# Patient Record
Sex: FEMALE | Race: WHITE | Employment: FULL TIME | URBAN - METROPOLITAN AREA
[De-identification: names, ages, dates, MRNs, and addresses within clinical notes are randomized per-mention and may not be internally consistent; named-entity substitution may affect disease eponyms.]

---

## 2017-10-13 ENCOUNTER — ANESTHESIA EVENT (OUTPATIENT)
Dept: ENDOSCOPY | Age: 67
End: 2017-10-13
Payer: MEDICARE

## 2017-10-13 RX ORDER — UREA 10 %
1 LOTION (ML) TOPICAL
COMMUNITY
End: 2022-07-07 | Stop reason: CLARIF

## 2017-10-13 NOTE — ANESTHESIA PREPROCEDURE EVALUATION
Anesthetic History   No history of anesthetic complications            Review of Systems / Medical History  Patient summary reviewed, nursing notes reviewed and pertinent labs reviewed    Pulmonary                Comments: Remote smoking history - Quit 1967 - 0.125 pack years   Neuro/Psych   Within defined limits           Cardiovascular  Within defined limits                Exercise tolerance: >4 METS  Comments: 3-2016 EKG: SB, rate 54   GI/Hepatic/Renal     GERD: well controlled      PUD    Comments: Dysphagia  RUQ pain  Colon polyps Endo/Other        Arthritis     Other Findings            Physical Exam    Airway  Mallampati: III  TM Distance: 4 - 6 cm  Neck ROM: normal range of motion   Mouth opening: Normal     Cardiovascular  Regular rate and rhythm,  S1 and S2 normal,  no murmur, click, rub, or gallop             Dental  No notable dental hx       Pulmonary  Breath sounds clear to auscultation               Abdominal  GI exam deferred       Other Findings            Anesthetic Plan    ASA: 2  Anesthesia type: total IV anesthesia and general          Induction: Intravenous  Anesthetic plan and risks discussed with: Patient

## 2017-10-16 ENCOUNTER — ANESTHESIA (OUTPATIENT)
Dept: ENDOSCOPY | Age: 67
End: 2017-10-16
Payer: MEDICARE

## 2017-10-16 ENCOUNTER — HOSPITAL ENCOUNTER (OUTPATIENT)
Age: 67
Setting detail: OUTPATIENT SURGERY
Discharge: HOME | End: 2017-10-16
Attending: INTERNAL MEDICINE | Admitting: INTERNAL MEDICINE
Payer: MEDICARE

## 2017-10-16 VITALS
DIASTOLIC BLOOD PRESSURE: 62 MMHG | WEIGHT: 171.31 LBS | RESPIRATION RATE: 10 BRPM | HEIGHT: 61 IN | HEART RATE: 57 BPM | SYSTOLIC BLOOD PRESSURE: 129 MMHG | TEMPERATURE: 98.3 F | BODY MASS INDEX: 32.34 KG/M2 | OXYGEN SATURATION: 99 %

## 2017-10-16 PROCEDURE — 77030013992 HC SNR POLYP ENDOSC BSC -B: Performed by: INTERNAL MEDICINE

## 2017-10-16 PROCEDURE — 76060000031 HC ANESTHESIA FIRST 0.5 HR: Performed by: INTERNAL MEDICINE

## 2017-10-16 PROCEDURE — 74011000250 HC RX REV CODE- 250

## 2017-10-16 PROCEDURE — 74011250636 HC RX REV CODE- 250/636: Performed by: INTERNAL MEDICINE

## 2017-10-16 PROCEDURE — 76040000019: Performed by: INTERNAL MEDICINE

## 2017-10-16 PROCEDURE — 88305 TISSUE EXAM BY PATHOLOGIST: CPT | Performed by: INTERNAL MEDICINE

## 2017-10-16 RX ORDER — ETOMIDATE 2 MG/ML
INJECTION INTRAVENOUS AS NEEDED
Status: DISCONTINUED | OUTPATIENT
Start: 2017-10-16 | End: 2017-10-16 | Stop reason: HOSPADM

## 2017-10-16 RX ORDER — EPINEPHRINE 0.1 MG/ML
1 INJECTION INTRACARDIAC; INTRAVENOUS
Status: DISCONTINUED | OUTPATIENT
Start: 2017-10-16 | End: 2017-10-16 | Stop reason: HOSPADM

## 2017-10-16 RX ORDER — MIDAZOLAM HYDROCHLORIDE 1 MG/ML
1-2 INJECTION, SOLUTION INTRAMUSCULAR; INTRAVENOUS
Status: DISCONTINUED | OUTPATIENT
Start: 2017-10-16 | End: 2017-10-16 | Stop reason: HOSPADM

## 2017-10-16 RX ORDER — SODIUM CHLORIDE 0.9 % (FLUSH) 0.9 %
5-10 SYRINGE (ML) INJECTION AS NEEDED
Status: DISCONTINUED | OUTPATIENT
Start: 2017-10-16 | End: 2017-10-16 | Stop reason: HOSPADM

## 2017-10-16 RX ORDER — ATROPINE SULFATE 0.1 MG/ML
0.5 INJECTION INTRAVENOUS
Status: DISCONTINUED | OUTPATIENT
Start: 2017-10-16 | End: 2017-10-16 | Stop reason: HOSPADM

## 2017-10-16 RX ORDER — MIDAZOLAM HYDROCHLORIDE 1 MG/ML
.25-5 INJECTION, SOLUTION INTRAMUSCULAR; INTRAVENOUS
Status: DISCONTINUED | OUTPATIENT
Start: 2017-10-16 | End: 2017-10-16 | Stop reason: HOSPADM

## 2017-10-16 RX ORDER — FENTANYL CITRATE 50 UG/ML
25 INJECTION, SOLUTION INTRAMUSCULAR; INTRAVENOUS
Status: DISCONTINUED | OUTPATIENT
Start: 2017-10-16 | End: 2017-10-16 | Stop reason: HOSPADM

## 2017-10-16 RX ORDER — FLUMAZENIL 0.1 MG/ML
0.2 INJECTION INTRAVENOUS
Status: DISCONTINUED | OUTPATIENT
Start: 2017-10-16 | End: 2017-10-16 | Stop reason: HOSPADM

## 2017-10-16 RX ORDER — SODIUM CHLORIDE 0.9 % (FLUSH) 0.9 %
5-10 SYRINGE (ML) INJECTION EVERY 8 HOURS
Status: DISCONTINUED | OUTPATIENT
Start: 2017-10-16 | End: 2017-10-16 | Stop reason: HOSPADM

## 2017-10-16 RX ORDER — DEXTROMETHORPHAN/PSEUDOEPHED 2.5-7.5/.8
1.2 DROPS ORAL
Status: DISCONTINUED | OUTPATIENT
Start: 2017-10-16 | End: 2017-10-16 | Stop reason: HOSPADM

## 2017-10-16 RX ORDER — NALOXONE HYDROCHLORIDE 0.4 MG/ML
0.4 INJECTION, SOLUTION INTRAMUSCULAR; INTRAVENOUS; SUBCUTANEOUS
Status: DISCONTINUED | OUTPATIENT
Start: 2017-10-16 | End: 2017-10-16 | Stop reason: HOSPADM

## 2017-10-16 RX ORDER — SODIUM CHLORIDE 9 MG/ML
75 INJECTION, SOLUTION INTRAVENOUS CONTINUOUS
Status: DISCONTINUED | OUTPATIENT
Start: 2017-10-16 | End: 2017-10-16 | Stop reason: HOSPADM

## 2017-10-16 RX ADMIN — ETOMIDATE 50 MG: 2 INJECTION INTRAVENOUS at 07:47

## 2017-10-16 RX ADMIN — ETOMIDATE 50 MG: 2 INJECTION INTRAVENOUS at 07:52

## 2017-10-16 RX ADMIN — ETOMIDATE 50 MG: 2 INJECTION INTRAVENOUS at 07:57

## 2017-10-16 RX ADMIN — ETOMIDATE 50 MG: 2 INJECTION INTRAVENOUS at 07:50

## 2017-10-16 RX ADMIN — SODIUM CHLORIDE 75 ML/HR: 900 INJECTION, SOLUTION INTRAVENOUS at 07:30

## 2017-10-16 RX ADMIN — ETOMIDATE 50 MG: 2 INJECTION INTRAVENOUS at 07:54

## 2017-10-16 NOTE — PERIOP NOTES
Polyp removed from Ascending Colon:   - Protruding lesions:     -Sessile Polyp(s) size 3 mm removed by polypectomy (cold snare) using cold snare technique.

## 2017-10-16 NOTE — H&P
Gastroenterology Outpatient History and Physical    Patient: Torey Menon    Physician: Verito Prakash MD    Chief Complaint: [facility] Home Hx of colon polyps  History of Present Illness: 70yo F with Pers Hx of colon polyps. Last colonoscopy  without polyps. Denies new GI s/Sx    History:  Past Medical History:   Diagnosis Date    Arthritis     Autoimmune disease ([facility] [address]     Lickens    GERD (gastroesophageal reflux disease)     PUD (peptic ulcer disease)       Past Surgical History:   Procedure Laterality Date    HX  SECTION      X2    HX CHOLECYSTECTOMY      HX ORTHOPAEDIC      right foot, bunionectomy    HX ORTHOPAEDIC      bilateral hip arthroplasty    MA COLONOSCOPY FLX DX W/COLLJ SPEC WHEN PFRMD  1/20/2012     x 2    UPPER GI ENDOSCOPY,BIOPSY  2016         UPPER GI ENDOSCOPY,DILATN W GUIDE  2016           Social History     Social History    Marital status:      Spouse name: N/A    Number of children: N/A    Years of education: N/A     Social History Main Topics    Smoking status: Former Smoker     Packs/day: 0.25     Years: 0.50     Quit date: 1967    Smokeless tobacco: Never Used    Alcohol use Yes      Comment: occasionally    Drug use: No    Sexual activity: Not Asked     Other Topics Concern    None     Social History Narrative      Family History   Problem Relation Age of Onset    Cancer Maternal Grandmother 79     colon cancer    Hypertension Mother     Cancer Mother      pacreatic cancer      Patient Active Problem List   Diagnosis Code    DJD (degenerative joint disease) of hip M16.9    Hip arthritis M16.10       Allergies:    Allergies   Allergen Reactions    Codeine Nausea and Vomiting    Penicillins Rash     Rash & dizzy    Sulfa (Sulfonamide Antibiotics) Other (comments)     General redness all over skin    Ciprofloxacin Itching and Other (comments)     Extreme dizziness and rash    Keflex [Cephalexin] Other (comments)     Denies allergy Medications:   Prior to Admission medications    Medication Sig Start Date End Date Taking? Authorizing Provider   melatonin 1 mg tablet Take 1 mg by mouth nightly. Yes Historical Provider   cyanocobalamin (VITAMIN B-12) 1,000 mcg tablet Take 1,000 mcg by mouth daily. Yes Historical Provider   cholecalciferol, vitamin D3, 2,000 unit tab Take 2,000 Units by mouth daily. Yes Historical Provider   BIOTIN PO Take 5,000 mcg by mouth daily. Yes Historical Provider   pyridoxine (VITAMIN B-6) 100 mg tablet Take 100 mg by mouth daily. Yes Historical Provider     Physical Exam:   Vital Signs: There were no vitals taken for this visit.   General: well developed, well nourished   HEENT: unremarkable   Heart: regular rhythm no mumur    Lungs: clear   Abdominal:  benign   Neurological: unremarkable   Extremities: no edema     Findings/Diagnosis: Pers Hx of colon polyps  Plan of Care/Planned Procedure: Colonoscopy with conscious/deep sedation    Signed:  Amairani Chinchilla MD 10/16/2017

## 2017-10-16 NOTE — DISCHARGE INSTRUCTIONS
Brittany Alert  [id number]  1950    COLON DISCHARGE INSTRUCTIONS  Discomfort:  Redness at IV site- apply warm compress to area; if redness or soreness persist- contact your physician  There may be a slight amount of blood passed from the rectum  Gaseous discomfort- walking, belching will help relieve any discomfort  You may not operate a vehicle for 12 hours  You may not engage in an occupation involving machinery or appliances for rest of today  You may not drink alcoholic beverages for at least 12 hours  Avoid making any critical decisions for at least 24 hour  DIET:   High fiber diet. - however -  remember your colon is empty and a heavy meal will produce gas. Avoid these foods:  vegetables, fried / greasy foods, carbonated drinks for today  MEDICATION:         ACTIVITY:  You may not resume your normal daily activities until tomorrow AM; it is recommended that you spend the remainder of the day resting -  avoid any strenuous activity. CALL M.D. ANY SIGN OF:   Increasing pain, nausea, vomiting  Abdominal distension (swelling)  New increased bleeding (oral or rectal)  Fever (chills)      IMPRESSION:  -Left side colonic diverticulosis  -Single diminutive 3mm sessile, benign appearing polyp in the ascending colon at 80cm, removed with cold snare    Follow-up Instructions:   Call Dr. Fab Blue for the results of procedure / biopsy in 7-10 days  Telephone # [phone]  Repeat colonoscopy in 5 years    Bebeto William MD    [employer] Activation    Thank you for requesting access to [employer]. Please follow the instructions below to securely access and download your online medical record. L[employer] allows you to send messages to your doctor, view your test results, renew your prescriptions, schedule appointments, and more. How Do I Sign Up? 1. In your internet browser, go to [email or web address]  2. Click on the First Time User? Click Here link in the Sign In box.  You will be redirect to the New Member Sign Up page.  3. Enter your [employer] Access Code exactly as it appears below. You will not need to use this code after youve completed the sign-up process. If you do not sign up before the expiration date, you must request a new code. MyChart Access Code: Activation code not generated  Current Pagevamp Status: Active (This is the date your [employer] access code will )    4. Enter the last four digits of your Social Security Number (xxxx) and Date of Birth (mm/dd/yyyy) as indicated and click Submit. You will be taken to the next sign-up page. 5. Create a [employer] ID. This will be your Page[employer]amp login ID and cannot be changed, so think of one that is secure and easy to remember. 6. Create a [employer] password. You can change your password at any time. 7. Enter your Password Reset Question and Answer. This can be used at a later time if you forget your password. 8. Enter your e-mail address. You will receive e-mail notification when new information is available in [address] 9. Click Sign Up. You can now view and download portions of your medical record. 10. Click the Download Summary menu link to download a portable copy of your medical information. Additional Information    If you have questions, please visit the Frequently Asked Questions section of the Page[employer] website at [email or web address] Remember, Page[employer] is NOT to be used for urgent needs. For medical emergencies, dial 911.

## 2017-10-16 NOTE — ANESTHESIA POSTPROCEDURE EVALUATION
Post-Anesthesia Evaluation and Assessment    Patient: Vanessa Kendrick MRN: [id number]  SSN: [id number]    YOB: 1950  Age: 79 y.o. Sex: female       Cardiovascular Function/Vital Signs  Visit Vitals    /78    Pulse (!) 47    Temp 36.8 °C (98.3 °F)    Resp 13    Ht 5' 0.5\" (1.537 m)    Wt 77.7 kg (171 lb 5 oz)    SpO2 100%    BMI 32.91 kg/m2       Patient is status post total IV anesthesia, general anesthesia for Procedure(s):  COLONOSCOPY  ENDOSCOPIC POLYPECTOMY. Nausea/Vomiting: None    Postoperative hydration reviewed and adequate. Pain:  Pain Scale 1: Numeric (0 - 10) (10/16/17 1174)  Pain Intensity 1: 0 (10/16/17 9410)   Managed    Neurological Status: At baseline    Mental Status and Level of Consciousness: Arousable    Pulmonary Status:   O2 Device: Room air (10/16/17 2245)   Adequate oxygenation and airway patent    Complications related to anesthesia: None    Post-anesthesia assessment completed.  No concerns    Signed By: Jonny Mercer MD     October 16, 2017

## 2017-10-16 NOTE — ROUTINE PROCESS
Rocio Saini  1950  [id number]    Situation:  Verbal report received from: Jeffrey Martinez RN  Procedure: Procedure(s):  COLONOSCOPY  ENDOSCOPIC POLYPECTOMY    Background:    Preoperative diagnosis: DYSPHAGIA, RUQ PAIN, HX COLONIC POLYPS  Postoperative diagnosis: diverticulosis; colon polyp    :  Dr. Mary Grossman  Assistant(s): Endoscopy Technician-1: Michaela Cisneros  Endoscopy RN-1: Dennis Orta    Specimens:   ID Type Source Tests Collected by Time Destination   1 : ascending colon polyp for pathology Preservative Colon, Ascending  Carlos Antoine MD 10/16/2017 8799 Pathology     H. Pylori  no    Assessment:  Intra-procedure medications       Anesthesia gave intra-procedure sedation and medications, see anesthesia flow sheet yes    Intravenous fluids: NS@ KVO     Vital signs stable       Abdominal assessment: round and soft       Recommendation:  Discharge patient per MD order  .     Family or Friend   Norman Aguilera  Permission to share finding with family or friend yes

## 2017-10-16 NOTE — PROCEDURES
NAME:  Littie Galeazzi   :   1950   MRN:   [id number]     Date/Time:  10/16/2017 8:05 AM    Colonoscopy Operative Report    Procedure Type:   Colonoscopy with polypectomy (cold snare)     Indications:     Personal history of colon polyps (screening only)  Pre-operative Diagnosis: see indication above  Post-operative Diagnosis:  See findings below  :  Larissa Robert MD  Referring Provider: Destiny Solano MD    Exam:  Airway: clear, no airway problems anticipated  Heart: RRR, without gallops or rubs  Lungs: clear bilaterally without wheezes, crackles, or rhonchi  Abdomen: soft, nontender, nondistended, bowel sounds present  Mental Status: awake, alert and oriented to person, place and time    Sedation:  MAC anesthesia Propofol 250mg IV  Procedure Details:  After informed consent was obtained with all risks and benefits of procedure explained and preoperative exam completed, the patient was taken to the endoscopy suite and placed in the left lateral decubitus position. Upon sequential sedation as per above, a digital rectal exam was performed demonstrating no hemorrhoids. The Olympus videocolonoscope  was inserted in the rectum and carefully advanced to the cecum, which was identified by the ileocecal valve and appendiceal orifice. The quality of preparation was adequate. The colonoscope was slowly withdrawn with careful evaluation between folds. Retroflexion in the rectum was completed demonstrating no hemorrhoids. Findings:     -Left side colonic diverticulosis  -Single diminutive 3mm sessile, benign appearing polyp in the ascending colon at 80cm, removed with cold snare    Specimen Removed:  #1 asc colon  Complications: None. EBL:  None. Impression:    -Left side colonic diverticulosis  -Single diminutive 3mm sessile, benign appearing polyp in the ascending colon at 80cm, removed with cold snare    Recommendations: --Await pathology. , -Repeat colonoscopy in 5 years.  High fiber diet.  Resume normal medication(s). You will receive a letter about the biopsy results in about 10 days. You may be asked to call your doctor's office for the results. Discharge Disposition:  Home in the company of a  when able to ambulate.     Normie Dakins, MD

## 2017-10-16 NOTE — IP AVS SNAPSHOT
[address] [facility] 
[phone] Patient: Liz Disla MRN: [id number] WBT:[birth year] You are allergic to the following Allergen Reactions Codeine Nausea and Vomiting Penicillins Rash Rash & dizzy Sulfa (Sulfonamide Antibiotics) Other (comments) General redness all over skin Ciprofloxacin Itching Other (comments) Extreme dizziness and rash Keflex (Cephalexin) Other (comments) Denies allergy Recent Documentation Height Weight BMI OB Status Smoking Status 1.537 m 77.7 kg 32.91 kg/m2 Postmenopausal Former Smoker Emergency Contacts Name Discharge Info Relation Home Work Mobile Abner Muhammad DISCHARGE CAREGIVER [3] Child [2] [phone] About your hospitalization You were admitted on:  October 16, 2017 You last received care in the:  [facility] ENDOSCOPY You were discharged on:  October 16, 2017 Unit phone number:  [phone] Why you were hospitalized Your primary diagnosis was:  Not on File Providers Seen During Your Hospitalizations Provider Role Specialty Primary office phone Normie Dakins, MD Attending Provider Gastroenterology [phone] Your Primary Care Physician (PCP) Primary Care Physician Office Phone Office Fax [address] [phone] Follow-up Information None Current Discharge Medication List  
  
CONTINUE these medications which have NOT CHANGED Dose & Instructions Dispensing Information Comments Morning Noon Evening Bedtime BIOTIN PO Your last dose was: Your next dose is:    
   
   
 Dose:  5000 mcg Take 5,000 mcg by mouth daily. Refills:  0  
     
   
   
   
  
 cholecalciferol (vitamin D3) 2,000 unit Tab Your last dose was: Your next dose is: Dose:  2000 Units Take 2,000 Units by mouth daily. Refills:  0  
     
   
   
   
  
 melatonin 1 mg tablet Your last dose was: Your next dose is:    
   
   
 Dose:  1 mg Take 1 mg by mouth nightly. Refills:  0  
     
   
   
   
  
 pyridoxine (vitamin B6) 100 mg tablet Commonly known as:  VITAMIN B-6 Your last dose was: Your next dose is:    
   
   
 Dose:  100 mg Take 100 mg by mouth daily. Refills:  0  
     
   
   
   
  
 VITAMIN B-12 1,000 mcg tablet Generic drug:  cyanocobalamin Your last dose was: Your next dose is:    
   
   
 Dose:  1000 mcg Take 1,000 mcg by mouth daily. Refills:  0 Discharge Instructions Caroline Lara [id number] 
1950 COLON DISCHARGE INSTRUCTIONS Discomfort: 
Redness at IV site- apply warm compress to area; if redness or soreness persist- contact your physician There may be a slight amount of blood passed from the rectum Gaseous discomfort- walking, belching will help relieve any discomfort You may not operate a vehicle for 12 hours You may not engage in an occupation involving machinery or appliances for rest of today You may not drink alcoholic beverages for at least 12 hours Avoid making any critical decisions for at least 24 hour DIET: 
 High fiber diet.  however -  remember your colon is empty and a heavy meal will produce gas. Avoid these foods:  vegetables, fried / greasy foods, carbonated drinks for today MEDICATION: 
 
 
  
ACTIVITY: 
You may not resume your normal daily activities until tomorrow AM; it is recommended that you spend the remainder of the day resting -  avoid any strenuous activity. CALL M.D. ANY SIGN OF: Increasing pain, nausea, vomiting Abdominal distension (swelling) New increased bleeding (oral or rectal) Fever (chills) IMPRESSION: 
-Left side colonic diverticulosis -Single diminutive 3mm sessile, benign appearing polyp in the ascending colon at 80cm, removed with cold snare Follow-up Instructions: 
 Call Dr. Susan Turner for the results of procedure / biopsy in 7-10 days Telephone # [phone] Repeat colonoscopy in 5 years Jaqueline Araujo MD 
 
[employer] Activation Thank you for requesting access to [employer]. Please follow the instructions below to securely access and download your online medical record. [employer] allows you to send messages to your doctor, view your test results, renew your prescriptions, schedule appointments, and more. How Do I Sign Up? 1. In your internet browser, go to [email or web address] 
2. Click on the First Time User? Click Here link in the Sign In box. You will be redirect to the New Member Sign Up page. 3. Enter your [employer] Access Code exactly as it appears below. You will not need to use this code after youve completed the sign-up process. If you do not sign up before the expiration date, you must request a new code. [employer] Access Code: Activation code not generated Current [employer] Status: Active (This is the date your [employer] access code will ) 4. Enter the last four digits of your Social Security Number (xxxx) and Date of Birth (mm/dd/yyyy) as indicated and click Submit. You will be taken to the next sign-up page. 5. Create a [employer] ID. This will be your [employer] login ID and cannot be changed, so think of one that is secure and easy to remember. 6. Create a [employer] password. You can change your password at any time. 7. Enter your Password Reset Question and Answer. This can be used at a later time if you forget your password. 8. Enter your e-mail address. You will receive e-mail notification when new information is available in [address] 9. Click Sign Up. You can now view and download portions of your medical record. 10. Click the Download Summary menu link to download a portable copy of your medical information. Additional Information If you have questions, please visit the Frequently Asked Questions section of the [employer] website at [email or web address] Remember, [employer] is NOT to be used for urgent needs. For medical emergencies, dial 911. Discharge Orders None [facility]! Dear Demetrius Black: 
Thank you for requesting a [employer] account. Our records indicate that you already have an active [employer] account. You can access your account anytime at [email or web address] Did you know that you can access your hospital and ER discharge instructions at any time in [employer]? You can also review all of your test results from your hospital stay or ER visit. Additional Information If you have questions, please visit the Frequently Asked Questions section of the [employer] website at [email or web address] Remember, [employer] is NOT to be used for urgent needs. For medical emergencies, dial 911. Now available from your iPhone and Android! General Information Please provide this summary of care documentation to your next provider. Patient Signature:  ____________________________________________________________ Date:  ____________________________________________________________  
  
Lizz Cobb Provider Signature:  ____________________________________________________________ Date:  ____________________________________________________________

## 2019-04-25 ENCOUNTER — HOSPITAL ENCOUNTER (OUTPATIENT)
Dept: MAMMOGRAPHY | Age: 69
Discharge: HOME | End: 2019-04-25
Payer: MEDICARE

## 2019-04-25 DIAGNOSIS — Z78.0 POSTMENOPAUSAL: ICD-10-CM

## 2019-04-25 PROCEDURE — 77080 DXA BONE DENSITY AXIAL: CPT

## 2021-06-25 RX ORDER — MISOPROSTOL 200 UG/1
200 TABLET ORAL ONCE
COMMUNITY
End: 2021-07-07

## 2021-06-25 RX ORDER — METHYLPREDNISOLONE 4 MG/1
4 TABLET ORAL
COMMUNITY
End: 2022-10-20

## 2021-06-25 RX ORDER — IBUPROFEN 200 MG
400 TABLET ORAL
COMMUNITY
End: 2021-07-07

## 2021-06-25 NOTE — PERIOP NOTES
[facility]  Ambulatory Surgery Unit  Pre-operative Instructions    Surgery/Procedure Date  7/7            Tentative Arrival Time tbd      1. On the day of your surgery/procedure, please report to the Ambulatory Surgery Unit Registration Desk and sign in at your designated time. The Ambulatory Surgery Unit is located in [facility] on the [facility] side of the [facility] across from the [facility]. Please have all of your health insurance cards and a photo ID. 2. You must have someone with you to drive you home, as you should not drive a car for 24 hours following anesthesia. Please make arrangements for a responsible adult friend or family member to stay with you for at least the first 24 hours after your surgery. 3. Do not have anything to eat or drink (including water, gum, mints, coffee, juice) after 11:59 PM  7/6. This may not apply to medications prescribed by your physician. (Please note below the special instructions with medications to take the morning of surgery, if applicable.)    4. We recommend you do not drink any alcoholic beverages for 24 hours before and after your surgery. 5. Contact your surgeons office for instructions on the following medications: non-steroidal anti-inflammatory drugs (i.e. Advil, Aleve), vitamins, and supplements. (Some surgeons will want you to stop these medications prior to surgery and others may allow you to take them)   **If you are currently taking Plavix, Coumadin, Aspirin and/or other blood-thinning agents, contact your surgeon for instructions. ** Your surgeon will partner with the physician prescribing these medications to determine if it is safe to stop or if you need to continue taking. Please do not stop taking these medications without instructions from your surgeon.     6. In an effort to help prevent surgical site infection, we ask that you shower with an anti-bacterial soap (i.e. Dial/Safeguard, or the soap provided to you at your preadmission testing appointment) for 3 days prior to and on the morning of surgery, using a fresh towel after each shower. (Please begin this process with fresh bed linens.) Do not apply any lotions, powders, or deodorants after the shower on the day of your procedure. If applicable, please do not shave the operative site for 48 hours prior to surgery. 7. Wear comfortable clothes. Wear glasses instead of contacts. Do not bring any jewelry or money (other than copays or fees as instructed). Do not wear make-up, particularly mascara, the morning of your surgery. Do not wear nail polish, particularly if you are having foot /hand surgery. Wear your hair loose or down, no ponytails, buns, erich pins or clips. All body piercings must be removed. 8. You should understand that if you do not follow these instructions your surgery may be cancelled. If your physical condition changes (i.e. fever, cold or flu) please contact your surgeon as soon as possible. 9. It is important that you be on time. If a situation occurs where you may be late, or if you have any questions or problems, please call [phone].    10. Your surgery time may be subject to change. You will receive a phone call the day prior to surgery to confirm your arrival time. Special Instructions: Take all medications and inhalers, as prescribed, on the morning of surgery with a sip of water EXCEPT: none    I understand a pre-operative phone call will be made to verify my surgery time. In the event that I am not available, I give permission for a message to be left on my answering service and/or with another person? yes    Reviewed by phone with pt, verbalize understanding.  Bring vaccine card dos     ___________________      ___________________      ________________  (Signature of Patient)          (Witness)                   (Date and Time)

## 2021-07-02 RX ORDER — ACETAMINOPHEN 500 MG
1000 TABLET ORAL ONCE
Status: COMPLETED | OUTPATIENT
Start: 2021-07-07 | End: 2021-07-07

## 2021-07-06 ENCOUNTER — ANESTHESIA EVENT (OUTPATIENT)
Dept: SURGERY | Age: 71
End: 2021-07-06
Payer: MEDICARE

## 2021-07-06 NOTE — H&P
Pre-operative Evaluation / History & Physical    Sent From: Sent To: [address] [place], [place]  Phone: [phone] Fax: [phone]      Patient Information  Patient Name Julio César DORMAN    1950 Age 69yo   Address [place] [address] [place] [address], [address] Phone H: [phone]  W: [phone]  M: [phone] [facility] [place] (Medicare)  ID: [id number]  Policy [place]: Shelley MULLIGAN [facility] [place] ([address] [place] [address])  ID: [id number]  Policy [place]: Diego Freeman Visit and Medical History  Chief Complaint postmenopausal bleeding   History of Present Illness Patient presents today for follow up of postmenopausal bleeding. last month she noticed some pink spotting on her liner one day and then a few more times when she wiped after urinating. She also had some pelvic pain. she saw Dr. Raina Stevenson who did an EMB. EMB was benign but no endometrial tissue was identified. she set her up for u/s and follow up. she is here today. she has not had any further bleeding. Her pain has improved. No vaginal discharge, no bladder or bowel complaints. EMS 12.5mm  Uterus 5 x 2.9 x 5cm  Cervical length 2.3cm     Past Medical History Discussed Past Medical History  Colon Polyp: Y  Obesity: Y  Other: (no answer) - Lichen Planus   Surgical History Reviewed Surgical History     Orthopedic - Hip Replacement - Bilateral hip replacement ,    Cholecystectomy (Gallbladder)    delivery - 1986   Tubal Ligation   Colonoscopy   Gynecological / Obstetrical History Reviewed GYN History  Date of LMP: (Notes: ). Date of Last Pap Smear: 2020 (Notes: NIL). Sexually Active?: N.  STIs/STDs: Y (Notes: HSV). Current Birth Control Method: Tubal Ligation. Date of Last Mammogram: 2020 (Notes: BIRADS 1, TC LR 8.4%). Most Recent Bone Density: (Notes: 2019 osteopenia (pcp)).   Date of Last Colonoscopy: (Notes: 2017 polyp removed Q5). Social History Discussed Social History  OB/GYN Social History  Chewing tobacco: none  Tobacco Smoking Status: Never smoker  Non-smoker  Smokeless Tobacco Status: Never used smokeless tobacco  Tobacco-years of use: 0  E-cigarette/Vape Status: Never used electronic cigarettes  Most Recent Tobacco Use Screenin/15/2020  Marital status:  (Notes:   2013)  Number of children: 2  Are you working: Yes  Occupation: dept of  in [employer]  On average, how many days per week do you engage in moderate to strenuous EXERCISE (like walking fast, running, jogging, dancing, swimming, biking, or other activities that cause a light or heavy sweat)?: 0  How often do you have a DRINK containing ALCOHOL?: Monthly or less  Illicit drugs: no   Family History Discussed Family History    Paternal Aunt - Malignant tumor of breast     - Malignant tumor of ovary   Maternal Grandmother - Malignant tumor of colon   Paternal Grandmother - Diabetes mellitus   Mother - Hypertensive disorder     - Malignant tumor of pancreas   Sister - Hypertensive disorder     - History of transient ischemic attack   Paternal Uncle - Heart disease      Allergies List Reviewed Allergies     CIPRO: Dizziness (Moderate)    CODEINE: Vomiting (Moderate)    PENICILLIN G SODIUM: Dizziness (Moderate)    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    Vital Signs Ht: 5 ft 0.75 in (154.31 cm) 2021 10:37 am Wt: 177 lbs (80.29 kg) 2021 10:38 am BMI: 33.7 2021 10:38 am   BP: 150/80 2021 10:40 am          Physical Exam Patient is a 70-year-old female. Constitutional: General Appearance: well developed and nourished and pleasant. Level of Distress: no acute distress. Ambulation: ambulating normally. Head: Head: normocephalic. Eyes: Extraocular Movements extraocular movements intact. Ears, Nose, Mouth, Throat: Ears normal hearing. Nose: no external nose lesions. Neck: Appearance supple, trachea midline, and no neck masses. Thyroid: no enlargement or nodules and non-tender. Abdomen: Inspection and Palpation: no tenderness, guarding, masses, rebound tenderness, or CVA tenderness and soft and non-distended. Liver: non-tender; no masses. Spleen: no masses. Hernia: none palpable. Female : External genitalia: no lesions, rash, or erythema. Vagina: no discharge, mass, or tenderness and atrophic mucosa. Cervix: no discharge or cervical lesion. Uterus: midline, non-tender, no mass, and not enlarged. Adnexae: no adnexal mass or tenderness. Bladder and Urethra: no urethral discharge or mass. Lymphatics: Inguinal no inguinal lymphadenopathy. Mental Status Exam: Orientation oriented to person, place, and time. ultrasound  Normal uterus wit 12.5 mm stripe. Normal right ovary. Non visible left ovary with otherwise normal left adnexa. No free fluid. Lab Results    Assessment and Plan 1. Postmenopausal bleeding -  Exam is normal. bleeding resolved. however, we discussed that stripe is quite a bit more than 4 mm which is the cut of to use u/s alone to rule out serious pathology. EMB was benign but inconclusive. i think the safest thing would be to proceed with hysteroscopy, possible myosure with Dr. Leticia Zayas. We discussed risks of bleeding, infection, injury to surrounding tissues that could require further surgery, and anesthetic complications. The need for blood transfusion is unlikely but possible. All of her questions were answered. She desires to proceed with surgery. she is fine to meet her day of procedure.  she will call back with any questions. N95.0: Postmenopausal bleeding    2.  Endometrium thickened  R93.89: Abnormal findings on diagnostic imaging of other specified body structures      Return to Office  None recorded   Current Problems (Diagnoses) Reviewed Problems     Endometrium thickened - Onset: 04/20/2021   Obesity - Onset: 12/29/2015   Atrophic vaginitis - Onset: 01/27/2009   Postmenopausal bleeding - Onset: 04/20/2021         Electronically Signed by: Stanton Rosas MD    _____________________________________________  Ordered/Documented by:  Visit Date: 04/20/2021

## 2021-07-07 ENCOUNTER — HOSPITAL ENCOUNTER (OUTPATIENT)
Age: 71
Setting detail: OUTPATIENT SURGERY
Discharge: HOME | End: 2021-07-07
Attending: OBSTETRICS & GYNECOLOGY | Admitting: OBSTETRICS & GYNECOLOGY
Payer: MEDICARE

## 2021-07-07 ENCOUNTER — ANESTHESIA (OUTPATIENT)
Dept: SURGERY | Age: 71
End: 2021-07-07
Payer: MEDICARE

## 2021-07-07 VITALS
WEIGHT: 175 LBS | HEART RATE: 64 BPM | BODY MASS INDEX: 33.04 KG/M2 | RESPIRATION RATE: 13 BRPM | DIASTOLIC BLOOD PRESSURE: 75 MMHG | TEMPERATURE: 97.6 F | OXYGEN SATURATION: 99 % | SYSTOLIC BLOOD PRESSURE: 156 MMHG | HEIGHT: 61 IN

## 2021-07-07 PROCEDURE — 76210000034 HC AMBSU PH I REC 0.5 TO 1 HR: Performed by: OBSTETRICS & GYNECOLOGY

## 2021-07-07 PROCEDURE — 2709999900 HC NON-CHARGEABLE SUPPLY: Performed by: OBSTETRICS & GYNECOLOGY

## 2021-07-07 PROCEDURE — 77030021352 HC CBL LD SYS DISP COVD -B: Performed by: OBSTETRICS & GYNECOLOGY

## 2021-07-07 PROCEDURE — 77030033137 HC TBNG OUTFLO AQUILEX ST HOLO -B: Performed by: OBSTETRICS & GYNECOLOGY

## 2021-07-07 PROCEDURE — 74011250636 HC RX REV CODE- 250/636: Performed by: NURSE ANESTHETIST, CERTIFIED REGISTERED

## 2021-07-07 PROCEDURE — 74011250637 HC RX REV CODE- 250/637: Performed by: ANESTHESIOLOGY

## 2021-07-07 PROCEDURE — 77030033136 HC TBNG INFLO AQUILEX ST HOLO -C: Performed by: OBSTETRICS & GYNECOLOGY

## 2021-07-07 PROCEDURE — 88305 TISSUE EXAM BY PATHOLOGIST: CPT

## 2021-07-07 PROCEDURE — 74011250636 HC RX REV CODE- 250/636: Performed by: ANESTHESIOLOGY

## 2021-07-07 PROCEDURE — 76210000046 HC AMBSU PH II REC FIRST 0.5 HR: Performed by: OBSTETRICS & GYNECOLOGY

## 2021-07-07 PROCEDURE — 74011000250 HC RX REV CODE- 250: Performed by: OBSTETRICS & GYNECOLOGY

## 2021-07-07 PROCEDURE — 76030000000 HC AMB SURG OR TIME 0.5 TO 1: Performed by: OBSTETRICS & GYNECOLOGY

## 2021-07-07 PROCEDURE — 74011000250 HC RX REV CODE- 250: Performed by: NURSE ANESTHETIST, CERTIFIED REGISTERED

## 2021-07-07 PROCEDURE — 77030037417 HC DEV TISS RMVL HOLO -H: Performed by: OBSTETRICS & GYNECOLOGY

## 2021-07-07 PROCEDURE — 76060000061 HC AMB SURG ANES 0.5 TO 1 HR: Performed by: OBSTETRICS & GYNECOLOGY

## 2021-07-07 RX ORDER — HYDROMORPHONE HYDROCHLORIDE 1 MG/ML
.2-.5 INJECTION, SOLUTION INTRAMUSCULAR; INTRAVENOUS; SUBCUTANEOUS ONCE
Status: DISCONTINUED | OUTPATIENT
Start: 2021-07-07 | End: 2021-07-07 | Stop reason: HOSPADM

## 2021-07-07 RX ORDER — LIDOCAINE HYDROCHLORIDE 10 MG/ML
10 INJECTION INFILTRATION; PERINEURAL ONCE
Status: DISCONTINUED | OUTPATIENT
Start: 2021-07-07 | End: 2021-07-07

## 2021-07-07 RX ORDER — MORPHINE SULFATE 10 MG/ML
2 INJECTION, SOLUTION INTRAMUSCULAR; INTRAVENOUS
Status: DISCONTINUED | OUTPATIENT
Start: 2021-07-07 | End: 2021-07-07 | Stop reason: HOSPADM

## 2021-07-07 RX ORDER — OXYCODONE AND ACETAMINOPHEN 5; 325 MG/1; MG/1
1 TABLET ORAL
Status: DISCONTINUED | OUTPATIENT
Start: 2021-07-07 | End: 2021-07-07 | Stop reason: HOSPADM

## 2021-07-07 RX ORDER — PROPOFOL 10 MG/ML
INJECTION, EMULSION INTRAVENOUS AS NEEDED
Status: DISCONTINUED | OUTPATIENT
Start: 2021-07-07 | End: 2021-07-07 | Stop reason: HOSPADM

## 2021-07-07 RX ORDER — SODIUM CHLORIDE 0.9 % (FLUSH) 0.9 %
5-40 SYRINGE (ML) INJECTION AS NEEDED
Status: DISCONTINUED | OUTPATIENT
Start: 2021-07-07 | End: 2021-07-07 | Stop reason: HOSPADM

## 2021-07-07 RX ORDER — IBUPROFEN 600 MG/1
600 TABLET ORAL
Qty: 30 TABLET | Refills: 0 | Status: SHIPPED | OUTPATIENT
Start: 2021-07-07 | End: 2022-08-17 | Stop reason: CLARIF

## 2021-07-07 RX ORDER — KETOROLAC TROMETHAMINE 30 MG/ML
30 INJECTION, SOLUTION INTRAMUSCULAR; INTRAVENOUS
Status: COMPLETED | OUTPATIENT
Start: 2021-07-07 | End: 2021-07-07

## 2021-07-07 RX ORDER — DEXAMETHASONE SODIUM PHOSPHATE 4 MG/ML
INJECTION, SOLUTION INTRA-ARTICULAR; INTRALESIONAL; INTRAMUSCULAR; INTRAVENOUS; SOFT TISSUE AS NEEDED
Status: DISCONTINUED | OUTPATIENT
Start: 2021-07-07 | End: 2021-07-07 | Stop reason: HOSPADM

## 2021-07-07 RX ORDER — LIDOCAINE HYDROCHLORIDE 10 MG/ML
0.1 INJECTION, SOLUTION EPIDURAL; INFILTRATION; INTRACAUDAL; PERINEURAL AS NEEDED
Status: DISCONTINUED | OUTPATIENT
Start: 2021-07-07 | End: 2021-07-07 | Stop reason: HOSPADM

## 2021-07-07 RX ORDER — LIDOCAINE HYDROCHLORIDE 10 MG/ML
10 INJECTION, SOLUTION EPIDURAL; INFILTRATION; INTRACAUDAL; PERINEURAL ONCE
Status: COMPLETED | OUTPATIENT
Start: 2021-07-07 | End: 2021-07-07

## 2021-07-07 RX ORDER — FENTANYL CITRATE 50 UG/ML
25 INJECTION, SOLUTION INTRAMUSCULAR; INTRAVENOUS
Status: DISCONTINUED | OUTPATIENT
Start: 2021-07-07 | End: 2021-07-07 | Stop reason: HOSPADM

## 2021-07-07 RX ORDER — SODIUM CHLORIDE 0.9 % (FLUSH) 0.9 %
5-40 SYRINGE (ML) INJECTION EVERY 8 HOURS
Status: DISCONTINUED | OUTPATIENT
Start: 2021-07-07 | End: 2021-07-07 | Stop reason: HOSPADM

## 2021-07-07 RX ORDER — EPHEDRINE SULFATE/0.9% NACL/PF 50 MG/5 ML
SYRINGE (ML) INTRAVENOUS AS NEEDED
Status: DISCONTINUED | OUTPATIENT
Start: 2021-07-07 | End: 2021-07-07 | Stop reason: HOSPADM

## 2021-07-07 RX ORDER — SODIUM CHLORIDE, SODIUM LACTATE, POTASSIUM CHLORIDE, CALCIUM CHLORIDE 600; 310; 30; 20 MG/100ML; MG/100ML; MG/100ML; MG/100ML
25 INJECTION, SOLUTION INTRAVENOUS CONTINUOUS
Status: DISCONTINUED | OUTPATIENT
Start: 2021-07-07 | End: 2021-07-07 | Stop reason: HOSPADM

## 2021-07-07 RX ORDER — KETOROLAC TROMETHAMINE 30 MG/ML
INJECTION, SOLUTION INTRAMUSCULAR; INTRAVENOUS
Status: DISCONTINUED
Start: 2021-07-07 | End: 2021-07-07 | Stop reason: HOSPADM

## 2021-07-07 RX ORDER — PROPOFOL 10 MG/ML
INJECTION, EMULSION INTRAVENOUS
Status: DISCONTINUED | OUTPATIENT
Start: 2021-07-07 | End: 2021-07-07 | Stop reason: HOSPADM

## 2021-07-07 RX ORDER — DIPHENHYDRAMINE HYDROCHLORIDE 50 MG/ML
12.5 INJECTION, SOLUTION INTRAMUSCULAR; INTRAVENOUS AS NEEDED
Status: DISCONTINUED | OUTPATIENT
Start: 2021-07-07 | End: 2021-07-07 | Stop reason: HOSPADM

## 2021-07-07 RX ORDER — FENTANYL CITRATE 50 UG/ML
INJECTION, SOLUTION INTRAMUSCULAR; INTRAVENOUS AS NEEDED
Status: DISCONTINUED | OUTPATIENT
Start: 2021-07-07 | End: 2021-07-07 | Stop reason: HOSPADM

## 2021-07-07 RX ORDER — SILVER NITRATE 38.21; 12.74 MG/1; MG/1
1 STICK TOPICAL ONCE
Status: DISCONTINUED | OUTPATIENT
Start: 2021-07-07 | End: 2021-07-07 | Stop reason: HOSPADM

## 2021-07-07 RX ORDER — ONDANSETRON 2 MG/ML
4 INJECTION INTRAMUSCULAR; INTRAVENOUS AS NEEDED
Status: DISCONTINUED | OUTPATIENT
Start: 2021-07-07 | End: 2021-07-07 | Stop reason: HOSPADM

## 2021-07-07 RX ORDER — ONDANSETRON 2 MG/ML
INJECTION INTRAMUSCULAR; INTRAVENOUS AS NEEDED
Status: DISCONTINUED | OUTPATIENT
Start: 2021-07-07 | End: 2021-07-07 | Stop reason: HOSPADM

## 2021-07-07 RX ADMIN — FENTANYL CITRATE 12.5 MCG: 50 INJECTION, SOLUTION INTRAMUSCULAR; INTRAVENOUS at 08:08

## 2021-07-07 RX ADMIN — KETOROLAC TROMETHAMINE 30 MG: 30 INJECTION, SOLUTION INTRAMUSCULAR; INTRAVENOUS at 08:57

## 2021-07-07 RX ADMIN — PROPOFOL 40 MG: 10 INJECTION, EMULSION INTRAVENOUS at 08:02

## 2021-07-07 RX ADMIN — Medication 1000 MG: at 07:52

## 2021-07-07 RX ADMIN — ONDANSETRON HYDROCHLORIDE 4 MG: 2 INJECTION, SOLUTION INTRAMUSCULAR; INTRAVENOUS at 08:05

## 2021-07-07 RX ADMIN — FENTANYL CITRATE 25 MCG: 50 INJECTION, SOLUTION INTRAMUSCULAR; INTRAVENOUS at 08:04

## 2021-07-07 RX ADMIN — PROPOFOL 30 MG: 10 INJECTION, EMULSION INTRAVENOUS at 08:13

## 2021-07-07 RX ADMIN — FENTANYL CITRATE 12.5 MCG: 50 INJECTION, SOLUTION INTRAMUSCULAR; INTRAVENOUS at 08:10

## 2021-07-07 RX ADMIN — FENTANYL CITRATE 12.5 MCG: 50 INJECTION, SOLUTION INTRAMUSCULAR; INTRAVENOUS at 08:13

## 2021-07-07 RX ADMIN — FENTANYL CITRATE 12.5 MCG: 50 INJECTION, SOLUTION INTRAMUSCULAR; INTRAVENOUS at 08:07

## 2021-07-07 RX ADMIN — SODIUM CHLORIDE, POTASSIUM CHLORIDE, SODIUM LACTATE AND CALCIUM CHLORIDE 25 ML/HR: 600; 310; 30; 20 INJECTION, SOLUTION INTRAVENOUS at 07:51

## 2021-07-07 RX ADMIN — PROPOFOL 70 MCG/KG/MIN: 10 INJECTION, EMULSION INTRAVENOUS at 08:02

## 2021-07-07 RX ADMIN — PROPOFOL 25 MG: 10 INJECTION, EMULSION INTRAVENOUS at 08:05

## 2021-07-07 RX ADMIN — DEXAMETHASONE SODIUM PHOSPHATE 4 MG: 4 INJECTION, SOLUTION INTRAMUSCULAR; INTRAVENOUS at 08:08

## 2021-07-07 RX ADMIN — PROPOFOL 25 MG: 10 INJECTION, EMULSION INTRAVENOUS at 08:07

## 2021-07-07 RX ADMIN — Medication 5 MG: at 08:15

## 2021-07-07 RX ADMIN — Medication 5 MG: at 08:32

## 2021-07-07 NOTE — PERIOP NOTES
Air Warming blanket placed on pt; turned on for comfort    Permission received to review discharge instructions and discuss private health information with son and will be with patient after discharge

## 2021-07-07 NOTE — DISCHARGE INSTRUCTIONS
>>>You received  Tylenol 1000mg before your surgery, you may take tylenol (or pain medication containing Tylenol or Acetaminophen) in 6 hours at 2 pm today. <<<        After Care Instructions For Your Hysteroscopy      1. You may resume your usual diet once the nausea resolves. Initially, try sips of warm fluids and a bland diet. 2. Avoid heavy lifting and straining. Gradually increase your activity. First, try walking and doing light activity around the house. Resume your normal habits if no significant discomfort or bleeding develops. Most women can return to work within one to four days after this procedure. 3. You may take showers. Avoid using a tub bath, swimming pool or hot tub until after your check-up. 4. Do not place anything in your vagina until after your postoperative visit. Do not   douche, use tampons, or have intercourse because this may cause bleeding and   infection. 5. You may initially experience a heavy bloody discharge. This should not be more than your menstrual flow. Over the next several days, the flow should steadily decrease. 6. Typically following the procedure, there is little or no pain. You may feel cramps in your lower abdomen. Tylenol may relieve mild cramping. If pain medication does not improve your symptoms, you should contact your physician. 7. Contact the office if you have excessive bleeding (saturating a pad an hour for two hours or passing large clots). It is also necessary to speak with your physician if you develop chills, a temperature greater than 100.4, difficulty voiding or burning on urination. 8. Your physician may want to see you in the office after your D&C. Please call for an appointment if this has not already been arranged. Our office phone number is [phone]. If appropriate, the microscopic results from your procedure will be discussed at this follow-up visit.                  We suggest taking a stool softener such as Colace or Miralax (follow package instructions). DO NOT DRIVE TODAY. DO NOT TAKE SLEEPING MEDICATIONS OR ANTIANXIETY MEDICATIONS WHILE TAKING NARCOTIC PAIN MEDICATIONS,  ESPECIALLY THE NIGHT OF ANESTHESIA! CPAP PATIENTS BE SURE TO WEAR MACHINE WHENEVER NAPPING OR SLEEPING! DISCHARGE SUMMARY from Nurse    The following personal items collected during your admission are returned to you:   Dental Appliance: Dental Appliances: None  Vision: Visual Aid: Glasses  Hearing Aid:    Jewelry: Jewelry: None  Clothing: Clothing: With patient  Other Valuables: Other Valuables: Eyeglasses (In Pacu)  Valuables sent to safe:        PATIENT INSTRUCTIONS:    After General Anesthesia or Intravenous Sedation, for 24 hours or while taking prescription Narcotics:        Someone should be with you for the next 24 hours. For your own safety, a responsible adult must drive you home. · Limit your activities  · Recommended activity: Rest today, up with assistance today. Do not climb stairs or shower unattended for the next 24 hours. · Please start with a soft bland diet and advance as tolerated (no nausea) to regular diet. · If you have a sore throat you should try the following: fluids, warm salt water gargles, or throat lozenges. If it does not improve after several days please follow up with your primary physician. · Do not drive and operate hazardous machinery  · Do not make important personal or business decisions  · Do  not drink alcoholic beverages  · If you have not urinated within 8 hours after discharge, please contact your surgeon on call.     Report the following to your surgeon:  · Excessive pain, swelling, redness or odor of or around the surgical area  · Temperature over 100.5  · Nausea and vomiting lasting longer than 4 hours or if unable to take medications  · Any signs of decreased circulation or nerve impairment to extremity: change in color, persistent  numbness, tingling, coldness or increase pain      · You will receive a Post Operative Call from one of the Recovery Room Nurses on the day after your surgery to check on you. It is very important for us to know how you are recovering after your surgery. If you have an issue or need to speak with someone, please call your surgeon, do not wait for the post operative call. · You may receive an e-mail or letter in the mail from [facility] regarding your experience with us in the Ambulatory Surgery Unit. Your feedback is valuable to us and we appreciate your participation in the survey. · If the above instructions are not adequate or you are having problems after your surgery, call the physician at their office number. · We wish you a speedy recovery ? What to do at Home:      *  Please give a list of your current medications to your Primary Care Provider. *  Please update this list whenever your medications are discontinued, doses are      changed, or new medications (including over-the-counter products) are added. *  Please carry medication information at all times in case of emergency situations. If you have not received your influenza and/or pneumococcal vaccine, please follow up with your primary care physician. The discharge information has been reviewed with the patient and caregiver. The patient and caregiver verbalized understanding.

## 2021-07-07 NOTE — PERIOP NOTES
Pt tolerating liquids, vss    0928-D/c instructions reviewed, all questions answered. Reviewed when to call the doctor, diet, and activity. Reviewed when to take pain meds. 0954-Transported via w/c to awaiting transportation.

## 2021-07-07 NOTE — PERIOP NOTES
Merle Face  1950  [id number]    Situation:  Verbal report given from: [address] MICHELLE Hughes RN  Procedure: Procedure(s):   HYSTEROSCOPY DIRECTED ENDOMETRIAL SAMPLING WITH MYOSURE,  DILITATION AND CURETTAGE (CHOICE ANESTH)    Background:    Preoperative diagnosis: THICKENED ENDOMETRIUM    Postoperative diagnosis: THICKENED ENDOMETRIUM    :  Dr. Lindajo Lesches    Assistant(s): Circ-1: Christina Reynoso RN  Scrub Tech-1: Vinod COCHRAN    Specimens:   ID Type Source Tests Collected by Time Destination   1 : ENDOMETRIAL CURETTINGS Preservative Endometrial Curetting  Claudia Sparks MD 7/7/2021 2705 Pathology       Assessment:  Intra-procedure medications         Anesthesia gave intra-procedure sedation and medications, see anesthesia flow sheet     Intravenous fluids: LR@ KVO     Vital signs stable       Recommendation:

## 2021-07-07 NOTE — PERIOP NOTES
Patient: Cipriano Estrella MRN: [id number]  SSN: [id number]   YOB: 1950  Age: 79 y.o. Sex: female     Patient is status post Procedure(s): HYSTEROSCOPY DIRECTED ENDOMETRIAL SAMPLING WITH MYOSURE,  DILITATION AND CURETTAGE (CHOICE ANESTH). Surgeon(s) and Role:     * Pricila Sparks MD - Primary    Local/Dose/Irrigation:  SEE MAR                Peripheral IV 07/07/21 Anterior;Distal;Left Antecubital (Active)   Site Assessment Clean, dry, & intact 07/07/21 0750   Phlebitis Assessment 0 07/07/21 0750   Infiltration Assessment 0 07/07/21 0750   Dressing Status Clean, dry, & intact 07/07/21 0750   Dressing Type Tape;Transparent 07/07/21 0750   Hub Color/Line Status Pink; Infusing 07/07/21 0750                           Dressing/Packing:  Incision 07/07/21 Vagina-Dressing/Treatment: Peripad (07/07/21 0700)    Other:  FLUID DEFICIT 70 ML.

## 2021-07-07 NOTE — OP NOTES
HYSTEROSCOPY WITH MYOSURE FULL OP NOTE           NAME: Rebeka Méndez    DO1950    DATE OF PROCEDURE:  2021     PREOPERATIVE DIAGNOSIS:  Postmenopausal bleeding, thickened endometrium     POSTOPERATIVE: Same     PROCEDURE: Hysteroscopy with directed endometrial sampling with myosure     SURGEON:  Abelardo Kidd MD     ASSISTANT: none     ANESTHESIA: MAC and paracervical block     EBL:  minimal     FINDINGS: Markedly thickened endometrium anteriorly with some areas of calcification. Posterior endometrium slightly thickened. Specimen: Endometrial curettings. PROCEDURE: Patient was placed on the operating table in the supine position. Time out was done to confirm the operating procedure, surgeon, patient and site. Once confirmed by the team, procedure was started. Patient was placed under MAC. She was prepped and draped in the usual fashion for vaginal surgery. Cervix was visualized with the aid of a Graves vaginal speculum and grasped with a single-tooth tenaculum. Approximately 10 cc of 1% lidocaine was then injected to create a paracervical block in the usual fashion. The cervix was then dilated to 7mm. The MyoSure hysteroscope was then inserted into the uterus under direct visualization. Survey of the uterus revealed markedly thickened endometrium anteriorly with some areas of calcification. Posterior endometrium slightly thickened All abnormal appearing tissue was resected with the MyoSure device. Global sampling of the endometrium was then done with the MyoSure. There was noted to be good hemostasis and no evidence of uterine perforation. The hysteroscope was then removed from the uterus. All instruments were then removed from the uterus. The tenaculum was removed and tenaculum sites were made hemostatic with pressure. All instruments were removed. She tolerated the procedure well and was transferred to the PACU in stable condition. Instrument counts were correct x 2.

## 2021-07-07 NOTE — ANESTHESIA PREPROCEDURE EVALUATION
Anesthetic History   No history of anesthetic complications            Review of Systems / Medical History  Patient summary reviewed, nursing notes reviewed and pertinent labs reviewed    Pulmonary                Comments: Remote smoking history - Quit 1967 - 0.125 pack years   Neuro/Psych   Within defined limits           Cardiovascular  Within defined limits                Exercise tolerance: >4 METS  Comments: 3-2016 EKG: SB, rate 54   GI/Hepatic/Renal     GERD (occasional only)      PUD (remote hx)     Endo/Other        Arthritis     Other Findings   Comments:  Thickened endometrium    Occasional right foot swelling d/t injury         Physical Exam    Airway  Mallampati: III  TM Distance: 4 - 6 cm  Neck ROM: normal range of motion, short neck   Mouth opening: Normal     Cardiovascular      Rate: normal         Dental    Dentition: Implants     Pulmonary  Breath sounds clear to auscultation               Abdominal  GI exam deferred       Other Findings            Anesthetic Plan    ASA: 2  Anesthesia type: MAC and general - backup          Induction: Intravenous  Anesthetic plan and risks discussed with: Patient

## 2021-07-07 NOTE — INTERVAL H&P NOTE
Update History & Physical    The Patient's History and Physical of July 6, 2021 was reviewed with the patient and I examined the patient. There was no change. The surgical site was confirmed by the patient and me. Plan:  The risk, benefits, expected outcome, and alternative to the recommended procedure have been discussed with the patient. Patient understands and wants to proceed with the procedure.     Electronically signed by Bridger Wallace MD on 7/7/2021 at 7:45 AM

## 2021-07-07 NOTE — ANESTHESIA POSTPROCEDURE EVALUATION
Procedure(s): HYSTEROSCOPY DIRECTED ENDOMETRIAL SAMPLING WITH MYOSURE,  DILITATION AND CURETTAGE (CHOICE ANESTH). MAC, general - backup    Anesthesia Post Evaluation      Multimodal analgesia: multimodal analgesia used between 6 hours prior to anesthesia start to PACU discharge  Patient location during evaluation: PACU  Patient participation: complete - patient participated  Level of consciousness: awake  Pain management: adequate  Airway patency: patent  Anesthetic complications: no  Cardiovascular status: acceptable  Respiratory status: acceptable  Hydration status: acceptable  Post anesthesia nausea and vomiting:  none  Final Post Anesthesia Temperature Assessment:  Normothermia (36.0-37.5 degrees C)      INITIAL Post-op Vital signs:   Vitals Value Taken Time   /77 07/07/21 0915   Temp 36.4 °C (97.6 °F) 07/07/21 0915   Pulse 63 07/07/21 0927   Resp 16 07/07/21 0927   SpO2 100 % 07/07/21 0927   Vitals shown include unvalidated device data.

## 2021-07-12 NOTE — PROGRESS NOTES
New Patient, Referred by Dr. Venkat Chang, D&C on 7/7/21,    High-grade endometrial carcinoma, favor serous carcinoma     Vitals:    07/14/21 0757 07/14/21 0805   BP: (!) 198/93 (!) 175/105   BP 1 Location: Left arm Left arm   BP Patient Position: Sitting Sitting   BP Cuff Size: Adult Adult   Pulse: 64 72   Height: 5' (1.524 m)    Weight: 174 lb 9.6 oz (79.2 kg)          1. Have you been to the ER, urgent care clinic since your last visit? Hospitalized since your last visit?  no    2. Have you seen or consulted any other health care providers outside of the [address] [place] [address] since your last visit? Include any pap smears or colon screening.    Yes, Dr. Venkat Chang

## 2021-07-14 ENCOUNTER — OFFICE VISIT (OUTPATIENT)
Dept: GYNECOLOGY | Age: 71
End: 2021-07-14
Payer: MEDICARE

## 2021-07-14 VITALS
BODY MASS INDEX: 34.28 KG/M2 | HEIGHT: 60 IN | SYSTOLIC BLOOD PRESSURE: 175 MMHG | WEIGHT: 174.6 LBS | HEART RATE: 72 BPM | DIASTOLIC BLOOD PRESSURE: 105 MMHG

## 2021-07-14 DIAGNOSIS — C54.1 ENDOMETRIAL CANCER (HCC): Primary | ICD-10-CM

## 2021-07-14 DIAGNOSIS — C55 SEROUS ADENOCARCINOMA OF UTERUS (HCC): ICD-10-CM

## 2021-07-14 PROCEDURE — G0463 HOSPITAL OUTPT CLINIC VISIT: HCPCS | Performed by: OBSTETRICS & GYNECOLOGY

## 2021-07-14 PROCEDURE — G8417 CALC BMI ABV UP PARAM F/U: HCPCS | Performed by: OBSTETRICS & GYNECOLOGY

## 2021-07-14 PROCEDURE — G8399 PT W/DXA RESULTS DOCUMENT: HCPCS | Performed by: OBSTETRICS & GYNECOLOGY

## 2021-07-14 PROCEDURE — 1101F PT FALLS ASSESS-DOCD LE1/YR: CPT | Performed by: OBSTETRICS & GYNECOLOGY

## 2021-07-14 PROCEDURE — G8432 DEP SCR NOT DOC, RNG: HCPCS | Performed by: OBSTETRICS & GYNECOLOGY

## 2021-07-14 PROCEDURE — 1090F PRES/ABSN URINE INCON ASSESS: CPT | Performed by: OBSTETRICS & GYNECOLOGY

## 2021-07-14 PROCEDURE — G8536 NO DOC ELDER MAL SCRN: HCPCS | Performed by: OBSTETRICS & GYNECOLOGY

## 2021-07-14 PROCEDURE — G8427 DOCREV CUR MEDS BY ELIG CLIN: HCPCS | Performed by: OBSTETRICS & GYNECOLOGY

## 2021-07-14 PROCEDURE — 99205 OFFICE O/P NEW HI 60 MIN: CPT | Performed by: OBSTETRICS & GYNECOLOGY

## 2021-07-14 PROCEDURE — 3017F COLORECTAL CA SCREEN DOC REV: CPT | Performed by: OBSTETRICS & GYNECOLOGY

## 2021-07-14 NOTE — LETTER
7/14/2021    Patient: Darby Mendez   YOB: 1950   Date of Visit: 7/14/2021     Nickolas Stiles, [address]  [address] [address]  Via Fax: [phone]     Jayleen Beyer MD  [address]  [address] [address]  Via Fax: [phone]    Dear MD Jayleen Fulton MD,      Thank you for referring Ms. Darby Mendez to [facility] for evaluation. My notes for this consultation are attached. If you have questions, please do not hesitate to call me. I look forward to following your patient along with you.       Sincerely,    Lion Cordova MD

## 2021-07-14 NOTE — PROGRESS NOTES
[address] [place] [address] Mathias Moritz [address], [address] [facility]  P [phone]  F [phone]    Office Note  Patient ID:  Name:  Garland Kyle  MRN:  [id number]  :  1950/70 y.o. Date:  2021      HISTORY OF PRESENT ILLNESS:  Ms. Garland Kyle is a 79 y.o.  postmenopausal female who presents as a new patient from Dr. Mikhail King for high-grade endometrial cancer. The patient reports vaginal spotting back in April, which resulted in her seeing Dr. Mikhail King. Pelvic ultrasound on 2021 demonstrated 12.5mm stripe. Hysteroscopy/D&C on 2021 demonstrated \"high-grade endometrial carcinoma, favor serous carcinoma\". She was subsequently referred to our office for further discussion and management. Denies pelvic or abdominal pain/bloating, change in appetite or bowel habits, nausea, vomiting, CP, SOB, hematuria, hematochezia, or urinary symptoms. Pertinent PMH/PSH: DJD, h/o  x 2     Active, no restrictions. Imaging Review:   Pelvic ultrasound 2021:  Impression: Normal uterus with 12.5mm stripe. Normal right ovary. Non visible left ovary with otherwise normal left adnexa. No free fluid. Pathology Review:   2021:  FINAL PATHOLOGIC DIAGNOSIS   Endometrium, curettings:   High-grade endometrial carcinoma, favor serous carcinoma   See comment   Comment   The biopsy contains a malignant glandular neoplasm with a high nuclear grade, papillary growth pattern, and areas of necrosis. Morphologic features suggest a high-grade endometrial carcinoma and are most compatible with a diagnosis of serous carcinoma. ROS:  A comprehensive review of systems was negative except for that written in the History of Present Illness.  , 10 point ROS    OB/GYN ROS:  Per HPI    ECOG ndGndrndanddndend:nd nd2nd Problem List:  Patient Active Problem List    Diagnosis Date Noted    PUD (peptic ulcer disease)     GERD (gastroesophageal reflux disease)     Arthritis     Lichen planus     Hip arthritis 2016    DJD (degenerative joint disease) of hip 2013     PMH:  Past Medical History:   Diagnosis Date    Arthritis     GERD (gastroesophageal reflux disease)     Lichen planus     oral    PUD (peptic ulcer disease)       PSH:  Past Surgical History:   Procedure Laterality Date    COLONOSCOPY N/A 10/16/2017    COLONOSCOPY performed by Mell Topete MD at [address] [place] [address]  10/16/2017         HX  SECTION      X2    HX CHOLECYSTECTOMY      HX DILATION AND CURETTAGE  2021    High-grade endometrial carcinoma, favor serous carcinoma    HX ORTHOPAEDIC      right foot, bunionectomy    HX ORTHOPAEDIC      bilateral hip arthroplasty    HX TUBAL LIGATION      NM COLONOSCOPY FLX DX W/COLLJ SPEC WHEN PFRMD  1/20/2012     x 2    UPPER GI ENDOSCOPY,BIOPSY  2016         UPPER GI ENDOSCOPY,DILATN W GUIDE  2016           Social History:  Social History     Tobacco Use    Smoking status: Former Smoker     Packs/day: 0.25     Years: 0.50     Pack years: 0.12     Quit date: 1967     Years since quittin.6    Smokeless tobacco: Never Used    Tobacco comment: brief   Substance Use Topics    Alcohol use: Yes     Comment: not weekly      Family History:  Family History   Problem Relation Age of Onset    Cancer Maternal Grandmother 79        colon cancer    Hypertension Mother     Cancer Mother         pacreatic cancer    Cancer Paternal Aunt         ovarian cancer      Medications: (reviewed)  Current Outpatient Medications   Medication Sig    ibuprofen (MOTRIN) 600 mg tablet Take 1 Tablet by mouth every six (6) hours as needed for Pain.  methylPREDNISolone (MedroL) 4 mg tab Take 4 mg by mouth daily as needed. Prn for lichen planus    melatonin 1 mg tablet Take 1 mg by mouth nightly.  cyanocobalamin (VITAMIN B-12) 1,000 mcg tablet Take 1,000 mcg by mouth daily.     cholecalciferol, vitamin D3, 2,000 unit tab Take 2,000 Units by mouth daily.  BIOTIN PO Take 5,000 mcg by mouth daily.  pyridoxine (VITAMIN B-6) 100 mg tablet Take 100 mg by mouth daily. No current facility-administered medications for this visit. Allergies: (reviewed)  Allergies   Allergen Reactions    Codeine Nausea and Vomiting    Penicillins Rash     Rash & dizzy    Sulfa (Sulfonamide Antibiotics) Other (comments)     General redness all over skin    Ciprofloxacin Itching and Other (comments)     Extreme dizziness and rash        OBJECTIVE:    Physical Exam:  VITAL SIGNS: Vitals:    07/14/21 0757 07/14/21 0805   BP: (!) 198/93 (!) 175/105   Pulse: 64 72   Weight: 174 lb 9.6 oz (79.2 kg)    Height: 5' (1.524 m)      Body mass index is 34.1 kg/m². GENERAL MICHAEL: Conversant, alert, oriented. No acute distress. HEENT: HEENT. No thyroid enlargement. No JVD. Neck: Supple without restrictions. RESPIRATORY: Clear to auscultation and percussion to the bases. No CVAT. CARDIOVASC: RRR without murmur/rub. GASTROINT: soft, non-tender, without masses or organomegaly   MUSCULOSKEL: no joint tenderness, deformity or swelling       EXTREMITIES: extremities normal, atraumatic, no cyanosis or edema   PELVIC: Exam chaperoned by nurse. Normal appearing external genitalia. On speculum exam, normal appearing vagina and cervix. On bimanual exam, the cervix and uterus are normal size and mobile. No evidence of adnexal masses or nodularity. RECTAL: deferred   SENTHIL SURVEY: No suspicious lymphadenopathy or edema noted. NEURO: Grossly intact. No acute deficit.        Lab Date as available:    Lab Results   Component Value Date/Time    WBC 5.8 03/21/2016 01:52 PM    HGB 11.1 (L) 04/05/2016 02:00 AM    HCT 39.2 03/21/2016 01:52 PM    PLATELET 285 27/50/0601 01:52 PM    MCV 87.9 03/21/2016 01:52 PM     Lab Results   Component Value Date/Time    Sodium 141 04/05/2016 02:00 AM    Potassium 3.9 04/05/2016 02:00 AM    Chloride 108 04/05/2016 02:00 AM CO2 24 04/05/2016 02:00 AM    Anion gap 9 04/05/2016 02:00 AM    Glucose 131 (H) 04/05/2016 02:00 AM    BUN 11 04/05/2016 02:00 AM    Creatinine 0.80 04/05/2016 02:00 AM    BUN/Creatinine ratio 14 04/05/2016 02:00 AM    GFR est AA >60 04/05/2016 02:00 AM    GFR est non-AA >60 04/05/2016 02:00 AM    Calcium 8.1 (L) 04/05/2016 02:00 AM         IMPRESSION/PLAN:    Ms. Deanne Hilliard is a 79 y.o. female with a working diagnosis of high-grade endometrial cancer    Problems:     Patient Active Problem List    Diagnosis Date Noted    PUD (peptic ulcer disease)     GERD (gastroesophageal reflux disease)     Arthritis     Lichen planus     Hip arthritis 04/04/2016    DJD (degenerative joint disease) of hip 01/04/2013       I reviewed Ms. David Muhammad's course to date, including her medical records, recent studies, physical exam, and review of symptoms. Counseled patient regarding standard of care recommendations for endometrial cancer including surgical staging. Given the patient has high-grade, possible serous carcinoma, I have recommended a CT C/A/P prior to surgery. Will plan to discuss via virtual visit next week. Regarding surgery, plan for robotic-assisted TLH/BSO/SLND, possible pelvic and/or para-aortic LND, possible exploratory laparotomy. Posted for surgery on 7/22/2021. Plan for CBCD, CMP, Ca-125, Ca 19-9, EKG, and CXR prior to surgery. Counseled patient regarding risks, benefits, indications, and alternatives to surgery. Plan to sign consents day of surgery. All questions and concerns were addressed with the patient and she is comfortable with the plan.       Defined Sensitive Document    >50% of total time allocated to visit dedicated to counseling, 70 minutes total.    Signed By: Kajal Vu MD     7/14/2021/8:04 AM

## 2021-07-16 ENCOUNTER — HOSPITAL ENCOUNTER (OUTPATIENT)
Dept: CT IMAGING | Age: 71
Discharge: HOME | End: 2021-07-16
Attending: OBSTETRICS & GYNECOLOGY
Payer: MEDICARE

## 2021-07-16 ENCOUNTER — HOSPITAL ENCOUNTER (OUTPATIENT)
Dept: PREADMISSION TESTING | Age: 71
Discharge: HOME | End: 2021-07-16
Payer: MEDICARE

## 2021-07-16 VITALS
HEART RATE: 68 BPM | DIASTOLIC BLOOD PRESSURE: 88 MMHG | HEIGHT: 60 IN | SYSTOLIC BLOOD PRESSURE: 142 MMHG | RESPIRATION RATE: 16 BRPM | TEMPERATURE: 97.9 F | OXYGEN SATURATION: 97 % | WEIGHT: 175.93 LBS | BODY MASS INDEX: 34.54 KG/M2

## 2021-07-16 DIAGNOSIS — C55 SEROUS ADENOCARCINOMA OF UTERUS (HCC): ICD-10-CM

## 2021-07-16 DIAGNOSIS — C54.1 ENDOMETRIAL CANCER (HCC): ICD-10-CM

## 2021-07-16 LAB
ALBUMIN SERPL-MCNC: 3.6 G/DL (ref 3.5–5)
ALBUMIN/GLOB SERPL: 1.1 {RATIO} (ref 1.1–2.2)
ALP SERPL-CCNC: 62 U/L (ref 45–117)
ALT SERPL-CCNC: 34 U/L (ref 12–78)
ANION GAP SERPL CALC-SCNC: 7 MMOL/L (ref 5–15)
AST SERPL-CCNC: 17 U/L (ref 15–37)
BASOPHILS # BLD: 0 K/UL (ref 0–0.1)
BASOPHILS NFR BLD: 1 % (ref 0–1)
BILIRUB SERPL-MCNC: 0.4 MG/DL (ref 0.2–1)
BUN SERPL-MCNC: 15 MG/DL (ref 6–20)
BUN/CREAT SERPL: 22 (ref 12–20)
CALCIUM SERPL-MCNC: 9 MG/DL (ref 8.5–10.1)
CANCER AG125 SERPL-ACNC: 15 U/ML (ref 1.5–35)
CHLORIDE SERPL-SCNC: 108 MMOL/L (ref 97–108)
CO2 SERPL-SCNC: 25 MMOL/L (ref 21–32)
CREAT BLD-MCNC: 0.8 MG/DL (ref 0.6–1.3)
CREAT SERPL-MCNC: 0.69 MG/DL (ref 0.55–1.02)
DIFFERENTIAL METHOD BLD: NORMAL
EOSINOPHIL # BLD: 0.2 K/UL (ref 0–0.4)
EOSINOPHIL NFR BLD: 2 % (ref 0–7)
ERYTHROCYTE [DISTWIDTH] IN BLOOD BY AUTOMATED COUNT: 13.2 % (ref 11.5–14.5)
EST. AVERAGE GLUCOSE BLD GHB EST-MCNC: 105 MG/DL
GLOBULIN SER CALC-MCNC: 3.3 G/DL (ref 2–4)
GLUCOSE SERPL-MCNC: 91 MG/DL (ref 65–100)
HBA1C MFR BLD: 5.3 % (ref 4–5.6)
HCT VFR BLD AUTO: 40.1 % (ref 35–47)
HGB BLD-MCNC: 13.6 G/DL (ref 11.5–16)
IMM GRANULOCYTES # BLD AUTO: 0 K/UL (ref 0–0.04)
IMM GRANULOCYTES NFR BLD AUTO: 0 % (ref 0–0.5)
LYMPHOCYTES # BLD: 1.5 K/UL (ref 0.8–3.5)
LYMPHOCYTES NFR BLD: 23 % (ref 12–49)
MCH RBC QN AUTO: 30.6 PG (ref 26–34)
MCHC RBC AUTO-ENTMCNC: 33.9 G/DL (ref 30–36.5)
MCV RBC AUTO: 90.3 FL (ref 80–99)
MONOCYTES # BLD: 0.5 K/UL (ref 0–1)
MONOCYTES NFR BLD: 8 % (ref 5–13)
NEUTS SEG # BLD: 4.3 K/UL (ref 1.8–8)
NEUTS SEG NFR BLD: 66 % (ref 32–75)
NRBC # BLD: 0 K/UL (ref 0–0.01)
NRBC BLD-RTO: 0 PER 100 WBC
PLATELET # BLD AUTO: 243 K/UL (ref 150–400)
PMV BLD AUTO: 9.8 FL (ref 8.9–12.9)
POTASSIUM SERPL-SCNC: 3.8 MMOL/L (ref 3.5–5.1)
PROT SERPL-MCNC: 6.9 G/DL (ref 6.4–8.2)
RBC # BLD AUTO: 4.44 M/UL (ref 3.8–5.2)
SODIUM SERPL-SCNC: 140 MMOL/L (ref 136–145)
WBC # BLD AUTO: 6.6 K/UL (ref 3.6–11)

## 2021-07-16 PROCEDURE — 82565 ASSAY OF CREATININE: CPT

## 2021-07-16 PROCEDURE — 71260 CT THORAX DX C+: CPT

## 2021-07-16 PROCEDURE — 93005 ELECTROCARDIOGRAM TRACING: CPT

## 2021-07-16 PROCEDURE — 85025 COMPLETE CBC W/AUTO DIFF WBC: CPT

## 2021-07-16 PROCEDURE — 86304 IMMUNOASSAY TUMOR CA 125: CPT

## 2021-07-16 PROCEDURE — 83036 HEMOGLOBIN GLYCOSYLATED A1C: CPT

## 2021-07-16 PROCEDURE — 36415 COLL VENOUS BLD VENIPUNCTURE: CPT

## 2021-07-16 PROCEDURE — 74011000636 HC RX REV CODE- 636: Performed by: OBSTETRICS & GYNECOLOGY

## 2021-07-16 PROCEDURE — 74011000250 HC RX REV CODE- 250: Performed by: OBSTETRICS & GYNECOLOGY

## 2021-07-16 PROCEDURE — 80053 COMPREHEN METABOLIC PANEL: CPT

## 2021-07-16 PROCEDURE — 86301 IMMUNOASSAY TUMOR CA 19-9: CPT

## 2021-07-16 PROCEDURE — 74177 CT ABD & PELVIS W/CONTRAST: CPT

## 2021-07-16 RX ORDER — BARIUM SULFATE 20 MG/ML
900 SUSPENSION ORAL ONCE
Status: COMPLETED | OUTPATIENT
Start: 2021-07-16 | End: 2021-07-16

## 2021-07-16 RX ADMIN — IOPAMIDOL 100 ML: 755 INJECTION, SOLUTION INTRAVENOUS at 13:12

## 2021-07-16 RX ADMIN — BARIUM SULFATE 900 ML: 20 SUSPENSION ORAL at 13:12

## 2021-07-17 LAB — CANCER AG19-9 SERPL-ACNC: 12 U/ML (ref 0–35)

## 2021-07-18 LAB
ATRIAL RATE: 63 BPM
CALCULATED P AXIS, ECG09: 20 DEGREES
CALCULATED R AXIS, ECG10: -6 DEGREES
CALCULATED T AXIS, ECG11: 30 DEGREES
DIAGNOSIS, 93000: NORMAL
P-R INTERVAL, ECG05: 136 MS
Q-T INTERVAL, ECG07: 432 MS
QRS DURATION, ECG06: 80 MS
QTC CALCULATION (BEZET), ECG08: 442 MS
VENTRICULAR RATE, ECG03: 63 BPM

## 2021-07-19 ENCOUNTER — TELEPHONE (OUTPATIENT)
Dept: GYNECOLOGY | Age: 71
End: 2021-07-19

## 2021-07-19 DIAGNOSIS — Z01.810 PREOP CARDIOVASCULAR EXAM: ICD-10-CM

## 2021-07-19 DIAGNOSIS — R94.31 ABNORMAL EKG: Primary | ICD-10-CM

## 2021-07-19 NOTE — PROGRESS NOTES
S/w  patient, she does not have cardiologist, we will call and make her an appt, she would like to go to [facility] if possible

## 2021-07-19 NOTE — TELEPHONE ENCOUNTER
I spoke with patient, advised abnormal EKG, needs cardiology clearance, will have page set her up an appt with cardiologist, will get first available appointment to try and keep her surgery  On 7/22/21, advised patient, page will call her when she has her appt date and time

## 2021-07-20 ENCOUNTER — VIRTUAL VISIT (OUTPATIENT)
Dept: GYNECOLOGY | Age: 71
End: 2021-07-20
Payer: MEDICARE

## 2021-07-20 DIAGNOSIS — C54.1 ENDOMETRIAL CANCER (HCC): Primary | ICD-10-CM

## 2021-07-20 PROCEDURE — 1101F PT FALLS ASSESS-DOCD LE1/YR: CPT | Performed by: OBSTETRICS & GYNECOLOGY

## 2021-07-20 PROCEDURE — 3017F COLORECTAL CA SCREEN DOC REV: CPT | Performed by: OBSTETRICS & GYNECOLOGY

## 2021-07-20 PROCEDURE — G8432 DEP SCR NOT DOC, RNG: HCPCS | Performed by: OBSTETRICS & GYNECOLOGY

## 2021-07-20 PROCEDURE — 1090F PRES/ABSN URINE INCON ASSESS: CPT | Performed by: OBSTETRICS & GYNECOLOGY

## 2021-07-20 PROCEDURE — G8427 DOCREV CUR MEDS BY ELIG CLIN: HCPCS | Performed by: OBSTETRICS & GYNECOLOGY

## 2021-07-20 PROCEDURE — G0463 HOSPITAL OUTPT CLINIC VISIT: HCPCS | Performed by: OBSTETRICS & GYNECOLOGY

## 2021-07-20 PROCEDURE — 99213 OFFICE O/P EST LOW 20 MIN: CPT | Performed by: OBSTETRICS & GYNECOLOGY

## 2021-07-20 PROCEDURE — G8399 PT W/DXA RESULTS DOCUMENT: HCPCS | Performed by: OBSTETRICS & GYNECOLOGY

## 2021-07-20 NOTE — PROGRESS NOTES
Virtual Visit to discuss CT scan results from 7/16/2021    1. Have you been to the ER, urgent care clinic since your last visit? Hospitalized since your last visit?  no    2. Have you seen or consulted any other health care providers outside of the [address] [place] [address] since your last visit? Include any pap smears or colon screening.    no

## 2021-07-25 NOTE — H&P (VIEW-ONLY)
[address] [place] [address] Mathias Moritz [address], [address] [place] [address]  P [phone]  F [phone]    Office Note  Patient ID:  Name:  Dasia Mario  MRN:  [id number]  :  1950/70 y.o. Date:  2021      HISTORY OF PRESENT ILLNESS:  Ms. Dasia Mario is a 79 y.o.  postmenopausal female who presents as a new patient from Dr. Ana Rosa Beyer for high-grade endometrial cancer. The patient reports vaginal spotting back in April, which resulted in her seeing Dr. Ana Rosa Beyer. Pelvic ultrasound on 2021 demonstrated 12.5mm stripe. Hysteroscopy/D&C on 2021 demonstrated \"high-grade endometrial carcinoma, favor serous carcinoma\". She was subsequently referred to our office for further discussion and management. Denies pelvic or abdominal pain/bloating, change in appetite or bowel habits, nausea, vomiting, CP, SOB, hematuria, hematochezia, or urinary symptoms. Interval History:  Presents today via virtual visit for CT results and to finalize surgical planning. Pertinent PMH/PSH: DJD, h/o  x 2     Active, no restrictions. Imaging Review:   CT C/A/P 2021:  FINDINGS:   THYROID: No nodule. MEDIASTINUM: No mass or lymphadenopathy. STEPHANIE: No mass or lymphadenopathy. THORACIC AORTA: No dissection or aneurysm. MAIN PULMONARY ARTERY: Normal in caliber. TRACHEA/BRONCHI: Patent. ESOPHAGUS: No wall thickening or dilatation. HEART: Normal in size. PLEURA: No effusion or pneumothorax. LUNGS: No nodule, mass, or airspace disease. There is minor atelectasis/scarring  right upper lobe  LIVER: No mass or biliary dilatation. There is hepatic steatosis. There is a 5  mm low-density in segment 4A and segment 5 to small to fully characterize but  most likely tiny cysts. GALLBLADDER: Patient status post cholecystectomy  SPLEEN: No mass. PANCREAS: No mass or ductal dilatation. ADRENALS: Unremarkable. KIDNEYS: No mass, calculus, or hydronephrosis.  There is a retroaortic left renal  vein  STOMACH: Unremarkable. SMALL BOWEL: No dilatation or wall thickening. COLON: No dilatation or wall thickening. APPENDIX: Unremarkable. PERITONEUM: No ascites or pneumoperitoneum. RETROPERITONEUM: No lymphadenopathy or aortic aneurysm. There is an 8 mm lymph  node at the aortic bifurcation on the left. There is mild stenosis origin of the  SMA  REPRODUCTIVE ORGANS: Uterus and pelvis are obscured by artifact related to  bilateral total hip replacements. URINARY BLADDER: No mass or calculus. BONES: No destructive bone lesion. There is a right shoulder effusion  ADDITIONAL COMMENTS: N/A     IMPRESSION     1. CT of the chest demonstrates no definite evidence of metastatic disease. No  acute abnormality identified. 2. CT of the abdomen and pelvis demonstrates no definite evidence of metastatic  disease. The pelvis is obscured by artifact related to total hip replacements. There is hepatic steatosis. 2 subcentimeter low densities in the liver are too  small to characterize but most likely represent tiny cysts. There is an 8mm lymph node just to the left of the aortic bifurcation which is  within normal limits. Pelvic ultrasound 4/20/2021:  Impression: Normal uterus with 12.5mm stripe. Normal right ovary. Non visible left ovary with otherwise normal left adnexa. No free fluid. Pathology Review:   7/7/2021:  FINAL PATHOLOGIC DIAGNOSIS   Endometrium, curettings:   High-grade endometrial carcinoma, favor serous carcinoma   See comment   Comment   The biopsy contains a malignant glandular neoplasm with a high nuclear grade, papillary growth pattern, and areas of necrosis. Morphologic features suggest a high-grade endometrial carcinoma and are most compatible with a diagnosis of serous carcinoma. ROS:  A comprehensive review of systems was negative except for that written in the History of Present Illness.  , 10 point ROS    OB/GYN ROS:  Per HPI    ECOG ndGndrndanddndend:nd nd2nd Problem List:  Patient Active Problem List    Diagnosis Date Noted    Endometrial cancer ([facility] [address] 2021    PUD (peptic ulcer disease)     GERD (gastroesophageal reflux disease)     Arthritis     Lichen planus     Hip arthritis 2016    DJD (degenerative joint disease) of hip 2013     PMH:  Past Medical History:   Diagnosis Date    Arthritis     OSTEO HIP JULY.     GERD (gastroesophageal reflux disease)     Lichen planus     oral    PUD (peptic ulcer disease)       PSH:  Past Surgical History:   Procedure Laterality Date    COLONOSCOPY N/A 10/16/2017    COLONOSCOPY performed by Tim Fields MD at [address] [place] [address]  10/16/2017         HX  SECTION      X2    HX CHOLECYSTECTOMY      HX DILATION AND CURETTAGE  2021    High-grade endometrial carcinoma, favor serous carcinoma    HX HEENT      EXTRACTION OF WISDOM TEETH X 4    HX ORTHOPAEDIC      right foot, bunionectomy    HX ORTHOPAEDIC      LEFTl hip arthroplasty    HX ORTHOPAEDIC      RIGHT hip arthroplasty    HX TUBAL LIGATION      SC COLONOSCOPY FLX DX W/COLLJ SPEC WHEN PFRMD  1/20/2012     x 2    UPPER GI ENDOSCOPY,BIOPSY  2016         UPPER GI ENDOSCOPY,DILATN W GUIDE  2016           Social History:  Social History     Tobacco Use    Smoking status: Former Smoker     Packs/day: 0.25     Years: 0.50     Pack years: 0.12     Quit date: 1967     Years since quittin.6    Smokeless tobacco: Never Used    Tobacco comment: brief   Substance Use Topics    Alcohol use: Yes     Comment: VERY RARELY       Family History:  Family History   Problem Relation Age of Onset    Cancer Maternal Grandmother 79        colon cancer    Hypertension Mother     Cancer Mother         pacreatic cancer    Cancer Paternal Aunt         ovarian cancer      Medications: (reviewed)  Current Outpatient Medications   Medication Sig    ibuprofen (MOTRIN) 600 mg tablet Take 1 Tablet by mouth every six (6) hours as needed for Pain. (Patient taking differently: Take 600 mg by mouth nightly.)    methylPREDNISolone (MedroL) 4 mg tab Take 4 mg by mouth daily as needed. Prn for lichen planus    melatonin 1 mg tablet Take 1 mg by mouth nightly.  cyanocobalamin (VITAMIN B-12) 1,000 mcg tablet Take 1,000 mcg by mouth daily.  cholecalciferol, vitamin D3, 2,000 unit tab Take 2,000 Units by mouth daily.  BIOTIN PO Take 5,000 mcg by mouth daily.  pyridoxine (VITAMIN B-6) 100 mg tablet Take 100 mg by mouth daily. No current facility-administered medications for this visit. Allergies: (reviewed)  Allergies   Allergen Reactions    Codeine Nausea and Vomiting    Penicillins Rash     Rash & dizzy    Sulfa (Sulfonamide Antibiotics) Other (comments)     General redness all over skin    Ciprofloxacin Itching and Other (comments)     Extreme dizziness and rash    Other Medication Nausea and Vomiting     PRESCRIPTION STRENGTH ANTI INFLAMMATORY MEDS         OBJECTIVE:  *deferred today given video-conference visit for ongoing COVID-19 pandemic*   Physical Exam:  VITAL SIGNS: There were no vitals filed for this visit. There is no height or weight on file to calculate BMI. GENERAL MICHAEL: Conversant, alert, oriented. No acute distress. HEENT: HEENT. No thyroid enlargement. No JVD. Neck: Supple without restrictions. RESPIRATORY: Clear to auscultation and percussion to the bases. No CVAT. CARDIOVASC: RRR without murmur/rub. GASTROINT: soft, non-tender, without masses or organomegaly   MUSCULOSKEL: no joint tenderness, deformity or swelling       EXTREMITIES: extremities normal, atraumatic, no cyanosis or edema   PELVIC: Exam chaperoned by nurse. Normal appearing external genitalia. On speculum exam, normal appearing vagina and cervix. On bimanual exam, the cervix and uterus are normal size and mobile. No evidence of adnexal masses or nodularity.     RECTAL: deferred   SENTHIL SURVEY: No suspicious lymphadenopathy or edema noted. NEURO: Grossly intact. No acute deficit. Lab Date as available:    Lab Results   Component Value Date/Time    WBC 6.6 07/16/2021 03:37 PM    HGB 13.6 07/16/2021 03:37 PM    HCT 40.1 07/16/2021 03:37 PM    PLATELET 321 41/92/8973 03:37 PM    MCV 90.3 07/16/2021 03:37 PM     Lab Results   Component Value Date/Time    Sodium 140 07/16/2021 03:37 PM    Potassium 3.8 07/16/2021 03:37 PM    Chloride 108 07/16/2021 03:37 PM    CO2 25 07/16/2021 03:37 PM    Anion gap 7 07/16/2021 03:37 PM    Glucose 91 07/16/2021 03:37 PM    BUN 15 07/16/2021 03:37 PM    Creatinine 0.69 07/16/2021 03:37 PM    BUN/Creatinine ratio 22 (H) 07/16/2021 03:37 PM    GFR est AA >60 07/16/2021 03:37 PM    GFR est non-AA >60 07/16/2021 03:37 PM    Calcium 9.0 07/16/2021 03:37 PM         IMPRESSION/PLAN:    Ms. Domonique Good is a 79 y.o. female with a working diagnosis of high-grade endometrial cancer    Problems:     Patient Active Problem List    Diagnosis Date Noted    Endometrial cancer ([facility] [address] 07/14/2021    PUD (peptic ulcer disease)     GERD (gastroesophageal reflux disease)     Arthritis     Lichen planus     Hip arthritis 04/04/2016    DJD (degenerative joint disease) of hip 01/04/2013     Reviewed patients course to date. CT C/A/P on 7/16/2021 negative for metastatic disease. Counseled patient that the standard of care is to proceed with surgical staging. She was previously scheduled for 7/22/2021, but based on her EKG she was referred to Cardiology for clearance. She has an appointment this coming Tuesday, and she is now scheduled for surgery on Thursday 7/28/2021. Plan for robotic-assisted TLH/BSO/SLND, possible pelvic and/or para-aortic LND, possible exploratory laparotomy. Counseled patient regarding risks, benefits, indications, and alternatives to surgery. Plan to sign consents day of surgery.  All questions and concerns were addressed with the patient and she is comfortable with the plan.    An electronic signature was used to authenticate this note. Shirlene Jerome MD    Pursuant to the emergency declaration unde the [address] [place] [address] waiver authority and the [employer] Act, this Virtual Visit was conducted, with patient's consent, to reduce the patient's risk of exposure to COVID-19 and provide continuity of care for an established patient. Patient identification was verified at the start of the visit: Yes    Services were provided through a video synchronous discussion virtually to substitute for in-person clinic visit. Patient was at her individual home, while the provider was in his/her respective office.     I spent at least 25 minutes with this established patient, and >50% of the time was spent counseling and/or coordinating care regarding endometrial cancer, imaging results, surgical planning    Shirlene Jerome MD

## 2021-07-25 NOTE — PROGRESS NOTES
[address] [place] [address] Mathias Moritz [address], [address] [facility]  P [phone]  F [phone]    Office Note  Patient ID:  Name:  Eliecer Tillman  MRN:  [id number]  :  1950/70 y.o. Date:  2021      HISTORY OF PRESENT ILLNESS:  Ms. Eliecer Tillman is a 79 y.o.  postmenopausal female who presents as a new patient from Dr. Lajuana Runner for high-grade endometrial cancer. The patient reports vaginal spotting back in April, which resulted in her seeing Dr. Lajuana Runner. Pelvic ultrasound on 2021 demonstrated 12.5mm stripe. Hysteroscopy/D&C on 2021 demonstrated \"high-grade endometrial carcinoma, favor serous carcinoma\". She was subsequently referred to our office for further discussion and management. Denies pelvic or abdominal pain/bloating, change in appetite or bowel habits, nausea, vomiting, CP, SOB, hematuria, hematochezia, or urinary symptoms. Interval History:  Presents today via virtual visit for CT results and to finalize surgical planning. Pertinent PMH/PSH: DJD, h/o  x 2     Active, no restrictions. Imaging Review:   CT C/A/P 2021:  FINDINGS:   THYROID: No nodule. MEDIASTINUM: No mass or lymphadenopathy. STEPHANIE: No mass or lymphadenopathy. THORACIC AORTA: No dissection or aneurysm. MAIN PULMONARY ARTERY: Normal in caliber. TRACHEA/BRONCHI: Patent. ESOPHAGUS: No wall thickening or dilatation. HEART: Normal in size. PLEURA: No effusion or pneumothorax. LUNGS: No nodule, mass, or airspace disease. There is minor atelectasis/scarring  right upper lobe  LIVER: No mass or biliary dilatation. There is hepatic steatosis. There is a 5  mm low-density in segment 4A and segment 5 to small to fully characterize but  most likely tiny cysts. GALLBLADDER: Patient status post cholecystectomy  SPLEEN: No mass. PANCREAS: No mass or ductal dilatation. ADRENALS: Unremarkable. KIDNEYS: No mass, calculus, or hydronephrosis.  There is a retroaortic left renal  vein  STOMACH: Unremarkable. SMALL BOWEL: No dilatation or wall thickening. COLON: No dilatation or wall thickening. APPENDIX: Unremarkable. PERITONEUM: No ascites or pneumoperitoneum. RETROPERITONEUM: No lymphadenopathy or aortic aneurysm. There is an 8 mm lymph  node at the aortic bifurcation on the left. There is mild stenosis origin of the  SMA  REPRODUCTIVE ORGANS: Uterus and pelvis are obscured by artifact related to  bilateral total hip replacements. URINARY BLADDER: No mass or calculus. BONES: No destructive bone lesion. There is a right shoulder effusion  ADDITIONAL COMMENTS: N/A     IMPRESSION     1. CT of the chest demonstrates no definite evidence of metastatic disease. No  acute abnormality identified. 2. CT of the abdomen and pelvis demonstrates no definite evidence of metastatic  disease. The pelvis is obscured by artifact related to total hip replacements. There is hepatic steatosis. 2 subcentimeter low densities in the liver are too  small to characterize but most likely represent tiny cysts. There is an 8mm lymph node just to the left of the aortic bifurcation which is  within normal limits. Pelvic ultrasound 4/20/2021:  Impression: Normal uterus with 12.5mm stripe. Normal right ovary. Non visible left ovary with otherwise normal left adnexa. No free fluid. Pathology Review:   7/7/2021:  FINAL PATHOLOGIC DIAGNOSIS   Endometrium, curettings:   High-grade endometrial carcinoma, favor serous carcinoma   See comment   Comment   The biopsy contains a malignant glandular neoplasm with a high nuclear grade, papillary growth pattern, and areas of necrosis. Morphologic features suggest a high-grade endometrial carcinoma and are most compatible with a diagnosis of serous carcinoma. ROS:  A comprehensive review of systems was negative except for that written in the History of Present Illness.  , 10 point ROS    OB/GYN ROS:  Per HPI    ECOG ndGndrndanddndend:nd nd2nd Problem List:  Patient Active Problem List    Diagnosis Date Noted    Endometrial cancer ([facility] 2021    PUD (peptic ulcer disease)     GERD (gastroesophageal reflux disease)     Arthritis     Lichen planus     Hip arthritis 2016    DJD (degenerative joint disease) of hip 2013     PMH:  Past Medical History:   Diagnosis Date    Arthritis     OSTEO HIP JULY.     GERD (gastroesophageal reflux disease)     Lichen planus     oral    PUD (peptic ulcer disease)       PSH:  Past Surgical History:   Procedure Laterality Date    COLONOSCOPY N/A 10/16/2017    COLONOSCOPY performed by Irma Mcleod MD at [address] [place] [address]  10/16/2017         HX  SECTION      X2    HX CHOLECYSTECTOMY      HX DILATION AND CURETTAGE  2021    High-grade endometrial carcinoma, favor serous carcinoma    HX HEENT      EXTRACTION OF WISDOM TEETH X 4    HX ORTHOPAEDIC      right foot, bunionectomy    HX ORTHOPAEDIC      LEFTl hip arthroplasty    HX ORTHOPAEDIC      RIGHT hip arthroplasty    HX TUBAL LIGATION      MO COLONOSCOPY FLX DX W/COLLJ SPEC WHEN PFRMD  1/20/2012     x 2    UPPER GI ENDOSCOPY,BIOPSY  2016         UPPER GI ENDOSCOPY,DILATN W GUIDE  2016           Social History:  Social History     Tobacco Use    Smoking status: Former Smoker     Packs/day: 0.25     Years: 0.50     Pack years: 0.12     Quit date: 1967     Years since quittin.6    Smokeless tobacco: Never Used    Tobacco comment: brief   Substance Use Topics    Alcohol use: Yes     Comment: VERY RARELY       Family History:  Family History   Problem Relation Age of Onset    Cancer Maternal Grandmother 79        colon cancer    Hypertension Mother     Cancer Mother         pacreatic cancer    Cancer Paternal Aunt         ovarian cancer      Medications: (reviewed)  Current Outpatient Medications   Medication Sig    ibuprofen (MOTRIN) 600 mg tablet Take 1 Tablet by mouth every six (6) hours as needed for Pain. (Patient taking differently: Take 600 mg by mouth nightly.)    methylPREDNISolone (MedroL) 4 mg tab Take 4 mg by mouth daily as needed. Prn for lichen planus    melatonin 1 mg tablet Take 1 mg by mouth nightly.  cyanocobalamin (VITAMIN B-12) 1,000 mcg tablet Take 1,000 mcg by mouth daily.  cholecalciferol, vitamin D3, 2,000 unit tab Take 2,000 Units by mouth daily.  BIOTIN PO Take 5,000 mcg by mouth daily.  pyridoxine (VITAMIN B-6) 100 mg tablet Take 100 mg by mouth daily. No current facility-administered medications for this visit. Allergies: (reviewed)  Allergies   Allergen Reactions    Codeine Nausea and Vomiting    Penicillins Rash     Rash & dizzy    Sulfa (Sulfonamide Antibiotics) Other (comments)     General redness all over skin    Ciprofloxacin Itching and Other (comments)     Extreme dizziness and rash    Other Medication Nausea and Vomiting     PRESCRIPTION STRENGTH ANTI INFLAMMATORY MEDS         OBJECTIVE:  *deferred today given video-conference visit for ongoing COVID-19 pandemic*   Physical Exam:  VITAL SIGNS: There were no vitals filed for this visit. There is no height or weight on file to calculate BMI. GENERAL MICHAEL: Conversant, alert, oriented. No acute distress. HEENT: HEENT. No thyroid enlargement. No JVD. Neck: Supple without restrictions. RESPIRATORY: Clear to auscultation and percussion to the bases. No CVAT. CARDIOVASC: RRR without murmur/rub. GASTROINT: soft, non-tender, without masses or organomegaly   MUSCULOSKEL: no joint tenderness, deformity or swelling       EXTREMITIES: extremities normal, atraumatic, no cyanosis or edema   PELVIC: Exam chaperoned by nurse. Normal appearing external genitalia. On speculum exam, normal appearing vagina and cervix. On bimanual exam, the cervix and uterus are normal size and mobile. No evidence of adnexal masses or nodularity.     RECTAL: deferred   SENTHIL SURVEY: No suspicious lymphadenopathy or edema noted. NEURO: Grossly intact. No acute deficit. Lab Date as available:    Lab Results   Component Value Date/Time    WBC 6.6 07/16/2021 03:37 PM    HGB 13.6 07/16/2021 03:37 PM    HCT 40.1 07/16/2021 03:37 PM    PLATELET 854 00/19/2325 03:37 PM    MCV 90.3 07/16/2021 03:37 PM     Lab Results   Component Value Date/Time    Sodium 140 07/16/2021 03:37 PM    Potassium 3.8 07/16/2021 03:37 PM    Chloride 108 07/16/2021 03:37 PM    CO2 25 07/16/2021 03:37 PM    Anion gap 7 07/16/2021 03:37 PM    Glucose 91 07/16/2021 03:37 PM    BUN 15 07/16/2021 03:37 PM    Creatinine 0.69 07/16/2021 03:37 PM    BUN/Creatinine ratio 22 (H) 07/16/2021 03:37 PM    GFR est AA >60 07/16/2021 03:37 PM    GFR est non-AA >60 07/16/2021 03:37 PM    Calcium 9.0 07/16/2021 03:37 PM         IMPRESSION/PLAN:    Ms. Carmella Burgos is a 79 y.o. female with a working diagnosis of high-grade endometrial cancer    Problems:     Patient Active Problem List    Diagnosis Date Noted    Endometrial cancer ([facility] [address] 07/14/2021    PUD (peptic ulcer disease)     GERD (gastroesophageal reflux disease)     Arthritis     Lichen planus     Hip arthritis 04/04/2016    DJD (degenerative joint disease) of hip 01/04/2013     Reviewed patients course to date. CT C/A/P on 7/16/2021 negative for metastatic disease. Counseled patient that the standard of care is to proceed with surgical staging. She was previously scheduled for 7/22/2021, but based on her EKG she was referred to Cardiology for clearance. She has an appointment this coming Tuesday, and she is now scheduled for surgery on Thursday 7/28/2021. Plan for robotic-assisted TLH/BSO/SLND, possible pelvic and/or para-aortic LND, possible exploratory laparotomy. Counseled patient regarding risks, benefits, indications, and alternatives to surgery. Plan to sign consents day of surgery.  All questions and concerns were addressed with the patient and she is comfortable with the plan.    An electronic signature was used to authenticate this note. Erika Reyna MD    Pursuant to the emergency declaration unde the [facility], [facility] waiver authority and the [employer] Act, this Virtual Visit was conducted, with patient's consent, to reduce the patient's risk of exposure to COVID-19 and provide continuity of care for an established patient. Patient identification was verified at the start of the visit: Yes    Services were provided through a video synchronous discussion virtually to substitute for in-person clinic visit. Patient was at her individual home, while the provider was in his/her respective office.     I spent at least 25 minutes with this established patient, and >50% of the time was spent counseling and/or coordinating care regarding endometrial cancer, imaging results, surgical planning    Erika Reyna MD

## 2021-07-27 ENCOUNTER — OFFICE VISIT (OUTPATIENT)
Dept: CARDIOLOGY CLINIC | Age: 71
End: 2021-07-27
Payer: MEDICARE

## 2021-07-27 VITALS
WEIGHT: 173 LBS | HEART RATE: 53 BPM | SYSTOLIC BLOOD PRESSURE: 186 MMHG | DIASTOLIC BLOOD PRESSURE: 100 MMHG | OXYGEN SATURATION: 94 % | HEIGHT: 60 IN | BODY MASS INDEX: 33.96 KG/M2

## 2021-07-27 DIAGNOSIS — I10 ESSENTIAL HYPERTENSION: ICD-10-CM

## 2021-07-27 DIAGNOSIS — R94.31 ABNORMAL FINDING ON EKG: ICD-10-CM

## 2021-07-27 DIAGNOSIS — C54.1 ENDOMETRIAL CANCER (HCC): ICD-10-CM

## 2021-07-27 DIAGNOSIS — Z01.810 PREOPERATIVE CARDIOVASCULAR EXAMINATION: Primary | ICD-10-CM

## 2021-07-27 PROCEDURE — G8417 CALC BMI ABV UP PARAM F/U: HCPCS | Performed by: SPECIALIST

## 2021-07-27 PROCEDURE — 1101F PT FALLS ASSESS-DOCD LE1/YR: CPT | Performed by: SPECIALIST

## 2021-07-27 PROCEDURE — G8427 DOCREV CUR MEDS BY ELIG CLIN: HCPCS | Performed by: SPECIALIST

## 2021-07-27 PROCEDURE — G8432 DEP SCR NOT DOC, RNG: HCPCS | Performed by: SPECIALIST

## 2021-07-27 PROCEDURE — 3017F COLORECTAL CA SCREEN DOC REV: CPT | Performed by: SPECIALIST

## 2021-07-27 PROCEDURE — G0463 HOSPITAL OUTPT CLINIC VISIT: HCPCS | Performed by: SPECIALIST

## 2021-07-27 PROCEDURE — G8536 NO DOC ELDER MAL SCRN: HCPCS | Performed by: SPECIALIST

## 2021-07-27 PROCEDURE — 1090F PRES/ABSN URINE INCON ASSESS: CPT | Performed by: SPECIALIST

## 2021-07-27 PROCEDURE — 99204 OFFICE O/P NEW MOD 45 MIN: CPT | Performed by: SPECIALIST

## 2021-07-27 PROCEDURE — G8399 PT W/DXA RESULTS DOCUMENT: HCPCS | Performed by: SPECIALIST

## 2021-07-27 RX ORDER — AMLODIPINE BESYLATE 5 MG/1
5 TABLET ORAL DAILY
Qty: 30 TABLET | Refills: 5 | Status: SHIPPED | OUTPATIENT
Start: 2021-07-27 | End: 2021-12-30

## 2021-07-27 NOTE — PROGRESS NOTES
HISTORY OF PRESENT ILLNESS  Lucho Liu is a 79 y.o. female. She is scheduled to have total abdominal hysterectomy in 2 days for endometrial cancer and is referred for preoperative clearance due to abnormal EKG. Her blood pressure has been high for some time but she thinks it goes up higher in doctor's offices. She does not smoke cigarettes or drink alcohol to excess. She has no chest discomfort or shortness of breath with walking. Family history is unremarkable. Her EKG shows left axis deviation and increased voltage consistent with hypertensive heart disease. HPI   Patient Active Problem List   Diagnosis Code    DJD (degenerative joint disease) of hip M16.9    Hip arthritis M16.10    PUD (peptic ulcer disease) K27.9    GERD (gastroesophageal reflux disease) K21.9    Arthritis T68.71    Lichen planus C28.7    [facility] C54.1    Essential hypertension I10    Abnormal finding on EKG R94.31     Current Outpatient Medications   Medication Sig Dispense Refill    amLODIPine (NORVASC) 5 mg tablet Take 1 Tablet by mouth daily. 30 Tablet 5    ibuprofen (MOTRIN) 600 mg tablet Take 1 Tablet by mouth every six (6) hours as needed for Pain. (Patient taking differently: Take 600 mg by mouth nightly.) 30 Tablet 0    methylPREDNISolone (MedroL) 4 mg tab Take 4 mg by mouth daily as needed. Prn for lichen planus      melatonin 1 mg tablet Take 1 mg by mouth nightly.  cyanocobalamin (VITAMIN B-12) 1,000 mcg tablet Take 1,000 mcg by mouth daily.  cholecalciferol, vitamin D3, 2,000 unit tab Take 2,000 Units by mouth daily.  BIOTIN PO Take 5,000 mcg by mouth daily.  pyridoxine (VITAMIN B-6) 100 mg tablet Take 100 mg by mouth daily. Past Medical History:   Diagnosis Date    Arthritis     OSTEO HIP JULY.     GERD (gastroesophageal reflux disease)     Lichen planus     oral    PUD (peptic ulcer disease)      Past Surgical History:   Procedure Laterality Date    COLONOSCOPY N/A 10/16/2017    COLONOSCOPY performed by Umberto Khan MD at [address] [place] [address]  10/16/2017         HX  SECTION      X2    HX CHOLECYSTECTOMY      HX DILATION AND CURETTAGE  2021    High-grade endometrial carcinoma, favor serous carcinoma    HX HEENT      EXTRACTION OF WISDOM TEETH X 4    HX ORTHOPAEDIC      right foot, bunionectomy    HX ORTHOPAEDIC      LEFTl hip arthroplasty    HX ORTHOPAEDIC      RIGHT hip arthroplasty    HX TUBAL LIGATION      OK COLONOSCOPY FLX DX W/COLLJ SPEC WHEN PFRMD  1/20/2012     x 2    UPPER GI ENDOSCOPY,BIOPSY  2016         UPPER GI ENDOSCOPY,DILATN W GUIDE  2016              Review of Systems   HENT: Negative. Respiratory: Negative. Cardiovascular: Negative. Gastrointestinal: Negative. Genitourinary: Negative. Musculoskeletal: Negative. Skin: Negative. Neurological: Negative. Visit Vitals  BP (!) 186/100 (BP 1 Location: Left upper arm, BP Patient Position: Sitting, BP Cuff Size: Adult)   Pulse (!) 53   Ht 5' (1.524 m)   Wt 173 lb (78.5 kg)   SpO2 94%   BMI 33.79 kg/m²       Physical Exam  Vitals and nursing note reviewed. Constitutional:       Appearance: Normal appearance. HENT:      Head: Normocephalic. Right Ear: External ear normal.      Left Ear: External ear normal.      Nose: Nose normal.      Mouth/Throat:      Mouth: Mucous membranes are moist.   Eyes:      Extraocular Movements: Extraocular movements intact. Cardiovascular:      Rate and Rhythm: Normal rate and regular rhythm. Pulses: Normal pulses. Heart sounds: Normal heart sounds. Pulmonary:      Effort: Pulmonary effort is normal.   Abdominal:      Palpations: Abdomen is soft. Musculoskeletal:         General: No swelling. Normal range of motion. Cervical back: Normal range of motion. Skin:     General: Skin is warm. Neurological:      General: No focal deficit present.       Mental Status: She is alert. Psychiatric:         Mood and Affect: Mood normal.         ASSESSMENT and PLAN  She is hypertensive and I suspect this accounts for her EKG abnormalities. She has no signs or symptoms of coronary disease. I believe that she can safely proceed with surgery in 2 days without further testing. I will start her on amlodipine 5 mg daily and have told her to take the first dose today and to take 1 on the morning of surgery. I will plan to see her back in 4 to 6 weeks and follow-up for further adjustment of medication. An echocardiogram may be considered in the future.

## 2021-07-27 NOTE — PROGRESS NOTES
Vanita Resendez is a 79 y.o. female    Visit Vitals  BP (!) 186/100 (BP 1 Location: Left upper arm, BP Patient Position: Sitting, BP Cuff Size: Adult)   Pulse (!) 53   Ht 5' (1.524 m)   Wt 173 lb (78.5 kg)   SpO2 94%   BMI 33.79 kg/m²       Chief Complaint   Patient presents with    Abnormal EKG       Chest pain NO  SOB NO  Dizziness NO  Swelling NO  Recent hospital visit NO  Refills NO

## 2021-07-28 ENCOUNTER — ANESTHESIA EVENT (OUTPATIENT)
Dept: SURGERY | Age: 71
End: 2021-07-28
Payer: MEDICARE

## 2021-07-29 ENCOUNTER — HOSPITAL ENCOUNTER (OUTPATIENT)
Age: 71
Setting detail: OBSERVATION
Discharge: HOME | End: 2021-07-30
Attending: OBSTETRICS & GYNECOLOGY | Admitting: OBSTETRICS & GYNECOLOGY
Payer: MEDICARE

## 2021-07-29 ENCOUNTER — ANESTHESIA (OUTPATIENT)
Dept: SURGERY | Age: 71
End: 2021-07-29
Payer: MEDICARE

## 2021-07-29 DIAGNOSIS — C54.1 ENDOMETRIAL CANCER (HCC): Primary | ICD-10-CM

## 2021-07-29 PROCEDURE — 74011250636 HC RX REV CODE- 250/636: Performed by: OBSTETRICS & GYNECOLOGY

## 2021-07-29 PROCEDURE — 74011250636 HC RX REV CODE- 250/636: Performed by: NURSE ANESTHETIST, CERTIFIED REGISTERED

## 2021-07-29 PROCEDURE — 77030014008 HC SPNG HEMSTAT J&J -C: Performed by: OBSTETRICS & GYNECOLOGY

## 2021-07-29 PROCEDURE — 74011000250 HC RX REV CODE- 250: Performed by: OBSTETRICS & GYNECOLOGY

## 2021-07-29 PROCEDURE — 77030003666 HC NDL SPINAL BD -A: Performed by: OBSTETRICS & GYNECOLOGY

## 2021-07-29 PROCEDURE — 77030013079 HC BLNKT BAIR HGGR 3M -A: Performed by: ANESTHESIOLOGY

## 2021-07-29 PROCEDURE — 88307 TISSUE EXAM BY PATHOLOGIST: CPT

## 2021-07-29 PROCEDURE — 77030002933 HC SUT MCRYL J&J -A: Performed by: OBSTETRICS & GYNECOLOGY

## 2021-07-29 PROCEDURE — 77030020703 HC SEAL CANN DISP INTU -B: Performed by: OBSTETRICS & GYNECOLOGY

## 2021-07-29 PROCEDURE — 77030008684 HC TU ET CUF COVD -B: Performed by: ANESTHESIOLOGY

## 2021-07-29 PROCEDURE — 77030031492 HC PRT ACC BLNT AIRSEAL CNMD -B: Performed by: OBSTETRICS & GYNECOLOGY

## 2021-07-29 PROCEDURE — 74011250636 HC RX REV CODE- 250/636: Performed by: PHYSICIAN ASSISTANT

## 2021-07-29 PROCEDURE — 77030040830 HC CATH URETH FOL MDII -A: Performed by: OBSTETRICS & GYNECOLOGY

## 2021-07-29 PROCEDURE — 99218 HC RM OBSERVATION: CPT

## 2021-07-29 PROCEDURE — 77030035277 HC OBTRTR BLDELSS DISP INTU -B: Performed by: OBSTETRICS & GYNECOLOGY

## 2021-07-29 PROCEDURE — 88112 CYTOPATH CELL ENHANCE TECH: CPT

## 2021-07-29 PROCEDURE — 74011250637 HC RX REV CODE- 250/637: Performed by: PHYSICIAN ASSISTANT

## 2021-07-29 PROCEDURE — 77030020263 HC SOL INJ SOD CL0.9% LFCR 1000ML: Performed by: OBSTETRICS & GYNECOLOGY

## 2021-07-29 PROCEDURE — 74011000250 HC RX REV CODE- 250: Performed by: NURSE ANESTHETIST, CERTIFIED REGISTERED

## 2021-07-29 PROCEDURE — 88360 TUMOR IMMUNOHISTOCHEM/MANUAL: CPT

## 2021-07-29 PROCEDURE — 88341 IMHCHEM/IMCYTCHM EA ADD ANTB: CPT

## 2021-07-29 PROCEDURE — 77030018778 HC MANIP UTER VCAR CNMD -B: Performed by: OBSTETRICS & GYNECOLOGY

## 2021-07-29 PROCEDURE — 74011250636 HC RX REV CODE- 250/636: Performed by: ANESTHESIOLOGY

## 2021-07-29 PROCEDURE — 77030035029 HC NDL INSUF VERES DISP COVD -B: Performed by: OBSTETRICS & GYNECOLOGY

## 2021-07-29 PROCEDURE — 74011000254 HC RX REV CODE- 254: Performed by: OBSTETRICS & GYNECOLOGY

## 2021-07-29 PROCEDURE — 2709999900 HC NON-CHARGEABLE SUPPLY: Performed by: OBSTETRICS & GYNECOLOGY

## 2021-07-29 PROCEDURE — 74011250637 HC RX REV CODE- 250/637: Performed by: ANESTHESIOLOGY

## 2021-07-29 PROCEDURE — 77030026438 HC STYL ET INTUB CARD -A: Performed by: ANESTHESIOLOGY

## 2021-07-29 PROCEDURE — 76060000037 HC ANESTHESIA 3 TO 3.5 HR: Performed by: OBSTETRICS & GYNECOLOGY

## 2021-07-29 PROCEDURE — 88309 TISSUE EXAM BY PATHOLOGIST: CPT

## 2021-07-29 PROCEDURE — 76010000878 HC OR TIME 3 TO 3.5HR INTENSV - TIER 2: Performed by: OBSTETRICS & GYNECOLOGY

## 2021-07-29 PROCEDURE — 74011000250 HC RX REV CODE- 250: Performed by: ANESTHESIOLOGY

## 2021-07-29 PROCEDURE — 77030022704 HC SUT VLOC COVD -B: Performed by: OBSTETRICS & GYNECOLOGY

## 2021-07-29 PROCEDURE — 76210000001 HC OR PH I REC 2.5 TO 3 HR: Performed by: OBSTETRICS & GYNECOLOGY

## 2021-07-29 PROCEDURE — 77030040922 HC BLNKT HYPOTHRM STRY -A

## 2021-07-29 PROCEDURE — 74011000258 HC RX REV CODE- 258: Performed by: OBSTETRICS & GYNECOLOGY

## 2021-07-29 PROCEDURE — 88342 IMHCHEM/IMCYTCHM 1ST ANTB: CPT

## 2021-07-29 RX ORDER — KETOROLAC TROMETHAMINE 30 MG/ML
15 INJECTION, SOLUTION INTRAMUSCULAR; INTRAVENOUS EVERY 6 HOURS
Status: COMPLETED | OUTPATIENT
Start: 2021-07-29 | End: 2021-07-30

## 2021-07-29 RX ORDER — SUCCINYLCHOLINE CHLORIDE 20 MG/ML
INJECTION INTRAMUSCULAR; INTRAVENOUS AS NEEDED
Status: DISCONTINUED | OUTPATIENT
Start: 2021-07-29 | End: 2021-07-29 | Stop reason: HOSPADM

## 2021-07-29 RX ORDER — SODIUM CHLORIDE 0.9 % (FLUSH) 0.9 %
5-40 SYRINGE (ML) INJECTION EVERY 8 HOURS
Status: DISCONTINUED | OUTPATIENT
Start: 2021-07-29 | End: 2021-07-29 | Stop reason: HOSPADM

## 2021-07-29 RX ORDER — FENTANYL CITRATE 50 UG/ML
INJECTION, SOLUTION INTRAMUSCULAR; INTRAVENOUS AS NEEDED
Status: DISCONTINUED | OUTPATIENT
Start: 2021-07-29 | End: 2021-07-29 | Stop reason: HOSPADM

## 2021-07-29 RX ORDER — ACETAMINOPHEN 325 MG/1
650 TABLET ORAL EVERY 6 HOURS
Status: DISCONTINUED | OUTPATIENT
Start: 2021-07-29 | End: 2021-07-30 | Stop reason: HOSPADM

## 2021-07-29 RX ORDER — LORAZEPAM 2 MG/ML
1 INJECTION INTRAMUSCULAR
Status: DISCONTINUED | OUTPATIENT
Start: 2021-07-29 | End: 2021-07-30 | Stop reason: HOSPADM

## 2021-07-29 RX ORDER — AMLODIPINE BESYLATE 5 MG/1
5 TABLET ORAL DAILY
Status: DISCONTINUED | OUTPATIENT
Start: 2021-07-30 | End: 2021-07-30 | Stop reason: HOSPADM

## 2021-07-29 RX ORDER — SODIUM CHLORIDE, SODIUM LACTATE, POTASSIUM CHLORIDE, CALCIUM CHLORIDE 600; 310; 30; 20 MG/100ML; MG/100ML; MG/100ML; MG/100ML
50 INJECTION, SOLUTION INTRAVENOUS CONTINUOUS
Status: DISCONTINUED | OUTPATIENT
Start: 2021-07-29 | End: 2021-07-29 | Stop reason: HOSPADM

## 2021-07-29 RX ORDER — ROCURONIUM BROMIDE 10 MG/ML
INJECTION, SOLUTION INTRAVENOUS AS NEEDED
Status: DISCONTINUED | OUTPATIENT
Start: 2021-07-29 | End: 2021-07-29 | Stop reason: HOSPADM

## 2021-07-29 RX ORDER — HYDROMORPHONE HYDROCHLORIDE 2 MG/ML
INJECTION, SOLUTION INTRAMUSCULAR; INTRAVENOUS; SUBCUTANEOUS AS NEEDED
Status: DISCONTINUED | OUTPATIENT
Start: 2021-07-29 | End: 2021-07-29 | Stop reason: HOSPADM

## 2021-07-29 RX ORDER — PROPOFOL 10 MG/ML
INJECTION, EMULSION INTRAVENOUS AS NEEDED
Status: DISCONTINUED | OUTPATIENT
Start: 2021-07-29 | End: 2021-07-29 | Stop reason: HOSPADM

## 2021-07-29 RX ORDER — FENTANYL CITRATE 50 UG/ML
25 INJECTION, SOLUTION INTRAMUSCULAR; INTRAVENOUS
Status: DISCONTINUED | OUTPATIENT
Start: 2021-07-29 | End: 2021-07-29 | Stop reason: HOSPADM

## 2021-07-29 RX ORDER — SODIUM CHLORIDE 0.9 % (FLUSH) 0.9 %
5-40 SYRINGE (ML) INJECTION AS NEEDED
Status: DISCONTINUED | OUTPATIENT
Start: 2021-07-29 | End: 2021-07-29 | Stop reason: HOSPADM

## 2021-07-29 RX ORDER — ROCURONIUM BROMIDE 10 MG/ML
INJECTION, SOLUTION INTRAVENOUS AS NEEDED
Status: DISCONTINUED | OUTPATIENT
Start: 2021-07-29 | End: 2021-07-29

## 2021-07-29 RX ORDER — BUPIVACAINE HYDROCHLORIDE 5 MG/ML
INJECTION, SOLUTION EPIDURAL; INTRACAUDAL AS NEEDED
Status: DISCONTINUED | OUTPATIENT
Start: 2021-07-29 | End: 2021-07-29 | Stop reason: HOSPADM

## 2021-07-29 RX ORDER — EPHEDRINE SULFATE/0.9% NACL/PF 50 MG/5 ML
SYRINGE (ML) INTRAVENOUS AS NEEDED
Status: DISCONTINUED | OUTPATIENT
Start: 2021-07-29 | End: 2021-07-29

## 2021-07-29 RX ORDER — MIDAZOLAM HYDROCHLORIDE 1 MG/ML
1 INJECTION, SOLUTION INTRAMUSCULAR; INTRAVENOUS AS NEEDED
Status: DISCONTINUED | OUTPATIENT
Start: 2021-07-29 | End: 2021-07-29 | Stop reason: HOSPADM

## 2021-07-29 RX ORDER — LIDOCAINE HYDROCHLORIDE 20 MG/ML
INJECTION, SOLUTION EPIDURAL; INFILTRATION; INTRACAUDAL; PERINEURAL AS NEEDED
Status: DISCONTINUED | OUTPATIENT
Start: 2021-07-29 | End: 2021-07-29 | Stop reason: HOSPADM

## 2021-07-29 RX ORDER — PHENYLEPHRINE HCL IN 0.9% NACL 0.4MG/10ML
SYRINGE (ML) INTRAVENOUS AS NEEDED
Status: DISCONTINUED | OUTPATIENT
Start: 2021-07-29 | End: 2021-07-29 | Stop reason: HOSPADM

## 2021-07-29 RX ORDER — ATROPINE SULFATE 0.4 MG/ML
INJECTION, SOLUTION ENDOTRACHEAL; INTRAMEDULLARY; INTRAMUSCULAR; INTRAVENOUS; SUBCUTANEOUS AS NEEDED
Status: DISCONTINUED | OUTPATIENT
Start: 2021-07-29 | End: 2021-07-29 | Stop reason: HOSPADM

## 2021-07-29 RX ORDER — SODIUM CHLORIDE 0.9 % (FLUSH) 0.9 %
5-40 SYRINGE (ML) INJECTION AS NEEDED
Status: DISCONTINUED | OUTPATIENT
Start: 2021-07-29 | End: 2021-07-30 | Stop reason: HOSPADM

## 2021-07-29 RX ORDER — SODIUM CHLORIDE, SODIUM LACTATE, POTASSIUM CHLORIDE, CALCIUM CHLORIDE 600; 310; 30; 20 MG/100ML; MG/100ML; MG/100ML; MG/100ML
25 INJECTION, SOLUTION INTRAVENOUS CONTINUOUS
Status: DISCONTINUED | OUTPATIENT
Start: 2021-07-29 | End: 2021-07-29 | Stop reason: HOSPADM

## 2021-07-29 RX ORDER — LIDOCAINE HYDROCHLORIDE 10 MG/ML
0.1 INJECTION, SOLUTION EPIDURAL; INFILTRATION; INTRACAUDAL; PERINEURAL AS NEEDED
Status: DISCONTINUED | OUTPATIENT
Start: 2021-07-29 | End: 2021-07-29 | Stop reason: HOSPADM

## 2021-07-29 RX ORDER — GLYCOPYRROLATE 0.2 MG/ML
INJECTION INTRAMUSCULAR; INTRAVENOUS
Status: DISPENSED
Start: 2021-07-29 | End: 2021-07-29

## 2021-07-29 RX ORDER — SODIUM CHLORIDE, SODIUM LACTATE, POTASSIUM CHLORIDE, CALCIUM CHLORIDE 600; 310; 30; 20 MG/100ML; MG/100ML; MG/100ML; MG/100ML
INJECTION, SOLUTION INTRAVENOUS
Status: DISCONTINUED | OUTPATIENT
Start: 2021-07-29 | End: 2021-07-29 | Stop reason: HOSPADM

## 2021-07-29 RX ORDER — EPHEDRINE SULFATE/0.9% NACL/PF 50 MG/5 ML
SYRINGE (ML) INTRAVENOUS AS NEEDED
Status: DISCONTINUED | OUTPATIENT
Start: 2021-07-29 | End: 2021-07-29 | Stop reason: HOSPADM

## 2021-07-29 RX ORDER — TRAMADOL HYDROCHLORIDE 50 MG/1
50 TABLET ORAL
Status: DISCONTINUED | OUTPATIENT
Start: 2021-07-29 | End: 2021-07-30 | Stop reason: HOSPADM

## 2021-07-29 RX ORDER — FENTANYL CITRATE 50 UG/ML
50 INJECTION, SOLUTION INTRAMUSCULAR; INTRAVENOUS AS NEEDED
Status: DISCONTINUED | OUTPATIENT
Start: 2021-07-29 | End: 2021-07-29 | Stop reason: HOSPADM

## 2021-07-29 RX ORDER — INDOCYANINE GREEN AND WATER 25 MG
KIT INJECTION AS NEEDED
Status: DISCONTINUED | OUTPATIENT
Start: 2021-07-29 | End: 2021-07-29 | Stop reason: HOSPADM

## 2021-07-29 RX ORDER — ONDANSETRON 2 MG/ML
4 INJECTION INTRAMUSCULAR; INTRAVENOUS
Status: DISCONTINUED | OUTPATIENT
Start: 2021-07-29 | End: 2021-07-30 | Stop reason: HOSPADM

## 2021-07-29 RX ORDER — ONDANSETRON 2 MG/ML
4 INJECTION INTRAMUSCULAR; INTRAVENOUS AS NEEDED
Status: DISCONTINUED | OUTPATIENT
Start: 2021-07-29 | End: 2021-07-29 | Stop reason: HOSPADM

## 2021-07-29 RX ORDER — GLYCOPYRROLATE 0.2 MG/ML
0.2 INJECTION INTRAMUSCULAR; INTRAVENOUS ONCE
Status: COMPLETED | OUTPATIENT
Start: 2021-07-29 | End: 2021-07-29

## 2021-07-29 RX ORDER — DIPHENHYDRAMINE HYDROCHLORIDE 50 MG/ML
12.5 INJECTION, SOLUTION INTRAMUSCULAR; INTRAVENOUS
Status: DISCONTINUED | OUTPATIENT
Start: 2021-07-29 | End: 2021-07-30 | Stop reason: HOSPADM

## 2021-07-29 RX ORDER — MIDAZOLAM HYDROCHLORIDE 1 MG/ML
INJECTION, SOLUTION INTRAMUSCULAR; INTRAVENOUS AS NEEDED
Status: DISCONTINUED | OUTPATIENT
Start: 2021-07-29 | End: 2021-07-29 | Stop reason: HOSPADM

## 2021-07-29 RX ORDER — SODIUM CHLORIDE 9 MG/ML
75 INJECTION, SOLUTION INTRAVENOUS CONTINUOUS
Status: DISCONTINUED | OUTPATIENT
Start: 2021-07-29 | End: 2021-07-30 | Stop reason: HOSPADM

## 2021-07-29 RX ORDER — ACETAMINOPHEN 325 MG/1
650 TABLET ORAL ONCE
Status: COMPLETED | OUTPATIENT
Start: 2021-07-29 | End: 2021-07-29

## 2021-07-29 RX ORDER — SCOLOPAMINE TRANSDERMAL SYSTEM 1 MG/1
1 PATCH, EXTENDED RELEASE TRANSDERMAL ONCE
Status: DISCONTINUED | OUTPATIENT
Start: 2021-07-29 | End: 2021-07-30 | Stop reason: HOSPADM

## 2021-07-29 RX ORDER — HYDROMORPHONE HYDROCHLORIDE 1 MG/ML
0.5 INJECTION, SOLUTION INTRAMUSCULAR; INTRAVENOUS; SUBCUTANEOUS
Status: DISCONTINUED | OUTPATIENT
Start: 2021-07-29 | End: 2021-07-30 | Stop reason: HOSPADM

## 2021-07-29 RX ORDER — SODIUM CHLORIDE 0.9 % (FLUSH) 0.9 %
5-40 SYRINGE (ML) INJECTION EVERY 8 HOURS
Status: DISCONTINUED | OUTPATIENT
Start: 2021-07-29 | End: 2021-07-30 | Stop reason: HOSPADM

## 2021-07-29 RX ORDER — HEPARIN SODIUM 5000 [USP'U]/ML
5000 INJECTION, SOLUTION INTRAVENOUS; SUBCUTANEOUS ONCE
Status: DISCONTINUED | OUTPATIENT
Start: 2021-07-29 | End: 2021-07-29

## 2021-07-29 RX ORDER — HYDROMORPHONE HYDROCHLORIDE 1 MG/ML
0.2 INJECTION, SOLUTION INTRAMUSCULAR; INTRAVENOUS; SUBCUTANEOUS
Status: DISCONTINUED | OUTPATIENT
Start: 2021-07-29 | End: 2021-07-29 | Stop reason: HOSPADM

## 2021-07-29 RX ADMIN — FENTANYL CITRATE 50 MCG: 50 INJECTION, SOLUTION INTRAMUSCULAR; INTRAVENOUS at 07:44

## 2021-07-29 RX ADMIN — SODIUM CHLORIDE, POTASSIUM CHLORIDE, SODIUM LACTATE AND CALCIUM CHLORIDE: 600; 310; 30; 20 INJECTION, SOLUTION INTRAVENOUS at 08:01

## 2021-07-29 RX ADMIN — MIDAZOLAM 2 MG: 1 INJECTION INTRAMUSCULAR; INTRAVENOUS at 07:24

## 2021-07-29 RX ADMIN — TRAMADOL HYDROCHLORIDE 50 MG: 50 TABLET, COATED ORAL at 15:36

## 2021-07-29 RX ADMIN — PROPOFOL 60 MG: 10 INJECTION, EMULSION INTRAVENOUS at 07:43

## 2021-07-29 RX ADMIN — FENTANYL CITRATE 50 MCG: 50 INJECTION, SOLUTION INTRAMUSCULAR; INTRAVENOUS at 07:35

## 2021-07-29 RX ADMIN — ACETAMINOPHEN 650 MG: 325 TABLET ORAL at 14:40

## 2021-07-29 RX ADMIN — KETOROLAC TROMETHAMINE 15 MG: 30 INJECTION, SOLUTION INTRAMUSCULAR; INTRAVENOUS at 14:41

## 2021-07-29 RX ADMIN — Medication 80 MCG: at 07:53

## 2021-07-29 RX ADMIN — Medication 10 ML: at 22:00

## 2021-07-29 RX ADMIN — FENTANYL CITRATE 25 MCG: 50 INJECTION, SOLUTION INTRAMUSCULAR; INTRAVENOUS at 11:18

## 2021-07-29 RX ADMIN — ROCURONIUM BROMIDE 5 MG: 10 SOLUTION INTRAVENOUS at 07:35

## 2021-07-29 RX ADMIN — SODIUM CHLORIDE, POTASSIUM CHLORIDE, SODIUM LACTATE AND CALCIUM CHLORIDE 50 ML/HR: 600; 310; 30; 20 INJECTION, SOLUTION INTRAVENOUS at 07:00

## 2021-07-29 RX ADMIN — FENTANYL CITRATE 25 MCG: 50 INJECTION, SOLUTION INTRAMUSCULAR; INTRAVENOUS at 12:32

## 2021-07-29 RX ADMIN — ROCURONIUM BROMIDE 45 MG: 10 SOLUTION INTRAVENOUS at 07:41

## 2021-07-29 RX ADMIN — SODIUM CHLORIDE, POTASSIUM CHLORIDE, SODIUM LACTATE AND CALCIUM CHLORIDE: 600; 310; 30; 20 INJECTION, SOLUTION INTRAVENOUS at 10:02

## 2021-07-29 RX ADMIN — SUGAMMADEX 200 MG: 100 INJECTION, SOLUTION INTRAVENOUS at 10:09

## 2021-07-29 RX ADMIN — SUCCINYLCHOLINE CHLORIDE 160 MG: 20 INJECTION, SOLUTION INTRAMUSCULAR; INTRAVENOUS at 07:35

## 2021-07-29 RX ADMIN — Medication 0.4 MG: at 08:40

## 2021-07-29 RX ADMIN — ACETAMINOPHEN 650 MG: 325 TABLET ORAL at 07:13

## 2021-07-29 RX ADMIN — GLYCOPYRROLATE 0.2 MG: 0.2 INJECTION INTRAMUSCULAR; INTRAVENOUS at 11:34

## 2021-07-29 RX ADMIN — HYDROMORPHONE HYDROCHLORIDE 0.5 MG: 2 INJECTION, SOLUTION INTRAMUSCULAR; INTRAVENOUS; SUBCUTANEOUS at 08:18

## 2021-07-29 RX ADMIN — CEFOXITIN SODIUM 2 G: 2 POWDER, FOR SOLUTION INTRAVENOUS at 07:45

## 2021-07-29 RX ADMIN — CEFOXITIN SODIUM 2 G: 2 POWDER, FOR SOLUTION INTRAVENOUS at 09:45

## 2021-07-29 RX ADMIN — SODIUM CHLORIDE 75 ML/HR: 9 INJECTION, SOLUTION INTRAVENOUS at 10:48

## 2021-07-29 RX ADMIN — Medication 80 MCG: at 08:01

## 2021-07-29 RX ADMIN — PROPOFOL 160 MG: 10 INJECTION, EMULSION INTRAVENOUS at 07:35

## 2021-07-29 RX ADMIN — SODIUM CHLORIDE, POTASSIUM CHLORIDE, SODIUM LACTATE AND CALCIUM CHLORIDE 25 ML/HR: 600; 310; 30; 20 INJECTION, SOLUTION INTRAVENOUS at 07:10

## 2021-07-29 RX ADMIN — ACETAMINOPHEN 650 MG: 325 TABLET ORAL at 20:46

## 2021-07-29 RX ADMIN — HYDROMORPHONE HYDROCHLORIDE 0.5 MG: 2 INJECTION, SOLUTION INTRAMUSCULAR; INTRAVENOUS; SUBCUTANEOUS at 09:37

## 2021-07-29 RX ADMIN — ROCURONIUM BROMIDE 20 MG: 10 SOLUTION INTRAVENOUS at 09:34

## 2021-07-29 RX ADMIN — KETOROLAC TROMETHAMINE 15 MG: 30 INJECTION, SOLUTION INTRAMUSCULAR; INTRAVENOUS at 20:46

## 2021-07-29 RX ADMIN — Medication 10 MG: at 08:25

## 2021-07-29 RX ADMIN — LIDOCAINE HYDROCHLORIDE 60 MG: 20 INJECTION, SOLUTION EPIDURAL; INFILTRATION; INTRACAUDAL; PERINEURAL at 07:35

## 2021-07-29 RX ADMIN — SODIUM CHLORIDE, POTASSIUM CHLORIDE, SODIUM LACTATE AND CALCIUM CHLORIDE: 600; 310; 30; 20 INJECTION, SOLUTION INTRAVENOUS at 07:00

## 2021-07-29 NOTE — ROUTINE PROCESS
Patient: Nancy Millan MRN: [id number]  SSN: [id number]   YOB: 1950  Age: 79 y.o. Sex: female     Patient is status post Procedure(s):  ROBOTIC ASSISTED TOTAL LAPAROSCOPIC HYSTERECTOMY, BILATERAL SALPINGO-OOPHERECTOMY, SENTINAL LYMPH NODE MAPPING AND BIOPSY. Surgeon(s) and Role:     * Caro Trevino MD - Primary    Local/Dose/Irrigation:  30ML                  Peripheral IV 07/29/21 Right Wrist (Active)   Site Assessment Clean, dry, & intact 07/29/21 0644   Dressing Status Clean, dry, & intact 07/29/21 0644   Dressing Type Transparent 07/29/21 0644   Hub Color/Line Status Infusing 07/29/21 0644       Peripheral IV 07/29/21 Anterior; Left Wrist (Active)   Site Assessment Clean, dry, & intact 07/29/21 0700   Dressing Status Clean, dry, & intact 07/29/21 0700   Dressing Type Transparent 07/29/21 0700   Hub Color/Line Status Infusing 07/29/21 0700          Orogastric Tube 07/29/21 (Active)      Airway - Endotracheal Tube 07/29/21 Oral (Active)                   Dressing/Packing:  Incision 07/29/21 Abdomen-Dressing/Treatment: Skin glue (07/29/21 1006)  Incision 07/29/21 Vagina-Dressing/Treatment: Ilya Mixer (07/29/21 1006)    Splint/Cast:  ]    Other:

## 2021-07-29 NOTE — BRIEF OP NOTE
Brief Postoperative Note    Patient: Lucho Liu  YOB: 1950  MRN: [id number]    Date of Procedure: 7/29/2021     Pre-Op Diagnosis: ENDOMETRIAL CANCER    Post-Op Diagnosis: Same as preoperative diagnosis. Procedure(s):  ROBOTIC ASSISTED TOTAL LAPAROSCOPIC HYSTERECTOMY, BILATERAL SALPINGO-OOPHERECTOMY, SENTINAL LYMPH NODE MAPPING AND BIOPSY    Surgeon(s): Ho Theodore MD    Surgical Assistant: Physician Assistant: Anna Monreal PA-C    Anesthesia: General     Estimated Blood Loss (mL): Minimal    Complications: None    Specimens:   ID Type Source Tests Collected by Time Destination   1 : RIGHT PELVIC SENTINEL LYMPH NODE Fresh Lymph Node  Ho Theodore MD 7/29/2021 3031 Pathology   2 : LEFT PELVIC SENTINEL LYMPH NODE 1 Fresh Lymph Node  Ho Theodore MD 7/29/2021 6938 Pathology   3 : LEFT PELVIC SENTINEL LYMPH NODE 2 Fresh Lymph Node  Ho Theodore MD 7/29/2021 9553 Pathology   4 : UTERUS, CERVIX, BILATERAL FALLOPIAN TUBES AND OVARIES Fresh Uterus with Bilateral Fallopian tubes and Ovaries  Ho Theodore MD 7/29/2021 8762 Pathology   1 : PELVIC WASHINGS Fresh Pelvis  Ho Theodore MD 7/29/2021 2528 Cytology        Implants: * No implants in log *    Drains:   Orogastric Tube 07/29/21 (Active)       Findings: On laparoscopic survey, the uterus and bilateral tubes/ovaries were normal appearing. She had signficant intra-abdominal adiposity, and her bowel could not be completed displaced superiorly even in steep Trendelenburg. For this reason, a para-aortic timothy dissection was not performed. Negative pre-operative CT C/A/P. Right sentinel lymph node mapped to the right obturator space. Left sentinel lymph node mapped to two lymph nodes along the external iliac vessels.  There was significant fibrosis and adhesions of the lower uterine segment and cervix to the bladder, which added increased difficult to the case and at least 45 minutes of operative time, and for which a 22-modifier will be added. Normal appearing rectosigmoid colon, small bowel, omentum, liver edge, and diaphragm.      Electronically Signed by Malcom Cox MD on 7/29/2021 at 10:12 AM

## 2021-07-29 NOTE — ANESTHESIA POSTPROCEDURE EVALUATION
Post-Anesthesia Evaluation and Assessment    Patient: Deanne Hilliard MRN: [id number]  SSN: [id number]    YOB: 1950  Age: 79 y.o. Sex: female      I have evaluated the patient and they are stable and ready for discharge from the PACU. Cardiovascular Function/Vital Signs  Visit Vitals  /70   Pulse 64   Temp 36.6 °C (97.8 °F)   Resp 14   Ht 5' (1.524 m)   Wt 79.8 kg (175 lb 14.8 oz)   SpO2 100%   BMI 34.36 kg/m²       Patient is status post General anesthesia for Procedure(s):  ROBOTIC ASSISTED TOTAL LAPAROSCOPIC HYSTERECTOMY, BILATERAL SALPINGO-OOPHERECTOMY, SENTINAL LYMPH NODE MAPPING AND BIOPSY. Nausea/Vomiting: None    Postoperative hydration reviewed and adequate. Pain:  Pain Scale 1: Numeric (0 - 10) (07/29/21 1256)  Pain Intensity 1: 5 (07/29/21 1256)   Managed    Neurological Status:   Neuro (WDL): Exceptions to WDL (07/29/21 1200)  Neuro  Neurologic State: Alert;Drowsy (07/29/21 1200)  Orientation Level: Oriented X4 (07/29/21 1200)  Cognition: Follows commands (07/29/21 1200)  Speech: Clear (07/29/21 1200)  LUE Motor Response: Purposeful (07/29/21 1200)  LLE Motor Response: Purposeful (07/29/21 1200)  RUE Motor Response: Purposeful (07/29/21 1200)  RLE Motor Response: Purposeful (07/29/21 1200)   At baseline    Mental Status, Level of Consciousness: Alert and  oriented to person, place, and time    Pulmonary Status:   O2 Device: Nasal cannula (07/29/21 1200)   Adequate oxygenation and airway patent    Complications related to anesthesia: None    Post-anesthesia assessment completed.  No concerns    Signed By: Juan Miguel Meza MD     July 29, 2021

## 2021-07-29 NOTE — ANESTHESIA PREPROCEDURE EVALUATION
Anesthetic History   No history of anesthetic complications            Review of Systems / Medical History  Patient summary reviewed, nursing notes reviewed and pertinent labs reviewed    Pulmonary                Comments: Remote smoking history - Quit 1967 - 0.125 pack years   Neuro/Psych   Within defined limits           Cardiovascular    Hypertension              Exercise tolerance: >4 METS  Comments:  EKG: SB   GI/Hepatic/Renal     GERD (occasional only)      PUD (remote hx)     Endo/Other        Arthritis     Other Findings   Comments:     Occasional right foot swelling d/t injury           Physical Exam    Airway  Mallampati: III  TM Distance: 4 - 6 cm  Neck ROM: normal range of motion, short neck   Mouth opening: Normal     Cardiovascular      Rate: normal         Dental    Dentition: Implants     Pulmonary  Breath sounds clear to auscultation               Abdominal  GI exam deferred       Other Findings            Anesthetic Plan    ASA: 2  Anesthesia type: general          Induction: Intravenous  Anesthetic plan and risks discussed with: Patient

## 2021-07-29 NOTE — PERIOP NOTES
Dr Susan Jose notified of pt's heart rate that is occ dipping as low as 36/min.   Orders rec'd for meds to be given

## 2021-07-29 NOTE — OP NOTES
Gynecologic Oncology Operative Report    Celena Mendosa  7/29/2021    Pre-operative dx:  1)  High-grade endometrial cancer    Post-operative dx:  1) High-grade endometrial cancer     Procedure:    Robotic-assisted total laparoscopic hysterectomy, Bilateral salpingo-oophorectomy, Bilateral pelvic sentinel lymph node mapping and biopsy     Surgeon:  Raina Peoples MD     Assistant:  Bubba Monte surgical assistance was required throughout the case due to the complexity of the procedure. Shee assisted with dissection, development of the retroperitoneal spaces, and identification of pertinent anatomy during the procedure. Anesthesia:  GETA     EBL:  <25cc    Antibiotics: Cefoxitin 2 grams IV    VTE Prophylaxis: SCDs     Complications:  None    Implants: None     Specimens:  uterus, cervix, bilateral fallopian tubes and ovaries, pelvic washings, bilateral sentinel pelvic lymph nodes      Operative indications:  79 y.o. female with high grade endometrial cancer    Operative findings: On laparoscopic survey, the uterus and bilateral tubes/ovaries were normal appearing. She had signficant intra-abdominal adiposity, and her bowel could not be completed displaced superiorly even in steep Trendelenburg. For this reason, a para-aortic timothy dissection was not performed. Negative pre-operative CT C/A/P. Right sentinel lymph node mapped to the right obturator space. Left sentinel lymph node mapped to two lymph nodes along the external iliac vessels. There was significant fibrosis and adhesions of the lower uterine segment and cervix to the bladder, which added increased difficult to the case and at least 45 minutes of operative time, and for which a 22-modifier will be added. Normal appearing rectosigmoid colon, small bowel, omentum, liver edge, and diaphragm. Operative report:  After the risks, benefits, indications, and alternatives of the procedure were discussed with the patient and informed consent was obtained, the patient was taken to the operating room. She was positively identified, administered general anesthesia, and then placed in the dorsal lithotomy position in [address] [place] [address]. An exam under anesthesia was performed. She was then prepped and draped in the usual fashion. A tony catheter was inserted. An open-sided speculum was used to visualize the cervix. The cervix was grasped with a single-tooth tenaculum and then progressively dilated using cervical dilators. Indocyanine green was then injected at 3 and 9 o'clock on each side of the cervix in preparation for sentinel lymph node mapping. I then placed a V-Care uterine manipulator without difficulty. Attention was then turned to laparoscopy. A horizontal 8-mm skin incision was made at Butler's point in the left upper quadrant. Using an Optiview technique, an 8-mm AirSeal port was placed using a 5-mm laparoscopic scope. Insufflation was applied and intraperitoneal placement was confirmed via direct visualization with the laparoscopic. The abdomen was then insufflated to a pressure of 15 mm Hg. Attention was then turned to placement of the robotic trocars. Under direct visualization, 8-mm DaVinci trocars were placed: one above the umbilicus, one each in both the right and left lateral quadrants, and one in the right upper quadrant. These were placed approximately 8 cm apart. Positioning of the trocars in the abdominal wall were then adjusted to locate the point of articulation at the level of the fascia. The patient was then placed in steep Trendelenberg position and the bowel was displaced into the upper abdomen. The camera port was then docked and the camera was inserted into the #2 port and advanced. These trocars were then docked with the [employer]. A fenestrated bipolar grasper was placed in arm #1, monopolar scissors placed in arm #3, and a Prograsp placed in arm #4. Pelvic washings were obtained.     Attention was then turned to the robotic console and proceeding with hysterectomy and pelvic lymph node mapping. Bilateral round ligaments were cauterized and divided with the monopolar scissors and the bilateral pelvic side-wall retroperitoneum entered and developed. Bilateral ureters were identified and preserved. Bilateral iliac vessels, superior vesicle artery, and obturator nerve were identified and preserved. [place] lymph node mapping identified the above sentinel lymph nodes. Bilateral sentinel lymph nodes were skeletonized and removed and sent for permanent pathology. Bilateral infundibulopelvic ligaments were skeletonized, then sealed with bipolar electrocautery and divided with monopolar scissors. Dissection was continued inferiorly along the broad ligament and the bladder dissected off the lower uterine segment and cervix. As noted above, there was dense fibrosis from her prior C-sections, which added time and difficult to the procedure. Bilateral uterine arteries were skeletonized, then cauterized with bipolar electrocautery and divided using monopolar scissors. A circumferential colpotomy was then carried along the V-care. The specimen of uterus, cervix, and bilateral tubes/ovaries was then removed via the vagina and sent for frozen pathology. The vaginal colpotomy was closed with 0-Vicryl V-lock suture using a karen-suture cut in arm #3. As noted above, there was significant intra-abdominal adiposity and the bowel could not be displaced superiorly and the para-aortic region could not be visualized and thus a para-aortic timothy dissection was not performed. The pelvis was irrigated and made hemostatic. The intra-abdominal pressure was then decreased and all planes of dissection were confirmed hemostatic. Fibrillar was placed along all planes of dissection. The insufflation was released prior to removal of all port sites. All skin incisions were closed with 4-0 monocryl subcuticular stitch.  Skin glue was applied to all skin incisions. The vagina was then inspected and the vaginal cuff was intact and there were no lacerations along the vaginal canal. The patient was taken out of stirrups, awakened from anesthesia, and taken to the recovery room in stable condition. The patient tolerated the procedure well. All instrument, sponge, and needle counts were correct.         Colleen Addison MD  7/29/2021  10:14 AM

## 2021-07-29 NOTE — PERIOP NOTES
FIBRILLAR WAS GIVEN TO THE STERILE FIELD TO BE USED BY MD DURING PROCEDURE  REF: [id number]  LOT: [id number]  EXP: 03/31/2023

## 2021-07-29 NOTE — PERIOP NOTES
TRANSFER - OUT REPORT:    Verbal report given to Maren Molina (name) on Griselda Pollack  being transferred to [facility](unit) for routine post - op       Report consisted of patients Situation, Background, Assessment and   Recommendations(SBAR). Time Pre op antibiotic BUTHS:9541 and 0945  Anesthesia Stop time: 3510  Andrea Present on Transfer to floor:y  Order for Andrea on Chart:y  Discharge Prescriptions with Chart:n    Information from the following report(s) SBAR, OR Summary, Intake/Output, MAR and Accordion was reviewed with the receiving nurse. Opportunity for questions and clarification was provided. Is the patient on 02? YES       L/Min 2       Other     Is the patient on a monitor? NO    Is the nurse transporting with the patient? NO    Surgical Waiting Area notified of patient's transfer from PACU? YES      The following personal items collected during your admission accompanied patient upon transfer:   Dental Appliance: Dental Appliances: None  Vision: Visual Aid: Glasses  Hearing Aid:    Jewelry: Jewelry: None  Clothing: Clothing:  (clothing bag, glasses to pacu)  Other Valuables:  Other Valuables: None  Valuables sent to safe:        Clothes and glasses to floor with pt

## 2021-07-29 NOTE — PROGRESS NOTES
TRANSFER - IN REPORT:    Verbal report received from Stephanie Winslow (name) on Katya Stern  being received from PACU (unit) for routine post - op      Report consisted of patients Situation, Background, Assessment and   Recommendations(SBAR). Information from the following report(s) SBAR, Kardex, OR Summary, Intake/Output, MAR and Recent Results was reviewed with the receiving nurse. Opportunity for questions and clarification was provided. Assessment completed upon patients arrival to unit and care assumed.

## 2021-07-29 NOTE — INTERVAL H&P NOTE
Date of Surgery Update:  Rosalba Gowers was seen and examined. History and physical has been reviewed. The patient has been examined. There have been no significant clinical changes since the completion of the originally dated History and Physical. Cleared by Cardiology to proceed with surgery.      Signed By: Juan Luis Interiano MD     July 29, 2021 7:01 AM

## 2021-07-30 VITALS
RESPIRATION RATE: 16 BRPM | SYSTOLIC BLOOD PRESSURE: 111 MMHG | DIASTOLIC BLOOD PRESSURE: 66 MMHG | HEART RATE: 60 BPM | WEIGHT: 175.93 LBS | HEIGHT: 60 IN | BODY MASS INDEX: 34.54 KG/M2 | OXYGEN SATURATION: 94 % | TEMPERATURE: 98.6 F

## 2021-07-30 LAB
ANION GAP SERPL CALC-SCNC: 6 MMOL/L (ref 5–15)
BUN SERPL-MCNC: 15 MG/DL (ref 6–20)
BUN/CREAT SERPL: 16 (ref 12–20)
CALCIUM SERPL-MCNC: 8 MG/DL (ref 8.5–10.1)
CHLORIDE SERPL-SCNC: 108 MMOL/L (ref 97–108)
CO2 SERPL-SCNC: 25 MMOL/L (ref 21–32)
CREAT SERPL-MCNC: 0.95 MG/DL (ref 0.55–1.02)
ERYTHROCYTE [DISTWIDTH] IN BLOOD BY AUTOMATED COUNT: 13.3 % (ref 11.5–14.5)
GLUCOSE SERPL-MCNC: 108 MG/DL (ref 65–100)
HCT VFR BLD AUTO: 34.7 % (ref 35–47)
HGB BLD-MCNC: 11.5 G/DL (ref 11.5–16)
MCH RBC QN AUTO: 29.9 PG (ref 26–34)
MCHC RBC AUTO-ENTMCNC: 33.1 G/DL (ref 30–36.5)
MCV RBC AUTO: 90.1 FL (ref 80–99)
NRBC # BLD: 0 K/UL (ref 0–0.01)
NRBC BLD-RTO: 0 PER 100 WBC
PLATELET # BLD AUTO: 234 K/UL (ref 150–400)
PMV BLD AUTO: 9.5 FL (ref 8.9–12.9)
POTASSIUM SERPL-SCNC: 4.2 MMOL/L (ref 3.5–5.1)
RBC # BLD AUTO: 3.85 M/UL (ref 3.8–5.2)
SODIUM SERPL-SCNC: 139 MMOL/L (ref 136–145)
WBC # BLD AUTO: 11 K/UL (ref 3.6–11)

## 2021-07-30 PROCEDURE — 80048 BASIC METABOLIC PNL TOTAL CA: CPT

## 2021-07-30 PROCEDURE — 74011250636 HC RX REV CODE- 250/636: Performed by: PHYSICIAN ASSISTANT

## 2021-07-30 PROCEDURE — 85027 COMPLETE CBC AUTOMATED: CPT

## 2021-07-30 PROCEDURE — 74011250637 HC RX REV CODE- 250/637: Performed by: PHYSICIAN ASSISTANT

## 2021-07-30 PROCEDURE — 36415 COLL VENOUS BLD VENIPUNCTURE: CPT

## 2021-07-30 PROCEDURE — 99218 HC RM OBSERVATION: CPT

## 2021-07-30 RX ORDER — DOCUSATE SODIUM 100 MG/1
100 CAPSULE, LIQUID FILLED ORAL
Qty: 60 CAPSULE | Refills: 0 | Status: SHIPPED | OUTPATIENT
Start: 2021-07-30 | End: 2021-08-11 | Stop reason: ALTCHOICE

## 2021-07-30 RX ORDER — TRAMADOL HYDROCHLORIDE 50 MG/1
50 TABLET ORAL
Qty: 20 TABLET | Refills: 0 | Status: SHIPPED | OUTPATIENT
Start: 2021-07-30 | End: 2021-08-04

## 2021-07-30 RX ORDER — ACETAMINOPHEN 325 MG/1
650 TABLET ORAL
Qty: 30 TABLET | Refills: 0 | Status: SHIPPED
Start: 2021-07-30

## 2021-07-30 RX ADMIN — ACETAMINOPHEN 650 MG: 325 TABLET ORAL at 09:57

## 2021-07-30 RX ADMIN — KETOROLAC TROMETHAMINE 15 MG: 30 INJECTION, SOLUTION INTRAMUSCULAR; INTRAVENOUS at 03:00

## 2021-07-30 RX ADMIN — ACETAMINOPHEN 650 MG: 325 TABLET ORAL at 03:00

## 2021-07-30 NOTE — PROGRESS NOTES
[facility]YNECOLOGIC [facility]COLOGY  [address] [facility], [address], [address] [phone]  F [phone]       Patient Name: Neha Strange   Admit Date: 7/29/2021   OR Date: 7/29/2021   Visit Date: 7/30/2021        SUBJECTIVE:    Doing well this morning. States that she had a good night. Pain controlled with tylenol and toradol. No nausea. Tolerating regular diet. Ambulating    OBJECTIVE:    Patient Vitals for the past 24 hrs:   Temp Pulse Resp BP SpO2   07/30/21 0802 98.6 °F (37 °C) 60 16 111/66 94 %   07/30/21 0400 98.2 °F (36.8 °C) 61 16 110/66 94 %   07/30/21 0045 98.9 °F (37.2 °C) 60 16 103/66 96 %   07/29/21 1956 97.9 °F (36.6 °C) 60 18 124/64 95 %   07/29/21 1620 97.8 °F (36.6 °C) 63 18 109/66 96 %   07/29/21 1510 97.9 °F (36.6 °C) 68 16 134/66 98 %   07/29/21 1420 97.7 °F (36.5 °C) 60 16 130/68 100 %   07/29/21 1330 97.6 °F (36.4 °C) (!) 57 16 (!) 144/75 100 %   07/29/21 1300 -- 64 14 132/70 100 %   07/29/21 1245 -- 70 19 (!) 131/58 100 %   07/29/21 1230 -- 77 27 128/78 100 %   07/29/21 1215 97.8 °F (36.6 °C) 75 12 138/70 100 %   07/29/21 1200 96.8 °F (36 °C) 77 13 137/71 100 %   07/29/21 1145 -- 75 11 134/65 100 %   07/29/21 1135 -- 80 12 (!) 141/73 100 %   07/29/21 1130 -- 61 14 132/69 100 %   07/29/21 1115 -- 62 12 (!) 154/89 100 %   07/29/21 1100 -- (!) 53 13 (!) 149/73 100 %   07/29/21 1055 -- 64 13 (!) 146/87 100 %   07/29/21 1050 -- 73 16 (!) 141/76 100 %   07/29/21 1045 -- (!) 58 12 (!) 141/69 100 %   07/29/21 1040 (!) 96.1 °F (35.6 °C) 64 (!) 7 139/75 98 %   07/29/21 1037 (!) 96.1 °F (35.6 °C) 74 10 135/61 100 %       Date 07/29/21 0700 - 07/30/21 0659 07/30/21 0700 - 07/31/21 0659   Shift 4675-3118 4530-1388 24 Hour Total 9452-7058 2479-2634 24 Hour Total   INTAKE   P.O. 0  0        P. O. 0  0      I. V.(mL/kg/hr) 0627(8.9)  2250(1.2)        I.V. 800  800        Volume (lactated Ringers infusion) 1400  1400        Volume (cefOXitin (MEFOXIN) 2 g in 0.9% sodium chloride (MBP/ADV) 50 mL MBP) 50  50      Shift Total(mL/kg) 1011(27.5)  9474(24.3)      OUTPUT   Urine(mL/kg/hr) 1000(1) 900(0.9) 1900(1)        Urine Voided  50 50        Urine Output 250  250        Urine Output (mL) ([REMOVED] Urinary Catheter 07/29/21 2- way; Andrea)       Blood 50  50        Estimated Blood Loss 50  50      Shift Total(mL/kg) 1050(13.2) 900(11.3) 1950(24.4)      NET 1200 -900 300      Weight (kg) 79.8 79.8 79.8 79.8 79.8 79.8       Physical Exam     General:  alert, cooperative, no distress     Cardiac:  Regular rate and rhythm        Lungs:  unlabored  Abdomen:  soft, nondistended       Wound:  clean, dry   Extremity: extremities normal, atraumatic, no cyanosis or edema    Data Review  Lab Results   Component Value Date/Time    WBC 11.0 07/30/2021 03:15 AM    ABS. NEUTROPHILS 4.3 07/16/2021 03:37 PM    HGB 11.5 07/30/2021 03:15 AM    HCT 34.7 (L) 07/30/2021 03:15 AM    MCV 90.1 07/30/2021 03:15 AM    MCH 29.9 07/30/2021 03:15 AM    PLATELET 832 39/47/7059 03:15 AM     Lab Results   Component Value Date/Time    Sodium 139 07/30/2021 03:15 AM    Potassium 4.2 07/30/2021 03:15 AM    Chloride 108 07/30/2021 03:15 AM    CO2 25 07/30/2021 03:15 AM    Glucose 108 (H) 07/30/2021 03:15 AM    BUN 15 07/30/2021 03:15 AM    Creatinine 0.95 07/30/2021 03:15 AM    Calcium 8.0 (L) 07/30/2021 03:15 AM    Albumin 3.6 07/16/2021 03:37 PM    Bilirubin, total 0.4 07/16/2021 03:37 PM    ALT (SGPT) 34 07/16/2021 03:37 PM    Alk. phosphatase 62 07/16/2021 03:37 PM     IMPRESSION/PLAN:    1 Day Post-Op Procedure(s):  ROBOTIC ASSISTED TOTAL LAPAROSCOPIC HYSTERECTOMY, BILATERAL SALPINGO-OOPHERECTOMY, SENTINAL LYMPH NODE MAPPING AND BIOPSY for ENDOMETRIAL CANCER    Oncologic:  78 yo. female with high grade endometrial cancer   Heme/CV:  Hgb 11.5 this morning. VSS. Hemodynamically stable. Renal:  creatinine 0.95. Good urine output overnight. Andrea removed this morning. Was able to void about 50cc.   Would like her to void again before discharge. FEN/GI:  no nausea. Tolerating regular diet. ID/Wound:  afebrile. Abdomen and incisions benign. PPX:  SCDs. Incentive spirometer. Continue OOB   Dispostion:  Doing well postoperatively. Continue routine post op care. Waiting to void again. Discharge instructions reviewed. Questions answered. [id number] [address] for discharge.         Amanda Curtis PA-C  7/30/2021  8:20 AM

## 2021-07-30 NOTE — PROGRESS NOTES
Observation notice provided in writing to patient and/or caregiver as well as verbal explanation of the policy. Patients who are in outpatient status also receive the Observation notice. Patient has received notice and or patient representative has received via secure email, fax, or certified mail based on patient representative's preference.      Keri Kelly MS

## 2021-07-30 NOTE — PROGRESS NOTES
Bedside and Verbal shift change report given to Yessi Levine RN  (oncoming nurse) by Laura Garrett RN  (offgoing nurse). Report included the following information SBAR, Kardex, OR Summary, Intake/Output, MAR and Recent Results. I have reviewed discharge instructions with the patient and son. The patient and son verbalized understanding. Pt given copy of discharge instructions. Medication times reviewed. IVs removed. denies any questions at this time.

## 2021-07-30 NOTE — DISCHARGE INSTRUCTIONS
[address] [facility] [address], [address]  P [phone]  F [phone]     [facility]      Dear Ms. Kavya Astudillo,      Please review your instructions with your nurse and ask any questions so you have all the information you need to recover well at home. If you do not feel you have everything you need to succeed and be safe after you leave the hospital, please discuss these concerns with your nurse. As always, call for any questions at home. Your doctor: Dr. Madie Espinoza  Diagnosis: Endometrial cancer [facility]) [C54.1]  Procedure: Procedure(s):  ROBOTIC ASSISTED TOTAL LAPAROSCOPIC HYSTERECTOMY, BILATERAL SALPINGO-OOPHERECTOMY, SENTINAL LYMPH NODE MAPPING AND BIOPSY  Date of Discharge: 7/30/2021      Take Home Medications     See Discharge Medication Review provided to you by your nurse. If you did not receive one, request this prior to your discharge. · It is important that you take your medications as they are prescribed. Your prescriptions are sent to your pharmacy on record. · Keep your medications in the bottles provided by the pharmacist and keep a list of the medication names, dosages, times to be taken and what they are for in your wallet. · Do not take other medications without consulting your doctor. · You may take acetaminophen (Tylenol) and ibuprofen (Advil) for pain. If this is not sufficient for your pain, take tramadol or other pain medication you were provided. · You should take a daily gentle stool softener such as a colace pill or dulcolax suppository for constipation as this is not uncommon after surgery and/or while on pain medication. If not prescribed, this can be found over the counter. If constipation persists for >24 hours you should take a dose of Milk of Magnesia. Call if your constipation continues. Diet    · Stay hydrated and drink fluids such as gatorade and water.  This will also help prevent constipation and dehydration. Limit somewhat any usual caffeine intake of beverages such as soda, tea and coffee as this may serve to dehydrate you. · For the first 2-3 days keep a low fiber diet avoiding raw vegetables or fruits with skin. A diet consisting of soup, cereal, yogurt, eggs, fish, Boost/Ensure. Avoid fatty/greasy foods. · If nauseated, keep your diet limited to liquids and call if this persists. Activity    · If possible, have someone with you at all times until you feel stable. · Gradually increase your activity each day. There are generally no restrictions on walking, climbing stairs or riding in a car. Walk at least 4 times per day. Walking will help reduce the risk of blood clots and constipation. · Showers are okay. If you have an incision, no tub bathing/swimming for two weeks. · No driving for 2 week and/or while on pain medication. · The following restrictions apply:  1. No heavy lifting greater than 15 lbs for 4 weeks, then 25 lbs for the next 4 weeks. 2. If you had any vaginal procedure or a hysterectomy, nothing per vagina for 6-8 weeks. 3. You may experience vaginal spotting. Should the bleeding require more that 4 pads a day please call our office. Incision    · You should expect some discomfort in the area of your incision, particularly as you increase your activity. If you notice an area of increasing redness or new drainage, please call your doctor. · Your incisions will have buried, absorbable sutures, which do not need to be removed and are covered by protective glue. Please allow this to fall off on its own over time and refrain from peeling it off unless it is no longer covering the incision. Causes For Concern    If any of the following occur, please call our office and speak with the Nurse/aid who will help you with your problem or ask the doctor to call you.  Problems with the incision, including increasing pain, swelling, redness or drainage.  Inability to pass urine    Increasing abdominal pain despite medication   Persisting nausea or vomiting.  Fever or chills and a temperature >101F   Constipation (no bowel movement for three days).  Diarrhea (more than three watery stools within 24 hours).  Excessive vaginal or wound bleeding.  If after hours and you cannot reach an on-call physician, call 911. Follow-Up    Call [phone] to schedule the following appointments with Dr. Ezekiel Storm:   Telephone virtual-visit in 10-14 days to discuss your pathology results.  In-office visit for your postoperative exam in 6 weeks from surgery. Information obtained by :  I understand that if any problems occur once I am at home I am to contact my physician. I understand and acknowledge receipt of the instructions indicated above.                                                                                                                                            Physician's or R.N.'s Signature                                                                  Date/Time                                                                                                                                              Patient or Representative Signature                                                          Date/Time

## 2021-07-30 NOTE — PROGRESS NOTES
1930: Bedside shift change report given to Val Meneses RN (oncoming nurse) by Junior Grady RN (offgoing nurse). Report included the following information SBAR, Kardex, OR Summary, Procedure Summary, Intake/Output, MAR and Recent Results.

## 2021-08-11 ENCOUNTER — VIRTUAL VISIT (OUTPATIENT)
Dept: GYNECOLOGY | Age: 71
End: 2021-08-11
Payer: MEDICARE

## 2021-08-11 DIAGNOSIS — C54.1 PAPILLARY SEROUS ENDOMETRIAL ADENOCARCINOMA (HCC): Primary | ICD-10-CM

## 2021-08-11 PROCEDURE — 99024 POSTOP FOLLOW-UP VISIT: CPT | Performed by: OBSTETRICS & GYNECOLOGY

## 2021-08-11 NOTE — PROGRESS NOTES
[address] [facility] [address], [address] [facility]  P [phone]  F [phone]    Office Note  Patient ID:  Name:  Deanne Hilliard  MRN:  [id number]  :  1950/70 y.o. Date:  2021      HISTORY OF PRESENT ILLNESS:  Ms. Deanne Hilliard is a 79 y.o. female who on 2021 underwent Robotic-assisted total laparoscopic hysterectomy, Bilateral salpingo-oophorectomy, Bilateral pelvic sentinel lymph node mapping and biopsy. Final pathology consistent with Stage II, serous endometrial carcinoma. Negative washings. Stromal cervical involvement. Negative SLNs. 2mm out of 10mm myometrial invasion. Parametrial involvement of tumor. Presents today via virtual visit to discuss pathology and management. She is doing well since surgery. Initial History:  Ms. Deanne Hilliard is a 79 y.o.  postmenopausal female who presents as a new patient from Dr. Jc Lawton for high-grade endometrial cancer. The patient reports vaginal spotting back in April, which resulted in her seeing Dr. Jc Lawton. Pelvic ultrasound on 2021 demonstrated 12.5mm stripe. Hysteroscopy/D&C on 2021 demonstrated \"high-grade endometrial carcinoma, favor serous carcinoma\". She was subsequently referred to our office for further discussion and management. Denies pelvic or abdominal pain/bloating, change in appetite or bowel habits, nausea, vomiting, CP, SOB, hematuria, hematochezia, or urinary symptoms. Interval History:  Presents today via virtual visit for CT results and to finalize surgical planning. Pertinent PMH/PSH: DJD, h/o  x 2     Active, no restrictions. Imaging Review:   CT C/A/P 2021:  FINDINGS:   THYROID: No nodule. MEDIASTINUM: No mass or lymphadenopathy. STEPHANIE: No mass or lymphadenopathy. THORACIC AORTA: No dissection or aneurysm. MAIN PULMONARY ARTERY: Normal in caliber. TRACHEA/BRONCHI: Patent.   ESOPHAGUS: No wall thickening or dilatation. HEART: Normal in size. PLEURA: No effusion or pneumothorax. LUNGS: No nodule, mass, or airspace disease. There is minor atelectasis/scarring  right upper lobe  LIVER: No mass or biliary dilatation. There is hepatic steatosis. There is a 5  mm low-density in segment 4A and segment 5 to small to fully characterize but  most likely tiny cysts. GALLBLADDER: Patient status post cholecystectomy  SPLEEN: No mass. PANCREAS: No mass or ductal dilatation. ADRENALS: Unremarkable. KIDNEYS: No mass, calculus, or hydronephrosis. There is a retroaortic left renal  vein  STOMACH: Unremarkable. SMALL BOWEL: No dilatation or wall thickening. COLON: No dilatation or wall thickening. APPENDIX: Unremarkable. PERITONEUM: No ascites or pneumoperitoneum. RETROPERITONEUM: No lymphadenopathy or aortic aneurysm. There is an 8 mm lymph  node at the aortic bifurcation on the left. There is mild stenosis origin of the  SMA  REPRODUCTIVE ORGANS: Uterus and pelvis are obscured by artifact related to  bilateral total hip replacements. URINARY BLADDER: No mass or calculus. BONES: No destructive bone lesion. There is a right shoulder effusion  ADDITIONAL COMMENTS: N/A     IMPRESSION     1. CT of the chest demonstrates no definite evidence of metastatic disease. No  acute abnormality identified. 2. CT of the abdomen and pelvis demonstrates no definite evidence of metastatic  disease. The pelvis is obscured by artifact related to total hip replacements. There is hepatic steatosis. 2 subcentimeter low densities in the liver are too  small to characterize but most likely represent tiny cysts. There is an 8mm lymph node just to the left of the aortic bifurcation which is  within normal limits. Pelvic ultrasound 4/20/2021:  Impression: Normal uterus with 12.5mm stripe. Normal right ovary. Non visible left ovary with otherwise normal left adnexa. No free fluid.      Pathology Review:   7/29/2021:  * * *FINAL PATHOLOGIC DIAGNOSIS* * *   1.  Right pelvic sentinel lymph node, biopsy:        One lymph node, negative for carcinoma, levels and cytokeratin stain   examined (0/1)   2.  Left pelvic sentinel lymph node #1, biopsy:        One lymph node, negative for carcinoma, levels and cytokeratin stain   examined (0/1)   3.  Left pelvic sentinel lymph node, biopsy:        One lymph node, negative for carcinoma, levels and cytokeratin stain   examined (0/1)   4. Uterus, cervix, bilateral fallopian tubes and ovaries; simple   hysterectomy, BSO:        Endometrium: Uterine serous carcinoma, see synoptic report        Cervix: Serous carcinoma focally involves endocervical stroma,   predominantly as lymphovascular                invasion        Bilateral fallopian tubes: No pathologic diagnosis     ENDOMETRIUM    SPECIMEN       Procedure: Total hysterectomy and bilateral salpingo-oophorectomy       Specimen Integrity: Intact    TUMOR       Histologic Type: Serous carcinoma       Myometrial Invasion: Present           Depth of Myometrial Invasion (Millimeters): 2 mm           Myometrial Thickness (Millimeters): 10 mm           Percentage of Myometrial Invasion: 20%       Uterine Serosa Involvement: Not identified       Lower Uterine Segment Involvement: Present, myoinvasive       Cervical Stromal Involvement: Present       Other Tissue / Organ Involvement: Not identified       Peritoneal Ascitic Fluid: Atypical, favor benign reactive process       Lymphovascular Invasion: Present    MARGINS       Margins: Parametrium and focal paracervix involved by tumor           Parametrial and focal Paracervical Margin: Involved by carcinoma    LYMPH NODES       Lymph Node Status:  All lymph nodes negative for tumor cells           Total Number of Pelvic Nodes Examined: 3           Number of Pelvic [place] Nodes Examined: 3           Total Number of Para-aortic Nodes Examined: 0           Number of Para-aortic [place] Nodes Examined: 0    PATHOLOGIC STAGE CLASSIFICATION (pTNM, AJCC 8th Edition)       Primary Tumor (pT): pT2       Regional Lymph Nodes Modifier: (sn)       Regional Lymph Nodes (pN): pN0    FIGO STAGE       FIGO Stage: II   * * *Comment* * *   Immunohistochemical stains with appropriate controls show tumor positive   for P16, P53 with Ki-67 proliferation index of approximately 50%.  These   findings support diagnosis of endometrial serous carcinoma. 2021:  FINAL PATHOLOGIC DIAGNOSIS   Endometrium, curettings:   High-grade endometrial carcinoma, favor serous carcinoma   See comment   Comment   The biopsy contains a malignant glandular neoplasm with a high nuclear grade, papillary growth pattern, and areas of necrosis. Morphologic features suggest a high-grade endometrial carcinoma and are most compatible with a diagnosis of serous carcinoma. ROS:  A comprehensive review of systems was negative except for that written in the History of Present Illness. , 10 point ROS    OB/GYN ROS:  Per HPI    ECOG ndGndrndanddndend:nd nd2nd Problem List:  Patient Active Problem List    Diagnosis Date Noted    Essential hypertension 2021    Abnormal finding on EKG 2021    Endometrial cancer ([address] 2021    PUD (peptic ulcer disease)     GERD (gastroesophageal reflux disease)     Arthritis     Lichen planus     Hip arthritis 2016    DJD (degenerative joint disease) of hip 2013     PMH:  Past Medical History:   Diagnosis Date    Arthritis     OSTEO HIP JULY.     GERD (gastroesophageal reflux disease)     Lichen planus     oral    PUD (peptic ulcer disease)       PSH:  Past Surgical History:   Procedure Laterality Date    COLONOSCOPY N/A 10/16/2017    COLONOSCOPY performed by Kelli George MD at [address] [place] [address]  10/16/2017         HX  SECTION      X2    HX CHOLECYSTECTOMY      HX DILATION AND CURETTAGE  2021    High-grade endometrial carcinoma, favor serous carcinoma    HX HEENT EXTRACTION OF WISDOM TEETH X 4    HX ORTHOPAEDIC      right foot, bunionectomy    HX ORTHOPAEDIC      LEFTl hip arthroplasty    HX ORTHOPAEDIC      RIGHT hip arthroplasty    HX TUBAL LIGATION      [place] COLONOSCOPY FLX DX W/COLLJ SPEC WHEN PFRMD  1/20/2012     x 2    UPPER GI ENDOSCOPY,BIOPSY  2016         UPPER GI ENDOSCOPY,DILATN W GUIDE  2016           Social History:  Social History     Tobacco Use    Smoking status: Former Smoker     Packs/day: 0.25     Years: 0.50     Pack years: 0.12     Quit date: 1967     Years since quittin.6    Smokeless tobacco: Never Used    Tobacco comment: brief   Substance Use Topics    Alcohol use: Yes     Comment: VERY RARELY       Family History:  Family History   Problem Relation Age of Onset    Cancer Maternal Grandmother 79        colon cancer    Hypertension Mother     Cancer Mother         pacreatic cancer    Cancer Paternal Aunt         ovarian cancer      Medications: (reviewed)  Current Outpatient Medications   Medication Sig    acetaminophen (TYLENOL) 325 mg tablet Take 2 Tablets by mouth every six (6) hours as needed for Pain.  docusate sodium (COLACE) 100 mg capsule Take 1 Capsule by mouth two (2) times daily as needed for Constipation for up to 30 days. Stop taking if you develop loose stools    amLODIPine (NORVASC) 5 mg tablet Take 1 Tablet by mouth daily.  ibuprofen (MOTRIN) 600 mg tablet Take 1 Tablet by mouth every six (6) hours as needed for Pain. (Patient taking differently: Take 600 mg by mouth nightly.)    methylPREDNISolone (MedroL) 4 mg tab Take 4 mg by mouth daily as needed. Prn for lichen planus (Patient not taking: Reported on 2021)    melatonin 1 mg tablet Take 1 mg by mouth nightly.  cyanocobalamin (VITAMIN B-12) 1,000 mcg tablet Take 1,000 mcg by mouth daily.  cholecalciferol, vitamin D3, 2,000 unit tab Take 2,000 Units by mouth daily.  BIOTIN PO Take 5,000 mcg by mouth daily.     pyridoxine (VITAMIN B-6) 100 mg tablet Take 100 mg by mouth daily. No current facility-administered medications for this visit. Allergies: (reviewed)  Allergies   Allergen Reactions    Codeine Nausea and Vomiting    Penicillins Rash     Rash & dizzy    Sulfa (Sulfonamide Antibiotics) Other (comments)     General redness all over skin    Ciprofloxacin Itching and Other (comments)     Extreme dizziness and rash    Other Medication Nausea and Vomiting     PRESCRIPTION STRENGTH ANTI INFLAMMATORY MEDS         OBJECTIVE:  *deferred today given video-conference visit for ongoing COVID-19 pandemic*   Physical Exam:  VITAL SIGNS: There were no vitals filed for this visit. There is no height or weight on file to calculate BMI. GENERAL MICHAEL: Conversant, alert, oriented. No acute distress. HEENT: HEENT. No thyroid enlargement. No JVD. Neck: Supple without restrictions. RESPIRATORY: Clear to auscultation and percussion to the bases. No CVAT. CARDIOVASC: RRR without murmur/rub. GASTROINT: soft, non-tender, without masses or organomegaly   MUSCULOSKEL: no joint tenderness, deformity or swelling       EXTREMITIES: extremities normal, atraumatic, no cyanosis or edema   PELVIC: Exam chaperoned by nurse. Normal appearing external genitalia. On speculum exam, normal appearing vagina and cervix. On bimanual exam, the cervix and uterus are normal size and mobile. No evidence of adnexal masses or nodularity. RECTAL: deferred   SENTHIL SURVEY: No suspicious lymphadenopathy or edema noted. NEURO: Grossly intact. No acute deficit.        Lab Date as available:    Lab Results   Component Value Date/Time    WBC 11.0 07/30/2021 03:15 AM    HGB 11.5 07/30/2021 03:15 AM    HCT 34.7 (L) 07/30/2021 03:15 AM    PLATELET 795 98/90/0947 03:15 AM    MCV 90.1 07/30/2021 03:15 AM     Lab Results   Component Value Date/Time    Sodium 139 07/30/2021 03:15 AM    Potassium 4.2 07/30/2021 03:15 AM    Chloride 108 07/30/2021 03:15 AM    CO2 25 07/30/2021 03:15 AM    Anion gap 6 07/30/2021 03:15 AM    Glucose 108 (H) 07/30/2021 03:15 AM    BUN 15 07/30/2021 03:15 AM    Creatinine 0.95 07/30/2021 03:15 AM    BUN/Creatinine ratio 16 07/30/2021 03:15 AM    GFR est AA >60 07/30/2021 03:15 AM    GFR est non-AA 58 (L) 07/30/2021 03:15 AM    Calcium 8.0 (L) 07/30/2021 03:15 AM         IMPRESSION/PLAN:    Ms. Kimberly Jackson is a 79 y.o.  female who on 7/29/2021 underwent Robotic-assisted total laparoscopic hysterectomy, Bilateral salpingo-oophorectomy, Bilateral pelvic sentinel lymph node mapping and biopsy. Final pathology consistent with Stage II, serous endometrial carcinoma. Negative washings. Stromal cervical involvement. Negative SLNs. 2mm out of 10mm myometrial invasion. Parametrial involvement of tumor. Problems:     Patient Active Problem List    Diagnosis Date Noted    Essential hypertension 07/27/2021    Abnormal finding on EKG 07/27/2021    Endometrial cancer ([facility] [address] 07/14/2021    PUD (peptic ulcer disease)     GERD (gastroesophageal reflux disease)     Arthritis     Lichen planus     Hip arthritis 04/04/2016    DJD (degenerative joint disease) of hip 01/04/2013     Reviewed patient's course to date, including her surgical pathology as per above. Counseled patient regarding management options, which are not clear cut. Discuss the results of , , and PORTEC-3, which support the role of adjuvant therapy but differ in what is the optimal adjuvant therapy. I discuss the following adjuvant therapy options: 1) chemotherapy alone (6 cycles of carboplatin + paclitaxel); 2) Chemotherapy followed by VBT; 3) concurrent chemoradiation (EBRT with cisplatin followed by 3-4 cycles of carboplatin/paclitaxel); 4) EBRT alone. The data is clear that some form of adjuvant therapy is indicated in patients with high-grade (high-risk) serous endometrial cancer.  supports the use of radiation alone in stage II disease.  Having said that, the risk of distant metastatic disease in serous endometrial cancer supports the consideration of adjuvant chemotherapy. Examining  and PORTEC-3, adjuvant pelvic radiation reduces the risk of pelvic/loco-regional recurrences, while chemotherapy reduces the risk of distant metastatic disease. Counseled the patient that our best time to beat this cancer is now at the start rather than during a recurrence. I believe the most aggressive form of treatment would be concurrent chemoradiation, and I believe this may be her best option given her cervical and parametrial tumor involvement. The patient agrees, but will discuss these options further with her family. RTC in 2 weeks for further discussion and planning. All questions and concerns were addressed with the patient and she is comfortable with the plan. An electronic signature was used to authenticate this note. Briseyda West MD    Pursuant to the emergency declaration unde the [facility] waiver authority and the [employer] Act, this Virtual Visit was conducted, with patient's consent, to reduce the patient's risk of exposure to COVID-19 and provide continuity of care for an established patient. Patient identification was verified at the start of the visit: Yes    Services were provided through a video synchronous discussion virtually to substitute for in-person clinic visit. Patient was at her individual home, while the provider was in his/her respective office.     I spent at least 40 minutes with this established patient, and >50% of the time was spent counseling and/or coordinating care regarding pathology, treatment options    Briseyda West MD

## 2021-08-11 NOTE — PROGRESS NOTES
Virtual Post op Visit to discuss pathology report, surgery was on 7/29/2021    1. Have you been to the ER, urgent care clinic since your last visit? Hospitalized since your last visit? Yes, surgery with Dr. Vivi Martin on 7/29/2021    2. Have you seen or consulted any other health care providers outside of the [address] [place] [address] since your last visit? Include any pap smears or colon screening.    no

## 2021-08-18 ENCOUNTER — VIRTUAL VISIT (OUTPATIENT)
Dept: GYNECOLOGY | Age: 71
End: 2021-08-18
Payer: MEDICARE

## 2021-08-18 DIAGNOSIS — C54.1 PAPILLARY SEROUS ENDOMETRIAL ADENOCARCINOMA (HCC): ICD-10-CM

## 2021-08-18 DIAGNOSIS — C54.1 ENDOMETRIAL CANCER (HCC): Primary | ICD-10-CM

## 2021-08-18 PROCEDURE — 99024 POSTOP FOLLOW-UP VISIT: CPT | Performed by: OBSTETRICS & GYNECOLOGY

## 2021-08-18 NOTE — PROGRESS NOTES
[address] [place] [address] Mathias Moritz 9, [address] [facility]  P [phone]  F [phone]    Office Note  Patient ID:  Name:  Dasia Mario  MRN:  [id number]  :  1950/70 y.o. Date:  2021      HISTORY OF PRESENT ILLNESS:  Ms. Dasia Mario is a 79 y.o. female who on 2021 underwent Robotic-assisted total laparoscopic hysterectomy, Bilateral salpingo-oophorectomy, Bilateral pelvic sentinel lymph node mapping and biopsy. Final pathology consistent with Stage II, serous endometrial carcinoma. Negative washings. Stromal cervical involvement. Negative SLNs. 2mm out of 10mm myometrial invasion. Parametrial involvement of tumor. Presents today via virtual visit to discuss pathology and further management. She was seen last week via virtual visit, but would like to discuss her options more. Initial History:  Ms. Dasia Mario is a 79 y.o.  postmenopausal female who presents as a new patient from Dr. Ana Rosa Beyer for high-grade endometrial cancer. The patient reports vaginal spotting back in April, which resulted in her seeing Dr. Ana Rosa Beyer. Pelvic ultrasound on 2021 demonstrated 12.5mm stripe. Hysteroscopy/D&C on 2021 demonstrated \"high-grade endometrial carcinoma, favor serous carcinoma\". She was subsequently referred to our office for further discussion and management. Denies pelvic or abdominal pain/bloating, change in appetite or bowel habits, nausea, vomiting, CP, SOB, hematuria, hematochezia, or urinary symptoms. Interval History:  Presents today via virtual visit for CT results and to finalize surgical planning. Pertinent PMH/PSH: DJD, h/o  x 2     Active, no restrictions. Imaging Review:   CT C/A/P 2021:  FINDINGS:   THYROID: No nodule. MEDIASTINUM: No mass or lymphadenopathy. STEPHANIE: No mass or lymphadenopathy. THORACIC AORTA: No dissection or aneurysm.   MAIN PULMONARY ARTERY: Normal in caliber. TRACHEA/BRONCHI: Patent. ESOPHAGUS: No wall thickening or dilatation. HEART: Normal in size. PLEURA: No effusion or pneumothorax. LUNGS: No nodule, mass, or airspace disease. There is minor atelectasis/scarring  right upper lobe  LIVER: No mass or biliary dilatation. There is hepatic steatosis. There is a 5  mm low-density in segment 4A and segment 5 to small to fully characterize but  most likely tiny cysts. GALLBLADDER: Patient status post cholecystectomy  SPLEEN: No mass. PANCREAS: No mass or ductal dilatation. ADRENALS: Unremarkable. KIDNEYS: No mass, calculus, or hydronephrosis. There is a retroaortic left renal  vein  STOMACH: Unremarkable. SMALL BOWEL: No dilatation or wall thickening. COLON: No dilatation or wall thickening. APPENDIX: Unremarkable. PERITONEUM: No ascites or pneumoperitoneum. RETROPERITONEUM: No lymphadenopathy or aortic aneurysm. There is an 8 mm lymph  node at the aortic bifurcation on the left. There is mild stenosis origin of the  SMA  REPRODUCTIVE ORGANS: Uterus and pelvis are obscured by artifact related to  bilateral total hip replacements. URINARY BLADDER: No mass or calculus. BONES: No destructive bone lesion. There is a right shoulder effusion  ADDITIONAL COMMENTS: N/A     IMPRESSION     1. CT of the chest demonstrates no definite evidence of metastatic disease. No  acute abnormality identified. 2. CT of the abdomen and pelvis demonstrates no definite evidence of metastatic  disease. The pelvis is obscured by artifact related to total hip replacements. There is hepatic steatosis. 2 subcentimeter low densities in the liver are too  small to characterize but most likely represent tiny cysts. There is an 8mm lymph node just to the left of the aortic bifurcation which is  within normal limits. Pelvic ultrasound 4/20/2021:  Impression: Normal uterus with 12.5mm stripe. Normal right ovary. Non visible left ovary with otherwise normal left adnexa.  No free fluid. Pathology Review:   7/29/2021:  * * *FINAL PATHOLOGIC DIAGNOSIS* * *   1.  Right pelvic sentinel lymph node, biopsy:        One lymph node, negative for carcinoma, levels and cytokeratin stain   examined (0/1)   2.  Left pelvic sentinel lymph node #1, biopsy:        One lymph node, negative for carcinoma, levels and cytokeratin stain   examined (0/1)   3.  Left pelvic sentinel lymph node, biopsy:        One lymph node, negative for carcinoma, levels and cytokeratin stain   examined (0/1)   4. Uterus, cervix, bilateral fallopian tubes and ovaries; simple   hysterectomy, BSO:        Endometrium: Uterine serous carcinoma, see synoptic report        Cervix: Serous carcinoma focally involves endocervical stroma,   predominantly as lymphovascular                invasion        Bilateral fallopian tubes: No pathologic diagnosis     ENDOMETRIUM    SPECIMEN       Procedure: Total hysterectomy and bilateral salpingo-oophorectomy       Specimen Integrity: Intact    TUMOR       Histologic Type: Serous carcinoma       Myometrial Invasion: Present           Depth of Myometrial Invasion (Millimeters): 2 mm           Myometrial Thickness (Millimeters): 10 mm           Percentage of Myometrial Invasion: 20%       Uterine Serosa Involvement: Not identified       Lower Uterine Segment Involvement: Present, myoinvasive       Cervical Stromal Involvement: Present       Other Tissue / Organ Involvement: Not identified       Peritoneal Ascitic Fluid: Atypical, favor benign reactive process       Lymphovascular Invasion: Present    MARGINS       Margins: Parametrium and focal paracervix involved by tumor           Parametrial and focal Paracervical Margin: Involved by carcinoma    LYMPH NODES       Lymph Node Status:  All lymph nodes negative for tumor cells           Total Number of Pelvic Nodes Examined: 3           Number of Pelvic [place] Nodes Examined: 3           Total Number of Para-aortic Nodes Examined: 0           Number of Para-aortic [place] Nodes Examined: 0    PATHOLOGIC STAGE CLASSIFICATION (pTNM, AJCC 8th Edition)       Primary Tumor (pT): pT2       Regional Lymph Nodes Modifier: (sn)       Regional Lymph Nodes (pN): pN0    FIGO STAGE       FIGO Stage: II   * * *Comment* * *   Immunohistochemical stains with appropriate controls show tumor positive   for P16, P53 with Ki-67 proliferation index of approximately 50%.  These   findings support diagnosis of endometrial serous carcinoma. 2021:  FINAL PATHOLOGIC DIAGNOSIS   Endometrium, curettings:   High-grade endometrial carcinoma, favor serous carcinoma   See comment   Comment   The biopsy contains a malignant glandular neoplasm with a high nuclear grade, papillary growth pattern, and areas of necrosis. Morphologic features suggest a high-grade endometrial carcinoma and are most compatible with a diagnosis of serous carcinoma. ROS:  A comprehensive review of systems was negative except for that written in the History of Present Illness. , 10 point ROS    OB/GYN ROS:  Per HPI    ECOG ndGndrndanddndend:nd nd2nd Problem List:  Patient Active Problem List    Diagnosis Date Noted    Essential hypertension 2021    Abnormal finding on EKG 2021    Papillary serous endometrial adenocarcinoma ([facility] [address] 2021    PUD (peptic ulcer disease)     GERD (gastroesophageal reflux disease)     Arthritis     Lichen planus     Hip arthritis 2016    DJD (degenerative joint disease) of hip 2013     PMH:  Past Medical History:   Diagnosis Date    Arthritis     OSTEO HIP JULY.     GERD (gastroesophageal reflux disease)     Lichen planus     oral    PUD (peptic ulcer disease)       PSH:  Past Surgical History:   Procedure Laterality Date    COLONOSCOPY N/A 10/16/2017    COLONOSCOPY performed by Salma Brown MD at [address] [place] [address]  10/16/2017         HX  SECTION      X2    HX CHOLECYSTECTOMY      HX DILATION AND CURETTAGE 2021    High-grade endometrial carcinoma, favor serous carcinoma    HX HEENT      EXTRACTION OF WISDOM TEETH X 4    HX ORTHOPAEDIC      right foot, bunionectomy    HX ORTHOPAEDIC      LEFTl hip arthroplasty    HX ORTHOPAEDIC      RIGHT hip arthroplasty    HX TUBAL LIGATION      OH COLONOSCOPY FLX DX W/COLLJ SPEC WHEN PFRMD  1/20/2012     x 2    UPPER GI ENDOSCOPY,BIOPSY  2016         UPPER GI ENDOSCOPY,DILATN W GUIDE  2016           Social History:  Social History     Tobacco Use    Smoking status: Former Smoker     Packs/day: 0.25     Years: 0.50     Pack years: 0.12     Quit date: 1967     Years since quittin.7    Smokeless tobacco: Never Used    Tobacco comment: brief   Substance Use Topics    Alcohol use: Yes     Comment: VERY RARELY       Family History:  Family History   Problem Relation Age of Onset    Cancer Maternal Grandmother 79        colon cancer    Hypertension Mother     Cancer Mother         pacreatic cancer    Cancer Paternal Aunt         ovarian cancer      Medications: (reviewed)  Current Outpatient Medications   Medication Sig    acetaminophen (TYLENOL) 325 mg tablet Take 2 Tablets by mouth every six (6) hours as needed for Pain.  amLODIPine (NORVASC) 5 mg tablet Take 1 Tablet by mouth daily.  ibuprofen (MOTRIN) 600 mg tablet Take 1 Tablet by mouth every six (6) hours as needed for Pain. (Patient taking differently: Take 600 mg by mouth nightly.)    methylPREDNISolone (MedroL) 4 mg tab Take 4 mg by mouth daily as needed. Prn for lichen planus (Patient not taking: Reported on 2021)    melatonin 1 mg tablet Take 1 mg by mouth nightly.  cyanocobalamin (VITAMIN B-12) 1,000 mcg tablet Take 1,000 mcg by mouth daily.  cholecalciferol, vitamin D3, 2,000 unit tab Take 2,000 Units by mouth daily.  BIOTIN PO Take 5,000 mcg by mouth daily.  pyridoxine (VITAMIN B-6) 100 mg tablet Take 100 mg by mouth daily.      No current facility-administered medications for this visit. Allergies: (reviewed)  Allergies   Allergen Reactions    Codeine Nausea and Vomiting    Penicillins Rash     Rash & dizzy    Sulfa (Sulfonamide Antibiotics) Other (comments)     General redness all over skin    Ciprofloxacin Itching and Other (comments)     Extreme dizziness and rash    Other Medication Nausea and Vomiting     PRESCRIPTION STRENGTH ANTI INFLAMMATORY MEDS         OBJECTIVE:  *deferred today given video-conference visit for ongoing COVID-19 pandemic*   Physical Exam:  VITAL SIGNS: There were no vitals filed for this visit. There is no height or weight on file to calculate BMI. GENERAL MICHAEL: Conversant, alert, oriented. No acute distress. HEENT: HEENT. No thyroid enlargement. No JVD. Neck: Supple without restrictions. RESPIRATORY: Clear to auscultation and percussion to the bases. No CVAT. CARDIOVASC: RRR without murmur/rub. GASTROINT: soft, non-tender, without masses or organomegaly   MUSCULOSKEL: no joint tenderness, deformity or swelling       EXTREMITIES: extremities normal, atraumatic, no cyanosis or edema   PELVIC: Exam chaperoned by nurse. Normal appearing external genitalia. On speculum exam, normal appearing vagina and cervix. On bimanual exam, the cervix and uterus are normal size and mobile. No evidence of adnexal masses or nodularity. RECTAL: deferred   SENTHIL SURVEY: No suspicious lymphadenopathy or edema noted. NEURO: Grossly intact. No acute deficit.        Lab Date as available:    Lab Results   Component Value Date/Time    WBC 11.0 07/30/2021 03:15 AM    HGB 11.5 07/30/2021 03:15 AM    HCT 34.7 (L) 07/30/2021 03:15 AM    PLATELET 810 07/22/7976 03:15 AM    MCV 90.1 07/30/2021 03:15 AM     Lab Results   Component Value Date/Time    Sodium 139 07/30/2021 03:15 AM    Potassium 4.2 07/30/2021 03:15 AM    Chloride 108 07/30/2021 03:15 AM    CO2 25 07/30/2021 03:15 AM    Anion gap 6 07/30/2021 03:15 AM    Glucose 108 (H) 07/30/2021 03:15 AM    BUN 15 07/30/2021 03:15 AM    Creatinine 0.95 07/30/2021 03:15 AM    BUN/Creatinine ratio 16 07/30/2021 03:15 AM    GFR est AA >60 07/30/2021 03:15 AM    GFR est non-AA 58 (L) 07/30/2021 03:15 AM    Calcium 8.0 (L) 07/30/2021 03:15 AM         IMPRESSION/PLAN:    Ms. Maureen Medina is a 79 y.o.  female who on 7/29/2021 underwent Robotic-assisted total laparoscopic hysterectomy, Bilateral salpingo-oophorectomy, Bilateral pelvic sentinel lymph node mapping and biopsy. Final pathology consistent with Stage II, serous endometrial carcinoma. Negative washings. Stromal cervical involvement. Negative SLNs. 2mm out of 10mm myometrial invasion. Parametrial involvement of tumor. Problems:     Patient Active Problem List    Diagnosis Date Noted    Essential hypertension 07/27/2021    Abnormal finding on EKG 07/27/2021    Papillary serous endometrial adenocarcinoma ([facility] [address] 07/14/2021    PUD (peptic ulcer disease)     GERD (gastroesophageal reflux disease)     Arthritis     Lichen planus     Hip arthritis 04/04/2016    DJD (degenerative joint disease) of hip 01/04/2013     Reviewed patient's course to date, including our discussion from last week. Today's visit was to go over these again and to move forward with further decisions. I again counseled the patient regarding management options, which are not clear cut. Discussed the results of , , and PORTEC-3, which support the role of adjuvant therapy but differ in what is the optimal adjuvant therapy. I discussed the following adjuvant therapy options: 1) chemotherapy alone (6 cycles of carboplatin + paclitaxel); 2) Chemotherapy followed by VBT; 3) concurrent chemoradiation (EBRT with cisplatin followed by 3-4 cycles of carboplatin/paclitaxel); 4) EBRT alone. The data is clear that some form of adjuvant therapy is indicated in patients with high-grade (high-risk) serous endometrial cancer.  However, in patients with stage II disease, the data is somewhat inconclusive.  supports the use of radiation alone in stage II disease. Having said that, the risk of distant metastatic disease in serous endometrial cancer supports the consideration of adjuvant chemotherapy. Examining  and PORTEC-3, adjuvant pelvic radiation reduces the risk of pelvic/loco-regional recurrences, while chemotherapy reduces the risk of distant metastatic disease. Counseled the patient that our best time to beat this cancer is now at the start rather than during a recurrence. I believe the most aggressive form of treatment would be concurrent chemoradiation, and I believe this may be her best option given her cervical and parametrial tumor involvement. The patient is reluctant to consider radiation as she is worried about side-effects. The patient has agreed to meet with Radiation Oncology to discuss radiation further, as I think the patient is somewhat misinformed regarding the effects of radiation in the short and long terms. Referral placed to Radiation Oncology today. Will follow-up after their discussion. RTC in 2 weeks for postoperative visit and further discussion. All questions and concerns were addressed with the patient and she is comfortable with the plan. An electronic signature was used to authenticate this note. Yuriy Martin MD    Pursuant to the emergency declaration unde the [facility] waiver authority and the "[employer] and [employer] Act, this Virtual Visit was conducted, with patient's consent, to reduce the patient's risk of exposure to COVID-19 and provide continuity of care for an established patient. Patient identification was verified at the start of the visit: Yes    Services were provided through a video synchronous discussion virtually to substitute for in-person clinic visit.  Patient was at her individual home, while the provider was in his/her respective office. I spent at least 40 minutes with this established patient, and >50% of the time was spent counseling and/or coordinating care regarding endometrial cancer, adjuvant therapy, chemotherapy, radiation.      Sydney Coley MD

## 2021-08-25 ENCOUNTER — HOSPITAL ENCOUNTER (OUTPATIENT)
Dept: RADIATION THERAPY | Age: 71
Discharge: HOME | End: 2021-08-25

## 2021-08-25 VITALS — HEIGHT: 61 IN | WEIGHT: 170 LBS | BODY MASS INDEX: 32.1 KG/M2

## 2021-08-26 ENCOUNTER — OFFICE VISIT (OUTPATIENT)
Dept: CARDIOLOGY CLINIC | Age: 71
End: 2021-08-26
Payer: MEDICARE

## 2021-08-26 VITALS
BODY MASS INDEX: 33.96 KG/M2 | HEART RATE: 65 BPM | SYSTOLIC BLOOD PRESSURE: 130 MMHG | WEIGHT: 173 LBS | DIASTOLIC BLOOD PRESSURE: 90 MMHG | RESPIRATION RATE: 18 BRPM | OXYGEN SATURATION: 98 % | HEIGHT: 60 IN

## 2021-08-26 DIAGNOSIS — C54.1 ENDOMETRIAL CANCER (HCC): ICD-10-CM

## 2021-08-26 DIAGNOSIS — I10 ESSENTIAL HYPERTENSION: Primary | ICD-10-CM

## 2021-08-26 DIAGNOSIS — R94.31 ABNORMAL FINDING ON EKG: ICD-10-CM

## 2021-08-26 PROCEDURE — 99214 OFFICE O/P EST MOD 30 MIN: CPT | Performed by: SPECIALIST

## 2021-08-26 PROCEDURE — 3017F COLORECTAL CA SCREEN DOC REV: CPT | Performed by: SPECIALIST

## 2021-08-26 PROCEDURE — G8399 PT W/DXA RESULTS DOCUMENT: HCPCS | Performed by: SPECIALIST

## 2021-08-26 PROCEDURE — 1090F PRES/ABSN URINE INCON ASSESS: CPT | Performed by: SPECIALIST

## 2021-08-26 PROCEDURE — G8427 DOCREV CUR MEDS BY ELIG CLIN: HCPCS | Performed by: SPECIALIST

## 2021-08-26 PROCEDURE — 1101F PT FALLS ASSESS-DOCD LE1/YR: CPT | Performed by: SPECIALIST

## 2021-08-26 PROCEDURE — G0463 HOSPITAL OUTPT CLINIC VISIT: HCPCS | Performed by: SPECIALIST

## 2021-08-26 PROCEDURE — G8417 CALC BMI ABV UP PARAM F/U: HCPCS | Performed by: SPECIALIST

## 2021-08-26 PROCEDURE — G8510 SCR DEP NEG, NO PLAN REQD: HCPCS | Performed by: SPECIALIST

## 2021-08-26 PROCEDURE — G8536 NO DOC ELDER MAL SCRN: HCPCS | Performed by: SPECIALIST

## 2021-08-26 RX ORDER — CEPHALEXIN 500 MG/1
CAPSULE ORAL
COMMUNITY
Start: 2021-08-23 | End: 2022-02-08 | Stop reason: ALTCHOICE

## 2021-08-26 RX ORDER — NYSTATIN 100000 [USP'U]/G
POWDER TOPICAL AS NEEDED
COMMUNITY
Start: 2021-08-22

## 2021-08-26 NOTE — PROGRESS NOTES
HISTORY OF PRESENT ILLNESS  Dasia Mario is a 79 y.o. female. I saw her initially prior to planned surgery for endometrial carcinoma. She was very hypertensive and I started her on Norvasc 5 mg a day and her blood pressure has come down. She initially had some side effects from the medication but they seem to be improved. The surgery went well but because it was a high-grade cancer they are considering her for radiation therapy and chemotherapy and she is concerned about her heart in this regard. She does have an abnormal EKG most likely due to hypertensive heart disease. HPI  Patient Active Problem List   Diagnosis Code    DJD (degenerative joint disease) of hip M16.9    Hip arthritis M16.10    PUD (peptic ulcer disease) K27.9    GERD (gastroesophageal reflux disease) K21.9    Arthritis B43.03    Lichen planus C33.2    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    Essential hypertension I10    Abnormal finding on EKG R94.31     Current Outpatient Medications   Medication Sig Dispense Refill    cephALEXin (KEFLEX) 500 mg capsule Dental work      Nystop powder as needed.  amLODIPine (NORVASC) 5 mg tablet Take 1 Tablet by mouth daily. 30 Tablet 5    ibuprofen (MOTRIN) 600 mg tablet Take 1 Tablet by mouth every six (6) hours as needed for Pain. (Patient taking differently: Take 600 mg by mouth nightly.) 30 Tablet 0    melatonin 1 mg tablet Take 1 mg by mouth nightly.  acetaminophen (TYLENOL) 325 mg tablet Take 2 Tablets by mouth every six (6) hours as needed for Pain. (Patient not taking: Reported on 8/26/2021) 30 Tablet 0    methylPREDNISolone (MedroL) 4 mg tab Take 4 mg by mouth daily as needed. Prn for lichen planus  (Patient not taking: Reported on 8/26/2021)      cyanocobalamin (VITAMIN B-12) 1,000 mcg tablet Take 1,000 mcg by mouth daily. (Patient not taking: Reported on 8/26/2021)      cholecalciferol, vitamin D3, 2,000 unit tab Take 2,000 Units by mouth daily. (Patient not taking: Reported on 2021)      BIOTIN PO Take 5,000 mcg by mouth daily. (Patient not taking: Reported on 2021)      pyridoxine (VITAMIN B-6) 100 mg tablet Take 100 mg by mouth daily. (Patient not taking: Reported on 2021)       Past Medical History:   Diagnosis Date    Arthritis     OSTEO HIP JULY.  GERD (gastroesophageal reflux disease)     Lichen planus     oral    PUD (peptic ulcer disease)      Past Surgical History:   Procedure Laterality Date    COLONOSCOPY N/A 10/16/2017    COLONOSCOPY performed by Fran Black MD at [address] [place] [address]  10/16/2017         HX  SECTION      X2    HX CHOLECYSTECTOMY      HX DILATION AND CURETTAGE  2021    High-grade endometrial carcinoma, favor serous carcinoma    HX HEENT      EXTRACTION OF WISDOM TEETH X 4    HX ORTHOPAEDIC      right foot, bunionectomy    HX ORTHOPAEDIC      LEFTl hip arthroplasty    HX ORTHOPAEDIC      RIGHT hip arthroplasty    HX TUBAL LIGATION      VT COLONOSCOPY FLX DX W/COLLJ SPEC WHEN PFRMD  1/20/2012     x 2    UPPER GI ENDOSCOPY,BIOPSY  2016         UPPER GI ENDOSCOPY,DILATN W GUIDE  2016            Review of Systems   Constitutional: Negative. HENT: Negative. Eyes: Negative. Respiratory: Negative. Cardiovascular: Negative. Gastrointestinal: Positive for abdominal pain. Skin: Negative. All other systems reviewed and are negative. Visit Vitals  BP (!) 130/90 (BP 1 Location: Right arm, BP Patient Position: Sitting, BP Cuff Size: Adult)   Pulse 65   Resp 18   Ht 5' (1.524 m)   Wt 173 lb (78.5 kg)   SpO2 98%   BMI 33.79 kg/m²       Physical Exam  Constitutional:       Appearance: Normal appearance. HENT:      Head: Normocephalic.       Right Ear: External ear normal.      Left Ear: External ear normal.      Nose: Nose normal.      Mouth/Throat:      Mouth: Mucous membranes are moist.   Eyes:      Extraocular Movements: Extraocular movements intact. Cardiovascular:      Rate and Rhythm: Normal rate and regular rhythm. Heart sounds: Normal heart sounds. Pulmonary:      Breath sounds: Normal breath sounds. Abdominal:      Palpations: Abdomen is soft. Musculoskeletal:         General: Normal range of motion. Cervical back: Normal range of motion. Skin:     General: Skin is warm. Neurological:      General: No focal deficit present. Mental Status: She is alert. Psychiatric:         Mood and Affect: Mood normal.         ASSESSMENT and PLAN  Blood pressure is acceptable today and I will continue this regimen for now. Because of her need for chemotherapy and her abnormal EKG I will have her return for an echocardiogram and we will call her regarding the results and any potential need for follow-up.

## 2021-09-03 NOTE — PROGRESS NOTES
Post op Visit, surgery was on 7/29/2021    1. Have you been to the ER, urgent care clinic since your last visit? Hospitalized since your last visit? Yes, surgery with Dr. Ruth Rogel on 7/29/2021    2. Have you seen or consulted any other health care providers outside of the [address] [place] [address] since your last visit? Include any pap smears or colon screening.    no

## 2021-09-07 ENCOUNTER — OFFICE VISIT (OUTPATIENT)
Dept: GYNECOLOGY | Age: 71
End: 2021-09-07
Payer: MEDICARE

## 2021-09-07 VITALS
DIASTOLIC BLOOD PRESSURE: 83 MMHG | HEIGHT: 60 IN | WEIGHT: 172.6 LBS | SYSTOLIC BLOOD PRESSURE: 160 MMHG | HEART RATE: 64 BPM | BODY MASS INDEX: 33.89 KG/M2

## 2021-09-07 DIAGNOSIS — C54.1 PAPILLARY SEROUS ENDOMETRIAL ADENOCARCINOMA (HCC): Primary | ICD-10-CM

## 2021-09-07 DIAGNOSIS — C54.1 ENDOMETRIAL CANCER (HCC): Primary | ICD-10-CM

## 2021-09-07 PROCEDURE — 99024 POSTOP FOLLOW-UP VISIT: CPT | Performed by: OBSTETRICS & GYNECOLOGY

## 2021-09-07 RX ORDER — DEXAMETHASONE 4 MG/1
TABLET ORAL
Qty: 36 TABLET | Refills: 2 | Status: SHIPPED | OUTPATIENT
Start: 2021-09-07 | End: 2022-07-07 | Stop reason: CLARIF

## 2021-09-07 RX ORDER — ONDANSETRON 4 MG/1
4 TABLET, ORALLY DISINTEGRATING ORAL
Qty: 30 TABLET | Refills: 2 | Status: SHIPPED | OUTPATIENT
Start: 2021-09-07 | End: 2021-11-15 | Stop reason: SDUPTHER

## 2021-09-07 NOTE — TELEPHONE ENCOUNTER
Requested Prescriptions     Signed Prescriptions Disp Refills    ondansetron (ZOFRAN ODT) 4 mg disintegrating tablet 30 Tablet 2     Sig: Take 1 Tablet by mouth every eight (8) hours as needed for Nausea. Indications: nausea and vomiting caused by cancer drugs     Authorizing Provider: Katja Russo     Ordering User: GELY Peter    dexAMETHasone (DECADRON) 4 mg tablet 36 Tablet 2     Sig: Take 2 tablets with breakfast the day before chemo and for 2 days after chemo     Authorizing Provider: Katja Russo     Ordering User: Keven Meckel     Prescriptions sent to pharmacy per vo Dr. Sher Terry.

## 2021-09-08 ENCOUNTER — TELEPHONE (OUTPATIENT)
Dept: GYNECOLOGY | Age: 71
End: 2021-09-08

## 2021-09-09 RX ORDER — DIPHENHYDRAMINE HYDROCHLORIDE 50 MG/ML
25 INJECTION, SOLUTION INTRAMUSCULAR; INTRAVENOUS AS NEEDED
Status: CANCELLED
Start: 2021-09-20

## 2021-09-09 RX ORDER — HYDROCORTISONE SODIUM SUCCINATE 100 MG/2ML
100 INJECTION, POWDER, FOR SOLUTION INTRAMUSCULAR; INTRAVENOUS AS NEEDED
Status: CANCELLED | OUTPATIENT
Start: 2021-09-20

## 2021-09-09 RX ORDER — SODIUM CHLORIDE 9 MG/ML
10 INJECTION INTRAMUSCULAR; INTRAVENOUS; SUBCUTANEOUS AS NEEDED
Status: CANCELLED | OUTPATIENT
Start: 2021-09-20

## 2021-09-09 RX ORDER — SODIUM CHLORIDE 0.9 % (FLUSH) 0.9 %
10 SYRINGE (ML) INJECTION AS NEEDED
Status: CANCELLED | OUTPATIENT
Start: 2021-09-20

## 2021-09-09 RX ORDER — ONDANSETRON 2 MG/ML
8 INJECTION INTRAMUSCULAR; INTRAVENOUS AS NEEDED
Status: CANCELLED | OUTPATIENT
Start: 2021-09-20

## 2021-09-09 RX ORDER — DIPHENHYDRAMINE HYDROCHLORIDE 50 MG/ML
50 INJECTION, SOLUTION INTRAMUSCULAR; INTRAVENOUS AS NEEDED
Status: CANCELLED
Start: 2021-09-20

## 2021-09-09 RX ORDER — ACETAMINOPHEN 325 MG/1
650 TABLET ORAL AS NEEDED
Status: CANCELLED
Start: 2021-09-20

## 2021-09-09 RX ORDER — PALONOSETRON 0.05 MG/ML
0.25 INJECTION, SOLUTION INTRAVENOUS ONCE
Status: CANCELLED | OUTPATIENT
Start: 2021-09-20 | End: 2021-09-20

## 2021-09-09 RX ORDER — EPINEPHRINE 1 MG/ML
0.3 INJECTION, SOLUTION, CONCENTRATE INTRAVENOUS AS NEEDED
Status: CANCELLED | OUTPATIENT
Start: 2021-09-20

## 2021-09-09 RX ORDER — ALBUTEROL SULFATE 0.83 MG/ML
2.5 SOLUTION RESPIRATORY (INHALATION) AS NEEDED
Status: CANCELLED
Start: 2021-09-20

## 2021-09-09 RX ORDER — HEPARIN 100 UNIT/ML
300-500 SYRINGE INTRAVENOUS AS NEEDED
Status: CANCELLED
Start: 2021-09-20

## 2021-09-09 RX ORDER — SODIUM CHLORIDE 9 MG/ML
25 INJECTION, SOLUTION INTRAVENOUS CONTINUOUS
Status: CANCELLED | OUTPATIENT
Start: 2021-09-20

## 2021-09-10 NOTE — PROGRESS NOTES
[address] [facility] [address]ias [address] [facility]  P [phone]  F [phone]    Office Note  Patient ID:  Name:  Nancy Millan  MRN:  [id number]  :  1950/71 y.o. Date:  9/10/2021      HISTORY OF PRESENT ILLNESS:  Ms. Nancy Millan is a 70 y.o. female who on 2021 underwent Robotic-assisted total laparoscopic hysterectomy, Bilateral salpingo-oophorectomy, Bilateral pelvic sentinel lymph node mapping and biopsy. Final pathology consistent with Stage II, serous endometrial carcinoma. Negative washings. Stromal cervical involvement. Negative SLNs. 2mm out of 10mm myometrial invasion. Parametrial involvement of tumor. Presents today for postoperative visit and to make final decisions regarding adjuvant treatment. Doing well without complaints. Initial History:  Ms. Nancy Millan is a 70 y.o.  postmenopausal female who presents as a new patient from Dr. Annie Mullins for high-grade endometrial cancer. The patient reports vaginal spotting back in April, which resulted in her seeing Dr. Annie Mullins. Pelvic ultrasound on 2021 demonstrated 12.5mm stripe. Hysteroscopy/D&C on 2021 demonstrated \"high-grade endometrial carcinoma, favor serous carcinoma\". She was subsequently referred to our office for further discussion and management. Denies pelvic or abdominal pain/bloating, change in appetite or bowel habits, nausea, vomiting, CP, SOB, hematuria, hematochezia, or urinary symptoms. Interval History:  Presents today via virtual visit for CT results and to finalize surgical planning. Pertinent PMH/PSH: DJD, h/o  x 2     Active, no restrictions. Imaging Review:   CT C/A/P 2021:  FINDINGS:   THYROID: No nodule. MEDIASTINUM: No mass or lymphadenopathy. STEPHANIE: No mass or lymphadenopathy. THORACIC AORTA: No dissection or aneurysm. MAIN PULMONARY ARTERY: Normal in caliber. TRACHEA/BRONCHI: Patent.   ESOPHAGUS: No wall thickening or dilatation. HEART: Normal in size. PLEURA: No effusion or pneumothorax. LUNGS: No nodule, mass, or airspace disease. There is minor atelectasis/scarring  right upper lobe  LIVER: No mass or biliary dilatation. There is hepatic steatosis. There is a 5  mm low-density in segment 4A and segment 5 to small to fully characterize but  most likely tiny cysts. GALLBLADDER: Patient status post cholecystectomy  SPLEEN: No mass. PANCREAS: No mass or ductal dilatation. ADRENALS: Unremarkable. KIDNEYS: No mass, calculus, or hydronephrosis. There is a retroaortic left renal  vein  STOMACH: Unremarkable. SMALL BOWEL: No dilatation or wall thickening. COLON: No dilatation or wall thickening. APPENDIX: Unremarkable. PERITONEUM: No ascites or pneumoperitoneum. RETROPERITONEUM: No lymphadenopathy or aortic aneurysm. There is an 8 mm lymph  node at the aortic bifurcation on the left. There is mild stenosis origin of the  SMA  REPRODUCTIVE ORGANS: Uterus and pelvis are obscured by artifact related to  bilateral total hip replacements. URINARY BLADDER: No mass or calculus. BONES: No destructive bone lesion. There is a right shoulder effusion  ADDITIONAL COMMENTS: N/A     IMPRESSION     1. CT of the chest demonstrates no definite evidence of metastatic disease. No  acute abnormality identified. 2. CT of the abdomen and pelvis demonstrates no definite evidence of metastatic  disease. The pelvis is obscured by artifact related to total hip replacements. There is hepatic steatosis. 2 subcentimeter low densities in the liver are too  small to characterize but most likely represent tiny cysts. There is an 8mm lymph node just to the left of the aortic bifurcation which is  within normal limits. Pelvic ultrasound 4/20/2021:  Impression: Normal uterus with 12.5mm stripe. Normal right ovary. Non visible left ovary with otherwise normal left adnexa. No free fluid.      Pathology Review:   7/29/2021:  * * *FINAL PATHOLOGIC DIAGNOSIS* * *   1.  Right pelvic sentinel lymph node, biopsy:        One lymph node, negative for carcinoma, levels and cytokeratin stain   examined (0/1)   2.  Left pelvic sentinel lymph node #1, biopsy:        One lymph node, negative for carcinoma, levels and cytokeratin stain   examined (0/1)   3.  Left pelvic sentinel lymph node, biopsy:        One lymph node, negative for carcinoma, levels and cytokeratin stain   examined (0/1)   4. Uterus, cervix, bilateral fallopian tubes and ovaries; simple   hysterectomy, BSO:        Endometrium: Uterine serous carcinoma, see synoptic report        Cervix: Serous carcinoma focally involves endocervical stroma,   predominantly as lymphovascular                invasion        Bilateral fallopian tubes: No pathologic diagnosis     ENDOMETRIUM    SPECIMEN       Procedure: Total hysterectomy and bilateral salpingo-oophorectomy       Specimen Integrity: Intact    TUMOR       Histologic Type: Serous carcinoma       Myometrial Invasion: Present           Depth of Myometrial Invasion (Millimeters): 2 mm           Myometrial Thickness (Millimeters): 10 mm           Percentage of Myometrial Invasion: 20%       Uterine Serosa Involvement: Not identified       Lower Uterine Segment Involvement: Present, myoinvasive       Cervical Stromal Involvement: Present       Other Tissue / Organ Involvement: Not identified       Peritoneal Ascitic Fluid: Atypical, favor benign reactive process       Lymphovascular Invasion: Present    MARGINS       Margins: Parametrium and focal paracervix involved by tumor           Parametrial and focal Paracervical Margin: Involved by carcinoma    LYMPH NODES       Lymph Node Status:  All lymph nodes negative for tumor cells           Total Number of Pelvic Nodes Examined: 3           Number of Pelvic [place] Nodes Examined: 3           Total Number of Para-aortic Nodes Examined: 0           Number of Para-aortic [place] Nodes Examined: 0 [address] [facility] [address] STAGE CLASSIFICATION (pTNM, AJCC 8th Edition)       Primary Tumor (pT): pT2       Regional Lymph Nodes Modifier: (sn)       Regional Lymph Nodes (pN): pN0    FIGO STAGE       FIGO Stage: II   * * *Comment* * *   Immunohistochemical stains with appropriate controls show tumor positive   for P16, P53 with Ki-67 proliferation index of approximately 50%.  These   findings support diagnosis of endometrial serous carcinoma. 2021:  FINAL PATHOLOGIC DIAGNOSIS   Endometrium, curettings:   High-grade endometrial carcinoma, favor serous carcinoma   See comment   Comment   The biopsy contains a malignant glandular neoplasm with a high nuclear grade, papillary growth pattern, and areas of necrosis. Morphologic features suggest a high-grade endometrial carcinoma and are most compatible with a diagnosis of serous carcinoma. ROS:  A comprehensive review of systems was negative except for that written in the History of Present Illness. , 10 point ROS    OB/GYN ROS:  Per HPI    ECOG ndGndrndanddndend:nd nd2nd Problem List:  Patient Active Problem List    Diagnosis Date Noted    Essential hypertension 2021    Abnormal finding on EKG 2021    Papillary serous endometrial adenocarcinoma ([facility] [address] 2021    PUD (peptic ulcer disease)     GERD (gastroesophageal reflux disease)     Arthritis     Lichen planus     Hip arthritis 2016    DJD (degenerative joint disease) of hip 2013     PMH:  Past Medical History:   Diagnosis Date    Arthritis     OSTEO HIP JULY.     GERD (gastroesophageal reflux disease)     Lichen planus     oral    PUD (peptic ulcer disease)       PSH:  Past Surgical History:   Procedure Laterality Date    COLONOSCOPY N/A 10/16/2017    COLONOSCOPY performed by Davy Doe MD at [address] [place] [address]  10/16/2017         HX  SECTION      X2    HX CHOLECYSTECTOMY      HX DILATION AND CURETTAGE  2021    High-grade endometrial carcinoma, favor serous carcinoma    HX HEENT      EXTRACTION OF WISDOM TEETH X 4    HX ORTHOPAEDIC      right foot, bunionectomy    HX ORTHOPAEDIC      LEFTl hip arthroplasty    HX ORTHOPAEDIC      RIGHT hip arthroplasty    HX TUBAL LIGATION      AK COLONOSCOPY FLX DX W/COLLJ SPEC WHEN PFRMD  1/20/2012     x 2    UPPER GI ENDOSCOPY,BIOPSY  2016         UPPER GI ENDOSCOPY,DILATN W GUIDE  2016           Social History:  Social History     Tobacco Use    Smoking status: Former Smoker     Packs/day: 0.25     Years: 0.50     Pack years: 0.12     Quit date: 1967     Years since quittin.7    Smokeless tobacco: Never Used    Tobacco comment: brief   Substance Use Topics    Alcohol use: Not Currently     Comment: VERY RARELY       Family History:  Family History   Problem Relation Age of Onset    Cancer Maternal Grandmother 79        colon cancer    Hypertension Mother     Cancer Mother         pacreatic cancer    Cancer Paternal Aunt         ovarian cancer      Medications: (reviewed)  Current Outpatient Medications   Medication Sig    Nystop powder as needed.  acetaminophen (TYLENOL) 325 mg tablet Take 2 Tablets by mouth every six (6) hours as needed for Pain.  amLODIPine (NORVASC) 5 mg tablet Take 1 Tablet by mouth daily.  methylPREDNISolone (MedroL) 4 mg tab Take 4 mg by mouth daily as needed. Prn for lichen planus    melatonin 1 mg tablet Take 1 mg by mouth nightly.  cyanocobalamin (VITAMIN B-12) 1,000 mcg tablet Take 1,000 mcg by mouth daily.  cholecalciferol, vitamin D3, 2,000 unit tab Take 2,000 Units by mouth daily.  BIOTIN PO Take 5,000 mcg by mouth daily.  pyridoxine (VITAMIN B-6) 100 mg tablet Take 100 mg by mouth daily.  ondansetron (ZOFRAN ODT) 4 mg disintegrating tablet Take 1 Tablet by mouth every eight (8) hours as needed for Nausea.  Indications: nausea and vomiting caused by cancer drugs    dexAMETHasone (DECADRON) 4 mg tablet Take 2 tablets with breakfast the day before chemo and for 2 days after chemo    cephALEXin (KEFLEX) 500 mg capsule Dental work (Patient not taking: Reported on 9/7/2021)    ibuprofen (MOTRIN) 600 mg tablet Take 1 Tablet by mouth every six (6) hours as needed for Pain. (Patient not taking: Reported on 9/7/2021)     No current facility-administered medications for this visit. Allergies: (reviewed)  Allergies   Allergen Reactions    Codeine Nausea and Vomiting    Penicillins Rash     Rash & dizzy    Sulfa (Sulfonamide Antibiotics) Other (comments)     General redness all over skin    Ciprofloxacin Itching and Other (comments)     Extreme dizziness and rash    Other Medication Nausea and Vomiting     PRESCRIPTION STRENGTH ANTI INFLAMMATORY MEDS         OBJECTIVE:  Physical Exam:  Visit Vitals  BP (!) 160/83 (BP 1 Location: Left arm, BP Patient Position: Sitting)   Pulse 64   Ht 5' (1.524 m)   Wt 172 lb 9.6 oz (78.3 kg)   BMI 33.71 kg/m²      General: Alert and oriented. No acute distress. Well-nourished  Gastrointestinal: soft, non-tender, non-distended, no masses or organomegaly. Well-healed port-site incisions. Musculoskeletal: normal gait. No joint tenderness, deformity or swelling. No muscular tenderness. Extremities: extremities normal, atraumatic, no cyanosis or edema. Pelvic: exam chaperoned by nurse. Normal appearing external genitalia. On speculum exam, the vaginal cuff is intact and healing well. On bimanual, the uterus and cervix are surgically absent. Vaginal cuff intact without defect. No evidence of masses or nodularity on bimanual exam. Deferred rectovaginal exam.   Neuro: Grossly intact. Normal gait and movement. No acute deficit  Skin: No evidence of rashes or skin changes. IMPRESSION/PLAN:    Ms. Talisha Sanchez is a 70 y.o.  female who on 7/29/2021 underwent Robotic-assisted total laparoscopic hysterectomy, Bilateral salpingo-oophorectomy, Bilateral pelvic sentinel lymph node mapping and biopsy. Final pathology consistent with Stage II, serous endometrial carcinoma. Negative washings. Stromal cervical involvement. Negative SLNs. 2mm out of 10mm myometrial invasion. Parametrial involvement of tumor. Problems:     Patient Active Problem List    Diagnosis Date Noted    Essential hypertension 07/27/2021    Abnormal finding on EKG 07/27/2021    Papillary serous endometrial adenocarcinoma ([facility] [address] 07/14/2021    PUD (peptic ulcer disease)     GERD (gastroesophageal reflux disease)     Arthritis     Lichen planus     Hip arthritis 04/04/2016    DJD (degenerative joint disease) of hip 01/04/2013     Reviewed patient's course to date, including our discussion from last week. She met with Dr. Vannesa Turcios with Radiation Oncology, and the patient believes that proceeding with the combination of radiation and chemotherapy is her best option. Please see my prior notes for our full discussion regarding available treatment options. I again counseled the patient that our best time to beat this cancer is now at the start rather than during a recurrence. I believe the most aggressive form of treatment would be concurrent chemoradiation, and I believe this may be her best option given her cervical and parametrial tumor involvement. As noted above, the patient would like to move forward with this regimen. Will meet with Dr. Vannesa Turcios tomorrow to finalize her radiation schedule. Will plan cisplatin 50mg/m2 during week 1 and week 4 of radiation, followed by carboplatin AUC 5 + paclitaxel 175mg/m2 for 4 cycles. All questions and concerns were addressed with the patient and she is comfortable with the plan. An electronic signature was used to authenticate this note.      Antonieta Pierre MD

## 2021-09-16 ENCOUNTER — TELEPHONE (OUTPATIENT)
Dept: CARDIOLOGY CLINIC | Age: 71
End: 2021-09-16

## 2021-09-16 ENCOUNTER — ANCILLARY PROCEDURE (OUTPATIENT)
Dept: CARDIOLOGY CLINIC | Age: 71
End: 2021-09-16
Payer: MEDICARE

## 2021-09-16 VITALS
WEIGHT: 172 LBS | DIASTOLIC BLOOD PRESSURE: 82 MMHG | SYSTOLIC BLOOD PRESSURE: 160 MMHG | BODY MASS INDEX: 33.77 KG/M2 | HEIGHT: 60 IN

## 2021-09-16 DIAGNOSIS — R94.31 ABNORMAL FINDING ON EKG: ICD-10-CM

## 2021-09-16 LAB
ECHO AO ASC DIAM: 3.24 CM
ECHO AO ROOT DIAM: 3.38 CM
ECHO AV AREA PEAK VELOCITY: 2.47 CM2
ECHO AV AREA VTI: 2.4 CM2
ECHO AV AREA/BSA PEAK VELOCITY: 1.4 CM2/M2
ECHO AV AREA/BSA VTI: 1.4 CM2/M2
ECHO AV MEAN GRADIENT: 5.22 MMHG
ECHO AV PEAK GRADIENT: 11.51 MMHG
ECHO AV PEAK VELOCITY: 169.66 CM/S
ECHO AV VTI: 36.6 CM
ECHO EST RA PRESSURE: 3 MMHG
ECHO IVC PROX: 1.62 CM
ECHO LA AREA 4C: 17.84 CM2
ECHO LA MAJOR AXIS: 3.97 CM
ECHO LA MINOR AXIS: 2.27 CM
ECHO LA VOL 2C: 51.22 ML (ref 22–52)
ECHO LA VOL 4C: 49.2 ML (ref 22–52)
ECHO LA VOL BP: 52.74 ML (ref 22–52)
ECHO LA VOL/BSA BIPLANE: 30.14 ML/M2 (ref 16–28)
ECHO LA VOLUME INDEX A2C: 29.27 ML/M2 (ref 16–28)
ECHO LA VOLUME INDEX A4C: 28.11 ML/M2 (ref 16–28)
ECHO LV E' LATERAL VELOCITY: 8.4 CM/S
ECHO LV E' SEPTAL VELOCITY: 8.47 CM/S
ECHO LV EDV A2C: 92.36 ML
ECHO LV EDV A4C: 88.23 ML
ECHO LV EDV BP: 91.22 ML (ref 56–104)
ECHO LV EDV INDEX A4C: 50.4 ML/M2
ECHO LV EDV INDEX BP: 52.1 ML/M2
ECHO LV EDV NDEX A2C: 52.8 ML/M2
ECHO LV EJECTION FRACTION A2C: 62 PERCENT
ECHO LV EJECTION FRACTION A4C: 63 PERCENT
ECHO LV EJECTION FRACTION BIPLANE: 62.2 PERCENT (ref 55–100)
ECHO LV ESV A2C: 35.38 ML
ECHO LV ESV A4C: 32.93 ML
ECHO LV ESV BP: 34.47 ML (ref 19–49)
ECHO LV ESV INDEX A2C: 20.2 ML/M2
ECHO LV ESV INDEX A4C: 18.8 ML/M2
ECHO LV ESV INDEX BP: 19.7 ML/M2
ECHO LV GLOBAL LONGITUDINAL STRAIN (GLS): -21.6 PERCENT
ECHO LV INTERNAL DIMENSION DIASTOLIC: 4.42 CM (ref 3.9–5.3)
ECHO LV INTERNAL DIMENSION SYSTOLIC: 2.81 CM
ECHO LV IVSD: 0.82 CM (ref 0.6–0.9)
ECHO LV MASS 2D: 129 G (ref 67–162)
ECHO LV MASS INDEX 2D: 73.7 G/M2 (ref 43–95)
ECHO LV POSTERIOR WALL DIASTOLIC: 0.98 CM (ref 0.6–0.9)
ECHO LVOT DIAM: 1.95 CM
ECHO LVOT PEAK GRADIENT: 7.83 MMHG
ECHO LVOT PEAK VELOCITY: 139.91 CM/S
ECHO LVOT SV: 87.9 ML
ECHO LVOT VTI: 29.34 CM
ECHO MV A VELOCITY: 92.56 CM/S
ECHO MV AREA PHT: 3.58 CM2
ECHO MV E DECELERATION TIME (DT): 211.88 MS
ECHO MV E VELOCITY: 75.16 CM/S
ECHO MV E/A RATIO: 0.81
ECHO MV E/E' LATERAL: 8.95
ECHO MV E/E' RATIO (AVERAGED): 8.91
ECHO MV E/E' SEPTAL: 8.87
ECHO MV PRESSURE HALF TIME (PHT): 61.44 MS
ECHO RA MAJOR AXIS: 3.51 CM
ECHO RIGHT VENTRICULAR SYSTOLIC PRESSURE (RVSP): 41 MMHG
ECHO RV INTERNAL DIMENSION: 3.24 CM
ECHO RV TAPSE: 2.11 CM (ref 1.5–2)
ECHO TV REGURGITANT MAX VELOCITY: 306.23 CM/S
ECHO TV REGURGITANT PEAK GRADIENT: 37.53 MMHG
GLOBAL LONGITUDINAL STRAIN 2 CHAMBER: -25.6 PERCENT
GLOBAL LONGITUDINAL STRAIN 4 CHAMBER: -23.9 PERCENT
GLOBAL LONGITUDINAL STRAIN LONG AXIS: -15.1 PERCENT
LA VOL DISK BP: 50.21 ML (ref 22–52)
LVOT MG: 3.31 MMHG

## 2021-09-16 PROCEDURE — 93306 TTE W/DOPPLER COMPLETE: CPT | Performed by: SPECIALIST

## 2021-09-16 NOTE — TELEPHONE ENCOUNTER
----- Message from Jorge L Cervantes MD sent at 9/16/2021 11:00 AM EDT -----  Heart function normal by echo  Would be reasonable to see her back in 6 mos

## 2021-09-16 NOTE — TELEPHONE ENCOUNTER
Called pt. Verified patient's identity with two identifiers. Notified pt of results and Dr. Guanakito Nagy message. Scheduled 6 mo f/u. Patient verbalized understanding and denied further questions or concerns.

## 2021-09-20 ENCOUNTER — HOSPITAL ENCOUNTER (OUTPATIENT)
Dept: INFUSION THERAPY | Age: 71
Discharge: HOME | End: 2021-09-20
Payer: MEDICARE

## 2021-09-20 ENCOUNTER — HOSPITAL ENCOUNTER (OUTPATIENT)
Dept: RADIATION THERAPY | Age: 71
Discharge: HOME | End: 2021-09-20

## 2021-09-20 VITALS
TEMPERATURE: 97.1 F | HEIGHT: 60 IN | RESPIRATION RATE: 18 BRPM | WEIGHT: 175 LBS | HEART RATE: 65 BPM | DIASTOLIC BLOOD PRESSURE: 70 MMHG | SYSTOLIC BLOOD PRESSURE: 150 MMHG | BODY MASS INDEX: 34.36 KG/M2

## 2021-09-20 DIAGNOSIS — C54.1 PAPILLARY SEROUS ENDOMETRIAL ADENOCARCINOMA (HCC): Primary | ICD-10-CM

## 2021-09-20 LAB
ALBUMIN SERPL-MCNC: 3.4 G/DL (ref 3.5–5)
ALBUMIN/GLOB SERPL: 1 {RATIO} (ref 1.1–2.2)
ALP SERPL-CCNC: 72 U/L (ref 45–117)
ALT SERPL-CCNC: 40 U/L (ref 12–78)
ANION GAP SERPL CALC-SCNC: 10 MMOL/L (ref 5–15)
AST SERPL-CCNC: 16 U/L (ref 15–37)
BASOPHILS # BLD: 0 K/UL (ref 0–0.1)
BASOPHILS NFR BLD: 0 % (ref 0–1)
BILIRUB SERPL-MCNC: 0.3 MG/DL (ref 0.2–1)
BUN SERPL-MCNC: 19 MG/DL (ref 6–20)
BUN/CREAT SERPL: 22 (ref 12–20)
CALCIUM SERPL-MCNC: 9.4 MG/DL (ref 8.5–10.1)
CANCER AG125 SERPL-ACNC: 10 U/ML (ref 1.5–35)
CHLORIDE SERPL-SCNC: 110 MMOL/L (ref 97–108)
CO2 SERPL-SCNC: 20 MMOL/L (ref 21–32)
CREAT SERPL-MCNC: 0.87 MG/DL (ref 0.55–1.02)
DIFFERENTIAL METHOD BLD: ABNORMAL
EOSINOPHIL # BLD: 0 K/UL (ref 0–0.4)
EOSINOPHIL NFR BLD: 0 % (ref 0–7)
ERYTHROCYTE [DISTWIDTH] IN BLOOD BY AUTOMATED COUNT: 13.4 % (ref 11.5–14.5)
GLOBULIN SER CALC-MCNC: 3.5 G/DL (ref 2–4)
GLUCOSE SERPL-MCNC: 146 MG/DL (ref 65–100)
HCT VFR BLD AUTO: 39.2 % (ref 35–47)
HGB BLD-MCNC: 13.1 G/DL (ref 11.5–16)
IMM GRANULOCYTES # BLD AUTO: 0.1 K/UL (ref 0–0.04)
IMM GRANULOCYTES NFR BLD AUTO: 0 % (ref 0–0.5)
LYMPHOCYTES # BLD: 0.8 K/UL (ref 0.8–3.5)
LYMPHOCYTES NFR BLD: 6 % (ref 12–49)
MCH RBC QN AUTO: 29.6 PG (ref 26–34)
MCHC RBC AUTO-ENTMCNC: 33.4 G/DL (ref 30–36.5)
MCV RBC AUTO: 88.7 FL (ref 80–99)
MONOCYTES # BLD: 0.6 K/UL (ref 0–1)
MONOCYTES NFR BLD: 4 % (ref 5–13)
NEUTS SEG # BLD: 11.8 K/UL (ref 1.8–8)
NEUTS SEG NFR BLD: 90 % (ref 32–75)
NRBC # BLD: 0 K/UL (ref 0–0.01)
NRBC BLD-RTO: 0 PER 100 WBC
PLATELET # BLD AUTO: 280 K/UL (ref 150–400)
PMV BLD AUTO: 10.2 FL (ref 8.9–12.9)
POTASSIUM SERPL-SCNC: 3.8 MMOL/L (ref 3.5–5.1)
PROT SERPL-MCNC: 6.9 G/DL (ref 6.4–8.2)
RBC # BLD AUTO: 4.42 M/UL (ref 3.8–5.2)
SODIUM SERPL-SCNC: 140 MMOL/L (ref 136–145)
WBC # BLD AUTO: 13.3 K/UL (ref 3.6–11)

## 2021-09-20 PROCEDURE — 74011250636 HC RX REV CODE- 250/636: Performed by: OBSTETRICS & GYNECOLOGY

## 2021-09-20 PROCEDURE — 74011000258 HC RX REV CODE- 258: Performed by: OBSTETRICS & GYNECOLOGY

## 2021-09-20 PROCEDURE — 96413 CHEMO IV INFUSION 1 HR: CPT

## 2021-09-20 PROCEDURE — 36415 COLL VENOUS BLD VENIPUNCTURE: CPT

## 2021-09-20 PROCEDURE — 96375 TX/PRO/DX INJ NEW DRUG ADDON: CPT

## 2021-09-20 PROCEDURE — 96368 THER/DIAG CONCURRENT INF: CPT

## 2021-09-20 PROCEDURE — 96415 CHEMO IV INFUSION ADDL HR: CPT

## 2021-09-20 PROCEDURE — 96367 TX/PROPH/DG ADDL SEQ IV INF: CPT

## 2021-09-20 PROCEDURE — 86304 IMMUNOASSAY TUMOR CA 125: CPT

## 2021-09-20 PROCEDURE — 80053 COMPREHEN METABOLIC PANEL: CPT

## 2021-09-20 PROCEDURE — 85025 COMPLETE CBC W/AUTO DIFF WBC: CPT

## 2021-09-20 PROCEDURE — 99213 OFFICE O/P EST LOW 20 MIN: CPT | Performed by: PHYSICIAN ASSISTANT

## 2021-09-20 RX ORDER — SODIUM CHLORIDE 0.9 % (FLUSH) 0.9 %
10 SYRINGE (ML) INJECTION AS NEEDED
Status: DISPENSED | OUTPATIENT
Start: 2021-09-20 | End: 2021-09-20

## 2021-09-20 RX ORDER — SODIUM CHLORIDE 9 MG/ML
25 INJECTION, SOLUTION INTRAVENOUS CONTINUOUS
Status: DISPENSED | OUTPATIENT
Start: 2021-09-20 | End: 2021-09-20

## 2021-09-20 RX ORDER — PALONOSETRON 0.05 MG/ML
0.25 INJECTION, SOLUTION INTRAVENOUS ONCE
Status: COMPLETED | OUTPATIENT
Start: 2021-09-20 | End: 2021-09-20

## 2021-09-20 RX ADMIN — POTASSIUM CHLORIDE: 2 INJECTION, SOLUTION, CONCENTRATE INTRAVENOUS at 11:41

## 2021-09-20 RX ADMIN — PALONOSETRON 0.25 MG: 0.05 INJECTION, SOLUTION INTRAVENOUS at 13:41

## 2021-09-20 RX ADMIN — POTASSIUM CHLORIDE: 2 INJECTION, SOLUTION, CONCENTRATE INTRAVENOUS at 13:42

## 2021-09-20 RX ADMIN — CISPLATIN 91 MG: 100 INJECTION, SOLUTION INTRAVENOUS at 13:46

## 2021-09-20 RX ADMIN — DEXAMETHASONE SODIUM PHOSPHATE 12 MG: 4 INJECTION, SOLUTION INTRA-ARTICULAR; INTRALESIONAL; INTRAMUSCULAR; INTRAVENOUS; SOFT TISSUE at 13:15

## 2021-09-20 RX ADMIN — SODIUM CHLORIDE 25 ML/HR: 900 INJECTION, SOLUTION INTRAVENOUS at 08:00

## 2021-09-20 RX ADMIN — SODIUM CHLORIDE 150 MG: 900 INJECTION, SOLUTION INTRAVENOUS at 12:51

## 2021-09-20 NOTE — PROGRESS NOTES
[address] [place] [address] Mathias Moritz 721, [address]  P [phone]  F [phone]      Patient ID:  Name:  Neha Strange  MRN:  [id number]  :  1950/71 y.o. Date:  2021      Hemant Zamarripa MD: Dr. Nino Tillman  PCP: Jigar De Luna MD     Primary Diagnosis: Endometrial cancer  Date of Diagnosis: 2021      Current Agent: Cisplatin/ XRT  Cycle: 1      HPI:  70 y.o. 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 She is now RATLHBSO on 21. Pathology confirmed high grade serous histopath w/ parametrial spread/cevical LVI. She is recommended adjuvant CCRT (CDDP/EBRT/brachytherapy) with adjuvant taxol/carboplatin to follow. OncTx History:  21 Hysteroscopy, EMB, myosure ([facility])  High-grade endometrial carcinoma, favor serous carcinoma      21 CT CAP  1. CT of the chest demonstrates no definite evidence of metastatic disease. No acute abnormality identified. 2. CT of the abdomen and pelvis demonstrates no definite evidence of metastatic disease. The pelvis is obscured by artifact related to total hip replacements. There is hepatic steatosis. 2 subcentimeter low densities in the liver are too small to characterize but most likely represent tiny cysts. There is an 8mm lymph node just to the left of the aortic bifurcation which is within normal limits. 21 RA TLHBSO, SLNB       1.  Right pelvic sentinel lymph node, biopsy:      One lymph node, negative for carcinoma, levels and cytokeratin stain   examined (0/1)   2.  Left pelvic sentinel lymph node #1, biopsy:      One lymph node, negative for carcinoma, levels and cytokeratin stain   examined (0/1)   3.  Left pelvic sentinel lymph node, biopsy:      One lymph node, negative for carcinoma, levels and cytokeratin stain   examined (0/1)   4.  Uterus, cervix, bilateral fallopian tubes and ovaries; simple   hysterectomy, BSO:        Endometrium: Uterine serous carcinoma, see synoptic report        Cervix: Serous carcinoma focally involves endocervical stroma, predominantly as lymphovascular invasion        Bilateral fallopian tubes: No pathologic diagnosis     ENDOMETRIUM    SPECIMEN       Procedure: Total hysterectomy and bilateral salpingo-oophorectomy       Specimen Integrity: Intact    TUMOR       Histologic Type: Serous carcinoma       Myometrial Invasion: Present           Depth of Myometrial Invasion (Millimeters): 2 mm           Myometrial Thickness (Millimeters): 10 mm           Percentage of Myometrial Invasion: 20%       Uterine Serosa Involvement: Not identified       Lower Uterine Segment Involvement: Present, myoinvasive       Cervical Stromal Involvement: Present       Other Tissue / Organ Involvement: Not identified       Peritoneal Ascitic Fluid: Atypical, favor benign reactive process       Lymphovascular Invasion: Present    MARGINS       Margins: Parametrium and focal paracervix involved by tumor           Parametrial and focal Paracervical Margin: Involved by carcinoma    LYMPH NODES       Lymph Node Status: All lymph nodes negative for tumor cells           Total Number of Pelvic Nodes Examined: 3   * * *Comment* * *  Immunohistochemical stains with appropriate controls show tumor positive for P16, P53 with Ki-67 proliferation index of approximately 50%.  These findings support diagnosis of endometrial serous carcinoma. 21 Cisplatin/XRT EBRT/cuff brachytherapy               SUBJECTIVE:  Shanita Gilbert presents for chemotherapy. She is doing well, recovered from surgery. She is to start her RT today with Dr. Kelton An. No c/o GI/ difficulty,no pain, no vaginal bleeding. She lives with one adult son. Another son is coming from out of state to stay for a week. Her  is  from cancer. She continues to work in [employer] full time from home.   She remains independent in ADLs and otherwise healthy. ROS  Constitutional: no weight loss, fever, night sweats  Respiratory: no cough, shortness of breath, or wheezing  Cardiovascular: no chest pain or dyspnea on exertion  Heme: No abnormal bleeding  Gastrointestinal: no abdominal pain, change in bowel habits, or black or bloody stools  Genito-Urinary: no dysuria, trouble voiding, or hematuria  Musculoskeletal: negative for - gait disturbance or muscular weakness  Neurological: negative for - confusion, headaches, memory loss or numbness/tingling  Derm: negative  Psych: negative for depression. Admits anxiety over her diagnosis and doing much research. OBJECTIVE:  Physical Exam  Visit Vitals  BP (!) 177/91   Pulse 85   Temp 97.1 °F (36.2 °C)   Resp 18   Ht 5' 0.05\" (1.525 m)   Wt 175 lb (79.4 kg)   Breastfeeding No   BMI 34.12 kg/m²        General:  alert, cooperative, no distress       HEENT: without pallor, sclera without jaundice, oral mucosa without lesions,      Cardiac:  Regular rate and rhythm        Lungs:  nonlabored          Port:  n/a. PIV  Abdomen:  soft, nondistended, nontender, well healed       Lymph:  no lymphadenopathy   Extremity: no edema    Recent Labs     09/20/21  0753   WBC 13.3*   ANEU 11.8*   HGB 13.1   HCT 39.2   MCV 88.7   MCH 29.6        Recent Labs     09/20/21  0847      K 3.8   *   *   BUN 19   CREA 0.87   CA 9.4   ALB 3.4*   TBILI 0.3   ALT 40       Tumor markers  No results found for: MBAA818    Patient Active Problem List   Diagnosis Code    DJD (degenerative joint disease) of hip M16.9    Hip arthritis M16.10    PUD (peptic ulcer disease) K27.9    GERD (gastroesophageal reflux disease) K21.9    Arthritis G57.20    Lichen planus S83.8    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     GERD (gastroesophageal reflux disease)     Lichen planus     oral    PUD (peptic ulcer disease)      Prior to Admission medications    Medication Sig Start Date End Date Taking? Authorizing Provider   ondansetron (ZOFRAN ODT) 4 mg disintegrating tablet Take 1 Tablet by mouth every eight (8) hours as needed for Nausea. Indications: nausea and vomiting caused by cancer drugs 9/7/21   Tyrone Young MD   dexAMETHasone (DECADRON) 4 mg tablet Take 2 tablets with breakfast the day before chemo and for 2 days after chemo 9/7/21   Tyrone Young MD   cephALEXin [facility]) 500 mg capsule Dental work  Patient not taking: Reported on 9/7/2021 8/23/21   Provider, Historical   Nystop powder as needed. 8/22/21   Provider, Historical   acetaminophen (TYLENOL) 325 mg tablet Take 2 Tablets by mouth every six (6) hours as needed for Pain. 7/30/21   Phillip Esposito PA-C   amLODIPine (NORVASC) 5 mg tablet Take 1 Tablet by mouth daily. 7/27/21   Valjean Schwab, MD   ibuprofen (MOTRIN) 600 mg tablet Take 1 Tablet by mouth every six (6) hours as needed for Pain. Patient not taking: Reported on 9/7/2021 7/7/21   Antonio Sparks MD   methylPREDNISolone (MedroL) 4 mg tab Take 4 mg by mouth daily as needed. Prn for lichen planus    Provider, Historical   melatonin 1 mg tablet Take 1 mg by mouth nightly. Provider, Historical   cyanocobalamin (VITAMIN B-12) 1,000 mcg tablet Take 1,000 mcg by mouth daily. Provider, Historical   cholecalciferol, vitamin D3, 2,000 unit tab Take 2,000 Units by mouth daily. Provider, Historical   BIOTIN PO Take 5,000 mcg by mouth daily. Provider, Historical   pyridoxine (VITAMIN B-6) 100 mg tablet Take 100 mg by mouth daily.     Provider, Historical     Allergies   Allergen Reactions    Codeine Nausea and Vomiting    Penicillins Rash     Rash & dizzy    Sulfa (Sulfonamide Antibiotics) Other (comments)     General redness all over skin    Ciprofloxacin Itching and Other (comments)     Extreme dizziness and rash    Other Medication Nausea and Vomiting     PRESCRIPTION STRENGTH ANTI INFLAMMATORY MEDS      Family History   Problem Relation Age of Onset    Cancer Maternal Grandmother 79        colon cancer    Hypertension Mother     Cancer Mother         pacreatic cancer    Cancer Paternal Aunt         ovarian cancer       CT Results (most recent):  Results from Hospital Encounter encounter on 07/16/21    CT CHEST W CONT    Narrative  EXAM:  CT ABD PELV W CONT, CT CHEST W CONT    INDICATION: Endometrial cancer    COMPARISON:    CONTRAST:  100 mL of Isovue-370. TECHNIQUE:  Following the uneventful intravenous administration of contrast, thin axial  images were obtained through the chest, abdomen and pelvis. Coronal and sagittal  reconstructions were generated. Oral contrast was administered. CT dose  reduction was achieved through use of a standardized protocol tailored for this  examination and automatic exposure control for dose modulation. FINDINGS:    THYROID: No nodule. MEDIASTINUM: No mass or lymphadenopathy. STEPHANIE: No mass or lymphadenopathy. THORACIC AORTA: No dissection or aneurysm. MAIN PULMONARY ARTERY: Normal in caliber. TRACHEA/BRONCHI: Patent. ESOPHAGUS: No wall thickening or dilatation. HEART: Normal in size. PLEURA: No effusion or pneumothorax. LUNGS: No nodule, mass, or airspace disease. There is minor atelectasis/scarring  right upper lobe  LIVER: No mass or biliary dilatation. There is hepatic steatosis. There is a 5  mm low-density in segment 4A and segment 5 to small to fully characterize but  most likely tiny cysts. GALLBLADDER: Patient status post cholecystectomy  SPLEEN: No mass. PANCREAS: No mass or ductal dilatation. ADRENALS: Unremarkable. KIDNEYS: No mass, calculus, or hydronephrosis. There is a retroaortic left renal  vein  STOMACH: Unremarkable. SMALL BOWEL: No dilatation or wall thickening. COLON: No dilatation or wall thickening. APPENDIX: Unremarkable.   PERITONEUM: No ascites or pneumoperitoneum. RETROPERITONEUM: No lymphadenopathy or aortic aneurysm. There is an 8 mm lymph  node at the aortic bifurcation on the left. There is mild stenosis origin of the  SMA  REPRODUCTIVE ORGANS: Uterus and pelvis are obscured by artifact related to  bilateral total hip replacements. URINARY BLADDER: No mass or calculus. BONES: No destructive bone lesion. There is a right shoulder effusion  ADDITIONAL COMMENTS: N/A    Impression  1. CT of the chest demonstrates no definite evidence of metastatic disease. No  acute abnormality identified. 2. CT of the abdomen and pelvis demonstrates no definite evidence of metastatic  disease. The pelvis is obscured by artifact related to total hip replacements. There is hepatic steatosis. 2 subcentimeter low densities in the liver are too  small to characterize but most likely represent tiny cysts. There is an 8mm lymph node just to the left of the aortic bifurcation which is  within normal limits. IMPRESSION/PLAN:  70 y.o. with a G3 UPSC s/p RA TLHBSO 21. Rx adjuvant CCRT. ECO      Labs/chart reviewed. Reviewed chemotherapy/XRT and ASE profiles. Proceed with chemotherapy today. Elevated BP - more recently since late August. To d/w PCP w/in the month. Needs genetics. Somatic IHC requested.       Xochitl Ríos PA-C  Gyn Onc

## 2021-09-20 NOTE — PROGRESS NOTES
[facility] Chemo Progress Note    Date: 2021    Name: Lucho Liu    MRN: [id number]         : 1950    [address]45 Ms. Muhammad Arrived to [facility] for C1D1 Cisplatin ambulatory in stable condition. Assessment was completed, no acute issues at this time, no new complaints voiced. Peripheral IV established with positive blood return. Labs drawn and sent for processing. Normal Saline started at [facility]. Chemotherapy Flowsheet 2021   Cycle C1D1   Date 2021   Drug / Regimen Cisplatin   Pre Hydration given   Post Hydration given   Pre Meds given   Notes given         Patient denies SOB, fever, cough, general not feeling well. Patient denies recent exposure to someone who has tested positive for COVID-19. Patient denies having contact with anyone who has a pending COVID test.      Ms. Muhammad's vitals were reviewed. Patient Vitals for the past 12 hrs:   Temp Pulse Resp BP   21 1628  65 18 (!) 150/70   21 0745 97.1 °F (36.2 °C) 85 18 (!) 177/91         Lab results were obtained and reviewed. Recent Results (from the past 12 hour(s))   CBC WITH AUTOMATED DIFF    Collection Time: 21  7:53 AM   Result Value Ref Range    WBC 13.3 (H) 3.6 - 11.0 K/uL    RBC 4.42 3.80 - 5.20 M/uL    HGB 13.1 11.5 - 16.0 g/dL    HCT 39.2 35.0 - 47.0 %    MCV 88.7 80.0 - 99.0 FL    MCH 29.6 26.0 - 34.0 PG    MCHC 33.4 30.0 - 36.5 g/dL    RDW 13.4 11.5 - 14.5 %    PLATELET 504 174 - 415 K/uL    MPV 10.2 8.9 - 12.9 FL    NRBC 0.0 0  WBC    ABSOLUTE NRBC 0.00 0.00 - 0.01 K/uL    NEUTROPHILS 90 (H) 32 - 75 %    LYMPHOCYTES 6 (L) 12 - 49 %    MONOCYTES 4 (L) 5 - 13 %    EOSINOPHILS 0 0 - 7 %    BASOPHILS 0 0 - 1 %    IMMATURE GRANULOCYTES 0 0.0 - 0.5 %    ABS. NEUTROPHILS 11.8 (H) 1.8 - 8.0 K/UL    ABS. LYMPHOCYTES 0.8 0.8 - 3.5 K/UL    ABS. MONOCYTES 0.6 0.0 - 1.0 K/UL    ABS. EOSINOPHILS 0.0 0.0 - 0.4 K/UL    ABS. BASOPHILS 0.0 0.0 - 0.1 K/UL    ABS. IMM.  GRANS. 0.1 (H) 0.00 - 0.04 K/UL    DF AUTOMATED METABOLIC PANEL, COMPREHENSIVE    Collection Time: 09/20/21  8:47 AM   Result Value Ref Range    Sodium 140 136 - 145 mmol/L    Potassium 3.8 3.5 - 5.1 mmol/L    Chloride 110 (H) 97 - 108 mmol/L    CO2 20 (L) 21 - 32 mmol/L    Anion gap 10 5 - 15 mmol/L    Glucose 146 (H) 65 - 100 mg/dL    BUN 19 6 - 20 MG/DL    Creatinine 0.87 0.55 - 1.02 MG/DL    BUN/Creatinine ratio 22 (H) 12 - 20      GFR est AA >60 >60 ml/min/1.73m2    GFR est non-AA >60 >60 ml/min/1.73m2    Calcium 9.4 8.5 - 10.1 MG/DL    Bilirubin, total 0.3 0.2 - 1.0 MG/DL    ALT (SGPT) 40 12 - 78 U/L    AST (SGOT) 16 15 - 37 U/L    Alk. phosphatase 72 45 - 117 U/L    Protein, total 6.9 6.4 - 8.2 g/dL    Albumin 3.4 (L) 3.5 - 5.0 g/dL    Globulin 3.5 2.0 - 4.0 g/dL    A-G Ratio 1.0 (L) 1.1 - 2.2     CANCER ANTIGEN 125    Collection Time: 09/20/21  8:47 AM   Result Value Ref Range    CA-125 10 1.5 - 35.0 U/mL       Pre-medications  were administered as ordered and chemotherapy was initiated.   Medications Administered     0.9% sodium chloride 1,000 mL with potassium chloride 10 mEq, magnesium sulfate 2 g infusion     Admin Date  09/20/2021 Action  New Bag Dose   Rate  1,000 mL/hr Route  IntraVENous Administered By  Mina Llanes RN           Admin Date  09/20/2021 Action  New Bag Dose   Rate  1,000 mL/hr Route  IntraVENous Administered By  Mina Llanes RN          0.9% sodium chloride infusion     Admin Date  09/20/2021 Action  New Bag Dose  25 mL/hr Rate  25 mL/hr Route  IntraVENous Administered By  Mina Llanes RN          CISplatin (PLATINOL) 91 mg in 0.9% sodium chloride 500 mL, overfill volume 50 mL chemo infusion     Admin Date  09/20/2021 Action  New Bag Dose  91 mg Rate  320.5 mL/hr Route  IntraVENous Administered By  Mina Llanes RN          dexamethasone (DECADRON) 12 mg in 0.9% sodium chloride 50 mL, overfill volume 5 mL IVPB     Admin Date  09/20/2021 Action  New Bag Dose  12 mg Rate  232 mL/hr Route  IntraVENous Administered By  Naren Priest RN          fosaprepitant (EMEND) 150 mg in 0.9% sodium chloride 150 mL IVPB     Admin Date  09/20/2021 Action  New Bag Dose  150 mg Rate  450 mL/hr Route  IntraVENous Administered By  Naren Priest RN          palonosetron HCl (ALOXI) injection 0.25 mg     Admin Date  09/20/2021 Action  Given Dose  0.25 mg Route  IntraVENous Administered By  Naren Priest RN                  [id number] Patient tolerated treatment well. Peripheral IV maintained positive blood return throughout treatment. Peripheral IV flushed and removed per protocol. Patient was discharged from [facility] in stable condition. Patient aware of next appointment.      Future Appointments   Date Time Provider Cresencio Mckenzie   9/21/2021  3:00 PM RAD ONC THERAPY [facility] [address] [place] [address] HUI   9/22/2021  3:00 PM RAD ONC THERAPY RCR [address] [place] [address] HUI   9/23/2021  3:00 PM RAD [address] HUI   9/24/2021  3:00 PM RAD [address] HUI   9/27/2021  3:45 PM RAD [address] HUI   9/28/2021  3:45 PM RAD [address] HUI   9/29/2021  3:45 PM RAD [address] HUI   9/30/2021  3:45 PM RAD [address] HUI   10/1/2021  3:45 PM RAD [address] HUI   10/4/2021  3:45 PM RAD [address] HUI   10/5/2021  3:45 PM RAD [address] HUI   10/6/2021  3:45 PM RAD [address] HUI   10/7/2021  3:45 PM RAD [address] HUI   10/8/2021  3:45 PM RAD [address] HUI   10/11/2021  3:45 PM RAD [address] HUI   10/12/2021  3:45 PM RAD [address] HUI   10/13/2021 10:30 AM Scott Nichols MD CGO BS AMB   10/13/2021  3:45 PM RAD [address] HUI   10/14/2021  3:45 PM RAD [address] HUI   10/15/2021  3:45 PM RAD ONC THERAPY RCR [address] HUI   10/18/2021  9:00 AM D3 KENNY LONG [facility] H   10/18/2021  3:45 PM RAD ONC THERAPY RCR [address] HUI   10/19/2021  3:45 PM RAD ONC THERAPY RCR [address] HUI   10/20/2021  3:45 PM RAD [address] HUI   10/21/2021  3:45 PM RAD [address] HUI   10/22/2021  3:45 PM RAD [address] HUI   3/17/2022 10:00 AM MD MARIA LUZ Leung BS [address] [place] [address], RN  September 20, 2021

## 2021-09-21 ENCOUNTER — HOSPITAL ENCOUNTER (OUTPATIENT)
Dept: RADIATION THERAPY | Age: 71
Discharge: HOME | End: 2021-09-21

## 2021-09-22 ENCOUNTER — HOSPITAL ENCOUNTER (OUTPATIENT)
Dept: RADIATION THERAPY | Age: 71
Discharge: HOME | End: 2021-09-22

## 2021-09-23 ENCOUNTER — HOSPITAL ENCOUNTER (OUTPATIENT)
Dept: RADIATION THERAPY | Age: 71
Discharge: HOME | End: 2021-09-23

## 2021-09-24 ENCOUNTER — HOSPITAL ENCOUNTER (OUTPATIENT)
Dept: RADIATION THERAPY | Age: 71
Discharge: HOME | End: 2021-09-24

## 2021-09-24 DIAGNOSIS — C54.1 ENDOMETRIAL CANCER (HCC): Primary | ICD-10-CM

## 2021-09-24 RX ORDER — PROCHLORPERAZINE MALEATE 5 MG
5 TABLET ORAL
Qty: 30 TABLET | Refills: 0 | Status: SHIPPED | OUTPATIENT
Start: 2021-09-24 | End: 2021-10-02

## 2021-09-27 ENCOUNTER — HOSPITAL ENCOUNTER (OUTPATIENT)
Dept: RADIATION THERAPY | Age: 71
Discharge: HOME | End: 2021-09-27

## 2021-09-28 ENCOUNTER — HOSPITAL ENCOUNTER (OUTPATIENT)
Dept: RADIATION THERAPY | Age: 71
Discharge: HOME | End: 2021-09-28

## 2021-09-29 ENCOUNTER — HOSPITAL ENCOUNTER (OUTPATIENT)
Dept: RADIATION THERAPY | Age: 71
Discharge: HOME | End: 2021-09-29

## 2021-09-30 ENCOUNTER — HOSPITAL ENCOUNTER (OUTPATIENT)
Dept: RADIATION THERAPY | Age: 71
Discharge: HOME | End: 2021-09-30

## 2021-10-01 ENCOUNTER — HOSPITAL ENCOUNTER (OUTPATIENT)
Dept: RADIATION THERAPY | Age: 71
Discharge: HOME | End: 2021-10-01

## 2021-10-01 DIAGNOSIS — C54.1 ENDOMETRIAL CANCER (HCC): Primary | ICD-10-CM

## 2021-10-04 ENCOUNTER — HOSPITAL ENCOUNTER (OUTPATIENT)
Dept: RADIATION THERAPY | Age: 71
Discharge: HOME | End: 2021-10-04

## 2021-10-05 ENCOUNTER — HOSPITAL ENCOUNTER (OUTPATIENT)
Dept: RADIATION THERAPY | Age: 71
Discharge: HOME | End: 2021-10-05

## 2021-10-06 ENCOUNTER — HOSPITAL ENCOUNTER (OUTPATIENT)
Dept: RADIATION THERAPY | Age: 71
Discharge: HOME | End: 2021-10-06

## 2021-10-07 ENCOUNTER — HOSPITAL ENCOUNTER (OUTPATIENT)
Dept: RADIATION THERAPY | Age: 71
Discharge: HOME | End: 2021-10-07

## 2021-10-08 ENCOUNTER — HOSPITAL ENCOUNTER (OUTPATIENT)
Dept: RADIATION THERAPY | Age: 71
Discharge: HOME | End: 2021-10-08

## 2021-10-11 ENCOUNTER — HOSPITAL ENCOUNTER (OUTPATIENT)
Dept: RADIATION THERAPY | Age: 71
Discharge: HOME | End: 2021-10-11

## 2021-10-11 RX ORDER — ONDANSETRON 2 MG/ML
8 INJECTION INTRAMUSCULAR; INTRAVENOUS AS NEEDED
Status: CANCELLED | OUTPATIENT
Start: 2021-10-18

## 2021-10-11 RX ORDER — HEPARIN 100 UNIT/ML
300-500 SYRINGE INTRAVENOUS AS NEEDED
Status: CANCELLED
Start: 2021-10-18

## 2021-10-11 RX ORDER — DIPHENHYDRAMINE HYDROCHLORIDE 50 MG/ML
50 INJECTION, SOLUTION INTRAMUSCULAR; INTRAVENOUS AS NEEDED
Status: CANCELLED
Start: 2021-10-18

## 2021-10-11 RX ORDER — SODIUM CHLORIDE 9 MG/ML
25 INJECTION, SOLUTION INTRAVENOUS CONTINUOUS
Status: CANCELLED | OUTPATIENT
Start: 2021-10-18

## 2021-10-11 RX ORDER — HYDROCORTISONE SODIUM SUCCINATE 100 MG/2ML
100 INJECTION, POWDER, FOR SOLUTION INTRAMUSCULAR; INTRAVENOUS AS NEEDED
Status: CANCELLED | OUTPATIENT
Start: 2021-10-18

## 2021-10-11 RX ORDER — SODIUM CHLORIDE 9 MG/ML
10 INJECTION INTRAMUSCULAR; INTRAVENOUS; SUBCUTANEOUS AS NEEDED
Status: CANCELLED | OUTPATIENT
Start: 2021-10-18

## 2021-10-11 RX ORDER — EPINEPHRINE 1 MG/ML
0.3 INJECTION, SOLUTION, CONCENTRATE INTRAVENOUS AS NEEDED
Status: CANCELLED | OUTPATIENT
Start: 2021-10-18

## 2021-10-11 RX ORDER — DIPHENHYDRAMINE HYDROCHLORIDE 50 MG/ML
25 INJECTION, SOLUTION INTRAMUSCULAR; INTRAVENOUS AS NEEDED
Status: CANCELLED
Start: 2021-10-18

## 2021-10-11 RX ORDER — PALONOSETRON 0.05 MG/ML
0.25 INJECTION, SOLUTION INTRAVENOUS ONCE
Status: CANCELLED | OUTPATIENT
Start: 2021-10-18 | End: 2021-10-18

## 2021-10-11 RX ORDER — ACETAMINOPHEN 325 MG/1
650 TABLET ORAL AS NEEDED
Status: CANCELLED
Start: 2021-10-18

## 2021-10-11 RX ORDER — ALBUTEROL SULFATE 0.83 MG/ML
2.5 SOLUTION RESPIRATORY (INHALATION) AS NEEDED
Status: CANCELLED
Start: 2021-10-18

## 2021-10-11 RX ORDER — SODIUM CHLORIDE 0.9 % (FLUSH) 0.9 %
10 SYRINGE (ML) INJECTION AS NEEDED
Status: CANCELLED | OUTPATIENT
Start: 2021-10-18

## 2021-10-12 ENCOUNTER — HOSPITAL ENCOUNTER (OUTPATIENT)
Dept: RADIATION THERAPY | Age: 71
Discharge: HOME | End: 2021-10-12

## 2021-10-13 ENCOUNTER — HOSPITAL ENCOUNTER (OUTPATIENT)
Dept: RADIATION THERAPY | Age: 71
Discharge: HOME | End: 2021-10-13

## 2021-10-13 ENCOUNTER — VIRTUAL VISIT (OUTPATIENT)
Dept: GYNECOLOGY | Age: 71
End: 2021-10-13
Payer: MEDICARE

## 2021-10-13 DIAGNOSIS — Z51.11 ENCOUNTER FOR ANTINEOPLASTIC CHEMOTHERAPY: ICD-10-CM

## 2021-10-13 DIAGNOSIS — C54.1 PAPILLARY SEROUS ENDOMETRIAL ADENOCARCINOMA (HCC): Primary | ICD-10-CM

## 2021-10-13 PROCEDURE — G0463 HOSPITAL OUTPT CLINIC VISIT: HCPCS | Performed by: OBSTETRICS & GYNECOLOGY

## 2021-10-13 PROCEDURE — G8427 DOCREV CUR MEDS BY ELIG CLIN: HCPCS | Performed by: OBSTETRICS & GYNECOLOGY

## 2021-10-13 PROCEDURE — 99215 OFFICE O/P EST HI 40 MIN: CPT | Performed by: OBSTETRICS & GYNECOLOGY

## 2021-10-13 PROCEDURE — 3017F COLORECTAL CA SCREEN DOC REV: CPT | Performed by: OBSTETRICS & GYNECOLOGY

## 2021-10-13 PROCEDURE — G8399 PT W/DXA RESULTS DOCUMENT: HCPCS | Performed by: OBSTETRICS & GYNECOLOGY

## 2021-10-13 PROCEDURE — G8417 CALC BMI ABV UP PARAM F/U: HCPCS | Performed by: OBSTETRICS & GYNECOLOGY

## 2021-10-13 PROCEDURE — 1101F PT FALLS ASSESS-DOCD LE1/YR: CPT | Performed by: OBSTETRICS & GYNECOLOGY

## 2021-10-13 PROCEDURE — G8536 NO DOC ELDER MAL SCRN: HCPCS | Performed by: OBSTETRICS & GYNECOLOGY

## 2021-10-13 PROCEDURE — G8432 DEP SCR NOT DOC, RNG: HCPCS | Performed by: OBSTETRICS & GYNECOLOGY

## 2021-10-13 PROCEDURE — 1090F PRES/ABSN URINE INCON ASSESS: CPT | Performed by: OBSTETRICS & GYNECOLOGY

## 2021-10-13 NOTE — PROGRESS NOTES
Virtual visit, pre chemo appt for cisplatin C2 at [facility] on 10/18/2021, pt reports abdominal pain and cramping, she states she is having some nausea, no vomiting, she states she is not eating well    1. Have you been to the ER, urgent care clinic since your last visit? Hospitalized since your last visit?  no    2. Have you seen or consulted any other health care providers outside of the [address] [place] [address] since your last visit? Include any pap smears or colon screening.    no

## 2021-10-13 NOTE — PROGRESS NOTES
[facility] [address]  P [phone]  F [phone]    Office Note  Patient ID:  Name:  Kitty Nguyễn  MRN:  [id number]  :  1950/71 y.o. Date:  10/13/2021      HISTORY OF PRESENT ILLNESS:  Ms. Kitty Nguyễn is a 70 y.o. female who on 2021 underwent Robotic-assisted total laparoscopic hysterectomy, Bilateral salpingo-oophorectomy, Bilateral pelvic sentinel lymph node mapping and biopsy. 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 Negative washings. Stromal cervical involvement. Negative SLNs. 2mm out of 10mm myometrial invasion. Parametrial involvement of tumor. Undergoing concurrent chemo-radiation. Presents today for consideration of cycle 2 of cisplatin. The patient reports that radiation is rough, but she is doing it. She is unable to work while undergoing treatment had has filed for disability during this time, which is reasonable and appropriate. Reports some diarrhea. Denies fevers or chills. Reports some abdominal cramping with radiation and nausea. Denies emesis. Initial History:  Ms. Kitty Nguyễn is a 70 y.o.  postmenopausal female who presents as a new patient from Dr. Aniket Barone for high-grade endometrial cancer. The patient reports vaginal spotting back in April, which resulted in her seeing Dr. Aniket Barone. Pelvic ultrasound on 2021 demonstrated 12.5mm stripe. Hysteroscopy/D&C on 2021 demonstrated \"high-grade endometrial carcinoma, favor serous carcinoma\". She was subsequently referred to our office for further discussion and management. Denies pelvic or abdominal pain/bloating, change in appetite or bowel habits, nausea, vomiting, CP, SOB, hematuria, hematochezia, or urinary symptoms. Interval History:  Presents today via virtual visit for CT results and to finalize surgical planning.      Pertinent PMH/PSH: YVETTE, h/o  x 2 Active, no restrictions. Imaging Review:   CT C/A/P 7/16/2021:  FINDINGS:   THYROID: No nodule. MEDIASTINUM: No mass or lymphadenopathy. STEPHANIE: No mass or lymphadenopathy. THORACIC AORTA: No dissection or aneurysm. MAIN PULMONARY ARTERY: Normal in caliber. TRACHEA/BRONCHI: Patent. ESOPHAGUS: No wall thickening or dilatation. HEART: Normal in size. PLEURA: No effusion or pneumothorax. LUNGS: No nodule, mass, or airspace disease. There is minor atelectasis/scarring  right upper lobe  LIVER: No mass or biliary dilatation. There is hepatic steatosis. There is a 5  mm low-density in segment 4A and segment 5 to small to fully characterize but  most likely tiny cysts. GALLBLADDER: Patient status post cholecystectomy  SPLEEN: No mass. PANCREAS: No mass or ductal dilatation. ADRENALS: Unremarkable. KIDNEYS: No mass, calculus, or hydronephrosis. There is a retroaortic left renal  vein  STOMACH: Unremarkable. SMALL BOWEL: No dilatation or wall thickening. COLON: No dilatation or wall thickening. APPENDIX: Unremarkable. PERITONEUM: No ascites or pneumoperitoneum. RETROPERITONEUM: No lymphadenopathy or aortic aneurysm. There is an 8 mm lymph  node at the aortic bifurcation on the left. There is mild stenosis origin of the  SMA  REPRODUCTIVE ORGANS: Uterus and pelvis are obscured by artifact related to  bilateral total hip replacements. URINARY BLADDER: No mass or calculus. BONES: No destructive bone lesion. There is a right shoulder effusion  ADDITIONAL COMMENTS: N/A     IMPRESSION     1. CT of the chest demonstrates no definite evidence of metastatic disease. No  acute abnormality identified. 2. CT of the abdomen and pelvis demonstrates no definite evidence of metastatic  disease. The pelvis is obscured by artifact related to total hip replacements. There is hepatic steatosis. 2 subcentimeter low densities in the liver are too  small to characterize but most likely represent tiny cysts.   There is an 8mm lymph node just to the left of the aortic bifurcation which is  within normal limits. Pelvic ultrasound 4/20/2021:  Impression: Normal uterus with 12.5mm stripe. Normal right ovary. Non visible left ovary with otherwise normal left adnexa. No free fluid. Pathology Review:   7/29/2021:  * * *FINAL PATHOLOGIC DIAGNOSIS* * *   1.  Right pelvic sentinel lymph node, biopsy:        One lymph node, negative for carcinoma, levels and cytokeratin stain   examined (0/1)   2.  Left pelvic sentinel lymph node #1, biopsy:        One lymph node, negative for carcinoma, levels and cytokeratin stain   examined (0/1)   3.  Left pelvic sentinel lymph node, biopsy:        One lymph node, negative for carcinoma, levels and cytokeratin stain   examined (0/1)   4. Uterus, cervix, bilateral fallopian tubes and ovaries; simple   hysterectomy, BSO:        Endometrium: Uterine serous carcinoma, see synoptic report        Cervix: Serous carcinoma focally involves endocervical stroma,   predominantly as lymphovascular                invasion        Bilateral fallopian tubes: No pathologic diagnosis     ENDOMETRIUM    SPECIMEN       Procedure:  Total hysterectomy and bilateral salpingo-oophorectomy       Specimen Integrity: Intact    TUMOR       Histologic Type: Serous carcinoma       Myometrial Invasion: Present           Depth of Myometrial Invasion (Millimeters): 2 mm           Myometrial Thickness (Millimeters): 10 mm           Percentage of Myometrial Invasion: 20%       Uterine Serosa Involvement: Not identified       Lower Uterine Segment Involvement: Present, myoinvasive       Cervical Stromal Involvement: Present       Other Tissue / Organ Involvement: Not identified       Peritoneal Ascitic Fluid: Atypical, favor benign reactive process       Lymphovascular Invasion: Present    MARGINS       Margins: Parametrium and focal paracervix involved by tumor           Parametrial and focal Paracervical Margin: Involved by carcinoma [facility] NODES       Lymph Node Status: All lymph nodes negative for tumor cells           Total Number of Pelvic Nodes Examined: 3           Number of Pelvic Vernon Nodes Examined: 3           Total Number of Para-aortic Nodes Examined: 0           Number of Para-aortic Vernon Nodes Examined: 0    PATHOLOGIC STAGE CLASSIFICATION (pTNM, AJCC 8th Edition)       Primary Tumor (pT): pT2       Regional Lymph Nodes Modifier: (sn)       Regional Lymph Nodes (pN): pN0    FIGO STAGE       FIGO Stage: II   * * *Comment* * *   Immunohistochemical stains with appropriate controls show tumor positive   for P16, P53 with Ki-67 proliferation index of approximately 50%.  These   findings support diagnosis of endometrial serous carcinoma. 7/7/2021:  FINAL PATHOLOGIC DIAGNOSIS   Endometrium, curettings:   High-grade endometrial carcinoma, favor serous carcinoma   See comment   Comment   The biopsy contains a malignant glandular neoplasm with a high nuclear grade, papillary growth pattern, and areas of necrosis. Morphologic features suggest a high-grade endometrial carcinoma and are most compatible with a diagnosis of serous carcinoma. ROS:  A comprehensive review of systems was negative except for that written in the History of Present Illness. , 10 point ROS    OB/GYN ROS:  Per HPI    ECOG ndGndrndanddndend:nd nd2nd Problem List:  Patient Active Problem List    Diagnosis Date Noted    Encounter for antineoplastic chemotherapy 10/13/2021    Essential hypertension 07/27/2021    Abnormal finding on EKG 07/27/2021    Papillary serous endometrial adenocarcinoma ([facility] [address] 07/14/2021    PUD (peptic ulcer disease)     GERD (gastroesophageal reflux disease)     Arthritis     Lichen planus     Hip arthritis 04/04/2016    DJD (degenerative joint disease) of hip 01/04/2013     PMH:  Past Medical History:   Diagnosis Date    Arthritis     OSTEO HIP JULY.     GERD (gastroesophageal reflux disease)     Lichen planus     oral    PUD (peptic ulcer disease)       PSH:  Past Surgical History:   Procedure Laterality Date    COLONOSCOPY N/A 10/16/2017    COL[facility]an [facility] ENDOSCOPY    Naun Ledesmaia  10/16/2017         HX  SECTION      X2    HX CHOLECYSTECTOMY      HX DILATION AND CURETTAGE  2021    High-grade endometrial carcinoma, favor serous carcinoma    HX HEENT      EXTRACTION OF WISDOM TEETH X 4    HX ORTHOPAEDIC      right foot, bunionectomy    HX ORTHOPAEDIC      LEFTl hip arthroplasty    HX ORTHOPAEDIC      RIGHT hip arthroplasty    HX TUBAL LIGATION      HI COLONOSCOPY FLX DX W/COLLJ SPEC WHEN PFRMD  1/20/2012     x 2    UPPER GI ENDOSCOPY,BIOPSY  2016         UPPER GI ENDOSCOPY,DILATN W GUIDE  2016           Social History:  Social History     Tobacco Use    Smoking status: Former Smoker     Packs/day: 0.25     Years: 0.50     Pack years: 0.12     Quit date: 1967     Years since quittin.8    Smokeless tobacco: Never Used    Tobacco comment: brief   Substance Use Topics    Alcohol use: Not Currently     Comment: VERY RARELY       Family History:  Family History   Problem Relation Age of Onset    Cancer Maternal Grandmother 79        colon cancer    Hypertension Mother     Cancer Mother         pacreatic cancer    Cancer Paternal Aunt         ovarian cancer      Medications: (reviewed)  Current Outpatient Medications   Medication Sig    ondansetron (ZOFRAN ODT) 4 mg disintegrating tablet Take 1 Tablet by mouth every eight (8) hours as needed for Nausea. Indications: nausea and vomiting caused by cancer drugs    dexAMETHasone (DECADRON) 4 mg tablet Take 2 tablets with breakfast the day before chemo and for 2 days after chemo    cephALEXin (KEFLEX) 500 mg capsule Dental work (Patient not taking: Reported on 2021)    Nystop powder as needed.     acetaminophen (TYLENOL) 325 mg tablet Take 2 Tablets by mouth every six (6) hours as needed for Pain.    amLODIPine (NORVASC) 5 mg tablet Take 1 Tablet by mouth daily.  ibuprofen (MOTRIN) 600 mg tablet Take 1 Tablet by mouth every six (6) hours as needed for Pain. (Patient not taking: Reported on 9/7/2021)    methylPREDNISolone (MedroL) 4 mg tab Take 4 mg by mouth daily as needed. Prn for lichen planus    melatonin 1 mg tablet Take 1 mg by mouth nightly.  cyanocobalamin (VITAMIN B-12) 1,000 mcg tablet Take 1,000 mcg by mouth daily.  cholecalciferol, vitamin D3, 2,000 unit tab Take 2,000 Units by mouth daily.  BIOTIN PO Take 5,000 mcg by mouth daily.  pyridoxine (VITAMIN B-6) 100 mg tablet Take 100 mg by mouth daily. No current facility-administered medications for this visit. Allergies: (reviewed)  Allergies   Allergen Reactions    Codeine Nausea and Vomiting    Penicillins Rash     Rash & dizzy    Sulfa (Sulfonamide Antibiotics) Other (comments)     General redness all over skin    Ciprofloxacin Itching and Other (comments)     Extreme dizziness and rash    Other Medication Nausea and Vomiting     PRESCRIPTION STRENGTH ANTI INFLAMMATORY MEDS         OBJECTIVE:  *deferred today given video-conference visit for ongoing COVID-19 pandemic*   Physical Exam:  There were no vitals taken for this visit. General: Alert and oriented. No acute distress. Well-nourished  Gastrointestinal: soft, non-tender, non-distended, no masses or organomegaly. Well-healed port-site incisions. Musculoskeletal: normal gait. No joint tenderness, deformity or swelling. No muscular tenderness. Extremities: extremities normal, atraumatic, no cyanosis or edema. Pelvic: exam chaperoned by nurse. Normal appearing external genitalia. On speculum exam, the vaginal cuff is intact and healing well. On bimanual, the uterus and cervix are surgically absent. Vaginal cuff intact without defect. No evidence of masses or nodularity on bimanual exam. Deferred rectovaginal exam.   Neuro: Grossly intact.  Normal gait and movement. No acute deficit  Skin: No evidence of rashes or skin changes. IMPRESSION/PLAN:    Ms. Cassius Baker is a 70 y.o.  female who on 7/29/2021 underwent Robotic-assisted total laparoscopic hysterectomy, Bilateral salpingo-oophorectomy, Bilateral pelvic sentinel lymph node mapping and biopsy. 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 Negative washings. Stromal cervical involvement. Negative SLNs. 2mm out of 10mm myometrial invasion. Parametrial involvement of tumor. Problems:     Patient Active Problem List    Diagnosis Date Noted    Encounter for antineoplastic chemotherapy 10/13/2021    Essential hypertension 07/27/2021    Abnormal finding on EKG 07/27/2021    Papillary serous endometrial adenocarcinoma ([facility] [address] 07/14/2021    PUD (peptic ulcer disease)     GERD (gastroesophageal reflux disease)     Arthritis     Lichen planus     Hip arthritis 04/04/2016    DJD (degenerative joint disease) of hip 01/04/2013     Reviewed patient's course to date. Currently undergoing concurrent chemo-radiation with Dr. Tanja Richter. Tolerating cisplatin well. Presenting for consideration of cycle 2 today. Please see my prior notes for our full discussion regarding available treatment options. I again counseled the patient that our best time to beat this cancer is now at the start rather than during a recurrence. I believe the most aggressive form of treatment would be concurrent chemoradiation, and I believe this may be her best option given her cervical and parametrial tumor involvement. Plan to complete chemo-radiation, and then we will proceed with carboplatin AUC 5 + paclitaxel 175mg/m2 for 4 cycles. All questions and concerns were addressed with the patient and she is comfortable with the plan. An electronic signature was used to authenticate this note.      Renetta Acevedo MD    Pursuant to the emergency declaration unde the [address] and the National Emergencies Act, [facility] waiver authority and the [employer] General Act, this Virtual Visit was conducted, with patient's consent, to reduce the patient's risk of exposure to COVID-19 and provide continuity of care for an established patient. Patient identification was verified at the start of the visit: Yes    Services were provided through a video synchronous discussion virtually to substitute for in-person clinic visit. Patient was at her individual home, while the provider was in his/her respective office.     I spent at least 40 minutes with this established patient, and >50% of the time was spent counseling and/or coordinating care regarding chemotherapy, radiation    Agustin Somers MD

## 2021-10-14 ENCOUNTER — HOSPITAL ENCOUNTER (OUTPATIENT)
Dept: RADIATION THERAPY | Age: 71
Discharge: HOME | End: 2021-10-14
Payer: MEDICARE

## 2021-10-14 ENCOUNTER — HOSPITAL ENCOUNTER (OUTPATIENT)
Dept: INTERVENTIONAL RADIOLOGY/VASCULAR | Age: 71
Discharge: HOME | End: 2021-10-14
Attending: RADIOLOGY | Admitting: RADIOLOGY
Payer: MEDICARE

## 2021-10-14 VITALS
RESPIRATION RATE: 15 BRPM | HEART RATE: 52 BPM | BODY MASS INDEX: 32.1 KG/M2 | OXYGEN SATURATION: 94 % | WEIGHT: 170 LBS | HEIGHT: 61 IN | SYSTOLIC BLOOD PRESSURE: 137 MMHG | DIASTOLIC BLOOD PRESSURE: 57 MMHG | TEMPERATURE: 97.8 F

## 2021-10-14 DIAGNOSIS — C54.1 ENDOMETRIAL CANCER (HCC): ICD-10-CM

## 2021-10-14 DIAGNOSIS — C54.1 PAPILLARY SEROUS ENDOMETRIAL ADENOCARCINOMA (HCC): Primary | ICD-10-CM

## 2021-10-14 LAB
ALBUMIN SERPL-MCNC: 3 G/DL (ref 3.5–5)
ALBUMIN/GLOB SERPL: 1 {RATIO} (ref 1.1–2.2)
ALP SERPL-CCNC: 64 U/L (ref 45–117)
ALT SERPL-CCNC: 26 U/L (ref 12–78)
ANION GAP SERPL CALC-SCNC: 4 MMOL/L (ref 5–15)
AST SERPL-CCNC: 15 U/L (ref 15–37)
BASOPHILS # BLD: 0 K/UL (ref 0–0.1)
BASOPHILS NFR BLD: 1 % (ref 0–1)
BILIRUB SERPL-MCNC: 0.4 MG/DL (ref 0.2–1)
BUN SERPL-MCNC: 9 MG/DL (ref 6–20)
BUN/CREAT SERPL: 10 (ref 12–20)
CALCIUM SERPL-MCNC: 8.6 MG/DL (ref 8.5–10.1)
CHLORIDE SERPL-SCNC: 108 MMOL/L (ref 97–108)
CO2 SERPL-SCNC: 28 MMOL/L (ref 21–32)
CREAT SERPL-MCNC: 0.87 MG/DL (ref 0.55–1.02)
DIFFERENTIAL METHOD BLD: ABNORMAL
EOSINOPHIL # BLD: 0.1 K/UL (ref 0–0.4)
EOSINOPHIL NFR BLD: 4 % (ref 0–7)
ERYTHROCYTE [DISTWIDTH] IN BLOOD BY AUTOMATED COUNT: 14.6 % (ref 11.5–14.5)
GLOBULIN SER CALC-MCNC: 3.1 G/DL (ref 2–4)
GLUCOSE SERPL-MCNC: 101 MG/DL (ref 65–100)
HCT VFR BLD AUTO: 34.6 % (ref 35–47)
HGB BLD-MCNC: 11.4 G/DL (ref 11.5–16)
IMM GRANULOCYTES # BLD AUTO: 0 K/UL (ref 0–0.04)
IMM GRANULOCYTES NFR BLD AUTO: 1 % (ref 0–0.5)
LYMPHOCYTES # BLD: 0.4 K/UL (ref 0.8–3.5)
LYMPHOCYTES NFR BLD: 13 % (ref 12–49)
MAGNESIUM SERPL-MCNC: 1.9 MG/DL (ref 1.6–2.4)
MCH RBC QN AUTO: 30.2 PG (ref 26–34)
MCHC RBC AUTO-ENTMCNC: 32.9 G/DL (ref 30–36.5)
MCV RBC AUTO: 91.8 FL (ref 80–99)
MONOCYTES # BLD: 0.4 K/UL (ref 0–1)
MONOCYTES NFR BLD: 13 % (ref 5–13)
NEUTS SEG # BLD: 2.2 K/UL (ref 1.8–8)
NEUTS SEG NFR BLD: 68 % (ref 32–75)
NRBC # BLD: 0 K/UL (ref 0–0.01)
NRBC BLD-RTO: 0 PER 100 WBC
PLATELET # BLD AUTO: 202 K/UL (ref 150–400)
PMV BLD AUTO: 10 FL (ref 8.9–12.9)
POTASSIUM SERPL-SCNC: 4 MMOL/L (ref 3.5–5.1)
PROT SERPL-MCNC: 6.1 G/DL (ref 6.4–8.2)
RBC # BLD AUTO: 3.77 M/UL (ref 3.8–5.2)
RBC MORPH BLD: ABNORMAL
RBC MORPH BLD: ABNORMAL
SODIUM SERPL-SCNC: 140 MMOL/L (ref 136–145)
WBC # BLD AUTO: 3.1 K/UL (ref 3.6–11)

## 2021-10-14 PROCEDURE — 74011000250 HC RX REV CODE- 250: Performed by: RADIOLOGY

## 2021-10-14 PROCEDURE — 2709999900 HC NON-CHARGEABLE SUPPLY

## 2021-10-14 PROCEDURE — C1894 INTRO/SHEATH, NON-LASER: HCPCS

## 2021-10-14 PROCEDURE — 77030010507 HC ADH SKN DERMBND J&J -B

## 2021-10-14 PROCEDURE — 74011250637 HC RX REV CODE- 250/637: Performed by: RADIOLOGY

## 2021-10-14 PROCEDURE — 36561 INSERT TUNNELED CV CATH: CPT

## 2021-10-14 PROCEDURE — C1788 PORT, INDWELLING, IMP: HCPCS

## 2021-10-14 PROCEDURE — 74011250636 HC RX REV CODE- 250/636: Performed by: RADIOLOGY

## 2021-10-14 PROCEDURE — 77030031139 HC SUT VCRL2 J&J -A

## 2021-10-14 RX ORDER — FENTANYL CITRATE 50 UG/ML
200 INJECTION, SOLUTION INTRAMUSCULAR; INTRAVENOUS
Status: DISCONTINUED | OUTPATIENT
Start: 2021-10-14 | End: 2021-10-14 | Stop reason: ALTCHOICE

## 2021-10-14 RX ORDER — FLUMAZENIL 0.1 MG/ML
0.5 INJECTION INTRAVENOUS
Status: DISCONTINUED | OUTPATIENT
Start: 2021-10-14 | End: 2021-10-14 | Stop reason: ALTCHOICE

## 2021-10-14 RX ORDER — HEPARIN SODIUM (PORCINE) LOCK FLUSH IV SOLN 100 UNIT/ML 100 UNIT/ML
300 SOLUTION INTRAVENOUS
Status: DISCONTINUED | OUTPATIENT
Start: 2021-10-14 | End: 2021-10-14

## 2021-10-14 RX ORDER — MIDAZOLAM HYDROCHLORIDE 1 MG/ML
5 INJECTION, SOLUTION INTRAMUSCULAR; INTRAVENOUS
Status: DISCONTINUED | OUTPATIENT
Start: 2021-10-14 | End: 2021-10-14 | Stop reason: ALTCHOICE

## 2021-10-14 RX ORDER — DIPHENOXYLATE HYDROCHLORIDE AND ATROPINE SULFATE 2.5; .025 MG/1; MG/1
1 TABLET ORAL
Qty: 20 TABLET | Refills: 2 | Status: SHIPPED | OUTPATIENT
Start: 2021-10-14 | End: 2022-07-07 | Stop reason: CLARIF

## 2021-10-14 RX ORDER — LIDOCAINE HYDROCHLORIDE AND EPINEPHRINE 10; 10 MG/ML; UG/ML
10 INJECTION, SOLUTION INFILTRATION; PERINEURAL ONCE
Status: COMPLETED | OUTPATIENT
Start: 2021-10-14 | End: 2021-10-14

## 2021-10-14 RX ORDER — NALOXONE HYDROCHLORIDE 0.4 MG/ML
0.4 INJECTION, SOLUTION INTRAMUSCULAR; INTRAVENOUS; SUBCUTANEOUS
Status: DISCONTINUED | OUTPATIENT
Start: 2021-10-14 | End: 2021-10-14 | Stop reason: ALTCHOICE

## 2021-10-14 RX ORDER — HEPARIN 100 UNIT/ML
300 SYRINGE INTRAVENOUS
Status: COMPLETED | OUTPATIENT
Start: 2021-10-14 | End: 2021-10-14

## 2021-10-14 RX ORDER — LIDOCAINE HYDROCHLORIDE 20 MG/ML
20 INJECTION, SOLUTION INFILTRATION; PERINEURAL ONCE
Status: COMPLETED | OUTPATIENT
Start: 2021-10-14 | End: 2021-10-14

## 2021-10-14 RX ORDER — SODIUM CHLORIDE 9 MG/ML
25 INJECTION, SOLUTION INTRAVENOUS
Status: DISCONTINUED | OUTPATIENT
Start: 2021-10-14 | End: 2021-10-14 | Stop reason: ALTCHOICE

## 2021-10-14 RX ORDER — LOPERAMIDE HYDROCHLORIDE 2 MG/1
4 CAPSULE ORAL
Status: COMPLETED | OUTPATIENT
Start: 2021-10-14 | End: 2021-10-14

## 2021-10-14 RX ADMIN — LIDOCAINE HYDROCHLORIDE,EPINEPHRINE BITARTRATE 3 ML: 10; .01 INJECTION, SOLUTION INFILTRATION; PERINEURAL at 09:20

## 2021-10-14 RX ADMIN — LIDOCAINE HYDROCHLORIDE 10 ML: 20 INJECTION, SOLUTION INFILTRATION; PERINEURAL at 09:20

## 2021-10-14 RX ADMIN — CEFAZOLIN SODIUM 2 G: 1 INJECTION, POWDER, FOR SOLUTION INTRAMUSCULAR; INTRAVENOUS at 08:35

## 2021-10-14 RX ADMIN — MIDAZOLAM 1 MG: 1 INJECTION INTRAMUSCULAR; INTRAVENOUS at 09:06

## 2021-10-14 RX ADMIN — HEPARIN 500 UNITS: 100 SYRINGE at 09:02

## 2021-10-14 RX ADMIN — FENTANYL CITRATE 50 MCG: 50 INJECTION INTRAMUSCULAR; INTRAVENOUS at 09:14

## 2021-10-14 RX ADMIN — LOPERAMIDE HYDROCHLORIDE 4 MG: 2 CAPSULE ORAL at 08:22

## 2021-10-14 RX ADMIN — FENTANYL CITRATE 50 MCG: 50 INJECTION INTRAMUSCULAR; INTRAVENOUS at 09:00

## 2021-10-14 RX ADMIN — MIDAZOLAM 1 MG: 1 INJECTION INTRAMUSCULAR; INTRAVENOUS at 09:00

## 2021-10-14 RX ADMIN — MIDAZOLAM 1 MG: 1 INJECTION INTRAMUSCULAR; INTRAVENOUS at 09:16

## 2021-10-14 RX ADMIN — SODIUM CHLORIDE 25 ML/HR: 9 INJECTION, SOLUTION INTRAVENOUS at 08:34

## 2021-10-14 NOTE — PROGRESS NOTES
Discharge instructions explained to Sonali Santamaria, all questions answered, and patient verbalized understanding. Copy of discharge instructions given to patient. Patient taken out via wheelchair Patient discharged to the care of son, Jose Santana. At time of discharge pt in no acute distress, A/O x 4, denies any pain, and vital signs stable. PIV removed without incident, cath tip intact. Post proc site clean and dry, no evidence of bleeding or hematoma noted by this RN.

## 2021-10-14 NOTE — ROUTINE PROCESS
Pt arrives ambulatory to angio department accompanied by son for port placement procedure. All assessments completed and consent was reviewed. Education given was regarding procedure, conscious sedation, post-procedure care and  management/follow-up. Opportunity for questions was provided and all questions and concerns were addressed.

## 2021-10-14 NOTE — DISCHARGE INSTRUCTIONS
Implanted Port Discharge Instructions      General Instructions:   A port is like an implanted IV. They are usually ordered for patients who will be getting chemotherapy, but can also be used as an IV for long term antibiotics, large amounts of fluids, and/or blood products. Your blood can be drawn from your port for labs also. Those patients who do not have good veins find the ports convenient as they can get the IV they need with one stick. The port can be used long term, and the care is easy. The device is under the skin, and once the skin heals, care is minimal. All that is required is the nurse who accesses the port will need to flush it with heparinized saline after each use. Ports are usually placed in the chest wall, usually on the right side. But they can be place in the arms and in the abdomen. Home Care Instructions: If your port is in your arm, do not allow blood pressure or other IVs to be place in that arm. Do not allow bra straps or any clothing to rub the skin over the port. Do not bathe or swim until the skin has healed and if the port is accessed. Once it is healed, and when the port is not accessed, it is okay to bathe and swim. Restrict yourself to light activity for the first 5 days after getting the port put in, after that, resume normal activity slowly. You may resume your normal diet and medications. Surgical glue is placed over the site and this will fall off naturally as the skin underneath heals, do not pick at it. The port can be used immediately. Follow-Up Instructions: Please see your oncologist, or whatever physician ordered the port as he/she has requested of you. Call If: You should call your Physician and/or the Radiology Nurse if you notice redness, pus, swelling, or pain from the area of your incision. Call if you should develop a fever. The nurses who access your port will know to call your doctor if the port does not seem to be working properly.  You need to tell the nurses who use the port if you should have any pain or swelling at the site during an infusion. To Reach Us: Side effects of sedation medications and other medications used today have been reviewed. Notify us of nausea, itching, hives, dizziness, or anything else out of the ordinary. Should you experience any of these significant changes, please call [phone] between the hours of 7:30 am and 10 pm or [phone] after hours.  After hours, ask the  to page the [address] Technologist, and describe the problem to the technologist.

## 2021-10-14 NOTE — H&P
Interventional and Vascular Radiology History and Physical    Patient: Richard Allred 70 y.o. female       Chief Complaint: No chief complaint on file. History of Present Illness: chemotherapy     History:    Past Medical History:   Diagnosis Date    Arthritis     OSTEO HIP JULY.  GERD (gastroesophageal reflux disease)     Lichen planus     oral    PUD (peptic ulcer disease)      Family History   Problem Relation Age of Onset    Cancer Maternal Grandmother 79        colon cancer    Hypertension Mother     Cancer Mother         pacreatic cancer    Cancer Paternal Aunt         ovarian cancer     Social History     Socioeconomic History    Marital status:      Spouse name: Not on file    Number of children: Not on file    Years of education: Not on file    Highest education level: Not on file   Occupational History    Not on file   Tobacco Use    Smoking status: Former Smoker     Packs/day: 0.25     Years: 0.50     Pack years: 0.12     Quit date: 1967     Years since quittin.8    Smokeless tobacco: Never Used    Tobacco comment: brief   Substance and Sexual Activity    Alcohol use: Not Currently     Comment: VERY RARELY     Drug use: No    Sexual activity: Not on file   Other Topics Concern    Not on file   Social History Narrative    Not on file     Social Determinants of Health     Financial Resource Strain:     Difficulty of Paying Living Expenses:    Food Insecurity:     Worried About Running Out of Food in the Last Year:     Ran Out of Food in the Last Year:    Transportation Needs:     Lack of Transportation (Medical):      Lack of Transportation (Non-Medical):    Physical Activity:     Days of Exercise per Week:     Minutes of Exercise per Session:    Stress:     Feeling of Stress :    Social Connections:     Frequency of Communication with Friends and Family:     Frequency of Social Gatherings with Friends and Family:     Attends Buddhism Services:  Active Member of Clubs or Organizations:     Attends Club or Organization Meetings:     Marital Status:    Intimate Partner Violence:     Fear of Current or Ex-Partner:     Emotionally Abused:     Physically Abused:     Sexually Abused: Allergies: Allergies   Allergen Reactions    Codeine Nausea and Vomiting    Penicillins Rash     Rash & dizzy    Sulfa (Sulfonamide Antibiotics) Other (comments)     General redness all over skin    Ciprofloxacin Itching and Other (comments)     Extreme dizziness and rash    Other Medication Nausea and Vomiting     PRESCRIPTION STRENGTH ANTI INFLAMMATORY MEDS        Current Medications:  No current facility-administered medications for this encounter. Physical Exam:  Blood pressure (!) 137/57, pulse (!) 52, temperature 97.8 °F (36.6 °C), resp. rate 15, height 5' 1\" (1.549 m), weight 77.1 kg (170 lb), SpO2 94 %. LUNG: clear to auscultation bilaterally, HEART: regular rate and rhythm, S1, S2 normal, no murmur, click, rub or gallop      Alerts:    Hospital Problems  Date Reviewed: 9/10/2021    None          Laboratory:    No results for input(s): HGB, HCT, WBC, PLT, INR, BUN, CREA, K, CRCLT, HGBEXT, HCTEXT, PLTEXT, INREXT in the last 72 hours. No lab exists for component: PTT, PT      Plan of Care/Planned Procedure:  Risks, benefits, and alternatives reviewed with patient and she agrees to proceed with the procedure. Conscious sedation will be performed with IV fentanyl and versed.  Plan is for chest port placement       Soo Telles MD

## 2021-10-15 ENCOUNTER — HOSPITAL ENCOUNTER (OUTPATIENT)
Dept: RADIATION THERAPY | Age: 71
Discharge: HOME | End: 2021-10-15

## 2021-10-15 ENCOUNTER — TELEPHONE (OUTPATIENT)
Dept: GYNECOLOGY | Age: 71
End: 2021-10-15

## 2021-10-15 DIAGNOSIS — C54.1 PAPILLARY SEROUS ENDOMETRIAL ADENOCARCINOMA (HCC): Primary | ICD-10-CM

## 2021-10-15 RX ORDER — LIDOCAINE AND PRILOCAINE 25; 25 MG/G; MG/G
CREAM TOPICAL
Qty: 30 G | Refills: 0 | Status: SHIPPED | OUTPATIENT
Start: 2021-10-15

## 2021-10-18 ENCOUNTER — HOSPITAL ENCOUNTER (OUTPATIENT)
Dept: RADIATION THERAPY | Age: 71
Discharge: HOME | End: 2021-10-18

## 2021-10-18 ENCOUNTER — HOSPITAL ENCOUNTER (OUTPATIENT)
Dept: INFUSION THERAPY | Age: 71
Discharge: HOME | End: 2021-10-18
Payer: MEDICARE

## 2021-10-18 VITALS
HEART RATE: 53 BPM | TEMPERATURE: 96.9 F | HEIGHT: 60 IN | RESPIRATION RATE: 18 BRPM | DIASTOLIC BLOOD PRESSURE: 64 MMHG | SYSTOLIC BLOOD PRESSURE: 134 MMHG | WEIGHT: 170.4 LBS | BODY MASS INDEX: 33.45 KG/M2

## 2021-10-18 DIAGNOSIS — C54.1 PAPILLARY SEROUS ENDOMETRIAL ADENOCARCINOMA (HCC): Primary | ICD-10-CM

## 2021-10-18 LAB
CANCER AG125 SERPL-ACNC: 11 U/ML (ref 1.5–35)
PHOSPHATE SERPL-MCNC: 3.1 MG/DL (ref 2.6–4.7)

## 2021-10-18 PROCEDURE — 96415 CHEMO IV INFUSION ADDL HR: CPT

## 2021-10-18 PROCEDURE — 74011250636 HC RX REV CODE- 250/636: Performed by: PHYSICIAN ASSISTANT

## 2021-10-18 PROCEDURE — 96375 TX/PRO/DX INJ NEW DRUG ADDON: CPT

## 2021-10-18 PROCEDURE — 36415 COLL VENOUS BLD VENIPUNCTURE: CPT

## 2021-10-18 PROCEDURE — 84100 ASSAY OF PHOSPHORUS: CPT

## 2021-10-18 PROCEDURE — 99213 OFFICE O/P EST LOW 20 MIN: CPT | Performed by: PHYSICIAN ASSISTANT

## 2021-10-18 PROCEDURE — 96413 CHEMO IV INFUSION 1 HR: CPT

## 2021-10-18 PROCEDURE — 96367 TX/PROPH/DG ADDL SEQ IV INF: CPT

## 2021-10-18 PROCEDURE — 86304 IMMUNOASSAY TUMOR CA 125: CPT

## 2021-10-18 PROCEDURE — 96361 HYDRATE IV INFUSION ADD-ON: CPT

## 2021-10-18 PROCEDURE — 77030012965 HC NDL HUBR BBMI -A

## 2021-10-18 PROCEDURE — 74011000258 HC RX REV CODE- 258: Performed by: PHYSICIAN ASSISTANT

## 2021-10-18 RX ORDER — SODIUM CHLORIDE 9 MG/ML
10 INJECTION INTRAMUSCULAR; INTRAVENOUS; SUBCUTANEOUS AS NEEDED
Status: ACTIVE | OUTPATIENT
Start: 2021-10-18 | End: 2021-10-18

## 2021-10-18 RX ORDER — HEPARIN 100 UNIT/ML
300-500 SYRINGE INTRAVENOUS AS NEEDED
Status: ACTIVE | OUTPATIENT
Start: 2021-10-18 | End: 2021-10-18

## 2021-10-18 RX ORDER — SODIUM CHLORIDE 9 MG/ML
25 INJECTION, SOLUTION INTRAVENOUS CONTINUOUS
Status: DISPENSED | OUTPATIENT
Start: 2021-10-18 | End: 2021-10-18

## 2021-10-18 RX ORDER — PALONOSETRON 0.05 MG/ML
0.25 INJECTION, SOLUTION INTRAVENOUS ONCE
Status: COMPLETED | OUTPATIENT
Start: 2021-10-18 | End: 2021-10-18

## 2021-10-18 RX ORDER — SODIUM CHLORIDE 0.9 % (FLUSH) 0.9 %
10 SYRINGE (ML) INJECTION AS NEEDED
Status: DISPENSED | OUTPATIENT
Start: 2021-10-18 | End: 2021-10-18

## 2021-10-18 RX ADMIN — HEPARIN SODIUM (PORCINE) LOCK FLUSH IV SOLN 100 UNIT/ML 500 UNITS: 100 SOLUTION at 14:12

## 2021-10-18 RX ADMIN — SODIUM CHLORIDE 25 ML/HR: 900 INJECTION, SOLUTION INTRAVENOUS at 09:57

## 2021-10-18 RX ADMIN — SODIUM CHLORIDE 10 ML: 9 INJECTION INTRAMUSCULAR; INTRAVENOUS; SUBCUTANEOUS at 14:12

## 2021-10-18 RX ADMIN — POTASSIUM CHLORIDE: 2 INJECTION, SOLUTION, CONCENTRATE INTRAVENOUS at 10:30

## 2021-10-18 RX ADMIN — PALONOSETRON 0.25 MG: 0.05 INJECTION, SOLUTION INTRAVENOUS at 10:25

## 2021-10-18 RX ADMIN — CISPLATIN 91 MG: 1 INJECTION INTRAVENOUS at 11:46

## 2021-10-18 RX ADMIN — FOSAPREPITANT 150 MG: 150 INJECTION, POWDER, LYOPHILIZED, FOR SOLUTION INTRAVENOUS at 09:59

## 2021-10-18 RX ADMIN — DEXAMETHASONE SODIUM PHOSPHATE 12 MG: 4 INJECTION, SOLUTION INTRA-ARTICULAR; INTRALESIONAL; INTRAMUSCULAR; INTRAVENOUS; SOFT TISSUE at 10:27

## 2021-10-18 RX ADMIN — POTASSIUM CHLORIDE: 2 INJECTION, SOLUTION, CONCENTRATE INTRAVENOUS at 11:48

## 2021-10-18 NOTE — PROGRESS NOTES
[facility] Chemo Progress Note    Date: 2021    Name: Nancy Gtz    MRN: [id number]         : 1950    0849 Ms. Muhammad Arrived to [facility] [place] for C2 d1 Cisplatin ambulatory in stable condition. Assessment was completed, no acute issues at this time, no new complaints voiced. Port accessed with positive blood return. Labs drawn on Friday were reviewed    Chemotherapy Flowsheet 10/18/2021   Cycle C2 D1   Date 10/18/2021   Drug / Regimen Cisplatin   Pre Hydration given   Post Hydration given   Pre Meds given   Notes given         Patient denies SOB, fever, cough, general not feeling well. Patient denies recent exposure to someone who has tested positive for COVID-19. Patient denies having contact with anyone who has a pending COVID test.      Ms. Muhammad's vitals were reviewed. Patient Vitals for the past 12 hrs:   Temp Pulse Resp BP   10/18/21 1412 -- (!) 53 -- 134/64   10/18/21 0850 96.9 °F (36.1 °C) 60 18 (!) 169/74         Lab results were obtained and reviewed. Recent Results (from the past 12 hour(s))   PHOSPHORUS    Collection Time: 10/18/21  9:04 AM   Result Value Ref Range    Phosphorus 3.1 2.6 - 4.7 MG/DL   CANCER ANTIGEN 125    Collection Time: 10/18/21  9:04 AM   Result Value Ref Range    CA-125 11 1.5 - 35.0 U/mL       Pre-medications  were administered as ordered and chemotherapy was initiated.   Medications Administered     0.9% sodium chloride 1,000 mL with potassium chloride 10 mEq, magnesium sulfate 2 g infusion     Admin Date  10/18/2021 Action  New Bag Dose   Rate  1,000 mL/hr Route  IntraVENous Administered By  Lavell Perea RN           Admin Date  10/18/2021 Action  New Bag Dose   Rate  1,000 mL/hr Route  IntraVENous Administered By  Lavell Perea RN          0.9% sodium chloride infusion     Admin Date  10/18/2021 Action  New Bag Dose  25 mL/hr Rate  25 mL/hr Route  IntraVENous Administered By  Lavell Perea RN          0.9% sodium chloride injection 10 mL     Admin Date  10/18/2021 Action  Given Dose  10 mL Route  IntraVENous Administered By  Ladan Ballesteros RN          CISplatin (PLATINOL) 91 mg in 0.9% sodium chloride 500 mL, overfill volume 50 mL chemo infusion     Admin Date  10/18/2021 Action  New Bag Dose  91 mg Rate  320.5 mL/hr Route  IntraVENous Administered By  Ladan Ballesteros RN          dexamethasone (DECADRON) 12 mg in 0.9% sodium chloride 50 mL, overfill volume 5 mL IVPB     Admin Date  10/18/2021 Action  New Bag Dose  12 mg Rate  232 mL/hr Route  IntraVENous Administered By  Ladan Ballesteros RN          fosaprepitant (EMEND) 150 mg in 0.9% sodium chloride 150 mL IVPB     Admin Date  10/18/2021 Action  New Bag Dose  150 mg Rate  450 mL/hr Route  IntraVENous Administered By  Ladna Ballesteros RN          heparin (porcine) pf 300-500 Units     Admin Date  10/18/2021 Action  Given Dose  500 Units Route  InterCATHeter Administered By  Ladan Ballesteros RN          palonosetron HCl (ALOXI) injection 0.25 mg     Admin Date  10/18/2021 Action  Given Dose  0.25 mg Route  IntraVENous Administered By  Ladan Ballesteros RN                  2130 Patient tolerated treatment well. Port maintained positive blood return throughout treatment. Port flushed, heparinized and de accessed per protocol. Patient was discharged from [facility] in stable condition. Patient aware of next appointment.      Future Appointments   Date Time Provider Cresencio Mckenzie   10/18/2021  3:45 PM RAD [address] [facility]   10/19/2021  3:45 PM RAD [address] [facility]   10/20/2021  3:45 PM RAD [address] [facility]   10/21/2021  3:45 PM RAD [address] [facility]   10/22/2021  3:45 PM RAD [address] [facility]   3/17/2022 10:00 AM MD MARIA LUZ Montes [facility]         Jonna Gordon RN  October 18, 2021

## 2021-10-18 NOTE — PROGRESS NOTES
[address] [place] [address] Mathias Moritz [address], [address] [facility]  P [phone]  F [phone]      Patient ID:  Name:  Rex Langley  MRN:  [id number]  :  1950/71 y.o. Date:  10/18/2021      Coby Caballero MD: Dr. Didi Cintron  PCP: Sujit Luna MD     Primary Diagnosis: Endometrial cancer  Date of Diagnosis: 2021      Current Agent: Cisplatin/ XRT  Cycle: 4      HPI:  70 y.o. 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 She is s/p RATLHBSO on 21. Pathology confirmed high grade serous carcinoma, histopath w/ parametrial spread/cevical LVI. She is recommended adjuvant CCRT (CDDP/EBRT/brachytherapy) with adjuvant taxol/carboplatin to follow. OncTx History:  21 Hysteroscopy, EMB, myosure ([facility])  High-grade endometrial carcinoma, favor serous carcinoma      21 CT CAP  1. CT of the chest demonstrates no definite evidence of metastatic disease. No acute abnormality identified. 2. CT of the abdomen and pelvis demonstrates no definite evidence of metastatic disease. The pelvis is obscured by artifact related to total hip replacements. There is hepatic steatosis. 2 subcentimeter low densities in the liver are too small to characterize but most likely represent tiny cysts. There is an 8mm lymph node just to the left of the aortic bifurcation which is within normal limits. 21 RA TLHBSO, SLNB       1.  Right pelvic sentinel lymph node, biopsy:      One lymph node, negative for carcinoma, levels and cytokeratin stain   examined (0/1)   2.  Left pelvic sentinel lymph node #1, biopsy:      One lymph node, negative for carcinoma, levels and cytokeratin stain   examined (0/1)   3.  Left pelvic sentinel lymph node, biopsy:      One lymph node, negative for carcinoma, levels and cytokeratin stain   examined (0/1)   4.  Uterus, cervix, bilateral fallopian tubes and ovaries; simple   hysterectomy, BSO:        Endometrium: Uterine serous carcinoma, see synoptic report        Cervix: Serous carcinoma focally involves endocervical stroma, predominantly as lymphovascular invasion        Bilateral fallopian tubes: No pathologic diagnosis     ENDOMETRIUM    SPECIMEN       Procedure: Total hysterectomy and bilateral salpingo-oophorectomy       Specimen Integrity: Intact    TUMOR       Histologic Type: Serous carcinoma       Myometrial Invasion: Present           Depth of Myometrial Invasion (Millimeters): 2 mm           Myometrial Thickness (Millimeters): 10 mm           Percentage of Myometrial Invasion: 20%       Uterine Serosa Involvement: Not identified       Lower Uterine Segment Involvement: Present, myoinvasive       Cervical Stromal Involvement: Present       Other Tissue / Organ Involvement: Not identified       Peritoneal Ascitic Fluid: Atypical, favor benign reactive process       Lymphovascular Invasion: Present    MARGINS       Margins: Parametrium and focal paracervix involved by tumor           Parametrial and focal Paracervical Margin: Involved by carcinoma    LYMPH NODES       Lymph Node Status: All lymph nodes negative for tumor cells           Total Number of Pelvic Nodes Examined: 3   Comment  Immunohistochemical stains with appropriate controls show tumor positive for P16, P53 with Ki-67 proliferation index of approximately 50%.  These findings support diagnosis of endometrial serous carcinoma. 21 Cisplatin/XRT EBRT/cuff brachytherapy               SUBJECTIVE:  Don Villagomez presents for chemotherapy. Continues with pelvic RT. Noting loose stools/GI cramping, dysgeusia, mild urge incontinence. No pain, no vaginal bleeding. She lives with one adult son. Another son in [place] who is very close to her. Her  is  from cancer. She is on temporary disability, previously working in [employer] full time from home. She remains independent in ADLs and otherwise healthy.       ROS  Constitutional: no weight loss, fever, night sweats  Respiratory: no cough, shortness of breath, or wheezing  Cardiovascular: no chest pain or dyspnea on exertion  Heme: No abnormal bleeding  Gastrointestinal: no abdominal pain, change in bowel habits, or black or bloody stools  Genito-Urinary: no dysuria, trouble voiding, or hematuria  Musculoskeletal: negative for - gait disturbance or muscular weakness  Neurological: negative for - confusion, headaches, memory loss or numbness/tingling  Derm: negative  Psych: negative for depression. Admits anxiety over her diagnosis and doing much research. OBJECTIVE:  Physical Exam  Visit Vitals  BP (!) 169/74   Pulse 60   Temp 96.9 °F (36.1 °C)   Resp 18   Ht 5' 0.05\" (1.525 m)   Wt 170 lb 6.4 oz (77.3 kg)   BMI 33.22 kg/m²        General:  alert, cooperative, no distress       HEENT: without pallor, sclera without jaundice, oral mucosa without lesions,      Cardiac:  Regular rate and rhythm        Lungs:  nonlabored          Port:  n/a. PIV  Abdomen:  soft, nondistended, nontender, well healed       Lymph:  no lymphadenopathy   Extremity: no edema    Lab Results   Component Value Date/Time    WBC 3.1 (L) 10/14/2021 12:06 PM    HGB 11.4 (L) 10/14/2021 12:06 PM    HCT 34.6 (L) 10/14/2021 12:06 PM    PLATELET 759 67/46/8792 12:06 PM    MCV 91.8 10/14/2021 12:06 PM     Lab Results   Component Value Date/Time    ABS. NEUTROPHILS 2.2 10/14/2021 12:06 PM     Lab Results   Component Value Date/Time    Sodium 140 10/14/2021 12:06 PM    Potassium 4.0 10/14/2021 12:06 PM    Chloride 108 10/14/2021 12:06 PM    CO2 28 10/14/2021 12:06 PM    Anion gap 4 (L) 10/14/2021 12:06 PM    Glucose 101 (H) 10/14/2021 12:06 PM    BUN 9 10/14/2021 12:06 PM    Creatinine 0.87 10/14/2021 12:06 PM    BUN/Creatinine ratio 10 (L) 10/14/2021 12:06 PM    GFR est AA >60 10/14/2021 12:06 PM    GFR est non-AA >60 10/14/2021 12:06 PM    Calcium 8.6 10/14/2021 12:06 PM    Bilirubin, total 0.4 10/14/2021 12:06 PM    Alk. 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  GERD (gastroesophageal reflux disease)     Lichen planus     oral    PUD (peptic ulcer disease)      Prior to Admission medications    Medication Sig Start Date End Date Taking? Authorizing Provider   lidocaine-prilocaine (EMLA) topical cream APPLY SMALL AMOUNT OVER PORT AREA AND COVER WITH BAND AID ONE HOUR BEFORE CHEMO 10/15/21   Cyndy Plunkett PA-C   diphenoxylate-atropine (LomotiL) 2.5-0.025 mg per tablet Take 1 Tablet by mouth four (4) times daily as needed for Diarrhea. Max Daily Amount: 4 Tablets. 10/14/21   Deanna Reeder MD   ondansetron (ZOFRAN ODT) 4 mg disintegrating tablet Take 1 Tablet by mouth every eight (8) hours as needed for Nausea. Indications: nausea and vomiting caused by cancer drugs 9/7/21   Mary Bey MD   dexAMETHasone (DECADRON) 4 mg tablet Take 2 tablets with breakfast the day before chemo and for 2 days after chemo 9/7/21   Mary Bey MD   cephALEXin [facility]) 500 mg capsule Dental work  Patient not taking: Reported on 9/7/2021 8/23/21   Provider, Historical   Nystop powder as needed.  8/22/21   Provider, Historical   acetaminophen (TYLENOL) 325 mg tablet Take 2 Tablets by mouth every six (6) hours as needed for Pain. 7/30/21   Salvador Lozano PA-C   amLODIPine (NORVASC) 5 mg tablet Take 1 Tablet by mouth daily. 7/27/21   Yohana Keene MD   ibuprofen (MOTRIN) 600 mg tablet Take 1 Tablet by mouth every six (6) hours as needed for Pain. Patient not taking: Reported on 9/7/2021 7/7/21   Beatriz Sparks MD   methylPREDNISolone (MedroL) 4 mg tab Take 4 mg by mouth daily as needed. Prn for lichen planus  Patient not taking: Reported on 10/14/2021    Provider, Historical   melatonin 1 mg tablet Take 1 mg by mouth nightly. Provider, Historical   cyanocobalamin (VITAMIN B-12) 1,000 mcg tablet Take 1,000 mcg by mouth daily. Provider, Historical   cholecalciferol, vitamin D3, 2,000 unit tab Take 2,000 Units by mouth daily. Provider, Historical   BIOTIN PO Take 5,000 mcg by mouth daily. Provider, Historical   pyridoxine (VITAMIN B-6) 100 mg tablet Take 100 mg by mouth daily. Provider, Historical     Allergies   Allergen Reactions    Codeine Nausea and Vomiting    Penicillins Rash     Rash & dizzy    Sulfa (Sulfonamide Antibiotics) Other (comments)     General redness all over skin    Ciprofloxacin Itching and Other (comments)     Extreme dizziness and rash    Other Medication Nausea and Vomiting     PRESCRIPTION STRENGTH ANTI INFLAMMATORY MEDS      Family History   Problem Relation Age of Onset    Cancer Maternal Grandmother 79        colon cancer    Hypertension Mother     Cancer Mother         pacreatic cancer    Cancer Paternal Aunt         ovarian cancer       CT Results (most recent):  Results from Hospital Encounter encounter on 07/16/21    CT CHEST W CONT    Narrative  EXAM:  CT ABD PELV W CONT, CT CHEST W CONT    INDICATION: Endometrial cancer    COMPARISON:    CONTRAST:  100 mL of Isovue-370.     TECHNIQUE:  Following the uneventful intravenous administration of contrast, thin axial  images were obtained through the chest, abdomen and pelvis. Coronal and sagittal  reconstructions were generated. Oral contrast was administered. CT dose  reduction was achieved through use of a standardized protocol tailored for this  examination and automatic exposure control for dose modulation. FINDINGS:    THYROID: No nodule. MEDIASTINUM: No mass or lymphadenopathy. STEPHANIE: No mass or lymphadenopathy. THORACIC AORTA: No dissection or aneurysm. MAIN PULMONARY ARTERY: Normal in caliber. TRACHEA/BRONCHI: Patent. ESOPHAGUS: No wall thickening or dilatation. HEART: Normal in size. PLEURA: No effusion or pneumothorax. LUNGS: No nodule, mass, or airspace disease. There is minor atelectasis/scarring  right upper lobe  LIVER: No mass or biliary dilatation. There is hepatic steatosis. There is a 5  mm low-density in segment 4A and segment 5 to small to fully characterize but  most likely tiny cysts. GALLBLADDER: Patient status post cholecystectomy  SPLEEN: No mass. PANCREAS: No mass or ductal dilatation. ADRENALS: Unremarkable. KIDNEYS: No mass, calculus, or hydronephrosis. There is a retroaortic left renal  vein  STOMACH: Unremarkable. SMALL BOWEL: No dilatation or wall thickening. COLON: No dilatation or wall thickening. APPENDIX: Unremarkable. PERITONEUM: No ascites or pneumoperitoneum. RETROPERITONEUM: No lymphadenopathy or aortic aneurysm. There is an 8 mm lymph  node at the aortic bifurcation on the left. There is mild stenosis origin of the  SMA  REPRODUCTIVE ORGANS: Uterus and pelvis are obscured by artifact related to  bilateral total hip replacements. URINARY BLADDER: No mass or calculus. BONES: No destructive bone lesion. There is a right shoulder effusion  ADDITIONAL COMMENTS: N/A    Impression  1. CT of the chest demonstrates no definite evidence of metastatic disease. No  acute abnormality identified. 2. CT of the abdomen and pelvis demonstrates no definite evidence of metastatic  disease.   The pelvis is obscured by artifact related to total hip replacements. There is hepatic steatosis. 2 subcentimeter low densities in the liver are too  small to characterize but most likely represent tiny cysts. There is an 8mm lymph node just to the left of the aortic bifurcation which is  within normal limits. IMPRESSION/PLAN:  70 y.o. with a G3 UPSC s/p RA TLHBSO 21. Rx adjuvant CCRT. ECO      Labs/chart reviewed. Proceed with chemotherapy today. Taxol/carboplatin next month. Radiation side effects managed by rad onc  Elevated BP - Stopped her Rx'd amlodipine. To d/w PCP. Needs genetics. Somatic requested  Very attentive sons, friends who check in. No financial toxicity. She has experience with hospice/pall care with her .   Survivorship    Julieta Griffin PA-C  Gyn Onc

## 2021-10-19 ENCOUNTER — TELEPHONE (OUTPATIENT)
Dept: GYNECOLOGY | Age: 71
End: 2021-10-19

## 2021-10-19 NOTE — TELEPHONE ENCOUNTER
Message left on am to call me back to give her radiation treatment schedule, so we can proceed to schedule Taxol/Carboplatin.

## 2021-10-20 ENCOUNTER — TELEPHONE (OUTPATIENT)
Dept: GYNECOLOGY | Age: 71
End: 2021-10-20

## 2021-10-20 ENCOUNTER — HOSPITAL ENCOUNTER (OUTPATIENT)
Dept: RADIATION THERAPY | Age: 71
Discharge: HOME | End: 2021-10-20

## 2021-10-20 DIAGNOSIS — C54.1 PAPILLARY SEROUS ENDOMETRIAL ADENOCARCINOMA (HCC): Primary | ICD-10-CM

## 2021-10-21 ENCOUNTER — HOSPITAL ENCOUNTER (OUTPATIENT)
Dept: RADIATION THERAPY | Age: 71
Discharge: HOME | End: 2021-10-21

## 2021-10-21 RX ORDER — ACETAMINOPHEN 325 MG/1
650 TABLET ORAL AS NEEDED
Status: CANCELLED
Start: 2021-11-18

## 2021-10-21 RX ORDER — DIPHENHYDRAMINE HYDROCHLORIDE 50 MG/ML
25 INJECTION, SOLUTION INTRAMUSCULAR; INTRAVENOUS AS NEEDED
Status: CANCELLED
Start: 2021-11-18

## 2021-10-21 RX ORDER — LORAZEPAM 2 MG/ML
0.5 INJECTION INTRAMUSCULAR
Status: CANCELLED
Start: 2021-11-18

## 2021-10-21 RX ORDER — ALBUTEROL SULFATE 0.83 MG/ML
2.5 SOLUTION RESPIRATORY (INHALATION) AS NEEDED
Status: CANCELLED
Start: 2021-11-18

## 2021-10-21 RX ORDER — HYDROCORTISONE SODIUM SUCCINATE 100 MG/2ML
100 INJECTION, POWDER, FOR SOLUTION INTRAMUSCULAR; INTRAVENOUS AS NEEDED
Status: CANCELLED | OUTPATIENT
Start: 2021-11-18

## 2021-10-21 RX ORDER — DIPHENHYDRAMINE HYDROCHLORIDE 50 MG/ML
50 INJECTION, SOLUTION INTRAMUSCULAR; INTRAVENOUS AS NEEDED
Status: CANCELLED
Start: 2021-11-18

## 2021-10-21 RX ORDER — SODIUM CHLORIDE 9 MG/ML
25 INJECTION, SOLUTION INTRAVENOUS CONTINUOUS
Status: CANCELLED | OUTPATIENT
Start: 2021-11-18 | End: 2021-11-18

## 2021-10-21 RX ORDER — SODIUM CHLORIDE 0.9 % (FLUSH) 0.9 %
10 SYRINGE (ML) INJECTION AS NEEDED
Status: CANCELLED | OUTPATIENT
Start: 2021-11-18 | End: 2021-11-18

## 2021-10-21 RX ORDER — ONDANSETRON 2 MG/ML
8 INJECTION INTRAMUSCULAR; INTRAVENOUS AS NEEDED
Status: CANCELLED | OUTPATIENT
Start: 2021-11-18

## 2021-10-21 RX ORDER — HEPARIN 100 UNIT/ML
300-500 SYRINGE INTRAVENOUS AS NEEDED
Status: CANCELLED
Start: 2021-11-18

## 2021-10-21 RX ORDER — ONDANSETRON 2 MG/ML
8 INJECTION INTRAMUSCULAR; INTRAVENOUS ONCE
Status: CANCELLED | OUTPATIENT
Start: 2021-11-18 | End: 2021-11-18

## 2021-10-21 RX ORDER — DIPHENHYDRAMINE HYDROCHLORIDE 50 MG/ML
50 INJECTION, SOLUTION INTRAMUSCULAR; INTRAVENOUS ONCE
Status: CANCELLED
Start: 2021-11-18 | End: 2021-11-18

## 2021-10-21 RX ORDER — EPINEPHRINE 1 MG/ML
0.3 INJECTION, SOLUTION, CONCENTRATE INTRAVENOUS AS NEEDED
Status: CANCELLED | OUTPATIENT
Start: 2021-11-18

## 2021-10-21 RX ORDER — SODIUM CHLORIDE 9 MG/ML
10 INJECTION INTRAMUSCULAR; INTRAVENOUS; SUBCUTANEOUS AS NEEDED
Status: CANCELLED | OUTPATIENT
Start: 2021-11-18

## 2021-10-22 ENCOUNTER — HOSPITAL ENCOUNTER (OUTPATIENT)
Dept: RADIATION THERAPY | Age: 71
Discharge: HOME | End: 2021-10-22

## 2021-10-25 ENCOUNTER — HOSPITAL ENCOUNTER (OUTPATIENT)
Dept: RADIATION THERAPY | Age: 71
Discharge: HOME | End: 2021-10-25

## 2021-10-26 ENCOUNTER — TELEPHONE (OUTPATIENT)
Dept: GYNECOLOGY | Age: 71
End: 2021-10-26

## 2021-10-27 NOTE — TELEPHONE ENCOUNTER
Patient wants to extend her leave through chemo, not working at all , Dr. Niki Barros has cleared her through 11/25 but she needs a note for janet to states she is not working during her chemo cycles, claim # [id number] and note to be faxed to [phone]

## 2021-11-11 DIAGNOSIS — C54.1 PAPILLARY SEROUS ENDOMETRIAL ADENOCARCINOMA (HCC): Primary | ICD-10-CM

## 2021-11-11 NOTE — TELEPHONE ENCOUNTER
Requested Prescriptions     Signed Prescriptions Disp Refills    CRANIAL PROSTHESIS misc 1 Each 1     Sig: Cranial Prosthesis  Diagnosis code:   C54.1  Cpt code:        Authorizing Provider: Noemy Newton     Ordering User: Phoenix Walton     Rx mailed to pt per her request for a wig, and vo Dr. Geraldine Liz.

## 2021-11-15 ENCOUNTER — CLINICAL SUPPORT (OUTPATIENT)
Dept: GYNECOLOGY | Age: 71
End: 2021-11-15
Payer: MEDICARE

## 2021-11-15 DIAGNOSIS — C54.1 ENDOMETRIAL CANCER (HCC): Primary | ICD-10-CM

## 2021-11-15 DIAGNOSIS — C54.1 ENDOMETRIAL CANCER (HCC): ICD-10-CM

## 2021-11-15 PROCEDURE — 99211 OFF/OP EST MAY X REQ PHY/QHP: CPT | Performed by: OBSTETRICS & GYNECOLOGY

## 2021-11-15 RX ORDER — ONDANSETRON 4 MG/1
4 TABLET, ORALLY DISINTEGRATING ORAL
Qty: 30 TABLET | Refills: 2 | Status: SHIPPED | OUTPATIENT
Start: 2021-11-15 | End: 2022-01-18 | Stop reason: SDUPTHER

## 2021-11-15 NOTE — PROGRESS NOTES
Per Dr. Pricila Holder patient was given written materials with review for Taxol and Carboplatin. Side effects with management recomendations:     1. Nausea and vomiting ( Zofran ODT, eating small frequent meals, hydration, and to call office if unable to eat or drink with vomiting X4 over 24 hours), Eating Hints before during and after Chemotherapy given,     2. Hair loss ( ACS book with wigs/hair accessories and a 2 page list of names with address and phone numbers of where she may purchase wigs given )    3. Lab abnormalities: Neutropenia (to call office with chills and fever of 100.4), use good handwashing with soap and water frequently, stay away from anyone sick, and stay out of crowds,   4. Diarrhea/Constipation:  Advise she may use Imodium as directed, but if continues with 4 watery stools to call the office, for constipation she can use Miralax  Prescription for Zofran ODT 4 mg,escribed to pts pharmacy. All questions answered. Office contact phone number given. Patient given schedule for MD/4 cycles chemotherapy. Consent signed and scanned into CC. Chemotherapy orders set up and sent to Dr. Pricila Holder for signature.

## 2021-11-15 NOTE — TELEPHONE ENCOUNTER
Requested Prescriptions     Signed Prescriptions Disp Refills    ondansetron (ZOFRAN ODT) 4 mg disintegrating tablet 30 Tablet 2     Sig: Take 1 Tablet by mouth every eight (8) hours as needed for Nausea. Indications: nausea and vomiting caused by cancer drugs     Authorizing Provider: Werner Em     Ordering User: Ryanne Daniel     Rx escribed to pharmacy per isabel Magdaleno to be used during chemotherapy.

## 2021-11-18 ENCOUNTER — HOSPITAL ENCOUNTER (OUTPATIENT)
Dept: INFUSION THERAPY | Age: 71
Discharge: HOME | End: 2021-11-18
Payer: MEDICARE

## 2021-11-18 VITALS
HEIGHT: 61 IN | OXYGEN SATURATION: 97 % | BODY MASS INDEX: 31 KG/M2 | WEIGHT: 164.2 LBS | HEART RATE: 62 BPM | TEMPERATURE: 98.3 F | DIASTOLIC BLOOD PRESSURE: 78 MMHG | RESPIRATION RATE: 17 BRPM | SYSTOLIC BLOOD PRESSURE: 164 MMHG

## 2021-11-18 DIAGNOSIS — C54.1 PAPILLARY SEROUS ENDOMETRIAL ADENOCARCINOMA (HCC): Primary | ICD-10-CM

## 2021-11-18 PROCEDURE — 96375 TX/PRO/DX INJ NEW DRUG ADDON: CPT

## 2021-11-18 PROCEDURE — 96417 CHEMO IV INFUS EACH ADDL SEQ: CPT

## 2021-11-18 PROCEDURE — 74011250636 HC RX REV CODE- 250/636: Performed by: OBSTETRICS & GYNECOLOGY

## 2021-11-18 PROCEDURE — 96367 TX/PROPH/DG ADDL SEQ IV INF: CPT

## 2021-11-18 PROCEDURE — 96413 CHEMO IV INFUSION 1 HR: CPT

## 2021-11-18 PROCEDURE — 74011000258 HC RX REV CODE- 258: Performed by: OBSTETRICS & GYNECOLOGY

## 2021-11-18 PROCEDURE — 77030012965 HC NDL HUBR BBMI -A

## 2021-11-18 PROCEDURE — 96415 CHEMO IV INFUSION ADDL HR: CPT

## 2021-11-18 PROCEDURE — 74011000250 HC RX REV CODE- 250: Performed by: OBSTETRICS & GYNECOLOGY

## 2021-11-18 RX ORDER — SODIUM CHLORIDE 0.9 % (FLUSH) 0.9 %
10 SYRINGE (ML) INJECTION AS NEEDED
Status: DISPENSED | OUTPATIENT
Start: 2021-11-18 | End: 2021-11-18

## 2021-11-18 RX ORDER — SODIUM CHLORIDE 9 MG/ML
25 INJECTION, SOLUTION INTRAVENOUS CONTINUOUS
Status: DISPENSED | OUTPATIENT
Start: 2021-11-18 | End: 2021-11-18

## 2021-11-18 RX ORDER — ONDANSETRON 2 MG/ML
8 INJECTION INTRAMUSCULAR; INTRAVENOUS ONCE
Status: COMPLETED | OUTPATIENT
Start: 2021-11-18 | End: 2021-11-18

## 2021-11-18 RX ORDER — HEPARIN 100 UNIT/ML
300-500 SYRINGE INTRAVENOUS AS NEEDED
Status: ACTIVE | OUTPATIENT
Start: 2021-11-18 | End: 2021-11-18

## 2021-11-18 RX ORDER — DIPHENHYDRAMINE HYDROCHLORIDE 50 MG/ML
50 INJECTION, SOLUTION INTRAMUSCULAR; INTRAVENOUS ONCE
Status: COMPLETED | OUTPATIENT
Start: 2021-11-18 | End: 2021-11-18

## 2021-11-18 RX ADMIN — Medication 10 ML: at 14:22

## 2021-11-18 RX ADMIN — CARBOPLATIN 412 MG: 10 INJECTION INTRAVENOUS at 13:33

## 2021-11-18 RX ADMIN — FAMOTIDINE 20 MG: 10 INJECTION INTRAVENOUS at 09:26

## 2021-11-18 RX ADMIN — SODIUM CHLORIDE 25 ML/HR: 900 INJECTION, SOLUTION INTRAVENOUS at 09:18

## 2021-11-18 RX ADMIN — SODIUM CHLORIDE 150 MG: 9 INJECTION, SOLUTION INTRAVENOUS at 09:31

## 2021-11-18 RX ADMIN — PACLITAXEL 317 MG: 6 INJECTION, SOLUTION INTRAVENOUS at 10:16

## 2021-11-18 RX ADMIN — DEXAMETHASONE SODIUM PHOSPHATE 12 MG: 4 INJECTION, SOLUTION INTRA-ARTICULAR; INTRALESIONAL; INTRAMUSCULAR; INTRAVENOUS; SOFT TISSUE at 09:29

## 2021-11-18 RX ADMIN — HEPARIN 500 UNITS: 100 SYRINGE at 14:23

## 2021-11-18 RX ADMIN — DIPHENHYDRAMINE HYDROCHLORIDE 50 MG: 50 INJECTION, SOLUTION INTRAMUSCULAR; INTRAVENOUS at 09:23

## 2021-11-18 RX ADMIN — ONDANSETRON 8 MG: 2 INJECTION INTRAMUSCULAR; INTRAVENOUS at 09:20

## 2021-11-18 NOTE — DISCHARGE INSTRUCTIONS
Patient Education   OUTPATIENT INFUSION CENTER    DISCHARGE INSTRUCTIONS FOR:  CHEMOTHERAPY / BIOTHERAPY    Chemotherapy has the potential to cause many side effects. The following are general precautions that chemo patients should take:    1. Practice good hand washing:   * Use soap and water for at least 15 seconds, covering all areas of hands. * Always wash hands before eating. * Wash hands after contact with public surfaces such as door knobs and         handles, shopping carts, telephones and elevator buttons. 2. Get plenty of rest:    * You will likely experience fatigue three to five days following your treatment. It may last as long as seven days. 3. Drink plenty of fluids. Water is best.    4. Eat a well balanced diet:  * Small frequent meals may help if you are having trouble with nausea or your  appetite. Some people also do well with nutritional supplements. 5. Pace yourself with daily activities:   * Take frequent breaks and ask for help if you need it. 6. Exercise is very important:  * It will increase circulation and will help the fatigue. Do what you can each day. 7. If your regimen results in hair loss:  *  You will likely notice effects between two and three weeks following your first treatment. Some lose all hair while others only experience thinning. 8. Practice good oral hygiene:   *  Notify your M.D. immediately if any mouth sores or discomfort develop. 9. Protect yourself from the sun. Signs/Symptoms of an allergic reaction and/or some side effects may require immediate medical attention. Notify your physician if you develop one or more of the following:     Temperature of 100.5 degrees or greater;   Skin redness, itching, swelling, blistering, weeping, crusting, rash, or hives;    Wheezing, chest tightness, cough, or shortness of breath;   Swelling of the face, eyelids, lips, tongue, or throat;  Severe, persistent headache;  Stuffy nose, runny nose, sneezing;   Red (bloodshot), itchy, swollen, or watery eyes;   Stomach  pain, nausea, vomiting, diarrhea, or bloody stools;  Mouth sores        Your physician should also be aware of the following symptoms:    Persistent and unresolved nausea and/or vomiting;   Persistent and unresolved diarrhea or constipation;   Numbness/tingling/burning of the extremities, including the fingers and toes; Bleeding or unexplained bruising; Unexplained redness/swelling/pain in the arms or legs; Shortness of breath or fatigue that worsens;   Pain with urination or blood in the urine; Chills;  Cough, especially a productive cough;  Mouth sores or a white coating of the tongue; Redness, swelling, pain or drainage at the port-a-cath or IV site; Increased feeling of bloating or water retention; Excessive weight loss or gain;  Ringing in the ears; Difficulty swallowing;  Dizziness, vertigo, lightheadedness or fainting. Terrea Flicker Signature: ____________________________ 11/18/2021  Rachel Mensah RN     Patient Education   Paclitaxel (By injection)   Paclitaxel (uxc-ne-YAY-el)  Treats lung, ovary, and breast cancer and Kaposi sarcoma. Brand Name(s): PremierPro Rx PACLitaxel   There may be other brand names for this medicine. When This Medicine Should Not Be Used: This medicine is not right for everyone. You should not receive it if you had an allergic reaction to paclitaxel or polyoxyl 35 castor oil or if you are pregnant. How to Use This Medicine:   Injectable  Medicines used to treat cancer are very strong and can have many side effects. Before receiving this medicine, make sure you understand all the risks and benefits. It is important for you to work closely with your doctor during your treatment. You will receive this medicine while you are in a hospital or cancer treatment center. A nurse or other trained health professional will give you this medicine.   Your doctor will prescribe your dose and schedule. This medicine is given through a needle placed in a vein. You may also receive other medicines to help prevent allergic reactions and nausea or vomiting from paclitaxel. Read and follow the patient instructions that come with this medicine. Talk to your doctor or pharmacist if you have any questions. Missed dose: This medicine needs to be given on a fixed schedule. If you miss a dose, call your doctor, home health caregiver, or treatment clinic for instructions. Drugs and Foods to Avoid:   Ask your doctor or pharmacist before using any other medicine, including over-the-counter medicines, vitamins, and herbal products. Some medicines and foods can affect the way this drug works. Tell your doctor if you are also using atazanavir, buspirone, carbamazepine, clarithromycin, eletriptan, felodipine, gemfibrozil, indinavir, itraconazole, ketoconazole, lovastatin, midazolam, nefazodone, nelfinavir, repaglinide, rifampin, ritonavir, rosiglitazone, saquinavir, sildenafil, simvastatin, telithromycin, or triazolam.  Warnings While Using This Medicine: It is not safe to take this medicine during pregnancy. It could harm an unborn baby. Tell your doctor right away if you become pregnant. Tell your doctor if you are breastfeeding or if you have liver disease, heart disease, or heart rhythm problems. Also tell your doctor if you had an allergic reaction to cyclosporin or teniposide. This medicine may cause the following problems:  Severe allergic reaction  Heart rhythm problems  High blood pressure  Severe reaction where the needle is placed (may happen up to 10 days later)  This medicine may make you bleed, bruise, or get infections more easily. Take precautions to prevent illness and injury. Wash your hands often. Cancer medicine can cause nausea or vomiting, sometimes even after you receive medicine to prevent these effects.  Ask your doctor or nurse about other ways to control any nausea or vomiting that might happen. Your doctor will do lab tests at regular visits to check on the effects of this medicine. Keep all appointments. Possible Side Effects While Using This Medicine:   Call your doctor right away if you notice any of these side effects: Allergic reaction: Itching or hives, swelling in your face or hands, swelling or tingling in your mouth or throat, chest tightness, trouble breathing  Fast, slow, or uneven heartbeat  Fever, chills, cough, sore throat, and body aches  Lightheadedness, dizziness, or fainting  Numbness, tingling, or burning pain in your hands, arms, legs, or feet  Severe redness, pain, swelling, or peeling where the needle is placed  Unusual bleeding, bruising, or weakness  If you notice these less serious side effects, talk with your doctor:   Hair loss  Mild nausea, vomiting, diarrhea  Mild redness, tenderness, swelling, or color changes where the needle is placed  Muscle, bone, or joint pain  Sores or white patches on your lips, mouth, or throat  If you notice other side effects that you think are caused by this medicine, tell your doctor. Call your doctor for medical advice about side effects. You may report side effects to FDA at 0-655-FDA-1823  © 2017 [facility] Information is for End User's use only and may not be sold, redistributed or otherwise used for commercial purposes. The above information is an  only. It is not intended as medical advice for individual conditions or treatments. Talk to your doctor, nurse or pharmacist before following any medical regimen to see if it is safe and effective for you. Patient Education   Carboplatin (By injection)   Carboplatin (rek-lpe-VEO-tin)  Treats cancer, including cancer of the ovaries, head, and neck. Sometimes used in combination with other medicines.    Brand Name(s): Amerinet Choice CARBOplatin, CARBOplatin N+ Novaplus, CARBOplatin Novaplus, PremierPro RX CARBOplatin   There may be other brand names for this medicine. When This Medicine Should Not Be Used: This medicine is not right for everyone. Do not receive it if you had an allergic reaction to carboplatin, mannitol, or platinum, or if you are pregnant or have bone marrow or bleeding problems. How to Use This Medicine:   Injectable  · Medicines used to treat cancer are very strong and can have many side effects. Before receiving this medicine, make sure you understand all the risks and benefits. It is important for you to work closely with your doctor during your treatment. · You will receive this medicine while you are in a hospital or cancer treatment center. A nurse or other trained health professional will give you this medicine. · Your doctor will prescribe your dose and schedule. This medicine is given through a needle placed in a vein. · You may also receive medicines to help prevent nausea and vomiting. · Missed dose: This medicine needs to be given on a fixed schedule. If you miss a dose, call your doctor, home health caregiver, or treatment clinic for instructions. Drugs and Foods to Avoid:   Ask your doctor or pharmacist before using any other medicine, including over-the-counter medicines, vitamins, and herbal products. · Some medicines can affect how carboplatin works. Tell your doctor if you are taking any other cancer medicines, or if you took cancer medicines in the past (especially cisplatin or other platinum medicines). · Talk to your doctor before you get a flu shot or other vaccine while you are receiving carboplatin. Warnings While Using This Medicine:   · It is not safe to take this medicine during pregnancy. It could harm an unborn baby. Tell your doctor right away if you become pregnant. · Tell your doctor if you are breastfeeding, or if you have kidney disease, hearing problems, or an infection of any kind. Tell your doctor if you have been around anyone who has chickenpox or any other infections.  Stay away from anyone who has recently received an oral polio vaccine. · This medicine may make you bleed, bruise, or get infections more easily. Take precautions to prevent illness and injury. Wash your hands often. · Cancer medicine can cause nausea or vomiting, sometimes even after you receive medicine to prevent these effects. Ask your doctor or nurse about other ways to control any nausea or vomiting that might happen. Possible Side Effects While Using This Medicine:   Call your doctor right away if you notice any of these side effects:  · Allergic reaction: Itching or hives, swelling in your face or hands, swelling or tingling in your mouth or throat, chest tightness, trouble breathing  · Bloody or black, tarry stools, or blood in your urine  · Changes in vision  · Fever, chills, or cough  · Numbness, tingling, or burning pain in your hands, arms, legs, or feet  · Redness, pain, or swelling where the IV needle is placed  · Ringing in your ears or trouble hearing  · Unusual bleeding, bruising, or weakness  If you notice these less serious side effects, talk with your doctor:   · Hair loss  · Nausea, vomiting, loss of appetite  If you notice other side effects that you think are caused by this medicine, tell your doctor. Call your doctor for medical advice about side effects. You may report side effects to FDA at 2-583-YLZ-8474  © 2017 [facility] Information is for End User's use only and may not be sold, redistributed or otherwise used for commercial purposes. The above information is an  only. It is not intended as medical advice for individual conditions or treatments. Talk to your doctor, nurse or pharmacist before following any medical regimen to see if it is safe and effective for you.

## 2021-11-18 NOTE — PROGRESS NOTES
[facility] Outpatient Infusion Progress Note  Name:Gaby Pyle  : 1950  MRN: [id number]    Pt arrived to [facility] ambulatory in no acute distress at 0815 for Taxol/Carboplatin C1D1. Assessment unremarkable. Port accessed without issue and positive blood return noted. Labs obtained on 11/15/21 and reviewed with patient. Patient given education on Carboplatin/Taxol treatment regimen. Patient arrived with frozen mittens and socks . Patient requested that  Mittens and socks be placed on 20 minutes prior to treatment and taken off 20 minutes after treatment. Patient used mittens and gloves intermittently and ambulated to bathroom a few times. Vital Signs:  Patient Vitals for the past 12 hrs:   Temp Pulse Resp BP SpO2   21 1425 -- 62 -- (!) 164/78 --   21 1244 -- (!) 57 -- 132/75 --   21 1135 -- (!) 56 -- (!) 169/74 --   21 0907 -- 63 -- (!) 158/87 --   21 0832 98.3 °F (36.8 °C) 79 17 (!) 187/97 97 %     Patient presented as anxious,  states that her amlodipine was stopped a while back and she has not been getting any good sleep in the past few days due to worrying about treatment. Patient denied having any symptoms of COVID-19, i.e. SOB, coughing, fever, or generally not feeling well.   Also denies having been exposed to COVID-19 recently or having had any recent contact with family/friend that has a pending COVID test.     The following medications administered:  Medications Administered     0.9% sodium chloride infusion     Admin Date  2021 Action  New Bag Dose  25 mL/hr Rate  25 mL/hr Route  IntraVENous Administered By  Ino House RN          CARBOplatin (PARAPLATIN) 412 mg in 0.9% sodium chloride 250 mL, overfill volume 25 mL chemo infusion (GOG)     Admin Date  2021 Action  New Bag Dose  412 mg Rate  632.4 mL/hr Route  IntraVENous Administered By  Ino House, RN          dexamethasone (DECADRON) 12 mg in 0.9% sodium chloride 50 mL IVPB Admin Date  11/18/2021 Action  New Bag Dose  12 mg Route  IntraVENous Administered By  Jonelle Villanueva RN          diphenhydrAMINE (BENADRYL) injection 50 mg     Admin Date  11/18/2021 Action  Given Dose  50 mg Route  IntraVENous Administered By  Jonelle Villanueva RN          famotidine (PF) (PEPCID) 20 mg in 0.9% sodium chloride 10 mL injection     Admin Date  11/18/2021 Action  Given Dose  20 mg Route  IntraVENous Administered By  Jonelle Villanueva RN          fosaprepitant (EMEND) 150 mg in 0.9% sodium chloride 150 mL IVPB     Admin Date  11/18/2021 Action  New Bag Dose  150 mg Rate  450 mL/hr Route  IntraVENous Administered By  Jonelle Villanueva RN          heparin (porcine) pf 300-500 Units     Admin Date  11/18/2021 Action  Given Dose  500 Units Route  InterCATHeter Administered By  Jonelle Villanueva RN          [facility] injection 8 mg     Admin Date  11/18/2021 Action  Given Dose  8 mg Route  IntraVENous Administered By  Jonelle Villanueva RN          PACLitaxeL (TAXOL) 317 mg in 0.9% sodium chloride 500 mL, overfill volume 50 mL chemo infusion     Admin Date  11/18/2021 Action  New Bag Dose  317 mg Rate  200.9 mL/hr Route  IntraVENous Administered By  Jonelle Villanueva RN          sodium chloride (NS) flush 10 mL     Admin Date  11/18/2021 Action  Given Dose  10 mL Route  IntraVENous Administered By  Jonelle Villanueva RN                Pt tolerated treatment well. IV flushed per policy and removed, 2x2 and bandaid placed. Pt discharged ambulatory in no acute distress at 1425 , accompanied by son. Next appointment 12/9/2021 @ 0800.     Future Appointments   Date Time Provider Cresencio Magaly   12/7/2021  8:45 AM MD GRISELDA Samuels BS AMB   12/9/2021  8:00 AM JOSE SANDY [place] [address] REG   12/22/2021  8:45 AM MD GRISELDA Samuels BS AMB   12/30/2021  8:00 AM [address] [place] [address] Floor REG   1/18/2022  8:45 AM MD GRISELDA Samuels BS AMB   1/20/2022  8:00 AM [address] [place] [address] Floor REG   3/17/2022 10:00 AM MD MARIA LUZ Lopez AMB

## 2021-11-22 DIAGNOSIS — C54.1 PAPILLARY SEROUS ENDOMETRIAL ADENOCARCINOMA (HCC): ICD-10-CM

## 2021-12-03 RX ORDER — DIPHENHYDRAMINE HYDROCHLORIDE 50 MG/ML
50 INJECTION, SOLUTION INTRAMUSCULAR; INTRAVENOUS AS NEEDED
Status: CANCELLED
Start: 2021-12-09

## 2021-12-03 RX ORDER — SODIUM CHLORIDE 9 MG/ML
25 INJECTION, SOLUTION INTRAVENOUS CONTINUOUS
Status: CANCELLED | OUTPATIENT
Start: 2021-12-09 | End: 2021-12-09

## 2021-12-03 RX ORDER — EPINEPHRINE 1 MG/ML
0.3 INJECTION, SOLUTION, CONCENTRATE INTRAVENOUS AS NEEDED
Status: CANCELLED | OUTPATIENT
Start: 2021-12-09

## 2021-12-03 RX ORDER — ONDANSETRON 2 MG/ML
8 INJECTION INTRAMUSCULAR; INTRAVENOUS AS NEEDED
Status: CANCELLED | OUTPATIENT
Start: 2021-12-09

## 2021-12-03 RX ORDER — LORAZEPAM 2 MG/ML
0.5 INJECTION INTRAMUSCULAR
Status: CANCELLED
Start: 2021-12-09

## 2021-12-03 RX ORDER — SODIUM CHLORIDE 0.9 % (FLUSH) 0.9 %
10 SYRINGE (ML) INJECTION AS NEEDED
Status: CANCELLED | OUTPATIENT
Start: 2021-12-09 | End: 2021-12-09

## 2021-12-03 RX ORDER — DIPHENHYDRAMINE HYDROCHLORIDE 50 MG/ML
50 INJECTION, SOLUTION INTRAMUSCULAR; INTRAVENOUS ONCE
Status: CANCELLED
Start: 2021-12-09 | End: 2021-12-09

## 2021-12-03 RX ORDER — ACETAMINOPHEN 325 MG/1
650 TABLET ORAL AS NEEDED
Status: CANCELLED
Start: 2021-12-09

## 2021-12-03 RX ORDER — HYDROCORTISONE SODIUM SUCCINATE 100 MG/2ML
100 INJECTION, POWDER, FOR SOLUTION INTRAMUSCULAR; INTRAVENOUS AS NEEDED
Status: CANCELLED | OUTPATIENT
Start: 2021-12-09

## 2021-12-03 RX ORDER — ONDANSETRON 2 MG/ML
8 INJECTION INTRAMUSCULAR; INTRAVENOUS ONCE
Status: CANCELLED | OUTPATIENT
Start: 2021-12-09 | End: 2021-12-09

## 2021-12-03 RX ORDER — HEPARIN 100 UNIT/ML
300-500 SYRINGE INTRAVENOUS AS NEEDED
Status: CANCELLED
Start: 2021-12-09

## 2021-12-03 RX ORDER — DIPHENHYDRAMINE HYDROCHLORIDE 50 MG/ML
25 INJECTION, SOLUTION INTRAMUSCULAR; INTRAVENOUS AS NEEDED
Status: CANCELLED
Start: 2021-12-09

## 2021-12-03 RX ORDER — ALBUTEROL SULFATE 0.83 MG/ML
2.5 SOLUTION RESPIRATORY (INHALATION) AS NEEDED
Status: CANCELLED
Start: 2021-12-09

## 2021-12-03 RX ORDER — SODIUM CHLORIDE 9 MG/ML
10 INJECTION INTRAMUSCULAR; INTRAVENOUS; SUBCUTANEOUS AS NEEDED
Status: CANCELLED | OUTPATIENT
Start: 2021-12-09

## 2021-12-06 NOTE — PROGRESS NOTES
Pre chemo appt for C2 Taxol/Carbo at [facility] on 12/9/2021    Vitals:    12/07/21 0859 12/07/21 0903   BP: (!) 199/94 (!) 182/85   BP 1 Location: Left arm Left arm   BP Patient Position: Sitting Sitting   Pulse: 74 67   Height: 5' (1.524 m)    Weight: 165 lb (74.8 kg)          1. Have you been to the ER, urgent care clinic since your last visit? Hospitalized since your last visit?  no    2. Have you seen or consulted any other health care providers outside of the [address] [place] [address] since your last visit? Include any pap smears or colon screening.    no

## 2021-12-07 ENCOUNTER — OFFICE VISIT (OUTPATIENT)
Dept: GYNECOLOGY | Age: 71
End: 2021-12-07
Payer: MEDICARE

## 2021-12-07 VITALS
BODY MASS INDEX: 32.39 KG/M2 | DIASTOLIC BLOOD PRESSURE: 85 MMHG | WEIGHT: 165 LBS | HEART RATE: 67 BPM | SYSTOLIC BLOOD PRESSURE: 182 MMHG | HEIGHT: 60 IN

## 2021-12-07 DIAGNOSIS — C54.1 PAPILLARY SEROUS ENDOMETRIAL ADENOCARCINOMA (HCC): ICD-10-CM

## 2021-12-07 DIAGNOSIS — M25.50 JOINT PAIN FOLLOWING CHEMOTHERAPY: ICD-10-CM

## 2021-12-07 DIAGNOSIS — C54.1 PAPILLARY SEROUS ENDOMETRIAL ADENOCARCINOMA (HCC): Primary | ICD-10-CM

## 2021-12-07 DIAGNOSIS — Z51.11 ENCOUNTER FOR ANTINEOPLASTIC CHEMOTHERAPY: ICD-10-CM

## 2021-12-07 DIAGNOSIS — F11.99 OPIOID USE, UNSPECIFIED WITH UNSPECIFIED OPIOID-INDUCED DISORDER (HCC): ICD-10-CM

## 2021-12-07 DIAGNOSIS — G89.18 JOINT PAIN FOLLOWING CHEMOTHERAPY: ICD-10-CM

## 2021-12-07 LAB
ALBUMIN SERPL-MCNC: 3.4 G/DL (ref 3.5–5)
ALBUMIN/GLOB SERPL: 1.1 {RATIO} (ref 1.1–2.2)
ALP SERPL-CCNC: 77 U/L (ref 45–117)
ALT SERPL-CCNC: 33 U/L (ref 12–78)
ANION GAP SERPL CALC-SCNC: 7 MMOL/L (ref 5–15)
AST SERPL-CCNC: 17 U/L (ref 15–37)
BASOPHILS # BLD: 0 K/UL (ref 0–0.1)
BASOPHILS NFR BLD: 0 % (ref 0–1)
BILIRUB SERPL-MCNC: 0.4 MG/DL (ref 0.2–1)
BUN SERPL-MCNC: 18 MG/DL (ref 6–20)
BUN/CREAT SERPL: 23 (ref 12–20)
CALCIUM SERPL-MCNC: 9.1 MG/DL (ref 8.5–10.1)
CANCER AG125 SERPL-ACNC: 10 U/ML (ref 1.5–35)
CHLORIDE SERPL-SCNC: 110 MMOL/L (ref 97–108)
CO2 SERPL-SCNC: 26 MMOL/L (ref 21–32)
CREAT SERPL-MCNC: 0.8 MG/DL (ref 0.55–1.02)
DIFFERENTIAL METHOD BLD: ABNORMAL
EOSINOPHIL # BLD: 0.1 K/UL (ref 0–0.4)
EOSINOPHIL NFR BLD: 2 % (ref 0–7)
ERYTHROCYTE [DISTWIDTH] IN BLOOD BY AUTOMATED COUNT: 15.8 % (ref 11.5–14.5)
GLOBULIN SER CALC-MCNC: 3.2 G/DL (ref 2–4)
GLUCOSE SERPL-MCNC: 102 MG/DL (ref 65–100)
HCT VFR BLD AUTO: 33.4 % (ref 35–47)
HGB BLD-MCNC: 10.7 G/DL (ref 11.5–16)
IMM GRANULOCYTES # BLD AUTO: 0.1 K/UL (ref 0–0.04)
IMM GRANULOCYTES NFR BLD AUTO: 2 % (ref 0–0.5)
LYMPHOCYTES # BLD: 0.3 K/UL (ref 0.8–3.5)
LYMPHOCYTES NFR BLD: 6 % (ref 12–49)
MAGNESIUM SERPL-MCNC: 2.2 MG/DL (ref 1.6–2.4)
MCH RBC QN AUTO: 30.5 PG (ref 26–34)
MCHC RBC AUTO-ENTMCNC: 32 G/DL (ref 30–36.5)
MCV RBC AUTO: 95.2 FL (ref 80–99)
MONOCYTES # BLD: 0.3 K/UL (ref 0–1)
MONOCYTES NFR BLD: 6 % (ref 5–13)
NEUTS SEG # BLD: 5 K/UL (ref 1.8–8)
NEUTS SEG NFR BLD: 84 % (ref 32–75)
NRBC # BLD: 0 K/UL (ref 0–0.01)
NRBC BLD-RTO: 0 PER 100 WBC
PLATELET # BLD AUTO: 212 K/UL (ref 150–400)
PMV BLD AUTO: 10.1 FL (ref 8.9–12.9)
POTASSIUM SERPL-SCNC: 4.9 MMOL/L (ref 3.5–5.1)
PROT SERPL-MCNC: 6.6 G/DL (ref 6.4–8.2)
RBC # BLD AUTO: 3.51 M/UL (ref 3.8–5.2)
RBC MORPH BLD: ABNORMAL
RBC MORPH BLD: ABNORMAL
SODIUM SERPL-SCNC: 143 MMOL/L (ref 136–145)
WBC # BLD AUTO: 5.8 K/UL (ref 3.6–11)

## 2021-12-07 PROCEDURE — G0463 HOSPITAL OUTPT CLINIC VISIT: HCPCS | Performed by: OBSTETRICS & GYNECOLOGY

## 2021-12-07 PROCEDURE — G8417 CALC BMI ABV UP PARAM F/U: HCPCS | Performed by: OBSTETRICS & GYNECOLOGY

## 2021-12-07 PROCEDURE — 3017F COLORECTAL CA SCREEN DOC REV: CPT | Performed by: OBSTETRICS & GYNECOLOGY

## 2021-12-07 PROCEDURE — G8399 PT W/DXA RESULTS DOCUMENT: HCPCS | Performed by: OBSTETRICS & GYNECOLOGY

## 2021-12-07 PROCEDURE — G8427 DOCREV CUR MEDS BY ELIG CLIN: HCPCS | Performed by: OBSTETRICS & GYNECOLOGY

## 2021-12-07 PROCEDURE — 1101F PT FALLS ASSESS-DOCD LE1/YR: CPT | Performed by: OBSTETRICS & GYNECOLOGY

## 2021-12-07 PROCEDURE — 99215 OFFICE O/P EST HI 40 MIN: CPT | Performed by: OBSTETRICS & GYNECOLOGY

## 2021-12-07 PROCEDURE — G8432 DEP SCR NOT DOC, RNG: HCPCS | Performed by: OBSTETRICS & GYNECOLOGY

## 2021-12-07 PROCEDURE — 1090F PRES/ABSN URINE INCON ASSESS: CPT | Performed by: OBSTETRICS & GYNECOLOGY

## 2021-12-07 PROCEDURE — G8536 NO DOC ELDER MAL SCRN: HCPCS | Performed by: OBSTETRICS & GYNECOLOGY

## 2021-12-07 RX ORDER — TRAMADOL HYDROCHLORIDE 50 MG/1
50 TABLET ORAL
Qty: 25 TABLET | Refills: 0 | Status: SHIPPED | OUTPATIENT
Start: 2021-12-07 | End: 2021-12-21

## 2021-12-07 NOTE — PROGRESS NOTES
[address] [place] [address] Mathias Moritz [facility], [address] [place] [address] [phone]  F [phone]    Office Note  Patient ID:  Name:  Carine Sy  MRN:  [id number]  :  1950/71 y.o. Date:  2021      HISTORY OF PRESENT ILLNESS:  Ms. Carine Sy is a 70 y.o. female who on 2021 underwent Robotic-assisted total laparoscopic hysterectomy, Bilateral salpingo-oophorectomy, Bilateral pelvic sentinel lymph node mapping and biopsy. 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 Negative washings. Stromal cervical involvement. Negative SLNs. 2mm out of 10mm myometrial invasion. Parametrial involvement of tumor. Completed concurrent chemo-EBRT + VBT on 2021. Initiated on adjuvant carboplatin + paclitaxel on 2021. Presents today for consideration of cycle 2. Tolerated cycle 1 well overall. Reports some bone pain controlled with tylenol and tramadol. Tylenol did not work alone. Reports some nausea, but denies emesis with her anti-emetic medications. Denies fevers or chills. Initial History:  Ms. Carine Sy is a 70 y.o.  postmenopausal female who presents as a new patient from Dr. Tyra Boles for high-grade endometrial cancer. The patient reports vaginal spotting back in April, which resulted in her seeing Dr. Tyra Boles. Pelvic ultrasound on 2021 demonstrated 12.5mm stripe. Hysteroscopy/D&C on 2021 demonstrated \"high-grade endometrial carcinoma, favor serous carcinoma\". She was subsequently referred to our office for further discussion and management. Denies pelvic or abdominal pain/bloating, change in appetite or bowel habits, nausea, vomiting, CP, SOB, hematuria, hematochezia, or urinary symptoms. Interval History:  Presents today via virtual visit for CT results and to finalize surgical planning. Pertinent PMH/PSH: DJD, h/o  x 2     Active, no restrictions.      Imaging Review:   CT C/A/P 7/16/2021:  FINDINGS:   THYROID: No nodule. MEDIASTINUM: No mass or lymphadenopathy. STEPHANIE: No mass or lymphadenopathy. THORACIC AORTA: No dissection or aneurysm. MAIN PULMONARY ARTERY: Normal in caliber. TRACHEA/BRONCHI: Patent. ESOPHAGUS: No wall thickening or dilatation. HEART: Normal in size. PLEURA: No effusion or pneumothorax. LUNGS: No nodule, mass, or airspace disease. There is minor atelectasis/scarring  right upper lobe  LIVER: No mass or biliary dilatation. There is hepatic steatosis. There is a 5  mm low-density in segment 4A and segment 5 to small to fully characterize but  most likely tiny cysts. GALLBLADDER: Patient status post cholecystectomy  SPLEEN: No mass. PANCREAS: No mass or ductal dilatation. ADRENALS: Unremarkable. KIDNEYS: No mass, calculus, or hydronephrosis. There is a retroaortic left renal  vein  STOMACH: Unremarkable. SMALL BOWEL: No dilatation or wall thickening. COLON: No dilatation or wall thickening. APPENDIX: Unremarkable. PERITONEUM: No ascites or pneumoperitoneum. RETROPERITONEUM: No lymphadenopathy or aortic aneurysm. There is an 8 mm lymph  node at the aortic bifurcation on the left. There is mild stenosis origin of the  SMA  REPRODUCTIVE ORGANS: Uterus and pelvis are obscured by artifact related to  bilateral total hip replacements. URINARY BLADDER: No mass or calculus. BONES: No destructive bone lesion. There is a right shoulder effusion  ADDITIONAL COMMENTS: N/A     IMPRESSION     1. CT of the chest demonstrates no definite evidence of metastatic disease. No  acute abnormality identified. 2. CT of the abdomen and pelvis demonstrates no definite evidence of metastatic  disease. The pelvis is obscured by artifact related to total hip replacements. There is hepatic steatosis. 2 subcentimeter low densities in the liver are too  small to characterize but most likely represent tiny cysts.   There is an 8mm lymph node just to the left of the aortic bifurcation which is  within normal limits. Pelvic ultrasound 4/20/2021:  Impression: Normal uterus with 12.5mm stripe. Normal right ovary. Non visible left ovary with otherwise normal left adnexa. No free fluid. Pathology Review:   7/29/2021:  * * *FINAL PATHOLOGIC DIAGNOSIS* * *   1.  Right pelvic sentinel lymph node, biopsy:        One lymph node, negative for carcinoma, levels and cytokeratin stain   examined (0/1)   2.  Left pelvic sentinel lymph node #1, biopsy:        One lymph node, negative for carcinoma, levels and cytokeratin stain   examined (0/1)   3.  Left pelvic sentinel lymph node, biopsy:        One lymph node, negative for carcinoma, levels and cytokeratin stain   examined (0/1)   4. Uterus, cervix, bilateral fallopian tubes and ovaries; simple   hysterectomy, BSO:        Endometrium: Uterine serous carcinoma, see synoptic report        Cervix: Serous carcinoma focally involves endocervical stroma,   predominantly as lymphovascular                invasion        Bilateral fallopian tubes: No pathologic diagnosis     ENDOMETRIUM    SPECIMEN       Procedure:  Total hysterectomy and bilateral salpingo-oophorectomy       Specimen Integrity: Intact    TUMOR       Histologic Type: Serous carcinoma       Myometrial Invasion: Present           Depth of Myometrial Invasion (Millimeters): 2 mm           Myometrial Thickness (Millimeters): 10 mm           Percentage of Myometrial Invasion: 20%       Uterine Serosa Involvement: Not identified       Lower Uterine Segment Involvement: Present, myoinvasive       Cervical Stromal Involvement: Present       Other Tissue / Organ Involvement: Not identified       Peritoneal Ascitic Fluid: Atypical, favor benign reactive process       Lymphovascular Invasion: Present    MARGINS       Margins: Parametrium and focal paracervix involved by tumor           Parametrial and focal Paracervical Margin: Involved by carcinoma    LYMPH NODES       Lymph Node Status: All lymph nodes negative for tumor cells           Total Number of Pelvic Nodes Examined: 3           Number of Pelvic Lincoln Nodes Examined: 3           Total Number of Para-aortic Nodes Examined: 0           Number of Para-aortic Lincoln Nodes Examined: 0    PATHOLOGIC STAGE CLASSIFICATION (pTNM, AJCC 8th Edition)       Primary Tumor (pT): pT2       Regional Lymph Nodes Modifier: (sn)       Regional Lymph Nodes (pN): pN0    FIGO STAGE       FIGO Stage: II   * * *Comment* * *   Immunohistochemical stains with appropriate controls show tumor positive   for P16, P53 with Ki-67 proliferation index of approximately 50%.  These   findings support diagnosis of endometrial serous carcinoma. 7/7/2021:  FINAL PATHOLOGIC DIAGNOSIS   Endometrium, curettings:   High-grade endometrial carcinoma, favor serous carcinoma   See comment   Comment   The biopsy contains a malignant glandular neoplasm with a high nuclear grade, papillary growth pattern, and areas of necrosis. Morphologic features suggest a high-grade endometrial carcinoma and are most compatible with a diagnosis of serous carcinoma. ROS:  A comprehensive review of systems was negative except for that written in the History of Present Illness. , 10 point ROS    OB/GYN ROS:  Per HPI    ECOG ndGndrndanddndend:nd nd2nd Problem List:  Patient Active Problem List    Diagnosis Date Noted    Encounter for antineoplastic chemotherapy 10/13/2021    Essential hypertension 07/27/2021    Abnormal finding on EKG 07/27/2021    Papillary serous endometrial adenocarcinoma ([facility] [address] 07/14/2021    PUD (peptic ulcer disease)     GERD (gastroesophageal reflux disease)     Arthritis     Lichen planus     Hip arthritis 04/04/2016    DJD (degenerative joint disease) of hip 01/04/2013     PMH:  Past Medical History:   Diagnosis Date    Arthritis     OSTEO HIP JULY.     GERD (gastroesophageal reflux disease)     Lichen planus     oral    PUD (peptic ulcer disease)       PSH:  Past Surgical History:   Procedure Laterality Date    COLONOSCOPY N/A 10/16/2017    COLONOSCOPY performed by Luis F Ragland [facility] at [facility] ENDOSCOPY    Karen Desir  10/16/2017         HX  SECTION      X2    HX CHOLECYSTECTOMY      HX DILATION AND CURETTAGE  2021    High-grade endometrial carcinoma, favor serous carcinoma    HX HEENT      EXTRACTION OF WISDOM TEETH X 4    HX ORTHOPAEDIC      right foot, bunionectomy    HX ORTHOPAEDIC      LEFTl hip arthroplasty    HX ORTHOPAEDIC      RIGHT hip arthroplasty    HX TUBAL LIGATION      IR INSERT TUNL CVC W PORT OVER 5 YEARS  10/14/2021    LA COLONOSCOPY FLX DX W/COLLJ SPEC WHEN PFRMD  1/20/2012     x 2    UPPER GI ENDOSCOPY,BIOPSY  2016         UPPER GI ENDOSCOPY,DILATN W GUIDE  2016           Social History:  Social History     Tobacco Use    Smoking status: Former Smoker     Packs/day: 0.25     Years: 0.50     Pack years: 0.12     Quit date: 1967     Years since quittin.0    Smokeless tobacco: Never Used    Tobacco comment: brief   Substance Use Topics    Alcohol use: Not Currently     Comment: VERY RARELY       Family History:  Family History   Problem Relation Age of Onset    Cancer Maternal Grandmother 79        colon cancer    Hypertension Mother     Cancer Mother         pacreatic cancer    Cancer Paternal Aunt         ovarian cancer      Medications: (reviewed)  Current Outpatient Medications   Medication Sig    ondansetron (ZOFRAN ODT) 4 mg disintegrating tablet Take 1 Tablet by mouth every eight (8) hours as needed for Nausea. Indications: nausea and vomiting caused by cancer drugs    lidocaine-prilocaine (EMLA) topical cream APPLY SMALL AMOUNT OVER PORT AREA AND COVER WITH BAND AID ONE HOUR BEFORE CHEMO    dexAMETHasone (DECADRON) 4 mg tablet Take 2 tablets with breakfast the day before chemo and for 2 days after chemo    Nystop powder as needed.     acetaminophen (TYLENOL) 325 mg tablet Take 2 Tablets by mouth every six (6) hours as needed for Pain.  melatonin 1 mg tablet Take 1 mg by mouth nightly.  cholecalciferol, vitamin D3, 2,000 unit tab Take 2,000 Units by mouth daily.  BIOTIN PO Take 5,000 mcg by mouth daily.  CRANIAL PROSTHESIS misc Cranial Prosthesis  Diagnosis code:   C54.1  Cpt code:    (Patient not taking: Reported on 12/7/2021)    diphenoxylate-atropine (LomotiL) 2.5-0.025 mg per tablet Take 1 Tablet by mouth four (4) times daily as needed for Diarrhea. Max Daily Amount: 4 Tablets.  cephALEXin (KEFLEX) 500 mg capsule Dental work (Patient not taking: Reported on 9/7/2021)    amLODIPine (NORVASC) 5 mg tablet Take 1 Tablet by mouth daily. (Patient not taking: Reported on 11/18/2021)    ibuprofen (MOTRIN) 600 mg tablet Take 1 Tablet by mouth every six (6) hours as needed for Pain. (Patient not taking: Reported on 9/7/2021)    methylPREDNISolone (MedroL) 4 mg tab Take 4 mg by mouth daily as needed. Prn for lichen planus (Patient not taking: Reported on 10/14/2021)    cyanocobalamin (VITAMIN B-12) 1,000 mcg tablet Take 1,000 mcg by mouth daily.  pyridoxine (VITAMIN B-6) 100 mg tablet Take 100 mg by mouth daily. No current facility-administered medications for this visit. Allergies: (reviewed)  Allergies   Allergen Reactions    Codeine Nausea and Vomiting    Penicillins Rash     Rash & dizzy    Sulfa (Sulfonamide Antibiotics) Other (comments)     General redness all over skin    Ciprofloxacin Itching and Other (comments)     Extreme dizziness and rash    Other Medication Nausea and Vomiting     PRESCRIPTION STRENGTH ANTI INFLAMMATORY MEDS         OBJECTIVE:  *deferred today given video-conference visit for ongoing COVID-19 pandemic*   Physical Exam:  Visit Vitals  BP (!) 182/85 (BP 1 Location: Left arm, BP Patient Position: Sitting)   Pulse 67   Ht 5' (1.524 m)   Wt 165 lb (74.8 kg)   BMI 32.22 kg/m²      General: Alert and oriented. No acute distress. Well-nourished  Gastrointestinal: soft, non-tender, non-distended, no masses or organomegaly. Well-healed port-site incisions. Musculoskeletal: normal gait. No joint tenderness, deformity or swelling. No muscular tenderness. Extremities: extremities normal, atraumatic, no cyanosis or edema. Pelvic: exam chaperoned by nurse. Normal appearing external genitalia. On speculum exam, the vaginal cuff is intact and healing well. On bimanual, the uterus and cervix are surgically absent. Vaginal cuff intact without defect. No evidence of masses or nodularity on bimanual exam. Deferred rectovaginal exam.   Neuro: Grossly intact. Normal gait and movement. No acute deficit  Skin: No evidence of rashes or skin changes. IMPRESSION/PLAN:    Ms. Edison Wei is a 70 y.o.  female who on 7/29/2021 underwent Robotic-assisted total laparoscopic hysterectomy, Bilateral salpingo-oophorectomy, Bilateral pelvic sentinel lymph node mapping and biopsy. 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 Negative washings. Stromal cervical involvement. Negative SLNs. 2mm out of 10mm myometrial invasion. Parametrial involvement of tumor. Problems:     Patient Active Problem List    Diagnosis Date Noted    Encounter for antineoplastic chemotherapy 10/13/2021    Essential hypertension 07/27/2021    Abnormal finding on EKG 07/27/2021    Papillary serous endometrial adenocarcinoma ([facility] [address] 07/14/2021    PUD (peptic ulcer disease)     GERD (gastroesophageal reflux disease)     Arthritis     Lichen planus     Hip arthritis 04/04/2016    DJD (degenerative joint disease) of hip 01/04/2013     Reviewed patient's course to date. Completed concurrent chemo-EBRT + VBT on 11/4/2021. Initiated on adjuvant carboplatin + paclitaxel on 11/18/2021. Presents today for consideration of cycle 2. Tolerated cycle 1 well overall. Tramadol prescription sent to pharmacy today for bone pain.      Please see my prior notes for our full discussion regarding available treatment options. I again counseled the patient that our best time to beat this cancer is now at the start rather than during a recurrence. I believe the most aggressive form of treatment would be concurrent chemoradiation, and I believe this may be her best option given her cervical and parametrial tumor involvement. She has completed chemoradiation. Plan proceed with carboplatin AUC 5 + paclitaxel 175mg/m2 for 4 cycles. All questions and concerns were addressed with the patient and she is comfortable with the plan. An electronic signature was used to authenticate this note.      Boaz Tirado MD

## 2021-12-09 ENCOUNTER — HOSPITAL ENCOUNTER (OUTPATIENT)
Dept: INFUSION THERAPY | Age: 71
Discharge: HOME | End: 2021-12-09
Payer: MEDICARE

## 2021-12-09 VITALS
BODY MASS INDEX: 30.98 KG/M2 | HEART RATE: 69 BPM | HEIGHT: 61 IN | TEMPERATURE: 98.1 F | OXYGEN SATURATION: 97 % | WEIGHT: 164.1 LBS | SYSTOLIC BLOOD PRESSURE: 155 MMHG | RESPIRATION RATE: 20 BRPM | DIASTOLIC BLOOD PRESSURE: 83 MMHG

## 2021-12-09 DIAGNOSIS — C54.1 PAPILLARY SEROUS ENDOMETRIAL ADENOCARCINOMA (HCC): Primary | ICD-10-CM

## 2021-12-09 PROCEDURE — 96367 TX/PROPH/DG ADDL SEQ IV INF: CPT

## 2021-12-09 PROCEDURE — 74011000250 HC RX REV CODE- 250: Performed by: OBSTETRICS & GYNECOLOGY

## 2021-12-09 PROCEDURE — 96375 TX/PRO/DX INJ NEW DRUG ADDON: CPT

## 2021-12-09 PROCEDURE — 74011000258 HC RX REV CODE- 258: Performed by: OBSTETRICS & GYNECOLOGY

## 2021-12-09 PROCEDURE — 74011250636 HC RX REV CODE- 250/636: Performed by: OBSTETRICS & GYNECOLOGY

## 2021-12-09 PROCEDURE — 96415 CHEMO IV INFUSION ADDL HR: CPT

## 2021-12-09 PROCEDURE — 96417 CHEMO IV INFUS EACH ADDL SEQ: CPT

## 2021-12-09 PROCEDURE — 96413 CHEMO IV INFUSION 1 HR: CPT

## 2021-12-09 PROCEDURE — 77030012965 HC NDL HUBR BBMI -A

## 2021-12-09 RX ORDER — SODIUM CHLORIDE 0.9 % (FLUSH) 0.9 %
10 SYRINGE (ML) INJECTION AS NEEDED
Status: DISPENSED | OUTPATIENT
Start: 2021-12-09 | End: 2021-12-09

## 2021-12-09 RX ORDER — SODIUM CHLORIDE 9 MG/ML
25 INJECTION, SOLUTION INTRAVENOUS CONTINUOUS
Status: DISPENSED | OUTPATIENT
Start: 2021-12-09 | End: 2021-12-09

## 2021-12-09 RX ORDER — HEPARIN 100 UNIT/ML
300-500 SYRINGE INTRAVENOUS AS NEEDED
Status: ACTIVE | OUTPATIENT
Start: 2021-12-09 | End: 2021-12-09

## 2021-12-09 RX ORDER — SODIUM CHLORIDE 9 MG/ML
10 INJECTION INTRAMUSCULAR; INTRAVENOUS; SUBCUTANEOUS AS NEEDED
Status: ACTIVE | OUTPATIENT
Start: 2021-12-09 | End: 2021-12-09

## 2021-12-09 RX ORDER — ONDANSETRON 2 MG/ML
8 INJECTION INTRAMUSCULAR; INTRAVENOUS ONCE
Status: COMPLETED | OUTPATIENT
Start: 2021-12-09 | End: 2021-12-09

## 2021-12-09 RX ORDER — DIPHENHYDRAMINE HYDROCHLORIDE 50 MG/ML
50 INJECTION, SOLUTION INTRAMUSCULAR; INTRAVENOUS ONCE
Status: COMPLETED | OUTPATIENT
Start: 2021-12-09 | End: 2021-12-09

## 2021-12-09 RX ADMIN — FAMOTIDINE 20 MG: 10 INJECTION INTRAVENOUS at 09:13

## 2021-12-09 RX ADMIN — DIPHENHYDRAMINE HYDROCHLORIDE 50 MG: 50 INJECTION, SOLUTION INTRAMUSCULAR; INTRAVENOUS at 09:17

## 2021-12-09 RX ADMIN — SODIUM CHLORIDE 25 ML/HR: 9 INJECTION, SOLUTION INTRAVENOUS at 09:09

## 2021-12-09 RX ADMIN — ONDANSETRON 8 MG: 2 INJECTION INTRAMUSCULAR; INTRAVENOUS at 09:10

## 2021-12-09 RX ADMIN — HEPARIN 500 UNITS: 100 SYRINGE at 13:37

## 2021-12-09 RX ADMIN — CARBOPLATIN 420 MG: 10 INJECTION, SOLUTION INTRAVENOUS at 13:00

## 2021-12-09 RX ADMIN — SODIUM CHLORIDE 10 ML: 9 INJECTION INTRAMUSCULAR; INTRAVENOUS; SUBCUTANEOUS at 08:32

## 2021-12-09 RX ADMIN — DEXAMETHASONE SODIUM PHOSPHATE 12 MG: 4 INJECTION, SOLUTION INTRA-ARTICULAR; INTRALESIONAL; INTRAMUSCULAR; INTRAVENOUS; SOFT TISSUE at 09:20

## 2021-12-09 RX ADMIN — SODIUM CHLORIDE 150 MG: 9 INJECTION, SOLUTION INTRAVENOUS at 09:20

## 2021-12-09 RX ADMIN — PACLITAXEL 317 MG: 6 INJECTION, SOLUTION INTRAVENOUS at 09:55

## 2021-12-09 RX ADMIN — Medication 10 ML: at 13:36

## 2021-12-09 NOTE — PROGRESS NOTES
[facility] Progress Note    Date: 2021    Name: Jaylan Hernandez    MRN: [id number]         : 1950    Ms. Muhammad arrived (ambulation) at 0815 and in no distress for cycle 2 day 1 of Taxol/Carboplatin regimen. Assessment was completed, no acute issues at this time, no new complaints voiced. Pt continues having a couple episodes of constipation weekly and generalized fatigue. Covid Screening    Patient denied having any symptoms of COVID-19, i.e. SOB, coughing, fever, or generally not feeling well. Also denies having been exposed to COVID-19 recently or having had any recent contact with family/friend that has a pending COVID test.    Left chest port accessed without difficulty with 1 inch grey needle; + blood return noted, labs drawn and sent. Ms. Rosina Dawson vitals were reviewed. Patient Vitals for the past 12 hrs:   Temp Pulse Resp BP SpO2   21 1331 -- 69 20 (!) 155/83 97 %   21 0822 98.1 °F (36.7 °C) 78 18 (!) 188/89 98 %       Lab were obtained on 21    Pre-medications  were administered as ordered and chemotherapy was initiated.      Medication:  Medications Administered     0.9% sodium chloride infusion     Admin Date  2021 Action  New Bag Dose  25 mL/hr Rate  25 mL/hr Route  IntraVENous Administered By  Phill COCHRAN          0.9% sodium chloride injection 10 mL     Admin Date  2021 Action  Given Dose  10 mL Route  IntraVENous Administered By  Phill COCHRAN          CARBOplatin (PARAPLATIN) 420 mg in 0.9% sodium chloride 250 mL, overfill volume 25 mL chemo infusion (GOG)     Admin Date  2021 Action  New Bag Dose  420 mg Rate  634 mL/hr Route  IntraVENous Administered By  Sukh Steven          dexamethasone (DECADRON) 12 mg in 0.9% sodium chloride 50 mL IVPB     Admin Date  2021 Action  New Bag Dose  12 mg Route  IntraVENous Administered By  Phill COCHRAN          diphenhydrAMINE (BENADRYL) injection 50 mg     Admin Date  12/09/2021 Action  Given Dose  50 mg Route  IntraVENous Administered By  Radha COCHRAN          famotidine (PF) (PEPCID) 20 mg in 0.9% sodium chloride 10 mL injection     Admin Date  12/09/2021 Action  Given Dose  20 mg Route  IntraVENous Administered By  Lindsay Ayers          fosaprepitant (EMEND) 150 mg in 0.9% sodium chloride 150 mL IVPB     Admin Date  12/09/2021 Action  New Bag Dose  150 mg Rate  450 mL/hr Route  IntraVENous Administered By  Radha COCHRAN          heparin (porcine) pf 300-500 Units     Admin Date  12/09/2021 Action  Given Dose  500 Units Route  InterCATHeter Administered By  Lindsay Ayers          ondansetron [facility]) injection 8 mg     Admin Date  12/09/2021 Action  Given Dose  8 mg Route  IntraVENous Administered By  Radha COCHRAN          PACLitaxeL (TAXOL) 317 mg in 0.9% sodium chloride 500 mL, overfill volume 50 mL chemo infusion     Admin Date  12/09/2021 Action  New Bag Dose  317 mg Rate  200.9 mL/hr Route  IntraVENous Administered By  Radha COCHRAN          sodium chloride (NS) flush 10 mL     Admin Date  12/09/2021 Action  Given Dose  10 mL Route  IntraVENous Administered By  RadhaTicketForEventdyan R              Patient port flushed; heparinized; and de-accessed per protocol. Ms. Baljit Vick tolerated treatment well and was discharged from [place] [id number] in stable condition at 1340. She is aware of when to return for her next appointment.     Future Appointments   Date Time Provider Cresencio Mckenzie   12/22/2021  8:45 AM MD GRISELDA Panchal BS AMB   12/30/2021  8:00 AM GARCIA LONG2 [id number] [facility]   1/18/2022  8:45 AM MD GRISELDA Panchal BS AMB   1/20/2022  8:00 AM GARCIA LONG2 [address] [facility]   3/17/2022 10:00 AM MD MARIA LUZ Lopez BS AMB       Brandie Woodall [place]  December 9, 2021

## 2021-12-20 NOTE — PROGRESS NOTES
[facility] [address]  P [phone]  F [phone]    Office Note  Patient ID:  Name:  Luis Vega  MRN:  [id number]  :  1950/71 y.o. Date:  2021      HISTORY OF PRESENT ILLNESS:  Ms. Luis Vega is a 70 y.o. female who on 2021 underwent Robotic-assisted total laparoscopic hysterectomy, Bilateral salpingo-oophorectomy, Bilateral pelvic sentinel lymph node mapping and biopsy. 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 Negative washings. Stromal cervical involvement. Negative SLNs. 2mm out of 10mm myometrial invasion. Parametrial involvement of tumor. Completed concurrent chemo-EBRT + VBT on 2021. Initiated on adjuvant carboplatin + paclitaxel on 2021. Presents today for consideration of cycle 3. Tolerated cycle 1-2 well overall. Reports some bone pain controlled with tylenol and tramadol. Tylenol did not work alone. Reports some nausea, but denies emesis with her anti-emetic medications. Denies fevers or chills. Initial History:  Ms. Luis Vega is a 70 y.o.  postmenopausal female who presents as a new patient from Dr. Debby Zamudio for high-grade endometrial cancer. The patient reports vaginal spotting back in April, which resulted in her seeing Dr. Debby Zamudio. Pelvic ultrasound on 2021 demonstrated 12.5mm stripe. Hysteroscopy/D&C on 2021 demonstrated \"high-grade endometrial carcinoma, favor serous carcinoma\". She was subsequently referred to our office for further discussion and management. Denies pelvic or abdominal pain/bloating, change in appetite or bowel habits, nausea, vomiting, CP, SOB, hematuria, hematochezia, or urinary symptoms. Interval History:  Presents today via virtual visit for CT results and to finalize surgical planning. Pertinent PMH/PSH: DJD, h/o  x 2     Active, no restrictions.      Imaging Review:   CT C/A/P 7/16/2021:  FINDINGS:   THYROID: No nodule. MEDIASTINUM: No mass or lymphadenopathy. STEPHANIE: No mass or lymphadenopathy. THORACIC AORTA: No dissection or aneurysm. MAIN PULMONARY ARTERY: Normal in caliber. TRACHEA/BRONCHI: Patent. ESOPHAGUS: No wall thickening or dilatation. HEART: Normal in size. PLEURA: No effusion or pneumothorax. LUNGS: No nodule, mass, or airspace disease. There is minor atelectasis/scarring  right upper lobe  LIVER: No mass or biliary dilatation. There is hepatic steatosis. There is a 5  mm low-density in segment 4A and segment 5 to small to fully characterize but  most likely tiny cysts. GALLBLADDER: Patient status post cholecystectomy  SPLEEN: No mass. PANCREAS: No mass or ductal dilatation. ADRENALS: Unremarkable. KIDNEYS: No mass, calculus, or hydronephrosis. There is a retroaortic left renal  vein  STOMACH: Unremarkable. SMALL BOWEL: No dilatation or wall thickening. COLON: No dilatation or wall thickening. APPENDIX: Unremarkable. PERITONEUM: No ascites or pneumoperitoneum. RETROPERITONEUM: No lymphadenopathy or aortic aneurysm. There is an 8 mm lymph  node at the aortic bifurcation on the left. There is mild stenosis origin of the  SMA  REPRODUCTIVE ORGANS: Uterus and pelvis are obscured by artifact related to  bilateral total hip replacements. URINARY BLADDER: No mass or calculus. BONES: No destructive bone lesion. There is a right shoulder effusion  ADDITIONAL COMMENTS: N/A     IMPRESSION     1. CT of the chest demonstrates no definite evidence of metastatic disease. No  acute abnormality identified. 2. CT of the abdomen and pelvis demonstrates no definite evidence of metastatic  disease. The pelvis is obscured by artifact related to total hip replacements. There is hepatic steatosis. 2 subcentimeter low densities in the liver are too  small to characterize but most likely represent tiny cysts.   There is an 8mm lymph node just to the left of the aortic bifurcation which is  within normal limits. Pelvic ultrasound 4/20/2021:  Impression: Normal uterus with 12.5mm stripe. Normal right ovary. Non visible left ovary with otherwise normal left adnexa. No free fluid. Pathology Review:   7/29/2021:  * * *FINAL PATHOLOGIC DIAGNOSIS* * *   1.  Right pelvic sentinel lymph node, biopsy:        One lymph node, negative for carcinoma, levels and cytokeratin stain   examined (0/1)   2.  Left pelvic sentinel lymph node #1, biopsy:        One lymph node, negative for carcinoma, levels and cytokeratin stain   examined (0/1)   3.  Left pelvic sentinel lymph node, biopsy:        One lymph node, negative for carcinoma, levels and cytokeratin stain   examined (0/1)   4. Uterus, cervix, bilateral fallopian tubes and ovaries; simple   hysterectomy, BSO:        Endometrium: Uterine serous carcinoma, see synoptic report        Cervix: Serous carcinoma focally involves endocervical stroma,   predominantly as lymphovascular                invasion        Bilateral fallopian tubes: No pathologic diagnosis     ENDOMETRIUM    SPECIMEN       Procedure:  Total hysterectomy and bilateral salpingo-oophorectomy       Specimen Integrity: Intact    TUMOR       Histologic Type: Serous carcinoma       Myometrial Invasion: Present           Depth of Myometrial Invasion (Millimeters): 2 mm           Myometrial Thickness (Millimeters): 10 mm           Percentage of Myometrial Invasion: 20%       Uterine Serosa Involvement: Not identified       Lower Uterine Segment Involvement: Present, myoinvasive       Cervical Stromal Involvement: Present       Other Tissue / Organ Involvement: Not identified       Peritoneal Ascitic Fluid: Atypical, favor benign reactive process       Lymphovascular Invasion: Present    MARGINS       Margins: Parametrium and focal paracervix involved by tumor           Parametrial and focal Paracervical Margin: Involved by carcinoma    LYMPH NODES       Lymph Node Status: All lymph nodes negative for tumor cells           Total Number of Pelvic Nodes Examined: 3           Number of Pelvic Butler Nodes Examined: 3           Total Number of Para-aortic Nodes Examined: 0           Number of Para-aortic Butler Nodes Examined: 0    PATHOLOGIC STAGE CLASSIFICATION (pTNM, AJCC 8th Edition)       Primary Tumor (pT): pT2       Regional Lymph Nodes Modifier: (sn)       Regional Lymph Nodes (pN): pN0    FIGO STAGE       FIGO Stage: II   * * *Comment* * *   Immunohistochemical stains with appropriate controls show tumor positive   for P16, P53 with Ki-67 proliferation index of approximately 50%.  These   findings support diagnosis of endometrial serous carcinoma. 7/7/2021:  FINAL PATHOLOGIC DIAGNOSIS   Endometrium, curettings:   High-grade endometrial carcinoma, favor serous carcinoma   See comment   Comment   The biopsy contains a malignant glandular neoplasm with a high nuclear grade, papillary growth pattern, and areas of necrosis. Morphologic features suggest a high-grade endometrial carcinoma and are most compatible with a diagnosis of serous carcinoma. ROS:  A comprehensive review of systems was negative except for that written in the History of Present Illness. , 10 point ROS    OB/GYN ROS:  Per HPI    ECOG ndGndrndanddndend:nd nd2nd Problem List:  Patient Active Problem List    Diagnosis Date Noted    Opioid use, unspecified with unspecified opioid-induced disorder 12/07/2021    Encounter for antineoplastic chemotherapy 10/13/2021    Essential hypertension 07/27/2021    Abnormal finding on EKG 07/27/2021    Papillary serous endometrial adenocarcinoma ([facility] [address] 07/14/2021    PUD (peptic ulcer disease)     GERD (gastroesophageal reflux disease)     Arthritis     Lichen planus     Hip arthritis 04/04/2016    DJD (degenerative joint disease) of hip 01/04/2013     PMH:  Past Medical History:   Diagnosis Date    Arthritis     OSTEO HIP JULY.     GERD (gastroesophageal reflux disease)  Lichen planus     oral    PUD (peptic ulcer disease)       PSH:  Past Surgical History:   Procedure Laterality Date    COLONOSCOPY N/A 10/16/2017    COLONOSCOPY performed by Merlin Coop, MD at [facility]  10/16/2017         HX  SECTION      X2    HX CHOLECYSTECTOMY      HX DILATION AND CURETTAGE  2021    High-grade endometrial carcinoma, favor serous carcinoma    HX HEENT      EXTRACTION OF WISDOM TEETH X 4    HX ORTHOPAEDIC      right foot, bunionectomy    HX ORTHOPAEDIC      LEFTl hip arthroplasty    HX ORTHOPAEDIC      RIGHT hip arthroplasty    HX TUBAL LIGATION      IR INSERT TUNL CVC W PORT OVER 5 YEARS  10/14/2021    VT COLONOSCOPY FLX DX W/COLLJ SPEC WHEN PFRMD  1/20/2012     x 2    UPPER GI ENDOSCOPY,BIOPSY  2016         UPPER GI ENDOSCOPY,DILATN W GUIDE  2016           Social History:  Social History     Tobacco Use    Smoking status: Former Smoker     Packs/day: 0.25     Years: 0.50     Pack years: 0.12     Quit date: 1967     Years since quittin.0    Smokeless tobacco: Never Used    Tobacco comment: brief   Substance Use Topics    Alcohol use: Not Currently     Comment: VERY RARELY       Family History:  Family History   Problem Relation Age of Onset    Cancer Maternal Grandmother 79        colon cancer    Hypertension Mother     Cancer Mother         pacreatic cancer    Cancer Paternal Aunt         ovarian cancer      Medications: (reviewed)  Current Outpatient Medications   Medication Sig    ondansetron (ZOFRAN ODT) 4 mg disintegrating tablet Take 1 Tablet by mouth every eight (8) hours as needed for Nausea.  Indications: nausea and vomiting caused by cancer drugs    lidocaine-prilocaine (EMLA) topical cream APPLY SMALL AMOUNT OVER PORT AREA AND COVER WITH BAND AID ONE HOUR BEFORE CHEMO    dexAMETHasone (DECADRON) 4 mg tablet Take 2 tablets with breakfast the day before chemo and for 2 days after chemo    Nystop powder as needed.  acetaminophen (TYLENOL) 325 mg tablet Take 2 Tablets by mouth every six (6) hours as needed for Pain.  melatonin 1 mg tablet Take 1 mg by mouth nightly.  cyanocobalamin (VITAMIN B-12) 1,000 mcg tablet Take 1,000 mcg by mouth daily.  cholecalciferol, vitamin D3, 2,000 unit tab Take 2,000 Units by mouth daily.  BIOTIN PO Take 5,000 mcg by mouth daily.  pyridoxine (VITAMIN B-6) 100 mg tablet Take 100 mg by mouth daily.  CRANIAL PROSTHESIS misc Cranial Prosthesis  Diagnosis code:   C54.1  Cpt code:    (Patient not taking: Reported on 12/7/2021)    diphenoxylate-atropine (LomotiL) 2.5-0.025 mg per tablet Take 1 Tablet by mouth four (4) times daily as needed for Diarrhea. Max Daily Amount: 4 Tablets.  cephALEXin (KEFLEX) 500 mg capsule Dental work (Patient not taking: Reported on 9/7/2021)    amLODIPine (NORVASC) 5 mg tablet Take 1 Tablet by mouth daily. (Patient not taking: Reported on 11/18/2021)    ibuprofen (MOTRIN) 600 mg tablet Take 1 Tablet by mouth every six (6) hours as needed for Pain. (Patient not taking: Reported on 9/7/2021)    methylPREDNISolone (MedroL) 4 mg tab Take 4 mg by mouth daily as needed. Prn for lichen planus (Patient not taking: Reported on 10/14/2021)     No current facility-administered medications for this visit. Allergies: (reviewed)  Allergies   Allergen Reactions    Codeine Nausea and Vomiting    Penicillins Rash     Rash & dizzy    Sulfa (Sulfonamide Antibiotics) Other (comments)     General redness all over skin    Ciprofloxacin Itching and Other (comments)     Extreme dizziness and rash    Other Medication Nausea and Vomiting     PRESCRIPTION STRENGTH ANTI INFLAMMATORY MEDS         OBJECTIVE:  *deferred today given video-conference visit for ongoing COVID-19 pandemic*   Physical Exam:  There were no vitals taken for this visit. General: Alert and oriented. No acute distress.  Well-nourished  Gastrointestinal: soft, non-tender, non-distended, no masses or organomegaly. Well-healed port-site incisions. Musculoskeletal: normal gait. No joint tenderness, deformity or swelling. No muscular tenderness. Extremities: extremities normal, atraumatic, no cyanosis or edema. Pelvic: exam chaperoned by nurse. Normal appearing external genitalia. On speculum exam, the vaginal cuff is intact and healing well. On bimanual, the uterus and cervix are surgically absent. Vaginal cuff intact without defect. No evidence of masses or nodularity on bimanual exam. Deferred rectovaginal exam.   Neuro: Grossly intact. Normal gait and movement. No acute deficit  Skin: No evidence of rashes or skin changes. IMPRESSION/PLAN:    Ms. Ryanne Morillo is a 70 y.o.  female who on 7/29/2021 underwent Robotic-assisted total laparoscopic hysterectomy, Bilateral salpingo-oophorectomy, Bilateral pelvic sentinel lymph node mapping and biopsy. 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 Negative washings. Stromal cervical involvement. Negative SLNs. 2mm out of 10mm myometrial invasion. Parametrial involvement of tumor. Problems:     Patient Active Problem List    Diagnosis Date Noted    Opioid use, unspecified with unspecified opioid-induced disorder 12/07/2021    Encounter for antineoplastic chemotherapy 10/13/2021    Essential hypertension 07/27/2021    Abnormal finding on EKG 07/27/2021    Papillary serous endometrial adenocarcinoma ([facility] [address] 07/14/2021    PUD (peptic ulcer disease)     GERD (gastroesophageal reflux disease)     Arthritis     Lichen planus     Hip arthritis 04/04/2016    DJD (degenerative joint disease) of hip 01/04/2013     Reviewed patient's course to date. Completed concurrent chemo-EBRT + VBT on 11/4/2021. Initiated on adjuvant carboplatin + paclitaxel on 11/18/2021. Presents today for consideration of cycle 3. Tolerated cycle 1-2 well overall.      Please see my prior notes for our full discussion regarding available treatment options. I again counseled the patient that our best time to beat this cancer is now at the start rather than during a recurrence. I believe the most aggressive form of treatment would be concurrent chemoradiation, and I believe this may be her best option given her cervical and parametrial tumor involvement. She has completed chemoradiation. Plan proceed with carboplatin AUC 5 + paclitaxel 175mg/m2 for 4 cycles. Plan to repeat CT C/A/P at the end of treatment. All questions and concerns were addressed with the patient and she is comfortable with the plan. An electronic signature was used to authenticate this note.      Jevon Antunez MD

## 2021-12-21 DIAGNOSIS — C54.1 ENDOMETRIAL CANCER (HCC): Primary | ICD-10-CM

## 2021-12-22 ENCOUNTER — HOSPITAL ENCOUNTER (OUTPATIENT)
Dept: RADIATION THERAPY | Age: 71
Discharge: HOME | End: 2021-12-22

## 2021-12-22 ENCOUNTER — VIRTUAL VISIT (OUTPATIENT)
Dept: GYNECOLOGY | Age: 71
End: 2021-12-22
Payer: MEDICARE

## 2021-12-22 DIAGNOSIS — C54.1 PAPILLARY SEROUS ENDOMETRIAL ADENOCARCINOMA (HCC): Primary | ICD-10-CM

## 2021-12-22 DIAGNOSIS — Z51.11 ENCOUNTER FOR ANTINEOPLASTIC CHEMOTHERAPY: ICD-10-CM

## 2021-12-22 PROCEDURE — G0463 HOSPITAL OUTPT CLINIC VISIT: HCPCS | Performed by: OBSTETRICS & GYNECOLOGY

## 2021-12-22 PROCEDURE — G8536 NO DOC ELDER MAL SCRN: HCPCS | Performed by: OBSTETRICS & GYNECOLOGY

## 2021-12-22 PROCEDURE — 1090F PRES/ABSN URINE INCON ASSESS: CPT | Performed by: OBSTETRICS & GYNECOLOGY

## 2021-12-22 PROCEDURE — 3017F COLORECTAL CA SCREEN DOC REV: CPT | Performed by: OBSTETRICS & GYNECOLOGY

## 2021-12-22 PROCEDURE — G8432 DEP SCR NOT DOC, RNG: HCPCS | Performed by: OBSTETRICS & GYNECOLOGY

## 2021-12-22 PROCEDURE — G8399 PT W/DXA RESULTS DOCUMENT: HCPCS | Performed by: OBSTETRICS & GYNECOLOGY

## 2021-12-22 PROCEDURE — 1101F PT FALLS ASSESS-DOCD LE1/YR: CPT | Performed by: OBSTETRICS & GYNECOLOGY

## 2021-12-22 PROCEDURE — 99214 OFFICE O/P EST MOD 30 MIN: CPT | Performed by: OBSTETRICS & GYNECOLOGY

## 2021-12-22 PROCEDURE — G8417 CALC BMI ABV UP PARAM F/U: HCPCS | Performed by: OBSTETRICS & GYNECOLOGY

## 2021-12-22 PROCEDURE — G8427 DOCREV CUR MEDS BY ELIG CLIN: HCPCS | Performed by: OBSTETRICS & GYNECOLOGY

## 2021-12-23 RX ORDER — EPINEPHRINE 1 MG/ML
0.3 INJECTION, SOLUTION, CONCENTRATE INTRAVENOUS AS NEEDED
Status: CANCELLED | OUTPATIENT
Start: 2021-12-30

## 2021-12-23 RX ORDER — DIPHENHYDRAMINE HYDROCHLORIDE 50 MG/ML
25 INJECTION, SOLUTION INTRAMUSCULAR; INTRAVENOUS AS NEEDED
Status: CANCELLED
Start: 2021-12-30

## 2021-12-23 RX ORDER — HYDROCORTISONE SODIUM SUCCINATE 100 MG/2ML
100 INJECTION, POWDER, FOR SOLUTION INTRAMUSCULAR; INTRAVENOUS AS NEEDED
Status: CANCELLED | OUTPATIENT
Start: 2021-12-30

## 2021-12-23 RX ORDER — SODIUM CHLORIDE 0.9 % (FLUSH) 0.9 %
10 SYRINGE (ML) INJECTION AS NEEDED
Status: CANCELLED | OUTPATIENT
Start: 2021-12-30 | End: 2021-12-30

## 2021-12-23 RX ORDER — ALBUTEROL SULFATE 0.83 MG/ML
2.5 SOLUTION RESPIRATORY (INHALATION) AS NEEDED
Status: CANCELLED
Start: 2021-12-30

## 2021-12-23 RX ORDER — DIPHENHYDRAMINE HYDROCHLORIDE 50 MG/ML
50 INJECTION, SOLUTION INTRAMUSCULAR; INTRAVENOUS AS NEEDED
Status: CANCELLED
Start: 2021-12-30

## 2021-12-23 RX ORDER — LORAZEPAM 2 MG/ML
0.5 INJECTION INTRAMUSCULAR
Status: CANCELLED
Start: 2021-12-30

## 2021-12-23 RX ORDER — DIPHENHYDRAMINE HYDROCHLORIDE 50 MG/ML
50 INJECTION, SOLUTION INTRAMUSCULAR; INTRAVENOUS ONCE
Status: CANCELLED | OUTPATIENT
Start: 2021-12-30 | End: 2021-12-30

## 2021-12-23 RX ORDER — SODIUM CHLORIDE 9 MG/ML
10 INJECTION INTRAMUSCULAR; INTRAVENOUS; SUBCUTANEOUS AS NEEDED
Status: CANCELLED | OUTPATIENT
Start: 2021-12-30

## 2021-12-23 RX ORDER — HEPARIN 100 UNIT/ML
300-500 SYRINGE INTRAVENOUS AS NEEDED
Status: CANCELLED | OUTPATIENT
Start: 2021-12-30

## 2021-12-23 RX ORDER — ACETAMINOPHEN 325 MG/1
650 TABLET ORAL AS NEEDED
Status: CANCELLED
Start: 2021-12-30

## 2021-12-23 RX ORDER — ONDANSETRON 2 MG/ML
8 INJECTION INTRAMUSCULAR; INTRAVENOUS AS NEEDED
Status: CANCELLED | OUTPATIENT
Start: 2021-12-30

## 2021-12-23 RX ORDER — SODIUM CHLORIDE 9 MG/ML
25 INJECTION, SOLUTION INTRAVENOUS CONTINUOUS
Status: CANCELLED | OUTPATIENT
Start: 2021-12-30 | End: 2021-12-30

## 2021-12-23 RX ORDER — ONDANSETRON 2 MG/ML
8 INJECTION INTRAMUSCULAR; INTRAVENOUS ONCE
Status: CANCELLED | OUTPATIENT
Start: 2021-12-30 | End: 2021-12-30

## 2021-12-27 ENCOUNTER — TELEPHONE (OUTPATIENT)
Dept: GYNECOLOGY | Age: 71
End: 2021-12-27

## 2021-12-27 DIAGNOSIS — C54.1 PAPILLARY SEROUS ENDOMETRIAL ADENOCARCINOMA (HCC): Primary | ICD-10-CM

## 2021-12-27 RX ORDER — TRAMADOL HYDROCHLORIDE 50 MG/1
50 TABLET ORAL
Qty: 30 TABLET | Refills: 0 | Status: SHIPPED | OUTPATIENT
Start: 2021-12-27 | End: 2022-01-18 | Stop reason: SDUPTHER

## 2021-12-30 ENCOUNTER — HOSPITAL ENCOUNTER (OUTPATIENT)
Dept: INFUSION THERAPY | Age: 71
Discharge: HOME | End: 2021-12-30
Payer: MEDICARE

## 2021-12-30 VITALS
HEIGHT: 61 IN | DIASTOLIC BLOOD PRESSURE: 79 MMHG | OXYGEN SATURATION: 97 % | HEART RATE: 66 BPM | TEMPERATURE: 97.7 F | SYSTOLIC BLOOD PRESSURE: 148 MMHG | WEIGHT: 167.7 LBS | RESPIRATION RATE: 18 BRPM | BODY MASS INDEX: 31.66 KG/M2

## 2021-12-30 DIAGNOSIS — C54.1 PAPILLARY SEROUS ENDOMETRIAL ADENOCARCINOMA (HCC): Primary | ICD-10-CM

## 2021-12-30 PROCEDURE — 96415 CHEMO IV INFUSION ADDL HR: CPT

## 2021-12-30 PROCEDURE — 77030012965 HC NDL HUBR BBMI -A

## 2021-12-30 PROCEDURE — 74011000258 HC RX REV CODE- 258: Performed by: OBSTETRICS & GYNECOLOGY

## 2021-12-30 PROCEDURE — 74011000250 HC RX REV CODE- 250: Performed by: OBSTETRICS & GYNECOLOGY

## 2021-12-30 PROCEDURE — 74011250636 HC RX REV CODE- 250/636: Performed by: OBSTETRICS & GYNECOLOGY

## 2021-12-30 PROCEDURE — 96367 TX/PROPH/DG ADDL SEQ IV INF: CPT

## 2021-12-30 PROCEDURE — 96375 TX/PRO/DX INJ NEW DRUG ADDON: CPT

## 2021-12-30 PROCEDURE — 96417 CHEMO IV INFUS EACH ADDL SEQ: CPT

## 2021-12-30 PROCEDURE — 96413 CHEMO IV INFUSION 1 HR: CPT

## 2021-12-30 RX ORDER — HEPARIN 100 UNIT/ML
300-500 SYRINGE INTRAVENOUS AS NEEDED
Status: ACTIVE | OUTPATIENT
Start: 2021-12-30 | End: 2021-12-30

## 2021-12-30 RX ORDER — DIPHENHYDRAMINE HYDROCHLORIDE 50 MG/ML
50 INJECTION, SOLUTION INTRAMUSCULAR; INTRAVENOUS ONCE
Status: COMPLETED | OUTPATIENT
Start: 2021-12-30 | End: 2021-12-30

## 2021-12-30 RX ORDER — SODIUM CHLORIDE 9 MG/ML
25 INJECTION, SOLUTION INTRAVENOUS CONTINUOUS
Status: DISPENSED | OUTPATIENT
Start: 2021-12-30 | End: 2021-12-30

## 2021-12-30 RX ORDER — ONDANSETRON 2 MG/ML
8 INJECTION INTRAMUSCULAR; INTRAVENOUS ONCE
Status: COMPLETED | OUTPATIENT
Start: 2021-12-30 | End: 2021-12-30

## 2021-12-30 RX ORDER — SODIUM CHLORIDE 9 MG/ML
10 INJECTION INTRAMUSCULAR; INTRAVENOUS; SUBCUTANEOUS AS NEEDED
Status: ACTIVE | OUTPATIENT
Start: 2021-12-30 | End: 2021-12-30

## 2021-12-30 RX ORDER — SODIUM CHLORIDE 0.9 % (FLUSH) 0.9 %
10 SYRINGE (ML) INJECTION AS NEEDED
Status: DISPENSED | OUTPATIENT
Start: 2021-12-30 | End: 2021-12-30

## 2021-12-30 RX ADMIN — Medication 10 ML: at 13:35

## 2021-12-30 RX ADMIN — CARBOPLATIN 390 MG: 10 INJECTION INTRAVENOUS at 13:00

## 2021-12-30 RX ADMIN — HEPARIN 500 UNITS: 100 SYRINGE at 13:35

## 2021-12-30 RX ADMIN — Medication 10 ML: at 08:15

## 2021-12-30 RX ADMIN — DEXAMETHASONE SODIUM PHOSPHATE 12 MG: 4 INJECTION, SOLUTION INTRAMUSCULAR; INTRAVENOUS at 09:15

## 2021-12-30 RX ADMIN — FAMOTIDINE 20 MG: 10 INJECTION INTRAVENOUS at 08:33

## 2021-12-30 RX ADMIN — DIPHENHYDRAMINE HYDROCHLORIDE 50 MG: 50 INJECTION, SOLUTION INTRAMUSCULAR; INTRAVENOUS at 08:36

## 2021-12-30 RX ADMIN — ONDANSETRON 8 MG: 2 INJECTION INTRAMUSCULAR; INTRAVENOUS at 08:29

## 2021-12-30 RX ADMIN — FOSAPREPITANT DIMEGLUMINE 150 MG: 150 INJECTION, POWDER, LYOPHILIZED, FOR SOLUTION INTRAVENOUS at 09:15

## 2021-12-30 RX ADMIN — PACLITAXEL 317 MG: 6 INJECTION, SOLUTION INTRAVENOUS at 10:00

## 2021-12-30 RX ADMIN — SODIUM CHLORIDE 25 ML/HR: 9 INJECTION, SOLUTION INTRAVENOUS at 08:29

## 2021-12-30 NOTE — PROGRESS NOTES
800- Pt arrived to [facility] ambulatory in no acute distress for C3 Taxol/Carbo. Assessment unremarkable except mild constipation and fatigue. R chest port accessed without issue and positive blood return noted. Labs reviewed from 12/28/21 and within treatment parameters. Patient denied having any symptoms of COVID-19, i.e. SOB, coughing, fever, or generally not feeling well.   Also denies having been exposed to COVID-19 recently or having had any recent contact with family/friend that has a pending COVID test.     The following medications administered:  Medications Administered     0.9% sodium chloride infusion     Admin Date  12/30/2021 Action  New Bag Dose  25 mL/hr Rate  25 mL/hr Route  IntraVENous Administered By  Dick Alexander RN          CARBOplatin (PARAPLATIN) 390 mg in 0.9% sodium chloride 250 mL, overfill volume 25 mL chemo infusion (GOG)     Admin Date  12/30/2021 Action  New Bag Dose  390 mg Rate  628 mL/hr Route  IntraVENous Administered By  Dick Alexander RN          dexamethasone (DECADRON) 12 mg in 0.9% sodium chloride 50 mL IVPB     Admin Date  12/30/2021 Action  New Bag Dose  12 mg Route  IntraVENous Administered By  Dick Alexander RN          diphenhydrAMINE (BENADRYL) injection 50 mg     Admin Date  12/30/2021 Action  Given Dose  50 mg Route  IntraVENous Administered By  Dick Alexander RN          famotidine (PF) (PEPCID) 20 mg in 0.9% sodium chloride 10 mL injection     Admin Date  12/30/2021 Action  Given Dose  20 mg Route  IntraVENous Administered By  Dick Alexander RN          fosaprepitant (EMEND) 150 mg in 0.9% sodium chloride 150 mL IVPB     Admin Date  12/30/2021 Action  New Bag Dose  150 mg Rate  450 mL/hr Route  IntraVENous Administered By  Dick Alexander RN          heparin (porcine) pf 300-500 Units     Admin Date  12/30/2021 Action  Given Dose  500 Units Route  InterCATHeter Administered By  Dick Alexander RN          ondansetron [facility] injection 8 mg     Admin Date  12/30/2021 Action  Given Dose  8 mg Route  IntraVENous Administered By  Roslynn Severance, TRISTAN          PACLitaxeL (TAXOL) 317 mg in 0.9% sodium chloride 500 mL, overfill volume 50 mL chemo infusion     Admin Date  12/30/2021 Action  New Bag Dose  317 mg Rate  200.9 mL/hr Route  IntraVENous Administered By  Roslynn Severance, TRISTAN          sodium chloride (NS) flush 10 mL     Admin Date  12/30/2021 Action  Given Dose  10 mL Route  IntraVENous Administered By  Roslynn Severance, RN           Admin Date  12/30/2021 Action  Given Dose  10 mL Route  IntraVENous Administered By  Roslynn Severance, TRISTAN              Patient Vitals for the past 12 hrs:   Temp Pulse Resp BP SpO2   12/30/21 1329 -- 66 18 (!) 148/79 --   12/30/21 0803 97.7 °F (36.5 °C) 84 18 (!) 154/78 97 %     1340- Pt tolerated treatment well, no adverse reactions noted. Port flushed per policy and de-accessed, 2x2 and tape placed. Pt discharged ambulatory in no acute distress, accompanied by son. Next appointment 1/20/22.

## 2022-01-17 NOTE — PROGRESS NOTES
Pre chemo for C4 Taxol/Carbo at [facility] on 1/20/2021, pt states her blood pressure has been running high, taking advil and allergy medication, pt report back pain that is worse in the morning, 8/10 on pain scale    1. Have you been to the ER, urgent care clinic since your last visit? Hospitalized since your last visit?  no    2. Have you seen or consulted any other health care providers outside of the [address] [place] [address] since your last visit? Include any pap smears or colon screening.    no

## 2022-01-18 ENCOUNTER — OFFICE VISIT (OUTPATIENT)
Dept: GYNECOLOGY | Age: 72
End: 2022-01-18
Payer: MEDICARE

## 2022-01-18 VITALS
DIASTOLIC BLOOD PRESSURE: 76 MMHG | BODY MASS INDEX: 32 KG/M2 | SYSTOLIC BLOOD PRESSURE: 182 MMHG | WEIGHT: 163 LBS | HEIGHT: 60 IN | HEART RATE: 70 BPM

## 2022-01-18 DIAGNOSIS — C54.1 PAPILLARY SEROUS ENDOMETRIAL ADENOCARCINOMA (HCC): ICD-10-CM

## 2022-01-18 DIAGNOSIS — C54.1 PAPILLARY SEROUS ENDOMETRIAL ADENOCARCINOMA (HCC): Primary | ICD-10-CM

## 2022-01-18 DIAGNOSIS — C54.1 ENDOMETRIAL CANCER (HCC): ICD-10-CM

## 2022-01-18 DIAGNOSIS — Z51.11 ENCOUNTER FOR ANTINEOPLASTIC CHEMOTHERAPY: ICD-10-CM

## 2022-01-18 PROCEDURE — G8427 DOCREV CUR MEDS BY ELIG CLIN: HCPCS | Performed by: OBSTETRICS & GYNECOLOGY

## 2022-01-18 PROCEDURE — G8754 DIAS BP LESS 90: HCPCS | Performed by: OBSTETRICS & GYNECOLOGY

## 2022-01-18 PROCEDURE — G8417 CALC BMI ABV UP PARAM F/U: HCPCS | Performed by: OBSTETRICS & GYNECOLOGY

## 2022-01-18 PROCEDURE — G8432 DEP SCR NOT DOC, RNG: HCPCS | Performed by: OBSTETRICS & GYNECOLOGY

## 2022-01-18 PROCEDURE — G8399 PT W/DXA RESULTS DOCUMENT: HCPCS | Performed by: OBSTETRICS & GYNECOLOGY

## 2022-01-18 PROCEDURE — G0463 HOSPITAL OUTPT CLINIC VISIT: HCPCS | Performed by: OBSTETRICS & GYNECOLOGY

## 2022-01-18 PROCEDURE — 1101F PT FALLS ASSESS-DOCD LE1/YR: CPT | Performed by: OBSTETRICS & GYNECOLOGY

## 2022-01-18 PROCEDURE — 1090F PRES/ABSN URINE INCON ASSESS: CPT | Performed by: OBSTETRICS & GYNECOLOGY

## 2022-01-18 PROCEDURE — G8536 NO DOC ELDER MAL SCRN: HCPCS | Performed by: OBSTETRICS & GYNECOLOGY

## 2022-01-18 PROCEDURE — G8753 SYS BP > OR = 140: HCPCS | Performed by: OBSTETRICS & GYNECOLOGY

## 2022-01-18 PROCEDURE — 3017F COLORECTAL CA SCREEN DOC REV: CPT | Performed by: OBSTETRICS & GYNECOLOGY

## 2022-01-18 PROCEDURE — 99214 OFFICE O/P EST MOD 30 MIN: CPT | Performed by: OBSTETRICS & GYNECOLOGY

## 2022-01-18 RX ORDER — IBUPROFEN 200 MG
400 TABLET ORAL AS NEEDED
COMMUNITY

## 2022-01-18 RX ORDER — SODIUM CHLORIDE 9 MG/ML
25 INJECTION, SOLUTION INTRAVENOUS CONTINUOUS
Status: CANCELLED | OUTPATIENT
Start: 2022-01-20 | End: 2022-01-20

## 2022-01-18 RX ORDER — DIPHENHYDRAMINE HYDROCHLORIDE 50 MG/ML
25 INJECTION, SOLUTION INTRAMUSCULAR; INTRAVENOUS AS NEEDED
Status: CANCELLED
Start: 2022-01-20

## 2022-01-18 RX ORDER — LORAZEPAM 2 MG/ML
0.5 INJECTION INTRAMUSCULAR
Status: CANCELLED
Start: 2022-01-20

## 2022-01-18 RX ORDER — HEPARIN 100 UNIT/ML
300-500 SYRINGE INTRAVENOUS AS NEEDED
Status: CANCELLED | OUTPATIENT
Start: 2022-01-20

## 2022-01-18 RX ORDER — EPINEPHRINE 1 MG/ML
0.3 INJECTION, SOLUTION, CONCENTRATE INTRAVENOUS AS NEEDED
Status: CANCELLED | OUTPATIENT
Start: 2022-01-20

## 2022-01-18 RX ORDER — DIPHENHYDRAMINE HYDROCHLORIDE 50 MG/ML
50 INJECTION, SOLUTION INTRAMUSCULAR; INTRAVENOUS AS NEEDED
Status: CANCELLED
Start: 2022-01-20

## 2022-01-18 RX ORDER — ALBUTEROL SULFATE 0.83 MG/ML
2.5 SOLUTION RESPIRATORY (INHALATION) AS NEEDED
Status: CANCELLED
Start: 2022-01-20

## 2022-01-18 RX ORDER — HYDROCORTISONE SODIUM SUCCINATE 100 MG/2ML
100 INJECTION, POWDER, FOR SOLUTION INTRAMUSCULAR; INTRAVENOUS AS NEEDED
Status: CANCELLED | OUTPATIENT
Start: 2022-01-20

## 2022-01-18 RX ORDER — ONDANSETRON 2 MG/ML
8 INJECTION INTRAMUSCULAR; INTRAVENOUS AS NEEDED
Status: CANCELLED | OUTPATIENT
Start: 2022-01-20

## 2022-01-18 RX ORDER — DIPHENHYDRAMINE HYDROCHLORIDE 50 MG/ML
50 INJECTION, SOLUTION INTRAMUSCULAR; INTRAVENOUS ONCE
Status: CANCELLED | OUTPATIENT
Start: 2022-01-20 | End: 2022-01-20

## 2022-01-18 RX ORDER — ONDANSETRON 2 MG/ML
8 INJECTION INTRAMUSCULAR; INTRAVENOUS ONCE
Status: CANCELLED | OUTPATIENT
Start: 2022-01-20 | End: 2022-01-20

## 2022-01-18 RX ORDER — SODIUM CHLORIDE 0.9 % (FLUSH) 0.9 %
10 SYRINGE (ML) INJECTION AS NEEDED
Status: CANCELLED | OUTPATIENT
Start: 2022-01-20 | End: 2022-01-20

## 2022-01-18 RX ORDER — SODIUM CHLORIDE 9 MG/ML
10 INJECTION INTRAMUSCULAR; INTRAVENOUS; SUBCUTANEOUS AS NEEDED
Status: CANCELLED | OUTPATIENT
Start: 2022-01-20

## 2022-01-18 RX ORDER — ONDANSETRON 4 MG/1
4 TABLET, ORALLY DISINTEGRATING ORAL
Qty: 30 TABLET | Refills: 2 | Status: SHIPPED | OUTPATIENT
Start: 2022-01-18 | End: 2022-08-17 | Stop reason: CLARIF

## 2022-01-18 RX ORDER — ACETAMINOPHEN 325 MG/1
650 TABLET ORAL AS NEEDED
Status: CANCELLED
Start: 2022-01-20

## 2022-01-18 NOTE — TELEPHONE ENCOUNTER
Pt requesting refill on zofran and tramadol   zofran rx sent to pharmacy, tramadol rx sent to dr. Jorge Herzog to sign

## 2022-01-18 NOTE — PROGRESS NOTES
[address] [facility] [address], [address] [facility] [phone]  F [phone]    Office Note  Patient ID:  Name:  Deena Ragland  MRN:  [id number]  :  1950/71 y.o. Date:  2022      HISTORY OF PRESENT ILLNESS:  Ms. Deena Ragland is a 70 y.o. female who on 2021 underwent Robotic-assisted total laparoscopic hysterectomy, Bilateral salpingo-oophorectomy, Bilateral pelvic sentinel lymph node mapping and biopsy. 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 Negative washings. Stromal cervical involvement. Negative SLNs. 2mm out of 10mm myometrial invasion. Parametrial involvement of tumor. Completed concurrent chemo-EBRT + VBT on 2021. Initiated on adjuvant carboplatin + paclitaxel on 2021. Presents today for consideration of cycle 4. Tolerated cycle 1-3 well overall. Reports some bone pain controlled with tylenol and tramadol. Tylenol did not work alone. Reports some nausea, but denies emesis with her anti-emetic medications. Denies fevers or chills. Mild neuropathy. Reports some lower back pain that is worse in the mornings. Initial History:  Ms. Deena Ragland is a 70 y.o.  postmenopausal female who presents as a new patient from Dr. Jeny Jain for high-grade endometrial cancer. The patient reports vaginal spotting back in April, which resulted in her seeing Dr. Jeny Jain. Pelvic ultrasound on 2021 demonstrated 12.5mm stripe. Hysteroscopy/D&C on 2021 demonstrated \"high-grade endometrial carcinoma, favor serous carcinoma\". She was subsequently referred to our office for further discussion and management. Denies pelvic or abdominal pain/bloating, change in appetite or bowel habits, nausea, vomiting, CP, SOB, hematuria, hematochezia, or urinary symptoms. Pertinent PMH/PSH: DJD, h/o  x 2     Active, no restrictions.      Imaging Review:   CT C/A/P 7/16/2021:  FINDINGS:   THYROID: No nodule. MEDIASTINUM: No mass or lymphadenopathy. STEPHANIE: No mass or lymphadenopathy. THORACIC AORTA: No dissection or aneurysm. MAIN PULMONARY ARTERY: Normal in caliber. TRACHEA/BRONCHI: Patent. ESOPHAGUS: No wall thickening or dilatation. HEART: Normal in size. PLEURA: No effusion or pneumothorax. LUNGS: No nodule, mass, or airspace disease. There is minor atelectasis/scarring  right upper lobe  LIVER: No mass or biliary dilatation. There is hepatic steatosis. There is a 5  mm low-density in segment 4A and segment 5 to small to fully characterize but  most likely tiny cysts. GALLBLADDER: Patient status post cholecystectomy  SPLEEN: No mass. PANCREAS: No mass or ductal dilatation. ADRENALS: Unremarkable. KIDNEYS: No mass, calculus, or hydronephrosis. There is a retroaortic left renal  vein  STOMACH: Unremarkable. SMALL BOWEL: No dilatation or wall thickening. COLON: No dilatation or wall thickening. APPENDIX: Unremarkable. PERITONEUM: No ascites or pneumoperitoneum. RETROPERITONEUM: No lymphadenopathy or aortic aneurysm. There is an 8 mm lymph  node at the aortic bifurcation on the left. There is mild stenosis origin of the  SMA  REPRODUCTIVE ORGANS: Uterus and pelvis are obscured by artifact related to  bilateral total hip replacements. URINARY BLADDER: No mass or calculus. BONES: No destructive bone lesion. There is a right shoulder effusion  ADDITIONAL COMMENTS: N/A     IMPRESSION     1. CT of the chest demonstrates no definite evidence of metastatic disease. No  acute abnormality identified. 2. CT of the abdomen and pelvis demonstrates no definite evidence of metastatic  disease. The pelvis is obscured by artifact related to total hip replacements. There is hepatic steatosis. 2 subcentimeter low densities in the liver are too  small to characterize but most likely represent tiny cysts.   There is an 8mm lymph node just to the left of the aortic bifurcation which is  within normal limits. Pelvic ultrasound 4/20/2021:  Impression: Normal uterus with 12.5mm stripe. Normal right ovary. Non visible left ovary with otherwise normal left adnexa. No free fluid. Pathology Review:   7/29/2021:  * * *FINAL PATHOLOGIC DIAGNOSIS* * *   1.  Right pelvic sentinel lymph node, biopsy:        One lymph node, negative for carcinoma, levels and cytokeratin stain   examined (0/1)   2.  Left pelvic sentinel lymph node #1, biopsy:        One lymph node, negative for carcinoma, levels and cytokeratin stain   examined (0/1)   3.  Left pelvic sentinel lymph node, biopsy:        One lymph node, negative for carcinoma, levels and cytokeratin stain   examined (0/1)   4. Uterus, cervix, bilateral fallopian tubes and ovaries; simple   hysterectomy, BSO:        Endometrium: Uterine serous carcinoma, see synoptic report        Cervix: Serous carcinoma focally involves endocervical stroma,   predominantly as lymphovascular                invasion        Bilateral fallopian tubes: No pathologic diagnosis     ENDOMETRIUM    SPECIMEN       Procedure: Total hysterectomy and bilateral salpingo-oophorectomy       Specimen Integrity: Intact    TUMOR       Histologic Type: Serous carcinoma       Myometrial Invasion: Present           Depth of Myometrial Invasion (Millimeters): 2 mm           Myometrial Thickness (Millimeters): 10 mm           Percentage of Myometrial Invasion: 20%       Uterine Serosa Involvement: Not identified       Lower Uterine Segment Involvement: Present, myoinvasive       Cervical Stromal Involvement: Present       Other Tissue / Organ Involvement: Not identified       Peritoneal Ascitic Fluid: Atypical, favor benign reactive process       Lymphovascular Invasion: Present    MARGINS       Margins: Parametrium and focal paracervix involved by tumor           Parametrial and focal Paracervical Margin: Involved by carcinoma    LYMPH NODES       Lymph Node Status:  All lymph nodes negative for tumor cells           Total Number of Pelvic Nodes Examined: 3           Number of Pelvic [place] Nodes Examined: 3           Total Number of Para-aortic Nodes Examined: 0           Number of Para-aortic [place] Nodes Examined: 0    PATHOLOGIC STAGE CLASSIFICATION (pTNM, AJCC 8th Edition)       Primary Tumor (pT): pT2       Regional Lymph Nodes Modifier: (sn)       Regional Lymph Nodes (pN): pN0    FIGO STAGE       FIGO Stage: II   * * *Comment* * *   Immunohistochemical stains with appropriate controls show tumor positive   for P16, P53 with Ki-67 proliferation index of approximately 50%.  These   findings support diagnosis of endometrial serous carcinoma. 7/7/2021:  FINAL PATHOLOGIC DIAGNOSIS   Endometrium, curettings:   High-grade endometrial carcinoma, favor serous carcinoma   See comment   Comment   The biopsy contains a malignant glandular neoplasm with a high nuclear grade, papillary growth pattern, and areas of necrosis. Morphologic features suggest a high-grade endometrial carcinoma and are most compatible with a diagnosis of serous carcinoma. ROS:  A comprehensive review of systems was negative except for that written in the History of Present Illness. , 10 point ROS    OB/GYN ROS:  Per HPI    ECOG ndGndrndanddndend:nd nd2nd Problem List:  Patient Active Problem List    Diagnosis Date Noted    Opioid use, unspecified with unspecified opioid-induced disorder 12/07/2021    Encounter for antineoplastic chemotherapy 10/13/2021    Essential hypertension 07/27/2021    Abnormal finding on EKG 07/27/2021    Papillary serous endometrial adenocarcinoma ([facility] [address] 07/14/2021    PUD (peptic ulcer disease)     GERD (gastroesophageal reflux disease)     Arthritis     Lichen planus     Hip arthritis 04/04/2016    DJD (degenerative joint disease) of hip 01/04/2013     PMH:  Past Medical History:   Diagnosis Date    Arthritis     OSTEO HIP JULY.     GERD (gastroesophageal reflux disease)     Lichen planus oral    PUD (peptic ulcer disease)       PSH:  Past Surgical History:   Procedure Laterality Date    COLONOSCOPY N/A 10/16/2017    COLONOSCOPY performed by Madan Perez [facility] at [facility] [place]  10/16/2017         HX  SECTION      X2    HX CHOLECYSTECTOMY      HX DILATION AND CURETTAGE  2021    High-grade endometrial carcinoma, favor serous carcinoma    HX HEENT      EXTRACTION OF WISDOM TEETH X 4    HX ORTHOPAEDIC      right foot, bunionectomy    HX ORTHOPAEDIC      LEFTl hip arthroplasty    HX ORTHOPAEDIC      RIGHT hip arthroplasty    HX TUBAL LIGATION      IR INSERT TUNL CVC W PORT OVER 5 YEARS  10/14/2021    ID COLONOSCOPY FLX DX W/COLLJ SPEC WHEN PFRMD  1/20/2012     x 2    UPPER GI ENDOSCOPY,BIOPSY  2016         UPPER GI ENDOSCOPY,DILATN W GUIDE  2016           Social History:  Social History     Tobacco Use    Smoking status: Former Smoker     Packs/day: 0.25     Years: 0.50     Pack years: 0.12     Quit date: 1967     Years since quittin.1    Smokeless tobacco: Never Used    Tobacco comment: brief   Substance Use Topics    Alcohol use: Not Currently     Comment: VERY RARELY       Family History:  Family History   Problem Relation Age of Onset    Cancer Maternal Grandmother 79        colon cancer    Hypertension Mother     Cancer Mother         pacreatic cancer    Cancer Paternal Aunt         ovarian cancer      Medications: (reviewed)  Current Outpatient Medications   Medication Sig    diphenhydramine HCl (ALLERGY MEDICATION PO) Take  by mouth.  ibuprofen (AdviL) 200 mg tablet Take  by mouth.  ondansetron (ZOFRAN ODT) 4 mg disintegrating tablet Take 1 Tablet by mouth every eight (8) hours as needed for Nausea.  Indications: nausea and vomiting caused by cancer drugs    lidocaine-prilocaine (EMLA) topical cream APPLY SMALL AMOUNT OVER PORT AREA AND COVER WITH BAND AID ONE HOUR BEFORE CHEMO    dexAMETHasone (DECADRON) 4 mg tablet Take 2 tablets with breakfast the day before chemo and for 2 days after chemo    Nystop powder as needed.  acetaminophen (TYLENOL) 325 mg tablet Take 2 Tablets by mouth every six (6) hours as needed for Pain.  melatonin 1 mg tablet Take 1 mg by mouth nightly.  cyanocobalamin (VITAMIN B-12) 1,000 mcg tablet Take 1,000 mcg by mouth daily.  cholecalciferol, vitamin D3, 2,000 unit tab Take 2,000 Units by mouth daily.  BIOTIN PO Take 5,000 mcg by mouth daily.  pyridoxine (VITAMIN B-6) 100 mg tablet Take 100 mg by mouth daily.  CRANIAL PROSTHESIS misc Cranial Prosthesis  Diagnosis code:   C54.1  Cpt code:    (Patient not taking: Reported on 12/7/2021)    diphenoxylate-atropine (LomotiL) 2.5-0.025 mg per tablet Take 1 Tablet by mouth four (4) times daily as needed for Diarrhea. Max Daily Amount: 4 Tablets.  cephALEXin (KEFLEX) 500 mg capsule Dental work (Patient not taking: Reported on 9/7/2021)    ibuprofen (MOTRIN) 600 mg tablet Take 1 Tablet by mouth every six (6) hours as needed for Pain. (Patient not taking: Reported on 9/7/2021)    methylPREDNISolone (MedroL) 4 mg tab Take 4 mg by mouth daily as needed. Prn for lichen planus (Patient not taking: Reported on 10/14/2021)     No current facility-administered medications for this visit. Allergies: (reviewed)  Allergies   Allergen Reactions    Codeine Nausea and Vomiting    Penicillins Rash     Rash & dizzy    Sulfa (Sulfonamide Antibiotics) Other (comments)     General redness all over skin    Ciprofloxacin Itching and Other (comments)     Extreme dizziness and rash    Other Medication Nausea and Vomiting     PRESCRIPTION STRENGTH ANTI INFLAMMATORY MEDS         OBJECTIVE:    Physical Exam:  Visit Vitals  BP (!) 182/76 (BP 1 Location: Left arm, BP Patient Position: Sitting)   Pulse 70   Ht 5' (1.524 m)   Wt 163 lb (73.9 kg)   BMI 31.83 kg/m²      General: Alert and oriented. No acute distress. Well-nourished  Gastrointestinal: soft, non-tender, non-distended, no masses or organomegaly. Well-healed port-site incisions. Musculoskeletal: normal gait. No joint tenderness, deformity or swelling. No muscular tenderness. Extremities: extremities normal, atraumatic, no cyanosis or edema. Pelvic: exam chaperoned by nurse. Normal appearing external genitalia. On speculum exam, the vaginal cuff is intact and healing well. On bimanual, the uterus and cervix are surgically absent. Vaginal cuff intact without defect. No evidence of masses or nodularity on bimanual exam. Deferred rectovaginal exam.   Neuro: Grossly intact. Normal gait and movement. No acute deficit  Skin: No evidence of rashes or skin changes. IMPRESSION/PLAN:    Ms. Kourtney Owen is a 70 y.o.  female who on 7/29/2021 underwent Robotic-assisted total laparoscopic hysterectomy, Bilateral salpingo-oophorectomy, Bilateral pelvic sentinel lymph node mapping and biopsy. 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 Negative washings. Stromal cervical involvement. Negative SLNs. 2mm out of 10mm myometrial invasion. Parametrial involvement of tumor. Problems:     Patient Active Problem List    Diagnosis Date Noted    Opioid use, unspecified with unspecified opioid-induced disorder 12/07/2021    Encounter for antineoplastic chemotherapy 10/13/2021    Essential hypertension 07/27/2021    Abnormal finding on EKG 07/27/2021    Papillary serous endometrial adenocarcinoma ([facility] [address] 07/14/2021    PUD (peptic ulcer disease)     GERD (gastroesophageal reflux disease)     Arthritis     Lichen planus     Hip arthritis 04/04/2016    DJD (degenerative joint disease) of hip 01/04/2013     Reviewed patient's course to date. Completed concurrent chemo-EBRT + VBT on 11/4/2021. Initiated on adjuvant carboplatin + paclitaxel on 11/18/2021. Presents today for consideration of cycle 4. Tolerated cycle 1-3 well overall. Please see my prior notes for our full discussion regarding available treatment options. I again counseled the patient that our best time to beat this cancer is now at the start rather than during a recurrence. I believe the most aggressive form of treatment would be concurrent chemoradiation, and I believe this may be her best option given her cervical and parametrial tumor involvement. She has completed chemoradiation. Plan proceed with carboplatin AUC 5 + paclitaxel 175mg/m2 for 4 cycles. Plan to repeat CT C/A/P at the end of treatment. All questions and concerns were addressed with the patient and she is comfortable with the plan. An electronic signature was used to authenticate this note.      Alva Wyatt MD

## 2022-01-18 NOTE — LETTER
NOTIFICATION RETURN TO WORK / SCHOOL    1/18/2022 8:57 AM    Ms. Yamil Paez  [address] [place] [id number]      To Whom It May Concern:    Yamil Paez is currently under the care of Basilio Fernández. Due to her treatment schedule and chemotherapy related side effects, Ms. Faviola Downing is unable to work. Her estimated return to work date is 2/22/2022. If there are questions or concerns please have the patient contact our office.         Sincerely,      Shannon Payan MD

## 2022-01-20 ENCOUNTER — HOSPITAL ENCOUNTER (OUTPATIENT)
Dept: INFUSION THERAPY | Age: 72
Discharge: HOME | End: 2022-01-20
Payer: MEDICARE

## 2022-01-20 VITALS
HEART RATE: 64 BPM | SYSTOLIC BLOOD PRESSURE: 152 MMHG | HEIGHT: 61 IN | BODY MASS INDEX: 31 KG/M2 | TEMPERATURE: 97.7 F | DIASTOLIC BLOOD PRESSURE: 86 MMHG | RESPIRATION RATE: 16 BRPM | OXYGEN SATURATION: 100 % | WEIGHT: 164.2 LBS

## 2022-01-20 DIAGNOSIS — C54.1 PAPILLARY SEROUS ENDOMETRIAL ADENOCARCINOMA (HCC): Primary | ICD-10-CM

## 2022-01-20 PROCEDURE — 74011000258 HC RX REV CODE- 258: Performed by: OBSTETRICS & GYNECOLOGY

## 2022-01-20 PROCEDURE — 96367 TX/PROPH/DG ADDL SEQ IV INF: CPT

## 2022-01-20 PROCEDURE — 96375 TX/PRO/DX INJ NEW DRUG ADDON: CPT

## 2022-01-20 PROCEDURE — 77030012965 HC NDL HUBR BBMI -A

## 2022-01-20 PROCEDURE — 96413 CHEMO IV INFUSION 1 HR: CPT

## 2022-01-20 PROCEDURE — 74011250636 HC RX REV CODE- 250/636: Performed by: OBSTETRICS & GYNECOLOGY

## 2022-01-20 PROCEDURE — 74011000250 HC RX REV CODE- 250: Performed by: OBSTETRICS & GYNECOLOGY

## 2022-01-20 PROCEDURE — 96415 CHEMO IV INFUSION ADDL HR: CPT

## 2022-01-20 PROCEDURE — 96417 CHEMO IV INFUS EACH ADDL SEQ: CPT

## 2022-01-20 RX ORDER — HEPARIN 100 UNIT/ML
300-500 SYRINGE INTRAVENOUS AS NEEDED
Status: ACTIVE | OUTPATIENT
Start: 2022-01-20 | End: 2022-01-20

## 2022-01-20 RX ORDER — SODIUM CHLORIDE 9 MG/ML
10 INJECTION INTRAMUSCULAR; INTRAVENOUS; SUBCUTANEOUS AS NEEDED
Status: ACTIVE | OUTPATIENT
Start: 2022-01-20 | End: 2022-01-20

## 2022-01-20 RX ORDER — DIPHENHYDRAMINE HYDROCHLORIDE 50 MG/ML
50 INJECTION, SOLUTION INTRAMUSCULAR; INTRAVENOUS ONCE
Status: COMPLETED | OUTPATIENT
Start: 2022-01-20 | End: 2022-01-20

## 2022-01-20 RX ORDER — TRAMADOL HYDROCHLORIDE 50 MG/1
50 TABLET ORAL
Qty: 30 TABLET | Refills: 0 | Status: SHIPPED | OUTPATIENT
Start: 2022-01-20 | End: 2022-01-30

## 2022-01-20 RX ORDER — SODIUM CHLORIDE 0.9 % (FLUSH) 0.9 %
10 SYRINGE (ML) INJECTION AS NEEDED
Status: DISPENSED | OUTPATIENT
Start: 2022-01-20 | End: 2022-01-20

## 2022-01-20 RX ORDER — ONDANSETRON 2 MG/ML
8 INJECTION INTRAMUSCULAR; INTRAVENOUS ONCE
Status: COMPLETED | OUTPATIENT
Start: 2022-01-20 | End: 2022-01-20

## 2022-01-20 RX ORDER — SODIUM CHLORIDE 9 MG/ML
25 INJECTION, SOLUTION INTRAVENOUS CONTINUOUS
Status: DISPENSED | OUTPATIENT
Start: 2022-01-20 | End: 2022-01-20

## 2022-01-20 RX ADMIN — SODIUM CHLORIDE, PRESERVATIVE FREE 10 ML: 5 INJECTION INTRAVENOUS at 13:35

## 2022-01-20 RX ADMIN — DIPHENHYDRAMINE HYDROCHLORIDE 50 MG: 50 INJECTION, SOLUTION INTRAMUSCULAR; INTRAVENOUS at 09:00

## 2022-01-20 RX ADMIN — FAMOTIDINE 20 MG: 10 INJECTION INTRAVENOUS at 08:56

## 2022-01-20 RX ADMIN — SODIUM CHLORIDE, PRESERVATIVE FREE 10 ML: 5 INJECTION INTRAVENOUS at 08:15

## 2022-01-20 RX ADMIN — CARBOPLATIN 420 MG: 10 INJECTION INTRAVENOUS at 13:00

## 2022-01-20 RX ADMIN — ONDANSETRON 8 MG: 2 INJECTION INTRAMUSCULAR; INTRAVENOUS at 08:51

## 2022-01-20 RX ADMIN — HEPARIN 500 UNITS: 100 SYRINGE at 13:35

## 2022-01-20 RX ADMIN — SODIUM CHLORIDE 10 ML: 9 INJECTION, SOLUTION INTRAMUSCULAR; INTRAVENOUS; SUBCUTANEOUS at 08:15

## 2022-01-20 RX ADMIN — DEXAMETHASONE SODIUM PHOSPHATE 12 MG: 4 INJECTION, SOLUTION INTRAMUSCULAR; INTRAVENOUS at 09:15

## 2022-01-20 RX ADMIN — PACLITAXEL 317 MG: 6 INJECTION, SOLUTION INTRAVENOUS at 10:00

## 2022-01-20 RX ADMIN — SODIUM CHLORIDE 25 ML/HR: 9 INJECTION, SOLUTION INTRAVENOUS at 08:49

## 2022-01-20 RX ADMIN — FOSAPREPITANT 150 MG: 150 INJECTION, POWDER, LYOPHILIZED, FOR SOLUTION INTRAVENOUS at 09:33

## 2022-01-20 NOTE — PROGRESS NOTES
800- Pt arrived to [facility] ambulatory in no acute distress for C4 Taxol/Carbo. Assessment unremarkable except constipation and fatigue. R chest port accessed without issue and positive blood return noted. Labs reviewed from 1/18/22 and within treatment parameters. Patient denied having any symptoms of COVID-19, i.e. SOB, coughing, fever, or generally not feeling well.   Also denies having been exposed to COVID-19 recently or having had any recent contact with family/friend that has a pending COVID test.     The following medications administered:  Medications Administered     0.9% sodium chloride infusion     Admin Date  01/20/2022 Action  New Bag Dose  25 mL/hr Rate  25 mL/hr Route  IntraVENous Administered By  Dick Alexander RN          0.9% sodium chloride injection 10 mL     Admin Date  01/20/2022 Action  Given Dose  10 mL Route  IntraVENous Administered By  Dick Alexander RN          CARBOplatin (PARAPLATIN) 420 mg in 0.9% sodium chloride 250 mL, overfill volume 25 mL chemo infusion (GOG)     Admin Date  01/20/2022 Action  New Bag Dose  420 mg Rate  634 mL/hr Route  IntraVENous Administered By  Dick Alexander RN          dexamethasone (DECADRON) 12 mg in 0.9% sodium chloride 50 mL IVPB     Admin Date  01/20/2022 Action  New Bag Dose  12 mg Route  IntraVENous Administered By  Dick Alexander RN          diphenhydrAMINE (BENADRYL) injection 50 mg     Admin Date  01/20/2022 Action  Given Dose  50 mg Route  IntraVENous Administered By  Dick Alexander RN          famotidine (PF) (PEPCID) 20 mg in 0.9% sodium chloride 10 mL injection     Admin Date  01/20/2022 Action  Given Dose  20 mg Route  IntraVENous Administered By  Dick Alexander RN          fosaprepitant (EMEND) 150 mg in 0.9% sodium chloride 150 mL IVPB     Admin Date  01/20/2022 Action  New Bag Dose  150 mg Rate  450 mL/hr Route  IntraVENous Administered By  Dick Alexander RN          heparin (porcine) pf 300-500 Units Admin Date  01/20/2022 Action  Given Dose  500 Units Route  InterCATHeter Administered By  Gwenda Cockayne, RN          ondansetron [facility]) injection 8 mg     Admin Date  01/20/2022 Action  Given Dose  8 mg Route  IntraVENous Administered By  Gwenda Cockayne, RN          PACLitaxeL (TAXOL) 317 mg in 0.9% sodium chloride 500 mL, overfill volume 50 mL chemo infusion     Admin Date  01/20/2022 Action  New Bag Dose  317 mg Rate  200.9 mL/hr Route  IntraVENous Administered By  Gwenda Cockayne, RN          sodium chloride (NS) flush 10 mL     Admin Date  01/20/2022 Action  Given Dose  10 mL Route  IntraVENous Administered By  Gwenda Cockayne, RN           Admin Date  01/20/2022 Action  Given Dose  10 mL Route  IntraVENous Administered By  Gwenda Cockayne, RN              Patient Vitals for the past 12 hrs:   Temp Pulse Resp BP SpO2   01/20/22 1328 -- 64 16 (!) 152/86 --   01/20/22 0903 -- 61 -- (!) 150/80 --   01/20/22 0804 97.7 °F (36.5 °C) 72 18 (!) 196/87 100 %     1335- Pt tolerated treatment well, no adverse reactions noted. Port flushed per policy and de-accessed, 2x2 and tape placed. Pt discharged ambulatory in no acute distress, accompanied by son. No further appts scheduled at this time.

## 2022-02-03 ENCOUNTER — HOSPITAL ENCOUNTER (OUTPATIENT)
Dept: CT IMAGING | Age: 72
Discharge: HOME | End: 2022-02-03
Attending: OBSTETRICS & GYNECOLOGY
Payer: MEDICARE

## 2022-02-03 DIAGNOSIS — C54.1 PAPILLARY SEROUS ENDOMETRIAL ADENOCARCINOMA (HCC): ICD-10-CM

## 2022-02-03 PROCEDURE — 74011000636 HC RX REV CODE- 636: Performed by: OBSTETRICS & GYNECOLOGY

## 2022-02-03 PROCEDURE — 74177 CT ABD & PELVIS W/CONTRAST: CPT

## 2022-02-03 PROCEDURE — 74011000250 HC RX REV CODE- 250: Performed by: OBSTETRICS & GYNECOLOGY

## 2022-02-03 PROCEDURE — 71260 CT THORAX DX C+: CPT

## 2022-02-03 RX ORDER — BARIUM SULFATE 20 MG/ML
900 SUSPENSION ORAL
Status: COMPLETED | OUTPATIENT
Start: 2022-02-03 | End: 2022-02-03

## 2022-02-03 RX ADMIN — BARIUM SULFATE 900 ML: 21 SUSPENSION ORAL at 13:16

## 2022-02-03 RX ADMIN — IOPAMIDOL 100 ML: 755 INJECTION, SOLUTION INTRAVENOUS at 13:16

## 2022-02-07 NOTE — PROGRESS NOTES
Virtual visit to discuss CT scan results from 2/3/2022, pt reports back pain that is a 10/10 on pain scale    1. Have you been to the ER, urgent care clinic since your last visit? Hospitalized since your last visit?  no    2. Have you seen or consulted any other health care providers outside of the [address] [place] [address] since your last visit? Include any pap smears or colon screening.    no

## 2022-02-08 ENCOUNTER — VIRTUAL VISIT (OUTPATIENT)
Dept: GYNECOLOGY | Age: 72
End: 2022-02-08
Payer: MEDICARE

## 2022-02-08 ENCOUNTER — TELEPHONE (OUTPATIENT)
Dept: GYNECOLOGY | Age: 72
End: 2022-02-08

## 2022-02-08 DIAGNOSIS — Z51.11 ENCOUNTER FOR ANTINEOPLASTIC CHEMOTHERAPY: ICD-10-CM

## 2022-02-08 DIAGNOSIS — C54.1 PAPILLARY SEROUS ENDOMETRIAL ADENOCARCINOMA (HCC): Primary | ICD-10-CM

## 2022-02-08 PROCEDURE — G8399 PT W/DXA RESULTS DOCUMENT: HCPCS | Performed by: OBSTETRICS & GYNECOLOGY

## 2022-02-08 PROCEDURE — G0463 HOSPITAL OUTPT CLINIC VISIT: HCPCS | Performed by: OBSTETRICS & GYNECOLOGY

## 2022-02-08 PROCEDURE — 1101F PT FALLS ASSESS-DOCD LE1/YR: CPT | Performed by: OBSTETRICS & GYNECOLOGY

## 2022-02-08 PROCEDURE — 3017F COLORECTAL CA SCREEN DOC REV: CPT | Performed by: OBSTETRICS & GYNECOLOGY

## 2022-02-08 PROCEDURE — G8427 DOCREV CUR MEDS BY ELIG CLIN: HCPCS | Performed by: OBSTETRICS & GYNECOLOGY

## 2022-02-08 PROCEDURE — G8756 NO BP MEASURE DOC: HCPCS | Performed by: OBSTETRICS & GYNECOLOGY

## 2022-02-08 PROCEDURE — G8432 DEP SCR NOT DOC, RNG: HCPCS | Performed by: OBSTETRICS & GYNECOLOGY

## 2022-02-08 PROCEDURE — 99214 OFFICE O/P EST MOD 30 MIN: CPT | Performed by: OBSTETRICS & GYNECOLOGY

## 2022-02-08 PROCEDURE — 1090F PRES/ABSN URINE INCON ASSESS: CPT | Performed by: OBSTETRICS & GYNECOLOGY

## 2022-02-08 NOTE — PROGRESS NOTES
[address] [facility] [address], [address] [facility]  P [phone]  F [phone]    Office Note  Patient ID:  Name:  Fidelia Romero  MRN:  [id number]  :  1950/71 y.o. Date:  2022      HISTORY OF PRESENT ILLNESS:  Ms. Fidelia Romero is a 70 y.o. female who on 2021 underwent Robotic-assisted total laparoscopic hysterectomy, Bilateral salpingo-oophorectomy, Bilateral pelvic sentinel lymph node mapping and biopsy. 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 Negative washings. Stromal cervical involvement. Negative SLNs. 2mm out of 10mm myometrial invasion. Parametrial involvement of tumor. Completed concurrent chemo-EBRT + VBT on 2021. Initiated on adjuvant carboplatin + paclitaxel on 2021. Completed cycle 4 on 2022. CT C/A/P 2022 without evidence of disease. Presents today for end of treatment imaging results. Initial History:  Ms. Fidelia Romero is a 70 y.o.  postmenopausal female who presents as a new patient from Dr. Nadya Escalera for high-grade endometrial cancer. The patient reports vaginal spotting back in April, which resulted in her seeing Dr. Nadya Escalera. Pelvic ultrasound on 2021 demonstrated 12.5mm stripe. Hysteroscopy/D&C on 2021 demonstrated \"high-grade endometrial carcinoma, favor serous carcinoma\". She was subsequently referred to our office for further discussion and management. Denies pelvic or abdominal pain/bloating, change in appetite or bowel habits, nausea, vomiting, CP, SOB, hematuria, hematochezia, or urinary symptoms. Pertinent PMH/PSH: DJD, h/o  x 2     Active, no restrictions. Imaging Review:   CT C/A/P 2/3/2022:  FINDINGS:  Port-A-Cath terminates at the cavoatrial junction. CHEST WALL: No mass or axillary lymphadenopathy. THYROID: No nodule. MEDIASTINUM: No mass or lymphadenopathy.   STEPHANIE: No mass or lymphadenopathy. THORACIC AORTA: No dissection or aneurysm. MAIN PULMONARY ARTERY: Normal in caliber. TRACHEA/BRONCHI: Patent. ESOPHAGUS: No wall thickening or dilatation. HEART: Normal in size. PLEURA: No effusion or pneumothorax. LUNGS: No nodule, mass, or airspace disease. LIVER: No mass or biliary dilatation. Stable 2 mm hypodensities in hepatic  segment 6 and 8, too small to further characterize. No new lesions. GALLBLADDER: Prior cholecystectomy. SPLEEN: No mass. PANCREAS: No mass or ductal dilatation. ADRENALS: Unremarkable. KIDNEYS: No mass, calculus, or hydronephrosis. STOMACH: Diffuse thickening of the antrum  SMALL BOWEL: No dilatation or wall thickening. COLON: No dilatation or wall thickening. APPENDIX: Normal axial image 70  PERITONEUM: No ascites or pneumoperitoneum. RETROPERITONEUM: No lymphadenopathy or aortic aneurysm. Slight decrease in left  para-aortic lymph node, now measuring 3 x 5 mm (series 2, image 74). REPRODUCTIVE ORGANS: Prior hysterectomy and bilateral nephrectomy. URINARY BLADDER: No mass or calculus. BONES: No destructive bone lesion. Bilateral hip replacements of results in a  moderate amount of pelvic streak artifact. Discogenic sclerosis at L2-3 and  T8-9. ADDITIONAL COMMENTS: N/A  IMPRESSION  No evidence of metastatic disease to the chest, abdomen, or pelvis  Incidental gastritis. CT C/A/P 7/16/2021:  FINDINGS:   THYROID: No nodule. MEDIASTINUM: No mass or lymphadenopathy. STEPHANIE: No mass or lymphadenopathy. THORACIC AORTA: No dissection or aneurysm. MAIN PULMONARY ARTERY: Normal in caliber. TRACHEA/BRONCHI: Patent. ESOPHAGUS: No wall thickening or dilatation. HEART: Normal in size. PLEURA: No effusion or pneumothorax. LUNGS: No nodule, mass, or airspace disease. There is minor atelectasis/scarring  right upper lobe  LIVER: No mass or biliary dilatation. There is hepatic steatosis.  There is a 5  mm low-density in segment 4A and segment 5 to small to fully characterize but  most likely tiny cysts. GALLBLADDER: Patient status post cholecystectomy  SPLEEN: No mass. PANCREAS: No mass or ductal dilatation. ADRENALS: Unremarkable. KIDNEYS: No mass, calculus, or hydronephrosis. There is a retroaortic left renal  vein  STOMACH: Unremarkable. SMALL BOWEL: No dilatation or wall thickening. COLON: No dilatation or wall thickening. APPENDIX: Unremarkable. PERITONEUM: No ascites or pneumoperitoneum. RETROPERITONEUM: No lymphadenopathy or aortic aneurysm. There is an 8 mm lymph  node at the aortic bifurcation on the left. There is mild stenosis origin of the  SMA  REPRODUCTIVE ORGANS: Uterus and pelvis are obscured by artifact related to  bilateral total hip replacements. URINARY BLADDER: No mass or calculus. BONES: No destructive bone lesion. There is a right shoulder effusion  ADDITIONAL COMMENTS: N/A     IMPRESSION     1. CT of the chest demonstrates no definite evidence of metastatic disease. No  acute abnormality identified. 2. CT of the abdomen and pelvis demonstrates no definite evidence of metastatic  disease. The pelvis is obscured by artifact related to total hip replacements. There is hepatic steatosis. 2 subcentimeter low densities in the liver are too  small to characterize but most likely represent tiny cysts. There is an 8mm lymph node just to the left of the aortic bifurcation which is  within normal limits. Pelvic ultrasound 4/20/2021:  Impression: Normal uterus with 12.5mm stripe. Normal right ovary. Non visible left ovary with otherwise normal left adnexa. No free fluid.      Pathology Review:   7/29/2021:  * * *FINAL PATHOLOGIC DIAGNOSIS* * *   1.  Right pelvic sentinel lymph node, biopsy:        One lymph node, negative for carcinoma, levels and cytokeratin stain   examined (0/1)   2.  Left pelvic sentinel lymph node #1, biopsy:        One lymph node, negative for carcinoma, levels and cytokeratin stain   examined (0/1)   3.  Left pelvic sentinel lymph node, biopsy:        One lymph node, negative for carcinoma, levels and cytokeratin stain   examined (0/1)   4. Uterus, cervix, bilateral fallopian tubes and ovaries; simple   hysterectomy, BSO:        Endometrium: Uterine serous carcinoma, see synoptic report        Cervix: Serous carcinoma focally involves endocervical stroma,   predominantly as lymphovascular                invasion        Bilateral fallopian tubes: No pathologic diagnosis     ENDOMETRIUM    SPECIMEN       Procedure: Total hysterectomy and bilateral salpingo-oophorectomy       Specimen Integrity: Intact    TUMOR       Histologic Type: Serous carcinoma       Myometrial Invasion: Present           Depth of Myometrial Invasion (Millimeters): 2 mm           Myometrial Thickness (Millimeters): 10 mm           Percentage of Myometrial Invasion: 20%       Uterine Serosa Involvement: Not identified       Lower Uterine Segment Involvement: Present, myoinvasive       Cervical Stromal Involvement: Present       Other Tissue / Organ Involvement: Not identified       Peritoneal Ascitic Fluid: Atypical, favor benign reactive process       Lymphovascular Invasion: Present    MARGINS       Margins: Parametrium and focal paracervix involved by tumor           Parametrial and focal Paracervical Margin: Involved by carcinoma    LYMPH NODES       Lymph Node Status:  All lymph nodes negative for tumor cells           Total Number of Pelvic Nodes Examined: 3           Number of Pelvic [place] Nodes Examined: 3           Total Number of Para-aortic Nodes Examined: 0           Number of Para-aortic [place] Nodes Examined: 0    PATHOLOGIC STAGE CLASSIFICATION (pTNM, AJCC 8th Edition)       Primary Tumor (pT): pT2       Regional Lymph Nodes Modifier: (sn)       Regional Lymph Nodes (pN): pN0    FIGO STAGE       FIGO Stage: II   * * *Comment* * *   Immunohistochemical stains with appropriate controls show tumor positive   for P16, P53 with Ki-67 proliferation index of approximately 50%.  These   findings support diagnosis of endometrial serous carcinoma. 2021:  FINAL PATHOLOGIC DIAGNOSIS   Endometrium, curettings:   High-grade endometrial carcinoma, favor serous carcinoma   See comment   Comment   The biopsy contains a malignant glandular neoplasm with a high nuclear grade, papillary growth pattern, and areas of necrosis. Morphologic features suggest a high-grade endometrial carcinoma and are most compatible with a diagnosis of serous carcinoma. ROS:  A comprehensive review of systems was negative except for that written in the History of Present Illness. , 10 point ROS    OB/GYN ROS:  Per HPI    ECOG ndGndrndanddndend:nd nd2nd Problem List:  Patient Active Problem List    Diagnosis Date Noted    Opioid use, unspecified with unspecified opioid-induced disorder 2021    Encounter for antineoplastic chemotherapy 10/13/2021    Essential hypertension 2021    Abnormal finding on EKG 2021    Papillary serous endometrial adenocarcinoma ([facility] [address] 2021    PUD (peptic ulcer disease)     GERD (gastroesophageal reflux disease)     Arthritis     Lichen planus     Hip arthritis 2016    DJD (degenerative joint disease) of hip 2013     PMH:  Past Medical History:   Diagnosis Date    Arthritis     OSTEO HIP JULY.     GERD (gastroesophageal reflux disease)     Lichen planus     oral    PUD (peptic ulcer disease)       PSH:  Past Surgical History:   Procedure Laterality Date    COLONOSCOPY N/A 10/16/2017    COLONOSCOPY performed by Chelsea Zambrano MD at [address] [place] [address]  10/16/2017         HX  SECTION      X2    HX CHOLECYSTECTOMY      HX DILATION AND CURETTAGE  2021    High-grade endometrial carcinoma, favor serous carcinoma    HX HEENT      EXTRACTION OF WISDOM TEETH X 4    HX ORTHOPAEDIC      right foot, bunionectomy    HX ORTHOPAEDIC      LEFTl hip arthroplasty    HX ORTHOPAEDIC RIGHT hip arthroplasty    HX TUBAL LIGATION      IR INSERT TUNL CVC W PORT OVER 5 YEARS  10/14/2021    MA COLONOSCOPY FLX DX W/COLLJ SPEC WHEN PFRMD  1/20/2012     x 2    UPPER GI ENDOSCOPY,BIOPSY  2016         UPPER GI ENDOSCOPY,DILATN W GUIDE  2016           Social History:  Social History     Tobacco Use    Smoking status: Former Smoker     Packs/day: 0.25     Years: 0.50     Pack years: 0.12     Quit date: 1967     Years since quittin.1    Smokeless tobacco: Never Used    Tobacco comment: brief   Substance Use Topics    Alcohol use: Not Currently     Comment: VERY RARELY       Family History:  Family History   Problem Relation Age of Onset    Cancer Maternal Grandmother 79        colon cancer    Hypertension Mother     Cancer Mother         pacreatic cancer    Cancer Paternal Aunt         ovarian cancer      Medications: (reviewed)  Current Outpatient Medications   Medication Sig    diphenhydramine HCl (ALLERGY MEDICATION PO) Take  by mouth.  ibuprofen (AdviL) 200 mg tablet Take  by mouth.  ondansetron (ZOFRAN ODT) 4 mg disintegrating tablet Take 1 Tablet by mouth every eight (8) hours as needed for Nausea. Indications: nausea and vomiting caused by cancer drugs    CRANIAL PROSTHESIS misc Cranial Prosthesis  Diagnosis code:   C54.1  Cpt code:    (Patient not taking: Reported on 2021)    lidocaine-prilocaine (EMLA) topical cream APPLY SMALL AMOUNT OVER PORT AREA AND COVER WITH BAND AID ONE HOUR BEFORE CHEMO    diphenoxylate-atropine (LomotiL) 2.5-0.025 mg per tablet Take 1 Tablet by mouth four (4) times daily as needed for Diarrhea. Max Daily Amount: 4 Tablets.  dexAMETHasone (DECADRON) 4 mg tablet Take 2 tablets with breakfast the day before chemo and for 2 days after chemo    cephALEXin (KEFLEX) 500 mg capsule Dental work (Patient not taking: Reported on 2021)    Nystop powder as needed.     acetaminophen (TYLENOL) 325 mg tablet Take 2 Tablets by mouth every six (6) hours as needed for Pain.  ibuprofen (MOTRIN) 600 mg tablet Take 1 Tablet by mouth every six (6) hours as needed for Pain. (Patient not taking: Reported on 9/7/2021)    methylPREDNISolone (MedroL) 4 mg tab Take 4 mg by mouth daily as needed. Prn for lichen planus (Patient not taking: Reported on 10/14/2021)    melatonin 1 mg tablet Take 1 mg by mouth nightly.  cyanocobalamin (VITAMIN B-12) 1,000 mcg tablet Take 1,000 mcg by mouth daily.  cholecalciferol, vitamin D3, 2,000 unit tab Take 2,000 Units by mouth daily.  BIOTIN PO Take 5,000 mcg by mouth daily.  pyridoxine (VITAMIN B-6) 100 mg tablet Take 100 mg by mouth daily. No current facility-administered medications for this visit. Allergies: (reviewed)  Allergies   Allergen Reactions    Codeine Nausea and Vomiting    Penicillins Rash     Rash & dizzy    Sulfa (Sulfonamide Antibiotics) Other (comments)     General redness all over skin    Ciprofloxacin Itching and Other (comments)     Extreme dizziness and rash    Other Medication Nausea and Vomiting     PRESCRIPTION STRENGTH ANTI INFLAMMATORY MEDS         OBJECTIVE:  *deferred today given video-conference visit for ongoing COVID-19 pandemic*   Physical Exam:  There were no vitals taken for this visit. General: Alert and oriented. No acute distress. Well-nourished  Gastrointestinal: soft, non-tender, non-distended, no masses or organomegaly. Well-healed port-site incisions. Musculoskeletal: normal gait. No joint tenderness, deformity or swelling. No muscular tenderness. Extremities: extremities normal, atraumatic, no cyanosis or edema. Pelvic: exam chaperoned by nurse. Normal appearing external genitalia. On speculum exam, the vaginal cuff is intact and healing well. On bimanual, the uterus and cervix are surgically absent. Vaginal cuff intact without defect.  No evidence of masses or nodularity on bimanual exam. Deferred rectovaginal exam.   Neuro: Grossly intact. Normal gait and movement. No acute deficit  Skin: No evidence of rashes or skin changes. IMPRESSION/PLAN:    Ms. Kwaku Mead is a 70 y.o.  female who on 7/29/2021 underwent Robotic-assisted total laparoscopic hysterectomy, Bilateral salpingo-oophorectomy, Bilateral pelvic sentinel lymph node mapping and biopsy. 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 Negative washings. Stromal cervical involvement. Negative SLNs. 2mm out of 10mm myometrial invasion. Parametrial involvement of tumor. Completed concurrent chemo-EBRT + VBT on 11/4/2021. Initiated on adjuvant carboplatin + paclitaxel on 11/18/2021. Completed cycle 4 on 1/20/2022. CT C/A/P 2/4/2022 without evidence of disease. Problems:     Patient Active Problem List    Diagnosis Date Noted    Opioid use, unspecified with unspecified opioid-induced disorder 12/07/2021    Encounter for antineoplastic chemotherapy 10/13/2021    Essential hypertension 07/27/2021    Abnormal finding on EKG 07/27/2021    Papillary serous endometrial adenocarcinoma ([facility] [address] 07/14/2021    PUD (peptic ulcer disease)     GERD (gastroesophageal reflux disease)     Arthritis     Lichen planus     Hip arthritis 04/04/2016    DJD (degenerative joint disease) of hip 01/04/2013     Reviewed patient's course to date. Completed concurrent chemo-EBRT + VBT on 11/4/2021. Initiated on adjuvant carboplatin + paclitaxel on 11/18/2021. Completed cycle 4 on 1/20/2022. CT C/A/P 2/4/2022 without evidence of disease. Reassured patient that she is now in remission without evidence of disease. Plan to RTC in 3 months for continued surveillance. Reviewed precautionary symptoms to return sooner. All questions and concerns were addressed with the patient and she is comfortable with the plan. An electronic signature was used to authenticate this note.      Janita Schaumann, MD    Pursuant to the emergency declaration unde the [address] and the [employer] Act, [facility] authority and the Coronavirus Preparedness and [employer] Act, this Virtual Visit was conducted, with patient's consent, to reduce the patient's risk of exposure to COVID-19 and provide continuity of care for an established patient. Patient identification was verified at the start of the visit: Yes    Services were provided through a video synchronous discussion virtually to substitute for in-person clinic visit. Patient was at her individual home, while the provider was in his/her respective office.     I spent at least 40 minutes with this established patient, and >50% of the time was spent counseling and/or coordinating care regarding end of treatment discussion    Felipe León MD

## 2022-02-09 RX ORDER — SODIUM CHLORIDE 9 MG/ML
10 INJECTION INTRAMUSCULAR; INTRAVENOUS; SUBCUTANEOUS AS NEEDED
Status: CANCELLED | OUTPATIENT
Start: 2022-02-24

## 2022-02-09 RX ORDER — SODIUM CHLORIDE 0.9 % (FLUSH) 0.9 %
5-10 SYRINGE (ML) INJECTION AS NEEDED
Status: CANCELLED | OUTPATIENT
Start: 2022-02-24

## 2022-02-09 RX ORDER — HEPARIN 100 UNIT/ML
500 SYRINGE INTRAVENOUS AS NEEDED
Status: CANCELLED | OUTPATIENT
Start: 2022-02-24

## 2022-02-24 ENCOUNTER — HOSPITAL ENCOUNTER (OUTPATIENT)
Dept: INFUSION THERAPY | Age: 72
Discharge: HOME | End: 2022-02-24
Payer: MEDICARE

## 2022-02-24 VITALS
TEMPERATURE: 97.2 F | RESPIRATION RATE: 18 BRPM | HEART RATE: 78 BPM | DIASTOLIC BLOOD PRESSURE: 81 MMHG | SYSTOLIC BLOOD PRESSURE: 160 MMHG

## 2022-02-24 DIAGNOSIS — C54.1 PAPILLARY SEROUS ENDOMETRIAL ADENOCARCINOMA (HCC): Primary | ICD-10-CM

## 2022-02-24 PROCEDURE — 96523 IRRIG DRUG DELIVERY DEVICE: CPT

## 2022-02-24 PROCEDURE — 74011000250 HC RX REV CODE- 250: Performed by: OBSTETRICS & GYNECOLOGY

## 2022-02-24 PROCEDURE — 77030012965 HC NDL HUBR BBMI -A

## 2022-02-24 PROCEDURE — 74011250636 HC RX REV CODE- 250/636: Performed by: OBSTETRICS & GYNECOLOGY

## 2022-02-24 RX ORDER — HEPARIN 100 UNIT/ML
500 SYRINGE INTRAVENOUS AS NEEDED
Status: ACTIVE | OUTPATIENT
Start: 2022-02-24 | End: 2022-02-24

## 2022-02-24 RX ORDER — SODIUM CHLORIDE 0.9 % (FLUSH) 0.9 %
5-10 SYRINGE (ML) INJECTION AS NEEDED
Status: DISPENSED | OUTPATIENT
Start: 2022-02-24 | End: 2022-02-24

## 2022-02-24 RX ORDER — SODIUM CHLORIDE 9 MG/ML
10 INJECTION INTRAMUSCULAR; INTRAVENOUS; SUBCUTANEOUS AS NEEDED
Status: ACTIVE | OUTPATIENT
Start: 2022-02-24 | End: 2022-02-24

## 2022-02-24 RX ADMIN — HEPARIN 500 UNITS: 100 SYRINGE at 10:37

## 2022-02-24 RX ADMIN — SODIUM CHLORIDE, PRESERVATIVE FREE 10 ML: 5 INJECTION INTRAVENOUS at 10:37

## 2022-02-24 NOTE — PROGRESS NOTES
[facility] Visit Note    7997 Pt arrived at [facility] ambulatory and in no distress for port flush. No new complaints voiced. Port accessed per protocol with positive blood return, flushed and de-accessed. Patient denied having any symptoms of COVID-19, i.e. SOB, coughing, fever, or generally not feeling well. Also denies having been exposed to COVID-19 recently or having had any recent contact with family/friend that has a pending COVID test.     Visit Vitals  BP (!) 160/81   Pulse 78   Temp 97.2 °F (36.2 °C)   Resp 18       Medications received:  Medications Administered     0.9% sodium chloride injection 10 mL     Admin Date  02/24/2022 Action  Given Dose  10 mL Route  IntraVENous Administered By  Luis Daniel Sparks RN          heparin (porcine) pf 500 Units     Admin Date  02/24/2022 Action  Given Dose  500 Units Route  IntraVENous Administered By  Luis Daniel Sparks RN                0602 Tolerated treatment well, no adverse reaction noted. D/Cd from [facility] ambulatory and in no distress accompanied by self.   Next appt 3/24

## 2022-03-19 PROBLEM — Z51.11 ENCOUNTER FOR ANTINEOPLASTIC CHEMOTHERAPY: Status: ACTIVE | Noted: 2021-10-13

## 2022-03-19 PROBLEM — R94.31 ABNORMAL FINDING ON EKG: Status: ACTIVE | Noted: 2021-07-27

## 2022-03-19 PROBLEM — F11.99 OPIOID USE, UNSPECIFIED WITH UNSPECIFIED OPIOID-INDUCED DISORDER (HCC): Status: ACTIVE | Noted: 2021-12-07

## 2022-03-19 PROBLEM — C54.1 PAPILLARY SEROUS ENDOMETRIAL ADENOCARCINOMA (HCC): Status: ACTIVE | Noted: 2021-07-14

## 2022-03-19 PROBLEM — I10 ESSENTIAL HYPERTENSION: Status: ACTIVE | Noted: 2021-07-27

## 2022-03-22 RX ORDER — SODIUM CHLORIDE 9 MG/ML
10 INJECTION INTRAMUSCULAR; INTRAVENOUS; SUBCUTANEOUS AS NEEDED
Status: CANCELLED | OUTPATIENT
Start: 2022-03-24

## 2022-03-22 RX ORDER — HEPARIN 100 UNIT/ML
500 SYRINGE INTRAVENOUS AS NEEDED
Status: CANCELLED | OUTPATIENT
Start: 2022-03-24

## 2022-03-22 RX ORDER — SODIUM CHLORIDE 0.9 % (FLUSH) 0.9 %
5-10 SYRINGE (ML) INJECTION AS NEEDED
Status: CANCELLED | OUTPATIENT
Start: 2022-03-24

## 2022-03-24 ENCOUNTER — HOSPITAL ENCOUNTER (OUTPATIENT)
Dept: INFUSION THERAPY | Age: 72
Discharge: HOME | End: 2022-03-24
Payer: MEDICARE

## 2022-03-24 VITALS
DIASTOLIC BLOOD PRESSURE: 74 MMHG | WEIGHT: 164 LBS | HEART RATE: 65 BPM | BODY MASS INDEX: 31.5 KG/M2 | RESPIRATION RATE: 18 BRPM | TEMPERATURE: 97.7 F | SYSTOLIC BLOOD PRESSURE: 171 MMHG | OXYGEN SATURATION: 98 %

## 2022-03-24 DIAGNOSIS — C54.1 PAPILLARY SEROUS ENDOMETRIAL ADENOCARCINOMA (HCC): Primary | ICD-10-CM

## 2022-03-24 PROCEDURE — 74011000250 HC RX REV CODE- 250: Performed by: OBSTETRICS & GYNECOLOGY

## 2022-03-24 PROCEDURE — 96523 IRRIG DRUG DELIVERY DEVICE: CPT

## 2022-03-24 PROCEDURE — 74011250636 HC RX REV CODE- 250/636: Performed by: OBSTETRICS & GYNECOLOGY

## 2022-03-24 RX ORDER — SODIUM CHLORIDE 0.9 % (FLUSH) 0.9 %
5-10 SYRINGE (ML) INJECTION AS NEEDED
Status: DISPENSED | OUTPATIENT
Start: 2022-03-24 | End: 2022-03-24

## 2022-03-24 RX ORDER — HEPARIN 100 UNIT/ML
500 SYRINGE INTRAVENOUS AS NEEDED
Status: ACTIVE | OUTPATIENT
Start: 2022-03-24 | End: 2022-03-24

## 2022-03-24 RX ORDER — SODIUM CHLORIDE 9 MG/ML
10 INJECTION INTRAMUSCULAR; INTRAVENOUS; SUBCUTANEOUS AS NEEDED
Status: ACTIVE | OUTPATIENT
Start: 2022-03-24 | End: 2022-03-24

## 2022-03-24 RX ADMIN — Medication 500 UNITS: at 10:12

## 2022-03-24 RX ADMIN — SODIUM CHLORIDE, PRESERVATIVE FREE 10 ML: 5 INJECTION INTRAVENOUS at 10:10

## 2022-03-24 RX ADMIN — SODIUM CHLORIDE, PRESERVATIVE FREE 10 ML: 5 INJECTION INTRAVENOUS at 10:11

## 2022-03-24 NOTE — PROGRESS NOTES
[facility] Note:  Arrived - 1000    Patient denied having any symptoms of COVID-19, i.e. SOB, coughing, fever, or generally not feeling well. Also denies having been exposed to COVID-19 recently or having had any recent contact with family/friend that has a pending COVID test.     Visit Vitals  BP (!) 171/74 (BP 1 Location: Left upper arm, BP Patient Position: Sitting)   Pulse 65   Temp 97.7 °F (36.5 °C)   Resp 18   Wt 74.4 kg (164 lb)   SpO2 98%   BMI 31.50 kg/m²       Assessment - unchanged. Port accessed & flushed per protocol w/o difficulty. Zazueta needle removed. 1010 - Tolerated well. Pt denies any acute problems/changes. Discharged from [facility] ambulatory. No distress. Next appt: 4/21/22 @ 1000.   Future Appointments   Date Time Provider Cresencio Mckenzie   4/4/2022 10:00 AM RAD [address] HUI   4/7/2022 11:00 AM MD MARIA LUZ Murphy BS AMB   4/21/2022 10:00 AM GARCIA LONG2 [place] [address] REG   5/10/2022  8:45 AM MD GRISELDA Gamez BS AMB   5/19/2022 10:00 AM GARCIA MED7 [address]    6/16/2022 10:00 AM GARCIA MED7 [address] [facility] REG   7/14/2022 10:00 AM GARCIA MED7 [address] [facility] REG   8/11/2022 10:00 AM GARCIA MED7 [place] [address] REG

## 2022-04-04 ENCOUNTER — HOSPITAL ENCOUNTER (OUTPATIENT)
Dept: RADIATION THERAPY | Age: 72
Discharge: HOME | End: 2022-04-04

## 2022-04-07 ENCOUNTER — OFFICE VISIT (OUTPATIENT)
Dept: CARDIOLOGY CLINIC | Age: 72
End: 2022-04-07
Payer: MEDICARE

## 2022-04-07 VITALS
WEIGHT: 165 LBS | DIASTOLIC BLOOD PRESSURE: 100 MMHG | HEIGHT: 60 IN | HEART RATE: 70 BPM | BODY MASS INDEX: 32.39 KG/M2 | RESPIRATION RATE: 16 BRPM | OXYGEN SATURATION: 98 % | SYSTOLIC BLOOD PRESSURE: 160 MMHG

## 2022-04-07 DIAGNOSIS — R94.31 ABNORMAL FINDING ON EKG: ICD-10-CM

## 2022-04-07 DIAGNOSIS — I10 ESSENTIAL HYPERTENSION: Primary | ICD-10-CM

## 2022-04-07 DIAGNOSIS — C54.1 ENDOMETRIAL CANCER (HCC): ICD-10-CM

## 2022-04-07 PROCEDURE — 1101F PT FALLS ASSESS-DOCD LE1/YR: CPT | Performed by: SPECIALIST

## 2022-04-07 PROCEDURE — G8432 DEP SCR NOT DOC, RNG: HCPCS | Performed by: SPECIALIST

## 2022-04-07 PROCEDURE — 99214 OFFICE O/P EST MOD 30 MIN: CPT | Performed by: SPECIALIST

## 2022-04-07 PROCEDURE — 1090F PRES/ABSN URINE INCON ASSESS: CPT | Performed by: SPECIALIST

## 2022-04-07 PROCEDURE — G8417 CALC BMI ABV UP PARAM F/U: HCPCS | Performed by: SPECIALIST

## 2022-04-07 PROCEDURE — G8427 DOCREV CUR MEDS BY ELIG CLIN: HCPCS | Performed by: SPECIALIST

## 2022-04-07 PROCEDURE — G8399 PT W/DXA RESULTS DOCUMENT: HCPCS | Performed by: SPECIALIST

## 2022-04-07 PROCEDURE — G8753 SYS BP > OR = 140: HCPCS | Performed by: SPECIALIST

## 2022-04-07 PROCEDURE — G8536 NO DOC ELDER MAL SCRN: HCPCS | Performed by: SPECIALIST

## 2022-04-07 PROCEDURE — G0463 HOSPITAL OUTPT CLINIC VISIT: HCPCS | Performed by: SPECIALIST

## 2022-04-07 PROCEDURE — G8755 DIAS BP > OR = 90: HCPCS | Performed by: SPECIALIST

## 2022-04-07 PROCEDURE — 3017F COLORECTAL CA SCREEN DOC REV: CPT | Performed by: SPECIALIST

## 2022-04-07 RX ORDER — LOSARTAN POTASSIUM AND HYDROCHLOROTHIAZIDE 12.5; 5 MG/1; MG/1
1 TABLET ORAL DAILY
Qty: 30 TABLET | Refills: 5 | Status: SHIPPED | OUTPATIENT
Start: 2022-04-07 | End: 2022-04-28 | Stop reason: ALTCHOICE

## 2022-04-07 NOTE — PROGRESS NOTES
HISTORY OF PRESENT ILLNESS  José Miguel Ureña is a 70 y.o. female. She has hypertension and endometrial cancer treated with surgery radiation and chemotherapy. When I originally saw her prior to her surgery for an abnormal EKG her blood pressure was elevated and I started her on therapy with amlodipine 5 mg. Since I last saw her her weight is down 8 pounds. She stopped the amlodipine because of headache nausea and swelling. She was given a prescription for lisinopril by her primary care physician but has not started it. She had no evidence for metastatic disease by CT scan done 2 months ago. Her blood pressure taken by myself today is 200/100. HPI  Patient Active Problem List   Diagnosis Code    DJD (degenerative joint disease) of hip M16.9    Hip arthritis M16.10    PUD (peptic ulcer disease) K27.9    GERD (gastroesophageal reflux disease) K21.9    Arthritis A97.76    Lichen planus W91.3    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    Essential hypertension I10    Abnormal finding on EKG R94.31    Encounter for antineoplastic chemotherapy Z51.11    Opioid use, unspecified with unspecified opioid-induced disorder F11.99     Current Outpatient Medications   Medication Sig Dispense Refill    losartan-hydroCHLOROthiazide (HYZAAR) 50-12.5 mg per tablet Take 1 Tablet by mouth daily. 30 Tablet 5    ibuprofen (AdviL) 200 mg tablet Take  by mouth.  cyanocobalamin (VITAMIN B-12) 1,000 mcg tablet Take 1,000 mcg by mouth daily.  cholecalciferol, vitamin D3, 2,000 unit tab Take 2,000 Units by mouth daily.  BIOTIN PO Take 5,000 mcg by mouth daily.  diphenhydramine HCl (ALLERGY MEDICATION PO) Take  by mouth. (Patient not taking: Reported on 3/24/2022)      ondansetron (ZOFRAN ODT) 4 mg disintegrating tablet Take 1 Tablet by mouth every eight (8) hours as needed for Nausea.  Indications: nausea and vomiting caused by cancer drugs (Patient not taking: Reported on 2/8/2022) 30 Tablet 2    CRANIAL PROSTHESIS misc Cranial Prosthesis  Diagnosis code:   C54.1  Cpt code:    (Patient not taking: Reported on 2021) 1 Each 1    lidocaine-prilocaine (EMLA) topical cream APPLY SMALL AMOUNT OVER PORT AREA AND COVER WITH BAND AID ONE HOUR BEFORE CHEMO (Patient not taking: Reported on 2022) 30 g 0    diphenoxylate-atropine (LomotiL) 2.5-0.025 mg per tablet Take 1 Tablet by mouth four (4) times daily as needed for Diarrhea. Max Daily Amount: 4 Tablets. (Patient not taking: Reported on 3/24/2022) 20 Tablet 2    dexAMETHasone (DECADRON) 4 mg tablet Take 2 tablets with breakfast the day before chemo and for 2 days after chemo (Patient not taking: Reported on 2022) 36 Tablet 2    Nystop powder as needed.  acetaminophen (TYLENOL) 325 mg tablet Take 2 Tablets by mouth every six (6) hours as needed for Pain. (Patient not taking: Reported on 2022) 30 Tablet 0    ibuprofen (MOTRIN) 600 mg tablet Take 1 Tablet by mouth every six (6) hours as needed for Pain. (Patient not taking: Reported on 2021) 30 Tablet 0    methylPREDNISolone (MedroL) 4 mg tab Take 4 mg by mouth daily as needed. Prn for lichen planus (Patient not taking: Reported on 2022)      melatonin 1 mg tablet Take 1 mg by mouth nightly. (Patient not taking: Reported on 2022)      pyridoxine (VITAMIN B-6) 100 mg tablet Take 100 mg by mouth daily. Past Medical History:   Diagnosis Date    Arthritis     OSTEO HIP JULY.     GERD (gastroesophageal reflux disease)     Lichen planus     oral    PUD (peptic ulcer disease)      Past Surgical History:   Procedure Laterality Date    COLONOSCOPY N/A 10/16/2017    COLONOSCOPY performed by Cong Russell MD at [address] [place] [address]  10/16/2017         HX  SECTION      X2    HX CHOLECYSTECTOMY      HX DILATION AND CURETTAGE  2021    High-grade endometrial carcinoma, favor serous carcinoma    HX HEENT      EXTRACTION OF WISDOM TEETH X 4    HX ORTHOPAEDIC      right foot, bunionectomy    HX ORTHOPAEDIC      LEFTl hip arthroplasty    HX ORTHOPAEDIC      RIGHT hip arthroplasty    HX TUBAL LIGATION      IR INSERT TUNL CVC W PORT OVER 5 YEARS  10/14/2021    ME COLONOSCOPY FLX DX W/COLLJ SPEC WHEN PFRMD  1/20/2012     x 2    UPPER GI ENDOSCOPY,BIOPSY  11/29/2016         UPPER GI ENDOSCOPY,DILATN W GUIDE  11/29/2016            Review of Systems   Constitutional: Positive for weight loss. All other systems reviewed and are negative. Visit Vitals  BP (!) 160/100   Pulse 70   Resp 16   Ht 5' (1.524 m)   Wt 165 lb (74.8 kg)   SpO2 98%   BMI 32.22 kg/m²       Physical Exam  Vitals and nursing note reviewed. Constitutional:       Appearance: Normal appearance. HENT:      Head: Normocephalic. Right Ear: External ear normal.      Left Ear: External ear normal.      Nose: Nose normal.      Mouth/Throat:      Mouth: Mucous membranes are moist.   Eyes:      Extraocular Movements: Extraocular movements intact. Cardiovascular:      Rate and Rhythm: Normal rate and regular rhythm. Heart sounds: Normal heart sounds. Pulmonary:      Breath sounds: Normal breath sounds. Abdominal:      Palpations: Abdomen is soft. Musculoskeletal:         General: Normal range of motion. Cervical back: Normal range of motion. Skin:     General: Skin is warm. Neurological:      Mental Status: She is alert and oriented to person, place, and time. Psychiatric:         Behavior: Behavior normal.         ASSESSMENT and PLAN  She definitely needs to be on therapy for blood pressure. I am not convinced that lisinopril by itself will be enough so I will start her on losartan with hydrochlorothiazide 50/12.5 mg once a day and see her back in 3 to 4 weeks.

## 2022-04-15 RX ORDER — SODIUM CHLORIDE 0.9 % (FLUSH) 0.9 %
5-10 SYRINGE (ML) INJECTION AS NEEDED
Status: CANCELLED | OUTPATIENT
Start: 2022-04-21

## 2022-04-15 RX ORDER — SODIUM CHLORIDE 9 MG/ML
10 INJECTION INTRAMUSCULAR; INTRAVENOUS; SUBCUTANEOUS AS NEEDED
Status: CANCELLED | OUTPATIENT
Start: 2022-04-21

## 2022-04-15 RX ORDER — HEPARIN 100 UNIT/ML
500 SYRINGE INTRAVENOUS AS NEEDED
Status: CANCELLED | OUTPATIENT
Start: 2022-04-21

## 2022-04-21 ENCOUNTER — HOSPITAL ENCOUNTER (OUTPATIENT)
Dept: INFUSION THERAPY | Age: 72
Discharge: HOME | End: 2022-04-21
Payer: MEDICARE

## 2022-04-21 VITALS
TEMPERATURE: 98 F | SYSTOLIC BLOOD PRESSURE: 154 MMHG | RESPIRATION RATE: 18 BRPM | DIASTOLIC BLOOD PRESSURE: 75 MMHG | HEART RATE: 60 BPM

## 2022-04-21 DIAGNOSIS — C54.1 PAPILLARY SEROUS ENDOMETRIAL ADENOCARCINOMA (HCC): Primary | ICD-10-CM

## 2022-04-21 PROCEDURE — 96523 IRRIG DRUG DELIVERY DEVICE: CPT

## 2022-04-21 PROCEDURE — 74011000250 HC RX REV CODE- 250: Performed by: OBSTETRICS & GYNECOLOGY

## 2022-04-21 PROCEDURE — 77030012965 HC NDL HUBR BBMI -A

## 2022-04-21 PROCEDURE — 74011250636 HC RX REV CODE- 250/636: Performed by: OBSTETRICS & GYNECOLOGY

## 2022-04-21 RX ORDER — SODIUM CHLORIDE 9 MG/ML
10 INJECTION INTRAMUSCULAR; INTRAVENOUS; SUBCUTANEOUS AS NEEDED
Status: ACTIVE | OUTPATIENT
Start: 2022-04-21 | End: 2022-04-21

## 2022-04-21 RX ORDER — HEPARIN 100 UNIT/ML
500 SYRINGE INTRAVENOUS AS NEEDED
Status: ACTIVE | OUTPATIENT
Start: 2022-04-21 | End: 2022-04-21

## 2022-04-21 RX ORDER — SODIUM CHLORIDE 0.9 % (FLUSH) 0.9 %
5-10 SYRINGE (ML) INJECTION AS NEEDED
Status: DISPENSED | OUTPATIENT
Start: 2022-04-21 | End: 2022-04-21

## 2022-04-21 RX ADMIN — SODIUM CHLORIDE, PRESERVATIVE FREE 10 ML: 5 INJECTION INTRAVENOUS at 10:38

## 2022-04-21 RX ADMIN — Medication 500 UNITS: at 10:39

## 2022-04-21 NOTE — PROGRESS NOTES
[facility] Visit Note    1020 Pt arrived at [facility] ambulatory and in no distress for port flush. No new complaints voiced. Port accessed per protocol with positive blood return, flushed and de-accessed. Patient denied having any symptoms of COVID-19, i.e. SOB, coughing, fever, or generally not feeling well. Also denies having been exposed to COVID-19 recently or having had any recent contact with family/friend that has a pending COVID test.     Patient Vitals for the past 12 hrs:   Temp Pulse Resp BP   04/21/22 1020 98 °F (36.7 °C) 60 18 (!) 154/75       Medications received:  Medications Administered     0.9% sodium chloride injection 10 mL     Admin Date  04/21/2022 Action  Given Dose  10 mL Route  IntraVENous Administered By  Kim Cortes RN          heparin (porcine) pf 500 Units     Admin Date  04/21/2022 Action  Given Dose  500 Units Route  IntraVENous Administered By  Kim Cortes RN                4515 Tolerated treatment well, no adverse reaction noted. D/Cd from [facility] ambulatory and in no distress accompanied by self.   Next appt 5/19

## 2022-04-27 ENCOUNTER — TELEPHONE (OUTPATIENT)
Dept: CARDIOLOGY CLINIC | Age: 72
End: 2022-04-27

## 2022-04-27 DIAGNOSIS — I10 ESSENTIAL HYPERTENSION: Primary | ICD-10-CM

## 2022-04-27 NOTE — TELEPHONE ENCOUNTER
Patient stated she was prescribed hyzaar 50-12.5mg but can't take it due to the side affects, patient having weakness, pain, diarrhea and dizziness, patient took it for six days, patient states she went back on the amlodipine, please advise          [phone]

## 2022-04-27 NOTE — TELEPHONE ENCOUNTER
Called pt. Verified patient's identity with two identifiers. She stated she just couldn't take the losartan-hctz d/t to sxs below. Even though she previously stopped amlodipine because of symptoms, she said not as bad as losartan-hctz, though she said the hyzaar did help her BP better. Discussed options, and she agreed to continue amlodipine 5 mg at night and would like a diuretic in am to help with fluid (maybe hctz 12.5 mg alone-no losartan), agreed to continue watching salt and monitoring BP at home also. I told her I'd discuss with Dr. Orquidea Boyce and call her tomorrow. We should keep her f/u as scheduled next week on 5/5.      Future Appointments   Date Time Provider Cresencio Mckenzie   5/5/2022 11:20 AM MD MARIA LUZ Mary BS AMB   5/10/2022  8:45 AM MD GRISELDA Bergeron BS AMB   5/19/2022 10:00 AM GARCIA MED7 [id number]    6/16/2022 10:00 AM GARCIA MED7 [facility]   7/14/2022 10:00 AM GARCIA MED7 [facility]   8/11/2022 10:00  [facility],[address]

## 2022-04-28 DIAGNOSIS — I10 ESSENTIAL HYPERTENSION: ICD-10-CM

## 2022-04-28 RX ORDER — AMLODIPINE BESYLATE 5 MG/1
5 TABLET ORAL DAILY
COMMUNITY

## 2022-04-28 RX ORDER — HYDROCHLOROTHIAZIDE 12.5 MG/1
12.5 TABLET ORAL DAILY
COMMUNITY
End: 2022-04-28 | Stop reason: SDUPTHER

## 2022-04-28 RX ORDER — HYDROCHLOROTHIAZIDE 12.5 MG/1
12.5 TABLET ORAL DAILY
Qty: 30 TABLET | Refills: 1 | Status: SHIPPED | OUTPATIENT
Start: 2022-04-28 | End: 2022-04-28

## 2022-04-28 RX ORDER — HYDROCHLOROTHIAZIDE 12.5 MG/1
12.5 TABLET ORAL DAILY
Qty: 30 TABLET | Refills: 0 | Status: SHIPPED | OUTPATIENT
Start: 2022-04-28 | End: 2022-05-04

## 2022-04-28 NOTE — TELEPHONE ENCOUNTER
Requested Prescriptions     Signed Prescriptions Disp Refills    hydroCHLOROthiazide (HYDRODIURIL) 12.5 mg tablet 30 Tablet 0     Sig: TAKE 1 TABLET BY MOUTH DAILY     Authorizing Provider: Joanie Pope     Ordering User: Alea Saunders    Per Dr. Yury Andrea verbal order

## 2022-04-28 NOTE — TELEPHONE ENCOUNTER
Called pt. Notified her Dr. Joaquin Chavez said okay to stay off losartan hctz and resume amlodipine 5 mg nightly and start hctz 12.5 mg in am alone. Verified patient's pharmacy. rx sent for hctz, but she has enough amlodipine. Pt will call us next week with update. Patient verbalized understanding and denied further questions or concerns. Requested Prescriptions     Signed Prescriptions Disp Refills    hydroCHLOROthiazide (HYDRODIURIL) 12.5 mg tablet 30 Tablet 1     Sig: Take 1 Tablet by mouth daily.      Authorizing Provider: Yvonne Koch     Ordering User: David Garcia   Per Dr. Elsie Vogt verbal order

## 2022-04-30 DIAGNOSIS — I10 ESSENTIAL HYPERTENSION: ICD-10-CM

## 2022-05-04 RX ORDER — HYDROCHLOROTHIAZIDE 12.5 MG/1
12.5 TABLET ORAL DAILY
Qty: 90 TABLET | Refills: 0 | Status: SHIPPED | OUTPATIENT
Start: 2022-05-04 | End: 2022-07-28 | Stop reason: ALTCHOICE

## 2022-05-04 NOTE — TELEPHONE ENCOUNTER
Requested Prescriptions     Signed Prescriptions Disp Refills    hydroCHLOROthiazide (HYDRODIURIL) 12.5 mg tablet 90 Tablet 0     Sig: TAKE 1 TABLET BY MOUTH DAILY     Authorizing Provider: Leilani Trevizo     Ordering User: Issa Bridges    Per Dr. Natalia Padron verbal order

## 2022-05-05 ENCOUNTER — OFFICE VISIT (OUTPATIENT)
Dept: CARDIOLOGY CLINIC | Age: 72
End: 2022-05-05
Payer: MEDICARE

## 2022-05-05 VITALS
SYSTOLIC BLOOD PRESSURE: 160 MMHG | BODY MASS INDEX: 32.2 KG/M2 | HEIGHT: 60 IN | DIASTOLIC BLOOD PRESSURE: 88 MMHG | WEIGHT: 164 LBS | RESPIRATION RATE: 18 BRPM | HEART RATE: 63 BPM | OXYGEN SATURATION: 99 %

## 2022-05-05 DIAGNOSIS — Z78.9 MEDICATION INTOLERANCE: ICD-10-CM

## 2022-05-05 DIAGNOSIS — C54.1 ENDOMETRIAL CANCER (HCC): ICD-10-CM

## 2022-05-05 DIAGNOSIS — R94.31 ABNORMAL FINDING ON EKG: ICD-10-CM

## 2022-05-05 DIAGNOSIS — I10 ESSENTIAL HYPERTENSION: Primary | ICD-10-CM

## 2022-05-05 PROCEDURE — 3017F COLORECTAL CA SCREEN DOC REV: CPT | Performed by: SPECIALIST

## 2022-05-05 PROCEDURE — G8753 SYS BP > OR = 140: HCPCS | Performed by: SPECIALIST

## 2022-05-05 PROCEDURE — G8427 DOCREV CUR MEDS BY ELIG CLIN: HCPCS | Performed by: SPECIALIST

## 2022-05-05 PROCEDURE — G8399 PT W/DXA RESULTS DOCUMENT: HCPCS | Performed by: SPECIALIST

## 2022-05-05 PROCEDURE — 1090F PRES/ABSN URINE INCON ASSESS: CPT | Performed by: SPECIALIST

## 2022-05-05 PROCEDURE — G8536 NO DOC ELDER MAL SCRN: HCPCS | Performed by: SPECIALIST

## 2022-05-05 PROCEDURE — G0463 HOSPITAL OUTPT CLINIC VISIT: HCPCS | Performed by: SPECIALIST

## 2022-05-05 PROCEDURE — G8754 DIAS BP LESS 90: HCPCS | Performed by: SPECIALIST

## 2022-05-05 PROCEDURE — G8510 SCR DEP NEG, NO PLAN REQD: HCPCS | Performed by: SPECIALIST

## 2022-05-05 PROCEDURE — G8417 CALC BMI ABV UP PARAM F/U: HCPCS | Performed by: SPECIALIST

## 2022-05-05 PROCEDURE — 99214 OFFICE O/P EST MOD 30 MIN: CPT | Performed by: SPECIALIST

## 2022-05-05 PROCEDURE — 1101F PT FALLS ASSESS-DOCD LE1/YR: CPT | Performed by: SPECIALIST

## 2022-05-05 RX ORDER — METOPROLOL SUCCINATE 25 MG/1
25 TABLET, EXTENDED RELEASE ORAL DAILY
Qty: 30 TABLET | Refills: 5 | Status: SHIPPED | OUTPATIENT
Start: 2022-05-05 | End: 2022-07-07 | Stop reason: CLARIF

## 2022-05-05 NOTE — PROGRESS NOTES
HISTORY OF PRESENT ILLNESS  William Oliveira is a 70 y.o. female. She has a history of endometrial cancer as well as hypertension with intolerance to multiple medications. She had been on lisinopril which was not controlling her blood pressure and I tried her on losartan with hydrochlorothiazide but she could not take the medication. Now she is on Norvasc 5 mg and hydrochlorothiazide 12.5 mg. She is afraid to take a higher dose of Norvasc because of possible side effects. She still has occasional palpitations. Her blood pressure at home was 156/72 so there may be some element of whitecoat hypertension as well as medication intolerance. 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 30 Tablet 5    hydroCHLOROthiazide (HYDRODIURIL) 12.5 mg tablet TAKE 1 TABLET BY MOUTH DAILY 90 Tablet 0    amLODIPine (NORVASC) 5 mg tablet Take 5 mg by mouth daily.  ibuprofen (AdviL) 200 mg tablet Take  by mouth.  CRANIAL PROSTHESIS misc Cranial Prosthesis  Diagnosis code:   C54.1  Cpt code:    1 Each 1    lidocaine-prilocaine (EMLA) topical cream APPLY SMALL AMOUNT OVER PORT AREA AND COVER WITH BAND AID ONE HOUR BEFORE CHEMO 30 g 0    Nystop powder as needed.  acetaminophen (TYLENOL) 325 mg tablet Take 2 Tablets by mouth every six (6) hours as needed for Pain.  30 Tablet 0    diphenhydramine HCl (ALLERGY MEDICATION PO) Take  by mouth. (Patient not taking: Reported on 3/24/2022)      ondansetron (ZOFRAN ODT) 4 mg disintegrating tablet Take 1 Tablet by mouth every eight (8) hours as needed for Nausea. Indications: nausea and vomiting caused by cancer drugs (Patient not taking: Reported on 2/8/2022) 30 Tablet 2    diphenoxylate-atropine (LomotiL) 2.5-0.025 mg per tablet Take 1 Tablet by mouth four (4) times daily as needed for Diarrhea. Max Daily Amount: 4 Tablets. (Patient not taking: Reported on 3/24/2022) 20 Tablet 2    dexAMETHasone (DECADRON) 4 mg tablet Take 2 tablets with breakfast the day before chemo and for 2 days after chemo (Patient not taking: Reported on 2/8/2022) 36 Tablet 2    ibuprofen (MOTRIN) 600 mg tablet Take 1 Tablet by mouth every six (6) hours as needed for Pain. (Patient not taking: Reported on 9/7/2021) 30 Tablet 0    methylPREDNISolone (MedroL) 4 mg tab Take 4 mg by mouth daily as needed. Prn for lichen planus (Patient not taking: Reported on 4/7/2022)      melatonin 1 mg tablet Take 1 mg by mouth nightly. (Patient not taking: Reported on 4/7/2022)      cyanocobalamin (VITAMIN B-12) 1,000 mcg tablet Take 1,000 mcg by mouth daily. (Patient not taking: Reported on 5/5/2022)      cholecalciferol, vitamin D3, 2,000 unit tab Take 2,000 Units by mouth daily. (Patient not taking: Reported on 5/5/2022)      BIOTIN PO Take 5,000 mcg by mouth daily. (Patient not taking: Reported on 5/5/2022)      pyridoxine (VITAMIN B-6) 100 mg tablet Take 100 mg by mouth daily. (Patient not taking: Reported on 5/5/2022)       Past Medical History:   Diagnosis Date    Arthritis     OSTEO HIP JULY.     GERD (gastroesophageal reflux disease)     Lichen planus     oral    PUD (peptic ulcer disease)      Past Surgical History:   Procedure Laterality Date    COLONOSCOPY N/A 10/16/2017    COLONOSCOPY performed by Arash Ashraf MD at [address] [place] [address]  10/16/2017         HX  SECTION      X2    HX CHOLECYSTECTOMY      HX DILATION AND CURETTAGE  2021    High-grade endometrial carcinoma, favor serous carcinoma    HX HEENT      EXTRACTION OF WISDOM TEETH X 4    HX ORTHOPAEDIC      right foot, bunionectomy    HX ORTHOPAEDIC      LEFTl hip arthroplasty    HX ORTHOPAEDIC      RIGHT hip arthroplasty    HX TUBAL LIGATION      IR INSERT TUNL CVC W PORT OVER 5 YEARS  10/14/2021    GA COLONOSCOPY FLX DX W/COLLJ SPEC WHEN PFRMD  1/20/2012     x 2    UPPER GI ENDOSCOPY,BIOPSY  2016         UPPER GI ENDOSCOPY,DILATN W GUIDE  2016            Review of Systems   Cardiovascular: Positive for palpitations. All other systems reviewed and are negative. Visit Vitals  BP (!) 160/88 (BP 1 Location: Left upper arm, BP Patient Position: Sitting, BP Cuff Size: Adult)   Pulse 63   Resp 18   Ht 5' (1.524 m)   Wt 164 lb (74.4 kg)   SpO2 99%   BMI 32.03 kg/m²       Physical Exam  Vitals and nursing note reviewed. HENT:      Head: Normocephalic. Right Ear: External ear normal.      Left Ear: External ear normal.      Nose: Nose normal.      Mouth/Throat:      Mouth: Mucous membranes are moist.   Eyes:      Extraocular Movements: Extraocular movements intact. Cardiovascular:      Rate and Rhythm: Normal rate and regular rhythm. Heart sounds: Normal heart sounds. Pulmonary:      Breath sounds: Normal breath sounds. Abdominal:      Palpations: Abdomen is soft. Musculoskeletal:         General: Normal range of motion. Cervical back: Normal range of motion. Skin:     General: Skin is warm. Neurological:      General: No focal deficit present. Mental Status: She is alert. Psychiatric:         Behavior: Behavior normal.         ASSESSMENT and PLAN  Her blood pressure is difficult to treat because of her medication intolerance. I suspect that only the lowest doses of any medications will be tolerated.   Although her heart rate is in the low 60s today I will try to add Toprol-XL 25 mg a day to her regimen and plan to see her back in 4 weeks.

## 2022-05-09 NOTE — PROGRESS NOTES
3 month follow up for endometrial cancer ,  pt reports rectal bleeding that started 2 days ago, pt states neuropathy in right foot and right leg \"bother me\"    1. Have you been to the ER, urgent care clinic since your last visit? Hospitalized since your last visit?  no    2. Have you seen or consulted any other health care providers outside of the [address] [place] [address] since your last visit? Include any pap smears or colon screening.    no

## 2022-05-10 ENCOUNTER — OFFICE VISIT (OUTPATIENT)
Dept: GYNECOLOGY | Age: 72
End: 2022-05-10
Payer: MEDICARE

## 2022-05-10 VITALS
HEART RATE: 65 BPM | WEIGHT: 165 LBS | DIASTOLIC BLOOD PRESSURE: 72 MMHG | BODY MASS INDEX: 32.39 KG/M2 | HEIGHT: 60 IN | SYSTOLIC BLOOD PRESSURE: 166 MMHG

## 2022-05-10 DIAGNOSIS — C54.1 PAPILLARY SEROUS ENDOMETRIAL ADENOCARCINOMA (HCC): Primary | ICD-10-CM

## 2022-05-10 PROCEDURE — 99214 OFFICE O/P EST MOD 30 MIN: CPT | Performed by: OBSTETRICS & GYNECOLOGY

## 2022-05-10 PROCEDURE — G8432 DEP SCR NOT DOC, RNG: HCPCS | Performed by: OBSTETRICS & GYNECOLOGY

## 2022-05-10 PROCEDURE — G8427 DOCREV CUR MEDS BY ELIG CLIN: HCPCS | Performed by: OBSTETRICS & GYNECOLOGY

## 2022-05-10 PROCEDURE — G8753 SYS BP > OR = 140: HCPCS | Performed by: OBSTETRICS & GYNECOLOGY

## 2022-05-10 PROCEDURE — 1090F PRES/ABSN URINE INCON ASSESS: CPT | Performed by: OBSTETRICS & GYNECOLOGY

## 2022-05-10 PROCEDURE — G8417 CALC BMI ABV UP PARAM F/U: HCPCS | Performed by: OBSTETRICS & GYNECOLOGY

## 2022-05-10 PROCEDURE — 1101F PT FALLS ASSESS-DOCD LE1/YR: CPT | Performed by: OBSTETRICS & GYNECOLOGY

## 2022-05-10 PROCEDURE — G8399 PT W/DXA RESULTS DOCUMENT: HCPCS | Performed by: OBSTETRICS & GYNECOLOGY

## 2022-05-10 PROCEDURE — 3017F COLORECTAL CA SCREEN DOC REV: CPT | Performed by: OBSTETRICS & GYNECOLOGY

## 2022-05-10 PROCEDURE — G8536 NO DOC ELDER MAL SCRN: HCPCS | Performed by: OBSTETRICS & GYNECOLOGY

## 2022-05-10 PROCEDURE — G8754 DIAS BP LESS 90: HCPCS | Performed by: OBSTETRICS & GYNECOLOGY

## 2022-05-10 PROCEDURE — G0463 HOSPITAL OUTPT CLINIC VISIT: HCPCS | Performed by: OBSTETRICS & GYNECOLOGY

## 2022-05-10 NOTE — PROGRESS NOTES
[facility] [address], [address]  P [phone]  F [phone]    Office Note  Patient ID:  Name:  Anthony Tran  MRN:  [id number]  :  1950/71 y.o. Date:  5/10/2022      HISTORY OF PRESENT ILLNESS:  Ms. Anthony Tran is a 70 y.o. female who on 2021 underwent Robotic-assisted total laparoscopic hysterectomy, Bilateral salpingo-oophorectomy, Bilateral pelvic sentinel lymph node mapping and biopsy. 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 Negative washings. Stromal cervical involvement. Negative SLNs. 2mm out of 10mm myometrial invasion. Parametrial involvement of tumor. Completed concurrent chemo-EBRT + VBT on 2021. Initiated on adjuvant carboplatin + paclitaxel on 2021. Completed cycle 4 on 2022. CT C/A/P 2022 without evidence of disease. Presents today for continued surveillance. Doing well without complaints. Denies vaginal bleeding/discharge, abdominal/pelvic pain, nausea, vomiting, constipation, diarrhea, CP, SOB, hematuria, hematochezia, change in appetite or bowel movements, bloating, fevers, chills, or urinary symptoms. Initial History:  Ms. Anthony Tran is a 70 y.o.  postmenopausal female who presents as a new patient from Dr. Rand Anglin for high-grade endometrial cancer. The patient reports vaginal spotting back in April, which resulted in her seeing Dr. Rand Anglin. Pelvic ultrasound on 2021 demonstrated 12.5mm stripe. Hysteroscopy/D&C on 2021 demonstrated \"high-grade endometrial carcinoma, favor serous carcinoma\". She was subsequently referred to our office for further discussion and management. Denies pelvic or abdominal pain/bloating, change in appetite or bowel habits, nausea, vomiting, CP, SOB, hematuria, hematochezia, or urinary symptoms. Pertinent PMH/PSH: DJD, h/o  x 2     Active, no restrictions.      Imaging Review:   CT C/A/P 2/3/2022:  FINDINGS:  Port-A-Cath terminates at the cavoatrial junction. CHEST WALL: No mass or axillary lymphadenopathy. THYROID: No nodule. MEDIASTINUM: No mass or lymphadenopathy. STEPHANIE: No mass or lymphadenopathy. THORACIC AORTA: No dissection or aneurysm. MAIN PULMONARY ARTERY: Normal in caliber. TRACHEA/BRONCHI: Patent. ESOPHAGUS: No wall thickening or dilatation. HEART: Normal in size. PLEURA: No effusion or pneumothorax. LUNGS: No nodule, mass, or airspace disease. LIVER: No mass or biliary dilatation. Stable 2 mm hypodensities in hepatic  segment 6 and 8, too small to further characterize. No new lesions. GALLBLADDER: Prior cholecystectomy. SPLEEN: No mass. PANCREAS: No mass or ductal dilatation. ADRENALS: Unremarkable. KIDNEYS: No mass, calculus, or hydronephrosis. STOMACH: Diffuse thickening of the antrum  SMALL BOWEL: No dilatation or wall thickening. COLON: No dilatation or wall thickening. APPENDIX: Normal axial image 70  PERITONEUM: No ascites or pneumoperitoneum. RETROPERITONEUM: No lymphadenopathy or aortic aneurysm. Slight decrease in left  para-aortic lymph node, now measuring 3 x 5 mm (series 2, image 74). REPRODUCTIVE ORGANS: Prior hysterectomy and bilateral nephrectomy. URINARY BLADDER: No mass or calculus. BONES: No destructive bone lesion. Bilateral hip replacements of results in a  moderate amount of pelvic streak artifact. Discogenic sclerosis at L2-3 and  T8-9. ADDITIONAL COMMENTS: N/A  IMPRESSION  No evidence of metastatic disease to the chest, abdomen, or pelvis  Incidental gastritis. CT C/A/P 7/16/2021:  FINDINGS:   THYROID: No nodule. MEDIASTINUM: No mass or lymphadenopathy. STEPHANIE: No mass or lymphadenopathy. THORACIC AORTA: No dissection or aneurysm. MAIN PULMONARY ARTERY: Normal in caliber. TRACHEA/BRONCHI: Patent. ESOPHAGUS: No wall thickening or dilatation. HEART: Normal in size. PLEURA: No effusion or pneumothorax.   LUNGS: No nodule, mass, or airspace disease. There is minor atelectasis/scarring  right upper lobe  LIVER: No mass or biliary dilatation. There is hepatic steatosis. There is a 5  mm low-density in segment 4A and segment 5 to small to fully characterize but  most likely tiny cysts. GALLBLADDER: Patient status post cholecystectomy  SPLEEN: No mass. PANCREAS: No mass or ductal dilatation. ADRENALS: Unremarkable. KIDNEYS: No mass, calculus, or hydronephrosis. There is a retroaortic left renal  vein  STOMACH: Unremarkable. SMALL BOWEL: No dilatation or wall thickening. COLON: No dilatation or wall thickening. APPENDIX: Unremarkable. PERITONEUM: No ascites or pneumoperitoneum. RETROPERITONEUM: No lymphadenopathy or aortic aneurysm. There is an 8 mm lymph  node at the aortic bifurcation on the left. There is mild stenosis origin of the  SMA  REPRODUCTIVE ORGANS: Uterus and pelvis are obscured by artifact related to  bilateral total hip replacements. URINARY BLADDER: No mass or calculus. BONES: No destructive bone lesion. There is a right shoulder effusion  ADDITIONAL COMMENTS: N/A     IMPRESSION     1. CT of the chest demonstrates no definite evidence of metastatic disease. No  acute abnormality identified. 2. CT of the abdomen and pelvis demonstrates no definite evidence of metastatic  disease. The pelvis is obscured by artifact related to total hip replacements. There is hepatic steatosis. 2 subcentimeter low densities in the liver are too  small to characterize but most likely represent tiny cysts. There is an 8mm lymph node just to the left of the aortic bifurcation which is  within normal limits. Pelvic ultrasound 4/20/2021:  Impression: Normal uterus with 12.5mm stripe. Normal right ovary. Non visible left ovary with otherwise normal left adnexa. No free fluid.      Pathology Review:   7/29/2021:  * * *FINAL PATHOLOGIC DIAGNOSIS* * *   1.  Right pelvic sentinel lymph node, biopsy:        One lymph node, negative for carcinoma, levels and cytokeratin stain   examined (0/1)   2.  Left pelvic sentinel lymph node #1, biopsy:        One lymph node, negative for carcinoma, levels and cytokeratin stain   examined (0/1)   3.  Left pelvic sentinel lymph node, biopsy:        One lymph node, negative for carcinoma, levels and cytokeratin stain   examined (0/1)   4. Uterus, cervix, bilateral fallopian tubes and ovaries; simple   hysterectomy, BSO:        Endometrium: Uterine serous carcinoma, see synoptic report        Cervix: Serous carcinoma focally involves endocervical stroma,   predominantly as lymphovascular                invasion        Bilateral fallopian tubes: No pathologic diagnosis     ENDOMETRIUM    SPECIMEN       Procedure: Total hysterectomy and bilateral salpingo-oophorectomy       Specimen Integrity: Intact    TUMOR       Histologic Type: Serous carcinoma       Myometrial Invasion: Present           Depth of Myometrial Invasion (Millimeters): 2 mm           Myometrial Thickness (Millimeters): 10 mm           Percentage of Myometrial Invasion: 20%       Uterine Serosa Involvement: Not identified       Lower Uterine Segment Involvement: Present, myoinvasive       Cervical Stromal Involvement: Present       Other Tissue / Organ Involvement: Not identified       Peritoneal Ascitic Fluid: Atypical, favor benign reactive process       Lymphovascular Invasion: Present    MARGINS       Margins: Parametrium and focal paracervix involved by tumor           Parametrial and focal Paracervical Margin: Involved by carcinoma    LYMPH NODES       Lymph Node Status:  All lymph nodes negative for tumor cells           Total Number of Pelvic Nodes Examined: 3           Number of Pelvic [place] Nodes Examined: 3           Total Number of Para-aortic Nodes Examined: 0           Number of Para-aortic [place] Nodes Examined: 0    PATHOLOGIC STAGE CLASSIFICATION (pTNM, AJCC 8th Edition)       Primary Tumor (pT): pT2       Regional Lymph Nodes Modifier: (sn)       Regional Lymph Nodes (pN): pN0    FIGO STAGE       FIGO Stage: II   * * *Comment* * *   Immunohistochemical stains with appropriate controls show tumor positive   for P16, P53 with Ki-67 proliferation index of approximately 50%.  These   findings support diagnosis of endometrial serous carcinoma. 2021:  FINAL PATHOLOGIC DIAGNOSIS   Endometrium, curettings:   High-grade endometrial carcinoma, favor serous carcinoma   See comment   Comment   The biopsy contains a malignant glandular neoplasm with a high nuclear grade, papillary growth pattern, and areas of necrosis. Morphologic features suggest a high-grade endometrial carcinoma and are most compatible with a diagnosis of serous carcinoma. ROS:  A comprehensive review of systems was negative except for that written in the History of Present Illness. , 10 point ROS    OB/GYN ROS:  Per HPI    ECOG ndGndrndanddndend:nd nd2nd Problem List:  Patient Active Problem List    Diagnosis Date Noted    Opioid use, unspecified with unspecified opioid-induced disorder 2021    Encounter for antineoplastic chemotherapy 10/13/2021    Essential hypertension 2021    Abnormal finding on EKG 2021    Papillary serous endometrial adenocarcinoma ([facility] [address] 2021    PUD (peptic ulcer disease)     GERD (gastroesophageal reflux disease)     Arthritis     Lichen planus     Hip arthritis 2016    DJD (degenerative joint disease) of hip 2013     PMH:  Past Medical History:   Diagnosis Date    Arthritis     OSTEO HIP JULY.     GERD (gastroesophageal reflux disease)     Lichen planus     oral    PUD (peptic ulcer disease)       PSH:  Past Surgical History:   Procedure Laterality Date    COLONOSCOPY N/A 10/16/2017    COLONOSCOPY performed by Zonia Montelongo MD at [address] [place] [address]  10/16/2017         HX  SECTION      X2    HX CHOLECYSTECTOMY      HX DILATION AND CURETTAGE  2021 High-grade endometrial carcinoma, favor serous carcinoma    HX HEENT      EXTRACTION OF WISDOM TEETH X 4    HX ORTHOPAEDIC      right foot, bunionectomy    HX ORTHOPAEDIC      LEFTl hip arthroplasty    HX ORTHOPAEDIC      RIGHT hip arthroplasty    HX TUBAL LIGATION      IR INSERT TUNL CVC W PORT OVER 5 YEARS  10/14/2021    ID COLONOSCOPY FLX DX W/COLLJ SPEC WHEN PFRMD  1/20/2012     x 2    UPPER GI ENDOSCOPY,BIOPSY  2016         UPPER GI ENDOSCOPY,DILATN W GUIDE  2016           Social History:  Social History     Tobacco Use    Smoking status: Former Smoker     Packs/day: 0.25     Years: 0.50     Pack years: 0.12     Quit date: 1967     Years since quittin.4    Smokeless tobacco: Never Used    Tobacco comment: brief   Substance Use Topics    Alcohol use: Not Currently     Comment: VERY RARELY       Family History:  Family History   Problem Relation Age of Onset    Cancer Maternal Grandmother 79        colon cancer    Hypertension Mother     Cancer Mother         pacreatic cancer    Cancer Paternal Aunt         ovarian cancer      Medications: (reviewed)  Current Outpatient Medications   Medication Sig    hydroCHLOROthiazide (HYDRODIURIL) 12.5 mg tablet TAKE 1 TABLET BY MOUTH DAILY    ibuprofen (AdviL) 200 mg tablet Take  by mouth.  Nystop powder as needed.  acetaminophen (TYLENOL) 325 mg tablet Take 2 Tablets by mouth every six (6) hours as needed for Pain.  metoprolol succinate (TOPROL-XL) 25 mg XL tablet Take 1 Tablet by mouth daily. (Patient not taking: Reported on 5/10/2022)    amLODIPine (NORVASC) 5 mg tablet Take 5 mg by mouth daily. (Patient not taking: Reported on 5/10/2022)    diphenhydramine HCl (ALLERGY MEDICATION PO) Take  by mouth. (Patient not taking: Reported on 3/24/2022)    ondansetron (ZOFRAN ODT) 4 mg disintegrating tablet Take 1 Tablet by mouth every eight (8) hours as needed for Nausea.  Indications: nausea and vomiting caused by cancer drugs (Patient not taking: Reported on 2/8/2022)   [address] [facility]  Diagnosis code:   C54.1  Cpt code:    (Patient not taking: Reported on 5/10/2022)    lidocaine-prilocaine (EMLA) topical cream APPLY SMALL AMOUNT OVER PORT AREA AND COVER WITH BAND AID ONE HOUR BEFORE CHEMO (Patient not taking: Reported on 5/10/2022)    diphenoxylate-atropine (LomotiL) 2.5-0.025 mg per tablet Take 1 Tablet by mouth four (4) times daily as needed for Diarrhea. Max Daily Amount: 4 Tablets. (Patient not taking: Reported on 3/24/2022)    dexAMETHasone (DECADRON) 4 mg tablet Take 2 tablets with breakfast the day before chemo and for 2 days after chemo (Patient not taking: Reported on 2/8/2022)    ibuprofen (MOTRIN) 600 mg tablet Take 1 Tablet by mouth every six (6) hours as needed for Pain. (Patient not taking: Reported on 9/7/2021)    methylPREDNISolone (MedroL) 4 mg tab Take 4 mg by mouth daily as needed. Prn for lichen planus (Patient not taking: Reported on 4/7/2022)    melatonin 1 mg tablet Take 1 mg by mouth nightly. (Patient not taking: Reported on 4/7/2022)    cyanocobalamin (VITAMIN B-12) 1,000 mcg tablet Take 1,000 mcg by mouth daily. (Patient not taking: Reported on 5/5/2022)    cholecalciferol, vitamin D3, 2,000 unit tab Take 2,000 Units by mouth daily. (Patient not taking: Reported on 5/5/2022)    BIOTIN PO Take 5,000 mcg by mouth daily. (Patient not taking: Reported on 5/5/2022)    pyridoxine (VITAMIN B-6) 100 mg tablet Take 100 mg by mouth daily. (Patient not taking: Reported on 5/5/2022)     No current facility-administered medications for this visit.      Allergies: (reviewed)  Allergies   Allergen Reactions    Codeine Nausea and Vomiting    Penicillins Rash     Rash & dizzy    Sulfa (Sulfonamide Antibiotics) Other (comments)     General redness all over skin    Ciprofloxacin Itching and Other (comments)     Extreme dizziness and rash    Other Medication Nausea and Vomiting PRESCRIPTION STRENGTH ANTI INFLAMMATORY MEDS         OBJECTIVE:  Physical Exam:  Visit Vitals  BP (!) 166/72 (BP 1 Location: Left arm, BP Patient Position: Sitting)   Pulse 65   Ht 5' (1.524 m)   Wt 165 lb (74.8 kg)   BMI 32.22 kg/m²      General: Alert and oriented. No acute distress. Well-nourished  HEENT: No thyroid enlargment. Neck supple without restrictions. Sclera normal. Normal occular motion. Moist mucous membranes. Lymphatics: No evidence of axillary, cervical, or subclavicular adenopathy. Respiratory: clear to auscultation and percussion to the bases. No CVAT. Cardiovascular: regular rate and rhythm. No murmurs, rubs, or gallops. Gastrointestinal: soft, non-tender, non-distended, no masses or organomegaly. Well-healed incision. Musculoskeletal: normal gait. No joint tenderness, deformity or swelling. No muscular tenderness. Extremities: extremities normal, atraumatic, no cyanosis or edema. Pelvic: exam chaperoned by nurse. Normal appearing external genitalia. On speculum exam, the vagina is atrophic. The uterus and cervix are surgically absent. No evidence of masses or nodularity on bimanual exam. Deferred rectovaginal exam.   Neuro: Grossly intact. Normal gait and movement. No acute deficit  Skin: No evidence of rashes or skin changes. IMPRESSION/PLAN:    Ms. Estuardo Cam is a 70 y.o.  female who on 7/29/2021 underwent Robotic-assisted total laparoscopic hysterectomy, Bilateral salpingo-oophorectomy, Bilateral pelvic sentinel lymph node mapping and biopsy. 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 Negative washings. Stromal cervical involvement. Negative SLNs. 2mm out of 10mm myometrial invasion. Parametrial involvement of tumor. Completed concurrent chemo-EBRT + VBT on 11/4/2021. Initiated on adjuvant carboplatin + paclitaxel on 11/18/2021. Completed cycle 4 on 1/20/2022.  CT C/A/P 2/4/2022 without evidence of disease. Problems:     Patient Active Problem List    Diagnosis Date Noted    Opioid use, unspecified with unspecified opioid-induced disorder 12/07/2021    Encounter for antineoplastic chemotherapy 10/13/2021    Essential hypertension 07/27/2021    Abnormal finding on EKG 07/27/2021    Papillary serous endometrial adenocarcinoma ([facility] [address] 07/14/2021    PUD (peptic ulcer disease)     GERD (gastroesophageal reflux disease)     Arthritis     Lichen planus     Hip arthritis 04/04/2016    DJD (degenerative joint disease) of hip 01/04/2013     Reviewed patient's course to date. HERMANN on exam today. Reassured patient. Plan to RTC in 3 months for continued surveillance. Reviewed precautionary symptoms to return sooner. Reports some rectal bleeding. Patient is due for a colonoscopy. Encouraged patient to follow-up with her Gastroenterologist for a colonoscopy. . All questions and concerns were addressed with the patient and she is comfortable with the plan. An electronic signature was used to authenticate this note.      Lizzeth Mijares MD

## 2022-05-16 RX ORDER — HEPARIN 100 UNIT/ML
500 SYRINGE INTRAVENOUS AS NEEDED
Status: CANCELLED | OUTPATIENT
Start: 2022-05-19

## 2022-05-16 RX ORDER — SODIUM CHLORIDE 9 MG/ML
10 INJECTION INTRAMUSCULAR; INTRAVENOUS; SUBCUTANEOUS AS NEEDED
Status: CANCELLED | OUTPATIENT
Start: 2022-05-19

## 2022-05-16 RX ORDER — SODIUM CHLORIDE 0.9 % (FLUSH) 0.9 %
5-10 SYRINGE (ML) INJECTION AS NEEDED
Status: CANCELLED | OUTPATIENT
Start: 2022-05-19 | End: 2022-05-19

## 2022-05-19 ENCOUNTER — HOSPITAL ENCOUNTER (OUTPATIENT)
Dept: INFUSION THERAPY | Age: 72
Discharge: HOME | End: 2022-05-19
Payer: MEDICARE

## 2022-05-19 VITALS
OXYGEN SATURATION: 99 % | BODY MASS INDEX: 32.5 KG/M2 | SYSTOLIC BLOOD PRESSURE: 155 MMHG | HEART RATE: 55 BPM | RESPIRATION RATE: 16 BRPM | TEMPERATURE: 97.5 F | WEIGHT: 166.4 LBS | DIASTOLIC BLOOD PRESSURE: 63 MMHG

## 2022-05-19 DIAGNOSIS — C54.1 PAPILLARY SEROUS ENDOMETRIAL ADENOCARCINOMA (HCC): Primary | ICD-10-CM

## 2022-05-19 PROCEDURE — 74011250636 HC RX REV CODE- 250/636: Performed by: OBSTETRICS & GYNECOLOGY

## 2022-05-19 PROCEDURE — 96523 IRRIG DRUG DELIVERY DEVICE: CPT

## 2022-05-19 PROCEDURE — 74011000250 HC RX REV CODE- 250: Performed by: OBSTETRICS & GYNECOLOGY

## 2022-05-19 PROCEDURE — 77030012965 HC NDL HUBR BBMI -A

## 2022-05-19 RX ORDER — SODIUM CHLORIDE 0.9 % (FLUSH) 0.9 %
5-10 SYRINGE (ML) INJECTION AS NEEDED
Status: DISPENSED | OUTPATIENT
Start: 2022-05-19 | End: 2022-05-19

## 2022-05-19 RX ORDER — SODIUM CHLORIDE 9 MG/ML
10 INJECTION INTRAMUSCULAR; INTRAVENOUS; SUBCUTANEOUS AS NEEDED
Status: ACTIVE | OUTPATIENT
Start: 2022-05-19 | End: 2022-05-19

## 2022-05-19 RX ORDER — HEPARIN 100 UNIT/ML
500 SYRINGE INTRAVENOUS AS NEEDED
Status: ACTIVE | OUTPATIENT
Start: 2022-05-19 | End: 2022-05-19

## 2022-05-19 RX ADMIN — HEPARIN 500 UNITS: 100 SYRINGE at 10:10

## 2022-05-19 RX ADMIN — SODIUM CHLORIDE, PRESERVATIVE FREE 10 ML: 5 INJECTION INTRAVENOUS at 10:10

## 2022-05-19 NOTE — PROGRESS NOTES
[facility] Note:  Arrived - 1000    Patient denied having any symptoms of COVID-19, i.e. SOB, coughing, fever, or generally not feeling well. Also denies having been exposed to COVID-19 recently or having had any recent contact with family/friend that has a pending COVID test.     Visit Vitals  BP (!) 155/63   Pulse (!) 55   Temp 97.5 °F (36.4 °C)   Resp 16   Wt 75.5 kg (166 lb 6.4 oz)   SpO2 99%   BMI 32.50 kg/m²       Port accessed & flushed per protocol w/o difficulty, positive blood return noted. Zazueta needle removed. 1010 - Tolerated well. Pt denies any acute problems/changes. Discharged from [facility] ambulatory. No distress.  Next appt: 6/16/22

## 2022-06-10 RX ORDER — HEPARIN 100 UNIT/ML
500 SYRINGE INTRAVENOUS AS NEEDED
Status: CANCELLED | OUTPATIENT
Start: 2022-06-16

## 2022-06-10 RX ORDER — SODIUM CHLORIDE 9 MG/ML
10 INJECTION INTRAMUSCULAR; INTRAVENOUS; SUBCUTANEOUS AS NEEDED
Status: CANCELLED | OUTPATIENT
Start: 2022-06-16

## 2022-06-10 RX ORDER — SODIUM CHLORIDE 0.9 % (FLUSH) 0.9 %
5-10 SYRINGE (ML) INJECTION AS NEEDED
Status: CANCELLED | OUTPATIENT
Start: 2022-06-16

## 2022-06-16 ENCOUNTER — HOSPITAL ENCOUNTER (OUTPATIENT)
Dept: INFUSION THERAPY | Age: 72
Discharge: HOME | End: 2022-06-16
Payer: MEDICARE

## 2022-06-16 VITALS
OXYGEN SATURATION: 100 % | RESPIRATION RATE: 16 BRPM | DIASTOLIC BLOOD PRESSURE: 72 MMHG | HEART RATE: 55 BPM | SYSTOLIC BLOOD PRESSURE: 149 MMHG | TEMPERATURE: 97.9 F

## 2022-06-16 DIAGNOSIS — C54.1 PAPILLARY SEROUS ENDOMETRIAL ADENOCARCINOMA (HCC): Primary | ICD-10-CM

## 2022-06-16 PROCEDURE — 74011000250 HC RX REV CODE- 250: Performed by: OBSTETRICS & GYNECOLOGY

## 2022-06-16 PROCEDURE — 96523 IRRIG DRUG DELIVERY DEVICE: CPT

## 2022-06-16 PROCEDURE — 74011250636 HC RX REV CODE- 250/636: Performed by: OBSTETRICS & GYNECOLOGY

## 2022-06-16 PROCEDURE — 77030012965 HC NDL HUBR BBMI -A

## 2022-06-16 RX ORDER — SODIUM CHLORIDE 9 MG/ML
10 INJECTION INTRAMUSCULAR; INTRAVENOUS; SUBCUTANEOUS AS NEEDED
Status: ACTIVE | OUTPATIENT
Start: 2022-06-16 | End: 2022-06-16

## 2022-06-16 RX ORDER — HEPARIN 100 UNIT/ML
500 SYRINGE INTRAVENOUS AS NEEDED
Status: ACTIVE | OUTPATIENT
Start: 2022-06-16 | End: 2022-06-16

## 2022-06-16 RX ORDER — SODIUM CHLORIDE 0.9 % (FLUSH) 0.9 %
5-10 SYRINGE (ML) INJECTION AS NEEDED
Status: DISPENSED | OUTPATIENT
Start: 2022-06-16 | End: 2022-06-16

## 2022-06-16 RX ADMIN — HEPARIN 500 UNITS: 100 SYRINGE at 10:15

## 2022-06-16 RX ADMIN — SODIUM CHLORIDE, PRESERVATIVE FREE 10 ML: 5 INJECTION INTRAVENOUS at 10:15

## 2022-06-16 NOTE — PROGRESS NOTES
[facility] Short Visit Note    [id number]  Arrived for Port Flush    Visit Vitals  BP (!) 149/72 (BP 1 Location: Left upper arm, BP Patient Position: Sitting)   Pulse (!) 55   Temp 97.9 °F (36.6 °C)   Resp 16   SpO2 100%       Patient denied having any symptoms of COVID-19, i.e. SOB, coughing, fever, or generally not feeling well. Also denies having been exposed to COVID-19 recently or having had any recent contact with family/friend that has a pending COVID test.    Port accessed with positive blood return noted. Port flushed and heparinized per protocol w/o difficulty. Zazueta needle removed. Site covered with gauze and paper tape. Medications Administered     0.9% sodium chloride injection 10 mL     Admin Date  06/16/2022 Action  Given Dose  10 mL Route  IntraVENous Administered By  Rebeca Rosario RN          heparin (porcine) pf 500 Units     Admin Date  06/16/2022 Action  Given Dose  500 Units Route  IntraVENous Administered By  Rebeca Rosario RN          sodium chloride (NS) flush 5-10 mL     Admin Date  06/16/2022 Action  Given Dose  10 mL Route  IntraVENous Administered By  Rebeca Rosario RN              Pt tolerated well. Pt denies any acute problems/changes. 1015  Discharged from [facility] ambulatory. No distress. Next appt:    Future Appointments   Date Time Provider Cresencio Mckenzie   6/30/2022  3:00 PM MD MARIA LUZ Luciano BS AMB   7/14/2022 10:00 AM [place] [id number] [address] [facility]   8/10/2022  8:45 AM MD GRISELDA Diehl Asp BS AMB   8/11/2022 10:00 AM [address] [place] [address],[address]

## 2022-07-11 ENCOUNTER — ANESTHESIA EVENT (OUTPATIENT)
Dept: ENDOSCOPY | Age: 72
End: 2022-07-11
Payer: MEDICARE

## 2022-07-11 ENCOUNTER — ANESTHESIA (OUTPATIENT)
Dept: ENDOSCOPY | Age: 72
End: 2022-07-11
Payer: MEDICARE

## 2022-07-11 ENCOUNTER — HOSPITAL ENCOUNTER (OUTPATIENT)
Age: 72
Setting detail: OUTPATIENT SURGERY
Discharge: HOME | End: 2022-07-11
Attending: INTERNAL MEDICINE | Admitting: INTERNAL MEDICINE
Payer: MEDICARE

## 2022-07-11 VITALS
WEIGHT: 161.2 LBS | SYSTOLIC BLOOD PRESSURE: 159 MMHG | HEIGHT: 60 IN | OXYGEN SATURATION: 98 % | DIASTOLIC BLOOD PRESSURE: 58 MMHG | TEMPERATURE: 97.4 F | BODY MASS INDEX: 31.65 KG/M2 | HEART RATE: 60 BPM | RESPIRATION RATE: 18 BRPM

## 2022-07-11 PROCEDURE — 76060000031 HC ANESTHESIA FIRST 0.5 HR: Performed by: INTERNAL MEDICINE

## 2022-07-11 PROCEDURE — 88305 TISSUE EXAM BY PATHOLOGIST: CPT

## 2022-07-11 PROCEDURE — 77030021593 HC FCPS BIOP ENDOSC BSC -A: Performed by: INTERNAL MEDICINE

## 2022-07-11 PROCEDURE — 74011000250 HC RX REV CODE- 250: Performed by: REGISTERED NURSE

## 2022-07-11 PROCEDURE — 74011250636 HC RX REV CODE- 250/636: Performed by: INTERNAL MEDICINE

## 2022-07-11 PROCEDURE — 2709999900 HC NON-CHARGEABLE SUPPLY: Performed by: INTERNAL MEDICINE

## 2022-07-11 PROCEDURE — 77030008565 HC TBNG SUC IRR ERBE -B: Performed by: INTERNAL MEDICINE

## 2022-07-11 PROCEDURE — 76040000019: Performed by: INTERNAL MEDICINE

## 2022-07-11 PROCEDURE — 74011250636 HC RX REV CODE- 250/636: Performed by: REGISTERED NURSE

## 2022-07-11 RX ORDER — EPINEPHRINE 0.1 MG/ML
1 INJECTION INTRACARDIAC; INTRAVENOUS
Status: DISCONTINUED | OUTPATIENT
Start: 2022-07-11 | End: 2022-07-11 | Stop reason: HOSPADM

## 2022-07-11 RX ORDER — SODIUM CHLORIDE 0.9 % (FLUSH) 0.9 %
5-40 SYRINGE (ML) INJECTION EVERY 8 HOURS
Status: DISCONTINUED | OUTPATIENT
Start: 2022-07-11 | End: 2022-07-11 | Stop reason: HOSPADM

## 2022-07-11 RX ORDER — NALOXONE HYDROCHLORIDE 0.4 MG/ML
0.4 INJECTION, SOLUTION INTRAMUSCULAR; INTRAVENOUS; SUBCUTANEOUS
Status: DISCONTINUED | OUTPATIENT
Start: 2022-07-11 | End: 2022-07-11 | Stop reason: HOSPADM

## 2022-07-11 RX ORDER — ATROPINE SULFATE 0.1 MG/ML
0.5 INJECTION INTRAVENOUS
Status: DISCONTINUED | OUTPATIENT
Start: 2022-07-11 | End: 2022-07-11 | Stop reason: HOSPADM

## 2022-07-11 RX ORDER — FENTANYL CITRATE 50 UG/ML
25 INJECTION, SOLUTION INTRAMUSCULAR; INTRAVENOUS
Status: DISCONTINUED | OUTPATIENT
Start: 2022-07-11 | End: 2022-07-11 | Stop reason: HOSPADM

## 2022-07-11 RX ORDER — FLUMAZENIL 0.1 MG/ML
0.2 INJECTION INTRAVENOUS
Status: DISCONTINUED | OUTPATIENT
Start: 2022-07-11 | End: 2022-07-11 | Stop reason: HOSPADM

## 2022-07-11 RX ORDER — PROPOFOL 10 MG/ML
INJECTION, EMULSION INTRAVENOUS AS NEEDED
Status: DISCONTINUED | OUTPATIENT
Start: 2022-07-11 | End: 2022-07-11 | Stop reason: HOSPADM

## 2022-07-11 RX ORDER — SODIUM CHLORIDE 0.9 % (FLUSH) 0.9 %
5-40 SYRINGE (ML) INJECTION AS NEEDED
Status: DISCONTINUED | OUTPATIENT
Start: 2022-07-11 | End: 2022-07-11 | Stop reason: HOSPADM

## 2022-07-11 RX ORDER — SODIUM CHLORIDE 9 MG/ML
75 INJECTION, SOLUTION INTRAVENOUS CONTINUOUS
Status: DISCONTINUED | OUTPATIENT
Start: 2022-07-11 | End: 2022-07-11 | Stop reason: HOSPADM

## 2022-07-11 RX ORDER — DEXTROMETHORPHAN/PSEUDOEPHED 2.5-7.5/.8
1.2 DROPS ORAL
Status: DISCONTINUED | OUTPATIENT
Start: 2022-07-11 | End: 2022-07-11 | Stop reason: HOSPADM

## 2022-07-11 RX ORDER — MIDAZOLAM HYDROCHLORIDE 1 MG/ML
.25-5 INJECTION, SOLUTION INTRAMUSCULAR; INTRAVENOUS
Status: DISCONTINUED | OUTPATIENT
Start: 2022-07-11 | End: 2022-07-11 | Stop reason: HOSPADM

## 2022-07-11 RX ORDER — LIDOCAINE HYDROCHLORIDE 20 MG/ML
INJECTION, SOLUTION EPIDURAL; INFILTRATION; INTRACAUDAL; PERINEURAL AS NEEDED
Status: DISCONTINUED | OUTPATIENT
Start: 2022-07-11 | End: 2022-07-11 | Stop reason: HOSPADM

## 2022-07-11 RX ADMIN — PROPOFOL 50 MG: 10 INJECTION, EMULSION INTRAVENOUS at 09:51

## 2022-07-11 RX ADMIN — PROPOFOL 50 MG: 10 INJECTION, EMULSION INTRAVENOUS at 09:46

## 2022-07-11 RX ADMIN — SODIUM CHLORIDE: 0.9 INJECTION, SOLUTION INTRAVENOUS at 09:39

## 2022-07-11 RX ADMIN — PROPOFOL 70 MG: 10 INJECTION, EMULSION INTRAVENOUS at 09:39

## 2022-07-11 RX ADMIN — LIDOCAINE HYDROCHLORIDE 40 MG: 20 INJECTION, SOLUTION EPIDURAL; INFILTRATION; INTRACAUDAL; PERINEURAL at 09:39

## 2022-07-11 RX ADMIN — PROPOFOL 60 MG: 10 INJECTION, EMULSION INTRAVENOUS at 09:41

## 2022-07-11 RX ADMIN — PROPOFOL 20 MG: 10 INJECTION, EMULSION INTRAVENOUS at 09:43

## 2022-07-11 NOTE — ANESTHESIA POSTPROCEDURE EVALUATION
Procedure(s):  COLONOSCOPY  COLON BIOPSY  ENDOSCOPIC ARGON PLASMA COAGULATION. total IV anesthesia, general    Anesthesia Post Evaluation        Patient location during evaluation: PACU  Note status: Adequate. 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 Sex: female      Cardiovascular Function/Vital Signs    BP (!) 159/58   Pulse 60   Temp 36.3 °C (97.4 °F)   Resp 18   Ht 5' (1.524 m)   Wt 73.1 kg (161 lb 3.2 oz)   SpO2 98%   Breastfeeding No   BMI 31.48 kg/m²     Patient is status post Procedure(s):  COLONOSCOPY  COLON BIOPSY  ENDOSCOPIC ARGON PLASMA COAGULATION. Nausea/Vomiting: Controlled. Postoperative hydration reviewed and adequate. Pain:  Pain Scale 1: Visual (07/11/22 1021)  Pain Intensity 1: 0 (07/11/22 1021)   Managed. Neurological Status: At baseline. Mental Status and Level of Consciousness: Arousable. Pulmonary Status:   O2 Device: None (Room air) (07/11/22 1021)   Adequate oxygenation and airway patent. Complications related to anesthesia: None    Post-anesthesia assessment completed. No concerns. Signed By: Kaye Kaplan MD    7/11/2022  Post anesthesia nausea and vomiting:  controlled      INITIAL Post-op Vital signs:   Vitals Value Taken Time   /72 07/11/22 1031   Temp 36.3 °C (97.4 °F) 07/11/22 1008   Pulse 54 07/11/22 1032   Resp 20 07/11/22 1032   SpO2 97 % 07/11/22 1032   Vitals shown include unvalidated device data.

## 2022-07-11 NOTE — ANESTHESIA PREPROCEDURE EVALUATION
Anesthetic History   No history of anesthetic complications            Review of Systems / Medical History  Patient summary reviewed, nursing notes reviewed and pertinent labs reviewed    Pulmonary                Comments: Remote smoking history - Quit 1967 - 0.125 pack years   Neuro/Psych   Within defined limits           Cardiovascular    Hypertension              Exercise tolerance: >4 METS  Comments: EKG 7/16/21: Normal sinus rhythm   Left ventricular hypertrophy with repolarization abnormality   Abnormal ECG   When compared with ECG of 21-MAR-2016 13:06,   Nonspecific T wave abnormality now evident in Lateral leads   GI/Hepatic/Renal     GERD: well controlled      PUD    Comments: Dysphagia  RUQ pain  Colon polyps Endo/Other        Arthritis     Other Findings              Physical Exam    Airway  Mallampati: III  TM Distance: 4 - 6 cm  Neck ROM: normal range of motion   Mouth opening: Normal     Cardiovascular  Regular rate and rhythm,  S1 and S2 normal,  no murmur, click, rub, or gallop             Dental  No notable dental hx       Pulmonary  Breath sounds clear to auscultation               Abdominal  GI exam deferred       Other Findings            Anesthetic Plan    ASA: 2  Anesthesia type: total IV anesthesia and general          Induction: Intravenous  Anesthetic plan and risks discussed with: Patient      Propofol MAC

## 2022-07-11 NOTE — H&P
Gastroenterology Outpatient History and Physical    Patient: Eric Schaffer    Physician: María Almeida MD    Chief Complaint: REctal bleeding  History of Present Illness: 79yo F with rectal bleedng. Hx. Radiation for uterine CA. Last colonoscopy . Hx diverticulosis, hx colon adenoma    History:  Past Medical History:   Diagnosis Date    Arthritis     OSTEO HIP JULY.     Cancer ([facility] [address]     uterine     GERD (gastroesophageal reflux disease)     Lichen planus     oral    PUD (peptic ulcer disease)       Past Surgical History:   Procedure Laterality Date    COLONOSCOPY N/A 10/16/2017    COLONOSCOPY performed by María Almeida MD at [address] [place] [address]  10/16/2017         HX  SECTION      X2    HX CHOLECYSTECTOMY      HX DILATION AND CURETTAGE  2021    High-grade endometrial carcinoma, favor serous carcinoma    HX HEENT      EXTRACTION OF WISDOM TEETH X 4    HX HYSTERECTOMY  2021    HX ORTHOPAEDIC      right foot, bunionectomy    HX ORTHOPAEDIC      LEFTl hip arthroplasty    HX ORTHOPAEDIC      RIGHT hip arthroplasty    HX TUBAL LIGATION      IR INSERT TUNL CVC W PORT OVER 5 YEARS  10/14/2021    OR COLONOSCOPY FLX DX W/COLLJ SPEC WHEN PFRMD  1/20/2012     x 2    UPPER GI ENDOSCOPY,BIOPSY  2016         UPPER GI ENDOSCOPY,DILATN W GUIDE  2016           Social History     Socioeconomic History    Marital status:    Tobacco Use    Smoking status: Never Smoker    Smokeless tobacco: Never Used   Substance and Sexual Activity    Alcohol use: Not Currently     Comment: VERY RARELY     Drug use: No      Family History   Problem Relation Age of Onset    Cancer Maternal Grandmother 79        colon cancer    Hypertension Mother     Cancer Mother         pacreatic cancer    Cancer Paternal Aunt         ovarian cancer      Patient Active Problem List   Diagnosis Code    DJD (degenerative joint disease) of hip M16.9    Hip arthritis M16.10    PUD (peptic ulcer disease) K27.9    GERD (gastroesophageal reflux disease) K21.9    Arthritis O51.33    Lichen planus T25.4    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    Essential hypertension I10    Abnormal finding on EKG R94.31    Encounter for antineoplastic chemotherapy Z51.11    Opioid use, unspecified with unspecified opioid-induced disorder F11.99       Allergies: Allergies   Allergen Reactions    Codeine Nausea and Vomiting    Penicillins Rash     Rash & dizzy    Sulfa (Sulfonamide Antibiotics) Other (comments)     General redness all over skin    Ciprofloxacin Itching and Other (comments)     Extreme dizziness and rash    Other Medication Nausea and Vomiting     PRESCRIPTION STRENGTH ANTI INFLAMMATORY MEDS      Medications:   Prior to Admission medications    Medication Sig Start Date End Date Taking? Authorizing Provider   amLODIPine (NORVASC) 5 mg tablet Take 5 mg by mouth daily. Yes Provider, Historical   CRANIAL PROSTHESIS misc Cranial Prosthesis  Diagnosis code:   C54.1  Cpt code:    11/22/21  Yes Nayeli Arechiga MD   lidocaine-prilocaine (EMLA) topical cream APPLY SMALL AMOUNT OVER PORT AREA AND COVER WITH BAND AID ONE HOUR BEFORE CHEMO 10/15/21  Yes Doreen Walden PA-C   Nystop powder as needed. 8/22/21  Yes Provider, Historical   acetaminophen (TYLENOL) 325 mg tablet Take 2 Tablets by mouth every six (6) hours as needed for Pain. 7/30/21  Yes Doreen Walden PA-C   ibuprofen (MOTRIN) 600 mg tablet Take 1 Tablet by mouth every six (6) hours as needed for Pain. 7/7/21  Yes Ellie Sparks MD   methylPREDNISolone (MedroL) 4 mg tab Take 4 mg by mouth daily as needed. Prn for lichen planus   Yes Provider, Historical   cholecalciferol, vitamin D3, 2,000 unit tab Take 2,000 Units by mouth daily.    Yes Provider, Historical   hydroCHLOROthiazide (HYDRODIURIL) 12.5 mg tablet TAKE 1 TABLET BY MOUTH DAILY  Patient not taking: Reported on 7/11/2022 5/4/22   Peder Holter, Jeanie Lockett MD   ibuprofen (AdviL) 200 mg tablet Take  by mouth. Provider, Historical   ondansetron (ZOFRAN ODT) 4 mg disintegrating tablet Take 1 Tablet by mouth every eight (8) hours as needed for Nausea. Indications: nausea and vomiting caused by cancer drugs 1/18/22   Arash Kaplan MD   cyanocobalamin (VITAMIN B-12) 1,000 mcg tablet Take 1,000 mcg by mouth daily. Patient not taking: Reported on 7/11/2022    Provider, Historical   BIOTIN PO Take 5,000 mcg by mouth daily. Patient not taking: Reported on 7/11/2022    Provider, Historical   pyridoxine (VITAMIN B-6) 100 mg tablet Take 100 mg by mouth daily. Patient not taking: Reported on 7/11/2022    Provider, Historical     Physical Exam:   Vital Signs: Blood pressure (!) 188/62, pulse 61, resp. rate 11, height 5' (1.524 m), weight 73.1 kg (161 lb 3.2 oz), SpO2 99 %, not currently breastfeeding.   General: well developed, well nourished   HEENT: unremarkable   Heart: regular rhythm no mumur    Lungs: clear   Abdominal:  benign   Neurological: unremarkable   Extremities: no edema     Findings/Diagnosis: REctal bleeding  Plan of Care/Planned Procedure: Colonoscopy with conscious/deep sedation    Signed:  Liliana Dawson MD 7/11/2022

## 2022-07-11 NOTE — PROCEDURES
NAME:  Renny Beltran   :   1950   MRN:   [id number]     Date/Time:  2022 10:06 AM    Colonoscopy Operative Report    Procedure Type:   Colonoscopy with argon plasma coagulation     Indications:     Lower rectal bleeding  Pre-operative Diagnosis: see indication above  Post-operative Diagnosis:  See findings below  :  Jonna Park MD  Referring Provider: Misa Tinsley MD    Exam:  Airway: clear, no airway problems anticipated  Heart: RRR, without gallops or rubs  Lungs: clear bilaterally without wheezes, crackles, or rhonchi  Abdomen: soft, nontender, nondistended, bowel sounds present  Mental Status: awake, alert and oriented to person, place and time    Sedation:  MAC anesthesia Propofol 250mg IV  Procedure Details:  After informed consent was obtained with all risks and benefits of procedure explained and preoperative exam completed, the patient was taken to the endoscopy suite and placed in the left lateral decubitus position. Upon sequential sedation as per above, a digital rectal exam was performed demonstrating internal hemorrhoids. The Olympus videocolonoscope  was inserted in the rectum and carefully advanced to the cecum, which was identified by the ileocecal valve and appendiceal orifice. The quality of preparation was adequate. The colonoscope was slowly withdrawn with careful evaluation between folds. Retroflexion in the rectum was completed demonstrating internal hemorrhoids. 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 Complications: None. EBL:  None.     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 , -Call for biopsy results in 10 days High fiber diet. Resume normal medication(s). Discharge Disposition:  Home in the company of a  when able to ambulate.       Rosa Holt MD

## 2022-07-11 NOTE — PROGRESS NOTES
Endoscope was pre-cleaned at bedside immediately following procedure by DIDIER Blum Glasses returned to patient immediately post-procedure.

## 2022-07-11 NOTE — DISCHARGE INSTRUCTIONS
Kitty Nguyễn  [id number]  1950    COLON DISCHARGE INSTRUCTIONS  Discomfort:  Redness at IV site- apply warm compress to area; if redness or soreness persist- contact your physician  There may be a slight amount of blood passed from the rectum  Gaseous discomfort- walking, belching will help relieve any discomfort  You may not operate a vehicle for 12 hours  You may not engage in an occupation involving machinery or appliances for rest of today  You may not drink alcoholic beverages for at least 12 hours  Avoid making any critical decisions for at least 24 hour  DIET:   High fiber diet. - however -  remember your colon is empty and a heavy meal will produce gas. Avoid these foods:  vegetables, fried / greasy foods, carbonated drinks for today  MEDICATION:         ACTIVITY:  You may not resume your normal daily activities until tomorrow AM; it is recommended that you spend the remainder of the day resting -  avoid any strenuous activity. CALL M.D.   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 Caren Harry if questions arise regarding your procedure  Telephone # [phone]  Repeat colonoscopy in 5 years or as needed for reccurrent bleeding    Maxi Cortez MD Patient Education on Sedation / Analgesia Administered for Procedure      For 24 hours after general anesthesia or intravenous analgesia / sedation:  · Have someone responsible help you with your care  · Limit your activities  · Do not drive and operate hazardous machinery  · Do not make important personal, legal or business decisions  · Do not drink alcoholic beverages  · If you have not urinated within 8 hours after discharge, please contact your physician  · Resume your medications unless otherwise instructed    For 24 hours after general anesthesia or intravenous analgesia / sedation  you may experience:  · Drowsiness, dizziness, sleepiness, or confusion  · Difficulty remembering or delayed reaction times  · Difficulty with your balance, especially while walking, move slowly and carefully, do not make sudden position changes  · Difficulty focusing or blurred vision    You may not be aware of slight changes in your behavior and/or your reaction time because of the medication used during and after your procedure.     Report the following to your physician:  · Excessive pain, swelling, redness or odor of or around the surgical area  · Temperature over 100.5  · Nausea and vomiting lasting longer than 4 hours or if unable to take medications  · Any signs of decreased circulation or nerve impairment to extremity: change in color, persistent numbness, tingling, coldness or increase pain  · Any questions or concerns    IF YOU REPORT TO AN EMERGENCY ROOM, DOCTOR'S OFFICE OR HOSPITAL WITHIN 24 HOURS AFTER YOUR PROCEDURE, BRING THIS SHEET AND YOUR AFTER VISIT SUMMARY WITH YOU AND GIVE IT TO THE PHYSICIAN OR NURSE ATTENDING YOU.

## 2022-07-12 RX ORDER — SODIUM CHLORIDE 9 MG/ML
10 INJECTION INTRAMUSCULAR; INTRAVENOUS; SUBCUTANEOUS AS NEEDED
Status: CANCELLED | OUTPATIENT
Start: 2022-07-14

## 2022-07-12 RX ORDER — SODIUM CHLORIDE 0.9 % (FLUSH) 0.9 %
5-10 SYRINGE (ML) INJECTION AS NEEDED
Status: CANCELLED | OUTPATIENT
Start: 2022-07-14

## 2022-07-12 RX ORDER — HEPARIN 100 UNIT/ML
500 SYRINGE INTRAVENOUS AS NEEDED
Status: CANCELLED | OUTPATIENT
Start: 2022-07-14

## 2022-07-14 ENCOUNTER — HOSPITAL ENCOUNTER (OUTPATIENT)
Dept: INFUSION THERAPY | Age: 72
Discharge: HOME | End: 2022-07-14
Payer: MEDICARE

## 2022-07-14 VITALS
HEART RATE: 60 BPM | RESPIRATION RATE: 16 BRPM | SYSTOLIC BLOOD PRESSURE: 150 MMHG | BODY MASS INDEX: 32.52 KG/M2 | TEMPERATURE: 98 F | WEIGHT: 166.5 LBS | DIASTOLIC BLOOD PRESSURE: 68 MMHG

## 2022-07-14 DIAGNOSIS — C54.1 PAPILLARY SEROUS ENDOMETRIAL ADENOCARCINOMA (HCC): Primary | ICD-10-CM

## 2022-07-14 PROCEDURE — 74011000250 HC RX REV CODE- 250: Performed by: OBSTETRICS & GYNECOLOGY

## 2022-07-14 PROCEDURE — 74011250636 HC RX REV CODE- 250/636: Performed by: OBSTETRICS & GYNECOLOGY

## 2022-07-14 PROCEDURE — 77030012965 HC NDL HUBR BBMI -A

## 2022-07-14 PROCEDURE — 96523 IRRIG DRUG DELIVERY DEVICE: CPT

## 2022-07-14 RX ORDER — SODIUM CHLORIDE 9 MG/ML
10 INJECTION INTRAMUSCULAR; INTRAVENOUS; SUBCUTANEOUS AS NEEDED
Status: ACTIVE | OUTPATIENT
Start: 2022-07-14 | End: 2022-07-14

## 2022-07-14 RX ORDER — SODIUM CHLORIDE 0.9 % (FLUSH) 0.9 %
5-10 SYRINGE (ML) INJECTION AS NEEDED
Status: DISPENSED | OUTPATIENT
Start: 2022-07-14 | End: 2022-07-14

## 2022-07-14 RX ORDER — HEPARIN 100 UNIT/ML
500 SYRINGE INTRAVENOUS AS NEEDED
Status: ACTIVE | OUTPATIENT
Start: 2022-07-14 | End: 2022-07-14

## 2022-07-14 RX ADMIN — SODIUM CHLORIDE, PRESERVATIVE FREE 10 ML: 5 INJECTION INTRAVENOUS at 09:46

## 2022-07-14 RX ADMIN — HEPARIN 500 UNITS: 100 SYRINGE at 09:47

## 2022-07-14 NOTE — PROGRESS NOTES
[facility] Short Visit Note    0940  Arrived for Port Flush    Visit Vitals  BP (!) 150/68 (BP 1 Location: Left upper arm, BP Patient Position: Sitting)   Pulse 60   Temp 98 °F (36.7 °C)   Resp 16   Wt 75.5 kg (166 lb 8 oz)   BMI 32.52 kg/m²       Patient denied having any symptoms of COVID-19, i.e. SOB, coughing, fever, or generally not feeling well. Also denies having been exposed to COVID-19 recently or having had any recent contact with family/friend that has a pending COVID test.    Port accessed with positive blood return noted. Port flushed and heparinized per protocol w/o difficulty. Zazueta needle removed. Site covered with bandaid. Pt tolerated well. Pt denies any acute problems/changes. 1647  Discharged from [facility] ambulatory. No distress. Next appt:    Future Appointments   Date Time Provider Cresencio Mckenzie   7/28/2022  9:00 AM MD MARIA LUZ Valentin BS AMB   8/10/2022  8:45 AM MD GRISELDA Langford BS AMB   8/11/2022 10:00 AM GARCIA MED7 [id number]    9/8/2022 10:00 AM GARCIA MED6 [address] [facility]   10/6/2022 10:00 AM GARCIA MED7 [address] [facility]   11/3/2022 10:00 AM GARCIA MED7 [address] [facility]   12/1/2022 10:00 AM GARCIA MED7 [address] [facility]   12/29/2022 10:00  [facility],[address]

## 2022-07-18 RX ORDER — SODIUM CHLORIDE 9 MG/ML
10 INJECTION INTRAMUSCULAR; INTRAVENOUS; SUBCUTANEOUS AS NEEDED
Status: CANCELLED | OUTPATIENT
Start: 2022-08-11

## 2022-07-18 RX ORDER — SODIUM CHLORIDE 0.9 % (FLUSH) 0.9 %
5-10 SYRINGE (ML) INJECTION AS NEEDED
Status: CANCELLED | OUTPATIENT
Start: 2022-08-11

## 2022-07-18 RX ORDER — HEPARIN 100 UNIT/ML
500 SYRINGE INTRAVENOUS AS NEEDED
Status: CANCELLED | OUTPATIENT
Start: 2022-08-11

## 2022-07-28 ENCOUNTER — OFFICE VISIT (OUTPATIENT)
Dept: CARDIOLOGY CLINIC | Age: 72
End: 2022-07-28
Payer: MEDICARE

## 2022-07-28 VITALS
SYSTOLIC BLOOD PRESSURE: 180 MMHG | WEIGHT: 168 LBS | RESPIRATION RATE: 16 BRPM | HEART RATE: 57 BPM | OXYGEN SATURATION: 99 % | BODY MASS INDEX: 32.98 KG/M2 | DIASTOLIC BLOOD PRESSURE: 100 MMHG | HEIGHT: 60 IN

## 2022-07-28 DIAGNOSIS — Z78.9 MEDICATION INTOLERANCE: ICD-10-CM

## 2022-07-28 DIAGNOSIS — C54.1 ENDOMETRIAL CANCER (HCC): ICD-10-CM

## 2022-07-28 DIAGNOSIS — R94.31 ABNORMAL FINDING ON EKG: ICD-10-CM

## 2022-07-28 DIAGNOSIS — I10 ESSENTIAL HYPERTENSION: Primary | ICD-10-CM

## 2022-07-28 DIAGNOSIS — L43.9 LICHEN PLANUS: ICD-10-CM

## 2022-07-28 PROCEDURE — G8432 DEP SCR NOT DOC, RNG: HCPCS | Performed by: SPECIALIST

## 2022-07-28 PROCEDURE — G8399 PT W/DXA RESULTS DOCUMENT: HCPCS | Performed by: SPECIALIST

## 2022-07-28 PROCEDURE — G8536 NO DOC ELDER MAL SCRN: HCPCS | Performed by: SPECIALIST

## 2022-07-28 PROCEDURE — 1123F ACP DISCUSS/DSCN MKR DOCD: CPT | Performed by: SPECIALIST

## 2022-07-28 PROCEDURE — G8427 DOCREV CUR MEDS BY ELIG CLIN: HCPCS | Performed by: SPECIALIST

## 2022-07-28 PROCEDURE — G8417 CALC BMI ABV UP PARAM F/U: HCPCS | Performed by: SPECIALIST

## 2022-07-28 PROCEDURE — G8755 DIAS BP > OR = 90: HCPCS | Performed by: SPECIALIST

## 2022-07-28 PROCEDURE — 1101F PT FALLS ASSESS-DOCD LE1/YR: CPT | Performed by: SPECIALIST

## 2022-07-28 PROCEDURE — G8753 SYS BP > OR = 140: HCPCS | Performed by: SPECIALIST

## 2022-07-28 PROCEDURE — 99214 OFFICE O/P EST MOD 30 MIN: CPT | Performed by: SPECIALIST

## 2022-07-28 PROCEDURE — G0463 HOSPITAL OUTPT CLINIC VISIT: HCPCS | Performed by: SPECIALIST

## 2022-07-28 PROCEDURE — 1090F PRES/ABSN URINE INCON ASSESS: CPT | Performed by: SPECIALIST

## 2022-07-28 PROCEDURE — 3017F COLORECTAL CA SCREEN DOC REV: CPT | Performed by: SPECIALIST

## 2022-07-28 RX ORDER — LOSARTAN POTASSIUM 25 MG/1
25 TABLET ORAL DAILY
Qty: 30 TABLET | Refills: 11 | Status: SHIPPED | OUTPATIENT
Start: 2022-07-28

## 2022-07-28 RX ORDER — ERGOCALCIFEROL 1.25 MG/1
CAPSULE ORAL
COMMUNITY
Start: 2022-06-10

## 2022-07-28 NOTE — PROGRESS NOTES
HISTORY OF PRESENT ILLNESS  Janki Grijalva is a 70 y.o. female. She has hypertension with intolerance to multiple medications. She also has an element of whitecoat hypertension and her blood pressure is usually 160/77 at home. I tried to start her on a very low-dose of a beta-blocker but she did not start it because she was worried about side effects. She also stopped taking hydrochlorothiazide on her own because it made her feel bad and she feels better. She has lichen planus and takes steroids occasionally for this. Previously she took losartan with hydrochlorothiazide and felt that she had intolerance to this combination. 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 30 Tablet 11    amLODIPine (NORVASC) 5 mg tablet Take 5 mg by mouth daily. ibuprofen (MOTRIN) 200 mg tablet Take  by mouth.      lidocaine-prilocaine (EMLA) topical cream APPLY SMALL AMOUNT OVER PORT AREA AND COVER WITH BAND AID ONE HOUR BEFORE CHEMO 30 g 0    ondansetron (ZOFRAN ODT) 4 mg disintegrating tablet Take 1 Tablet by mouth every eight (8) hours as needed for Nausea.  Indications: nausea and vomiting caused by cancer drugs (Patient not taking: Reported on 7/28/2022) 30 Tablet 2    CRANIAL PROSTHESIS misc Cranial Prosthesis  Diagnosis code: C54.1  Cpt code:    1 Each 1    Nystop powder as needed. acetaminophen (TYLENOL) 325 mg tablet Take 2 Tablets by mouth every six (6) hours as needed for Pain. (Patient not taking: Reported on 2022) 30 Tablet 0    ibuprofen (MOTRIN) 600 mg tablet Take 1 Tablet by mouth every six (6) hours as needed for Pain. 30 Tablet 0    methylPREDNISolone (MEDROL) 4 mg tab Take 4 mg by mouth daily as needed. Prn for lichen planus (Patient not taking: Reported on 2022)      cyanocobalamin 1,000 mcg tablet Take 1,000 mcg by mouth daily. (Patient not taking: Reported on 2022)      cholecalciferol, vitamin D3, 2,000 unit tab Take 2,000 Units by mouth daily. (Patient not taking: Reported on 2022)      BIOTIN PO Take 5,000 mcg by mouth daily. (Patient not taking: No sig reported)      pyridoxine (VITAMIN B-6) 100 mg tablet Take 100 mg by mouth daily. (Patient not taking: No sig reported)       Past Medical History:   Diagnosis Date    Arthritis     OSTEO HIP JULY.     Cancer ([facility] [address]     uterine     GERD (gastroesophageal reflux disease)     Lichen planus     oral    PUD (peptic ulcer disease)      Past Surgical History:   Procedure Laterality Date    COLONOSCOPY N/A 10/16/2017    COLONOSCOPY performed by Cooper Selby MD at [facility] ENDOSCOPY    COLONOSCOPY N/A 2022    COLONOSCOPY performed by Yohan Jackson MD at [facility] ENDOSCOPY    COLONOSCOPY,FLEX,W/CONTROL, BLEEDING  2022         COLONOSCOPY,HANS [place]ER[place]OD,JULIANNE  10/16/2017         HX  SECTION      X2    HX CHOLECYSTECTOMY      HX DILATION AND CURETTAGE  2021    High-grade endometrial carcinoma, favor serous carcinoma    HX HEENT      EXTRACTION OF WISDOM TEETH X 4    HX HYSTERECTOMY  2021    HX ORTHOPAEDIC      right foot, bunionectomy    HX ORTHOPAEDIC      LEFTl hip arthroplasty    HX ORTHOPAEDIC      RIGHT hip arthroplasty    HX TUBAL LIGATION      IR INSERT TUNL CVC W PORT OVER 5 YEARS  10/14/2021    MT COLONOSCOPY FLX DX W/COLLJ SPEC WHEN PFRMD  1/20/2012     x 2    UPPER GI ENDOSCOPY,BIOPSY  11/29/2016         UPPER GI ENDOSCOPY,DILATN W GUIDE  11/29/2016            Review of Systems   Gastrointestinal:  Positive for nausea. All other systems reviewed and are negative. Visit Vitals  BP (!) 180/100   Pulse (!) 57   Resp 16   Ht 5' (1.524 m)   Wt 168 lb (76.2 kg)   SpO2 99%   BMI 32.81 kg/m²       Physical Exam  Vitals and nursing note reviewed. Constitutional:       Appearance: Normal appearance. HENT:      Head: Normocephalic. Nose: Nose normal.      Mouth/Throat:      Mouth: Mucous membranes are moist.   Eyes:      Extraocular Movements: Extraocular movements intact. Cardiovascular:      Rate and Rhythm: Normal rate and regular rhythm. Heart sounds: Normal heart sounds. Pulmonary:      Breath sounds: Normal breath sounds. Abdominal:      Palpations: Abdomen is soft. Musculoskeletal:         General: Normal range of motion. Cervical back: Normal range of motion. Skin:     General: Skin is warm. Neurological:      Mental Status: She is alert and oriented to person, place, and time. Psychiatric:         Behavior: Behavior normal.       ASSESSMENT and PLAN  Treating her hypertension is quite problematic given her frequent side effects and concerned about medications. Since her heart rate is only 57 bpm today and she has not started the beta-blocker she will not start it. I will try her on losartan alone without hydrochlorothiazide 25 mg a day and she will let us know if she has problems with the medicine but otherwise I will see her back in about 3 months.

## 2022-08-08 NOTE — PROGRESS NOTES
3 month follow up for endometrial cancer ,  pt reports no abnormal spotting or bleeding, pt reports she is retaining fluid, body aches due to the fluid retention    1. Have you been to the ER, urgent care clinic since your last visit? Hospitalized since your last visit?  no    2. Have you seen or consulted any other health care providers outside of the [address] [place] [address] since your last visit? Include any pap smears or colon screening.    no

## 2022-08-09 NOTE — PROGRESS NOTES
[address] [place] [address] Mathias Moritz 807, 9592 [facility]  P [phone]  F [phone]    Office Note  Patient ID:  Name:  Kitty Nguyễn  MRN:  [id number]  :  1950/71 y.o. Date:  8/10/2022      HISTORY OF PRESENT ILLNESS:  Ms. Kitty Nguyễn is a 70 y.o. female who on 2021 underwent Robotic-assisted total laparoscopic hysterectomy, Bilateral salpingo-oophorectomy, Bilateral pelvic sentinel lymph node mapping and biopsy. 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 Negative washings. Stromal cervical involvement. Negative SLNs. 2mm out of 10mm myometrial invasion. Parametrial involvement of tumor. Completed concurrent chemo-EBRT + VBT on 2021. Initiated on adjuvant carboplatin + paclitaxel on 2021. Completed cycle 4 on 2022. CT C/A/P 2022 without evidence of disease. Presents today for continued surveillance. Doing well without complaints. Denies vaginal bleeding/discharge, abdominal/pelvic pain, nausea, vomiting, constipation, diarrhea, CP, SOB, hematuria, hematochezia, change in appetite or bowel movements, bloating, fevers, chills, or urinary symptoms. Stable numbness in her feet. Denies pain or issues with balance. Initial History:  Ms. Kitty Nguyễn is a 70 y.o.  postmenopausal female who presents as a new patient from Dr. Aniket Barone for high-grade endometrial cancer. The patient reports vaginal spotting back in April, which resulted in her seeing Dr. Aniket Barone. Pelvic ultrasound on 2021 demonstrated 12.5mm stripe. Hysteroscopy/D&C on 2021 demonstrated \"high-grade endometrial carcinoma, favor serous carcinoma\". She was subsequently referred to our office for further discussion and management. Denies pelvic or abdominal pain/bloating, change in appetite or bowel habits, nausea, vomiting, CP, SOB, hematuria, hematochezia, or urinary symptoms.        Pertinent PMH/PSH: DJD, h/o  x 2     Active, no restrictions. Imaging Review:   CT C/A/P 2/3/2022:  FINDINGS:  Port-A-Cath terminates at the cavoatrial junction. CHEST WALL: No mass or axillary lymphadenopathy. THYROID: No nodule. MEDIASTINUM: No mass or lymphadenopathy. STEPHANIE: No mass or lymphadenopathy. THORACIC AORTA: No dissection or aneurysm. MAIN PULMONARY ARTERY: Normal in caliber. TRACHEA/BRONCHI: Patent. ESOPHAGUS: No wall thickening or dilatation. HEART: Normal in size. PLEURA: No effusion or pneumothorax. LUNGS: No nodule, mass, or airspace disease. LIVER: No mass or biliary dilatation. Stable 2 mm hypodensities in hepatic  segment 6 and 8, too small to further characterize. No new lesions. GALLBLADDER: Prior cholecystectomy. SPLEEN: No mass. PANCREAS: No mass or ductal dilatation. ADRENALS: Unremarkable. KIDNEYS: No mass, calculus, or hydronephrosis. STOMACH: Diffuse thickening of the antrum  SMALL BOWEL: No dilatation or wall thickening. COLON: No dilatation or wall thickening. APPENDIX: Normal axial image 70  PERITONEUM: No ascites or pneumoperitoneum. RETROPERITONEUM: No lymphadenopathy or aortic aneurysm. Slight decrease in left  para-aortic lymph node, now measuring 3 x 5 mm (series 2, image 74). REPRODUCTIVE ORGANS: Prior hysterectomy and bilateral nephrectomy. URINARY BLADDER: No mass or calculus. BONES: No destructive bone lesion. Bilateral hip replacements of results in a  moderate amount of pelvic streak artifact. Discogenic sclerosis at L2-3 and  T8-9. ADDITIONAL COMMENTS: N/A  IMPRESSION  No evidence of metastatic disease to the chest, abdomen, or pelvis  Incidental gastritis. CT C/A/P 2021:  FINDINGS:   THYROID: No nodule. MEDIASTINUM: No mass or lymphadenopathy. STEPHANIE: No mass or lymphadenopathy. THORACIC AORTA: No dissection or aneurysm. MAIN PULMONARY ARTERY: Normal in caliber. TRACHEA/BRONCHI: Patent.   ESOPHAGUS: No wall thickening or dilatation. HEART: Normal in size. PLEURA: No effusion or pneumothorax. LUNGS: No nodule, mass, or airspace disease. There is minor atelectasis/scarring  right upper lobe  LIVER: No mass or biliary dilatation. There is hepatic steatosis. There is a 5  mm low-density in segment 4A and segment 5 to small to fully characterize but  most likely tiny cysts. GALLBLADDER: Patient status post cholecystectomy  SPLEEN: No mass. PANCREAS: No mass or ductal dilatation. ADRENALS: Unremarkable. KIDNEYS: No mass, calculus, or hydronephrosis. There is a retroaortic left renal  vein  STOMACH: Unremarkable. SMALL BOWEL: No dilatation or wall thickening. COLON: No dilatation or wall thickening. APPENDIX: Unremarkable. PERITONEUM: No ascites or pneumoperitoneum. RETROPERITONEUM: No lymphadenopathy or aortic aneurysm. There is an 8 mm lymph  node at the aortic bifurcation on the left. There is mild stenosis origin of the  SMA  REPRODUCTIVE ORGANS: Uterus and pelvis are obscured by artifact related to  bilateral total hip replacements. URINARY BLADDER: No mass or calculus. BONES: No destructive bone lesion. There is a right shoulder effusion  ADDITIONAL COMMENTS: N/A     IMPRESSION     1. CT of the chest demonstrates no definite evidence of metastatic disease. No  acute abnormality identified. 2. CT of the abdomen and pelvis demonstrates no definite evidence of metastatic  disease. The pelvis is obscured by artifact related to total hip replacements. There is hepatic steatosis. 2 subcentimeter low densities in the liver are too  small to characterize but most likely represent tiny cysts. There is an 8mm lymph node just to the left of the aortic bifurcation which is  within normal limits. Pelvic ultrasound 4/20/2021:  Impression: Normal uterus with 12.5mm stripe. Normal right ovary. Non visible left ovary with otherwise normal left adnexa. No free fluid.      Pathology Review:   7/29/2021:  * * *FINAL PATHOLOGIC DIAGNOSIS* * *   1. Right pelvic sentinel lymph node, biopsy:        One lymph node, negative for carcinoma, levels and cytokeratin stain   examined (0/1)   2. Left pelvic sentinel lymph node #1, biopsy:        One lymph node, negative for carcinoma, levels and cytokeratin stain   examined (0/1)   3. Left pelvic sentinel lymph node, biopsy:        One lymph node, negative for carcinoma, levels and cytokeratin stain   examined (0/1)   4. Uterus, cervix, bilateral fallopian tubes and ovaries; simple   hysterectomy, BSO:        Endometrium: Uterine serous carcinoma, see synoptic report        Cervix: Serous carcinoma focally involves endocervical stroma,   predominantly as lymphovascular                invasion        Bilateral fallopian tubes: No pathologic diagnosis     ENDOMETRIUM    SPECIMEN       Procedure: Total hysterectomy and bilateral salpingo-oophorectomy       Specimen Integrity: Intact    TUMOR       Histologic Type: Serous carcinoma       Myometrial Invasion: Present           Depth of Myometrial Invasion (Millimeters): 2 mm           Myometrial Thickness (Millimeters): 10 mm           Percentage of Myometrial Invasion: 20%       Uterine Serosa Involvement: Not identified       Lower Uterine Segment Involvement: Present, myoinvasive       Cervical Stromal Involvement: Present       Other Tissue / Organ Involvement: Not identified       Peritoneal Ascitic Fluid: Atypical, favor benign reactive process       Lymphovascular Invasion: Present    MARGINS       Margins: Parametrium and focal paracervix involved by tumor           Parametrial and focal Paracervical Margin: Involved by carcinoma    LYMPH NODES       Lymph Node Status:  All lymph nodes negative for tumor cells           Total Number of Pelvic Nodes Examined: 3           Number of Pelvic [place] Nodes Examined: 3           Total Number of Para-aortic Nodes Examined: 0           Number of Para-aortic [place] Nodes Examined: 0    PATHOLOGIC STAGE CLASSIFICATION (pTNM, AJCC 8th Edition)       Primary Tumor (pT): pT2       Regional Lymph Nodes Modifier: (sn)       Regional Lymph Nodes (pN): pN0    FIGO STAGE       FIGO Stage: II   * * *Comment* * *   Immunohistochemical stains with appropriate controls show tumor positive   for P16, P53 with Ki-67 proliferation index of approximately 50%. These   findings support diagnosis of endometrial serous carcinoma. 7/7/2021:  FINAL PATHOLOGIC DIAGNOSIS   Endometrium, curettings:   High-grade endometrial carcinoma, favor serous carcinoma   See comment   Comment   The biopsy contains a malignant glandular neoplasm with a high nuclear grade, papillary growth pattern, and areas of necrosis. Morphologic features suggest a high-grade endometrial carcinoma and are most compatible with a diagnosis of serous carcinoma. ROS:  A comprehensive review of systems was negative except for that written in the History of Present Illness. , 10 point ROS    OB/GYN ROS:  Per HPI    ECOG ndGndrndanddndend:nd nd2nd Problem List:  Patient Active Problem List    Diagnosis Date Noted    Opioid use, unspecified with unspecified opioid-induced disorder 12/07/2021    Encounter for antineoplastic chemotherapy 10/13/2021    Essential hypertension 07/27/2021    Abnormal finding on EKG 07/27/2021    Papillary serous endometrial adenocarcinoma ([facility] [address] 07/14/2021    PUD (peptic ulcer disease)     GERD (gastroesophageal reflux disease)     Arthritis     Lichen planus     Hip arthritis 04/04/2016    DJD (degenerative joint disease) of hip 01/04/2013     PMH:  Past Medical History:   Diagnosis Date    Arthritis     OSTEO HIP JULY.     Cancer ([facility] [address]     uterine     GERD (gastroesophageal reflux disease)     Lichen planus     oral    PUD (peptic ulcer disease)       PSH:  Past Surgical History:   Procedure Laterality Date    COLONOSCOPY N/A 10/16/2017    COLONOSCOPY performed by Che Camp MD at [facility] ENDOSCOPY    COLONOSCOPY N/A 7/11/2022    COLONOSCOPY performed by Karyn Clark MD at [facility] ENDOSCOPY    COLONOSCOPY,FLEX,W/CONTROL, BLEEDING  2022         COLONOSCOPY,REMV LESN,SNARE  10/16/2017         HX  SECTION      X2    HX CHOLECYSTECTOMY      HX DILATION AND CURETTAGE  2021    High-grade endometrial carcinoma, favor serous carcinoma    HX HEENT      EXTRACTION OF WISDOM TEETH X 4    HX HYSTERECTOMY  2021    HX ORTHOPAEDIC      right foot, bunionectomy    HX ORTHOPAEDIC      LEFTl hip arthroplasty    HX ORTHOPAEDIC      RIGHT hip arthroplasty    HX TUBAL LIGATION      IR INSERT TUNL CVC W PORT OVER 5 YEARS  10/14/2021    SD COLONOSCOPY FLX DX W/COLLJ SPEC WHEN PFRMD  1/20/2012     x 2    UPPER GI ENDOSCOPY,BIOPSY  2016         UPPER GI ENDOSCOPY,DILATN W GUIDE  2016           Social History:  Social History     Tobacco Use    Smoking status: Never    Smokeless tobacco: Never   Substance Use Topics    Alcohol use: Not Currently     Comment: VERY RARELY       Family History:  Family History   Problem Relation Age of Onset    Cancer Maternal Grandmother 79        colon cancer    Hypertension Mother     Cancer Mother         pacreatic cancer    Cancer Paternal Aunt         ovarian cancer      Medications: (reviewed)  Current Outpatient Medications   Medication Sig    ergocalciferol (ERGOCALCIFEROL) 1,250 mcg (50,000 unit) capsule TAKE 1 CAPSULE BY MOUTH WEEKLY    losartan (COZAAR) 25 mg tablet Take 1 Tablet by mouth in the morning. amLODIPine (NORVASC) 5 mg tablet Take 5 mg by mouth daily. ibuprofen (MOTRIN) 200 mg tablet Take  by mouth.    lidocaine-prilocaine (EMLA) topical cream APPLY SMALL AMOUNT OVER PORT AREA AND COVER WITH BAND AID ONE HOUR BEFORE CHEMO    Nystop powder as needed. cholecalciferol, vitamin D3, 2,000 unit tab Take 2,000 Units by mouth in the morning. ondansetron (ZOFRAN ODT) 4 mg disintegrating tablet Take 1 Tablet by mouth every eight (8) hours as needed for Nausea.  Indications: nausea and vomiting caused by cancer drugs (Patient not taking: No sig reported)    CRANIAL PROSTHESIS misc Cranial Prosthesis  Diagnosis code:   C54.1  Cpt code:       acetaminophen (TYLENOL) 325 mg tablet Take 2 Tablets by mouth every six (6) hours as needed for Pain. (Patient not taking: No sig reported)    ibuprofen (MOTRIN) 600 mg tablet Take 1 Tablet by mouth every six (6) hours as needed for Pain. methylPREDNISolone (MEDROL) 4 mg tab Take 4 mg by mouth daily as needed. Prn for lichen planus (Patient not taking: No sig reported)    cyanocobalamin 1,000 mcg tablet Take 1,000 mcg by mouth daily. (Patient not taking: No sig reported)    BIOTIN PO Take 5,000 mcg by mouth daily. (Patient not taking: No sig reported)    pyridoxine (VITAMIN B-6) 100 mg tablet Take 100 mg by mouth daily. (Patient not taking: No sig reported)     No current facility-administered medications for this visit. Allergies: (reviewed)  Allergies   Allergen Reactions    Codeine Nausea and Vomiting    Penicillins Rash     Rash & dizzy    Sulfa (Sulfonamide Antibiotics) Other (comments)     General redness all over skin    Ciprofloxacin Itching and Other (comments)     Extreme dizziness and rash    Other Medication Nausea and Vomiting     PRESCRIPTION STRENGTH ANTI INFLAMMATORY MEDS         OBJECTIVE:  Physical Exam:  Visit Vitals  BP (!) 168/74 (BP 1 Location: Left upper arm, BP Patient Position: Sitting)   Pulse (!) 57   Ht 5' (1.524 m)   Wt 166 lb 6.4 oz (75.5 kg)   BMI 32.50 kg/m²        General: Alert and oriented. No acute distress. Well-nourished  HEENT: No thyroid enlargment. Neck supple without restrictions. Sclera normal. Normal occular motion. Moist mucous membranes. Lymphatics: No evidence of axillary, cervical, or subclavicular adenopathy. Respiratory: clear to auscultation and percussion to the bases. No CVAT. Cardiovascular: regular rate and rhythm. No murmurs, rubs, or gallops.   Gastrointestinal: soft, non-tender, non-distended, no masses or organomegaly. Well-healed incision. Musculoskeletal: normal gait. No joint tenderness, deformity or swelling. No muscular tenderness. Extremities: extremities normal, atraumatic, no cyanosis or edema. Pelvic: exam chaperoned by nurse. Normal appearing external genitalia. On speculum exam, the vagina is atrophic. The uterus and cervix are surgically absent. No evidence of masses or nodularity on bimanual exam. Deferred rectovaginal exam.   Neuro: Grossly intact. Normal gait and movement. No acute deficit  Skin: No evidence of rashes or skin changes. IMPRESSION/PLAN:    Ms. Mikael Buchanan is a 70 y.o.  female who on 7/29/2021 underwent Robotic-assisted total laparoscopic hysterectomy, Bilateral salpingo-oophorectomy, Bilateral pelvic sentinel lymph node mapping and biopsy. 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 Negative washings. Stromal cervical involvement. Negative SLNs. 2mm out of 10mm myometrial invasion. Parametrial involvement of tumor. Completed concurrent chemo-EBRT + VBT on 11/4/2021. Initiated on adjuvant carboplatin + paclitaxel on 11/18/2021. Completed cycle 4 on 1/20/2022. CT C/A/P 2/4/2022 without evidence of disease. Problems:     Patient Active Problem List    Diagnosis Date Noted    Opioid use, unspecified with unspecified opioid-induced disorder 12/07/2021    Encounter for antineoplastic chemotherapy 10/13/2021    Essential hypertension 07/27/2021    Abnormal finding on EKG 07/27/2021    Papillary serous endometrial adenocarcinoma ([facility] [address] 07/14/2021    PUD (peptic ulcer disease)     GERD (gastroesophageal reflux disease)     Arthritis     Lichen planus     Hip arthritis 04/04/2016    DJD (degenerative joint disease) of hip 01/04/2013     Reviewed patient's course to date. HERMANN on exam today. Reassured patient. Stable neuropathy. Plan to RTC in 3 months for continued surveillance. Reviewed precautionary symptoms to return sooner.  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 Will continue to follow-up with her Gastroenterologist. All questions and concerns were addressed with the patient and she is comfortable with the plan. An electronic signature was used to authenticate this note.      Beau Richard MD

## 2022-08-10 ENCOUNTER — OFFICE VISIT (OUTPATIENT)
Dept: GYNECOLOGY | Age: 72
End: 2022-08-10
Payer: MEDICARE

## 2022-08-10 VITALS
BODY MASS INDEX: 32.67 KG/M2 | HEART RATE: 57 BPM | DIASTOLIC BLOOD PRESSURE: 74 MMHG | HEIGHT: 60 IN | WEIGHT: 166.4 LBS | SYSTOLIC BLOOD PRESSURE: 168 MMHG

## 2022-08-10 DIAGNOSIS — C54.1 PAPILLARY SEROUS ENDOMETRIAL ADENOCARCINOMA (HCC): Primary | ICD-10-CM

## 2022-08-10 PROCEDURE — G8399 PT W/DXA RESULTS DOCUMENT: HCPCS | Performed by: OBSTETRICS & GYNECOLOGY

## 2022-08-10 PROCEDURE — 1123F ACP DISCUSS/DSCN MKR DOCD: CPT | Performed by: OBSTETRICS & GYNECOLOGY

## 2022-08-10 PROCEDURE — G8753 SYS BP > OR = 140: HCPCS | Performed by: OBSTETRICS & GYNECOLOGY

## 2022-08-10 PROCEDURE — 3017F COLORECTAL CA SCREEN DOC REV: CPT | Performed by: OBSTETRICS & GYNECOLOGY

## 2022-08-10 PROCEDURE — G8754 DIAS BP LESS 90: HCPCS | Performed by: OBSTETRICS & GYNECOLOGY

## 2022-08-10 PROCEDURE — G8432 DEP SCR NOT DOC, RNG: HCPCS | Performed by: OBSTETRICS & GYNECOLOGY

## 2022-08-10 PROCEDURE — G0463 HOSPITAL OUTPT CLINIC VISIT: HCPCS | Performed by: OBSTETRICS & GYNECOLOGY

## 2022-08-10 PROCEDURE — G8536 NO DOC ELDER MAL SCRN: HCPCS | Performed by: OBSTETRICS & GYNECOLOGY

## 2022-08-10 PROCEDURE — 99214 OFFICE O/P EST MOD 30 MIN: CPT | Performed by: OBSTETRICS & GYNECOLOGY

## 2022-08-10 PROCEDURE — 1090F PRES/ABSN URINE INCON ASSESS: CPT | Performed by: OBSTETRICS & GYNECOLOGY

## 2022-08-10 PROCEDURE — G8417 CALC BMI ABV UP PARAM F/U: HCPCS | Performed by: OBSTETRICS & GYNECOLOGY

## 2022-08-10 PROCEDURE — G8427 DOCREV CUR MEDS BY ELIG CLIN: HCPCS | Performed by: OBSTETRICS & GYNECOLOGY

## 2022-08-10 PROCEDURE — 1101F PT FALLS ASSESS-DOCD LE1/YR: CPT | Performed by: OBSTETRICS & GYNECOLOGY

## 2022-08-11 ENCOUNTER — HOSPITAL ENCOUNTER (OUTPATIENT)
Dept: INFUSION THERAPY | Age: 72
Discharge: HOME | End: 2022-08-11
Payer: MEDICARE

## 2022-08-11 VITALS
HEART RATE: 52 BPM | TEMPERATURE: 97.8 F | DIASTOLIC BLOOD PRESSURE: 71 MMHG | SYSTOLIC BLOOD PRESSURE: 144 MMHG | RESPIRATION RATE: 16 BRPM | BODY MASS INDEX: 32.71 KG/M2 | OXYGEN SATURATION: 100 % | WEIGHT: 167.5 LBS

## 2022-08-11 DIAGNOSIS — C54.1 PAPILLARY SEROUS ENDOMETRIAL ADENOCARCINOMA (HCC): Primary | ICD-10-CM

## 2022-08-11 PROCEDURE — 74011000250 HC RX REV CODE- 250: Performed by: OBSTETRICS & GYNECOLOGY

## 2022-08-11 PROCEDURE — 96523 IRRIG DRUG DELIVERY DEVICE: CPT

## 2022-08-11 PROCEDURE — 74011250636 HC RX REV CODE- 250/636: Performed by: OBSTETRICS & GYNECOLOGY

## 2022-08-11 PROCEDURE — 77030012965 HC NDL HUBR BBMI -A

## 2022-08-11 RX ORDER — HEPARIN 100 UNIT/ML
500 SYRINGE INTRAVENOUS AS NEEDED
Status: ACTIVE | OUTPATIENT
Start: 2022-08-11 | End: 2022-08-11

## 2022-08-11 RX ORDER — SODIUM CHLORIDE 0.9 % (FLUSH) 0.9 %
5-10 SYRINGE (ML) INJECTION AS NEEDED
Status: DISPENSED | OUTPATIENT
Start: 2022-08-11 | End: 2022-08-11

## 2022-08-11 RX ORDER — SODIUM CHLORIDE 9 MG/ML
10 INJECTION INTRAMUSCULAR; INTRAVENOUS; SUBCUTANEOUS AS NEEDED
Status: ACTIVE | OUTPATIENT
Start: 2022-08-11 | End: 2022-08-11

## 2022-08-11 RX ADMIN — HEPARIN 500 UNITS: 100 SYRINGE at 10:05

## 2022-08-11 RX ADMIN — SODIUM CHLORIDE, PRESERVATIVE FREE 10 ML: 5 INJECTION INTRAVENOUS at 10:05

## 2022-08-11 NOTE — PROGRESS NOTES
[facility] Short Visit Note    1000  Arrived for Port Flush    Visit Vitals  BP (!) 144/71 (BP 1 Location: Left upper arm, BP Patient Position: Sitting)   Pulse (!) 52   Temp 97.8 °F (36.6 °C)   Resp 16   Wt 76 kg (167 lb 8 oz)   SpO2 100%   BMI 32.71 kg/m²       Patient denied having any symptoms of COVID-19, i.e. SOB, coughing, fever, or generally not feeling well. Also denies having been exposed to COVID-19 recently or having had any recent contact with family/friend that has a pending COVID test.    Port accessed with positive blood return noted. Port flushed and heparinized per protocol w/o difficulty. Zazueta needle removed. Site covered with gauze and paper tape. Pt tolerated well. Pt denies any acute problems/changes. 1010  Discharged from [facility] ambulatory. No distress. Next appt:    Future Appointments   Date Time Provider Cresencio Mckenzie   9/7/2022  9:30 AM [address] REG   10/6/2022 10:00 AM GARCIA MED7 [place] [address] REG   10/20/2022  9:20 AM MD MARIA LUZ Lopez BS AMB   11/3/2022 10:00 AM GARCIA MED7 [place] [address] REG   11/9/2022  8:30 AM MD GRISELDA Panchal BS AMB   12/1/2022 10:00 AM GARCIA MED7 [place] [address] REG   12/29/2022 10:00 AM [address] [place] [address],[address]

## 2022-08-14 RX ORDER — HEPARIN 100 UNIT/ML
500 SYRINGE INTRAVENOUS AS NEEDED
Status: CANCELLED | OUTPATIENT
Start: 2022-09-07

## 2022-08-14 RX ORDER — SODIUM CHLORIDE 9 MG/ML
10 INJECTION INTRAMUSCULAR; INTRAVENOUS; SUBCUTANEOUS AS NEEDED
Status: CANCELLED | OUTPATIENT
Start: 2022-09-07

## 2022-08-14 RX ORDER — SODIUM CHLORIDE 0.9 % (FLUSH) 0.9 %
5-10 SYRINGE (ML) INJECTION AS NEEDED
Status: CANCELLED | OUTPATIENT
Start: 2022-09-07

## 2022-08-18 ENCOUNTER — ANESTHESIA EVENT (OUTPATIENT)
Dept: ENDOSCOPY | Age: 72
End: 2022-08-18
Payer: MEDICARE

## 2022-08-18 ENCOUNTER — ANESTHESIA (OUTPATIENT)
Dept: ENDOSCOPY | Age: 72
End: 2022-08-18
Payer: MEDICARE

## 2022-08-18 ENCOUNTER — HOSPITAL ENCOUNTER (OUTPATIENT)
Age: 72
Setting detail: OUTPATIENT SURGERY
Discharge: HOME | End: 2022-08-18
Attending: INTERNAL MEDICINE | Admitting: INTERNAL MEDICINE
Payer: MEDICARE

## 2022-08-18 VITALS
OXYGEN SATURATION: 100 % | SYSTOLIC BLOOD PRESSURE: 151 MMHG | WEIGHT: 162.5 LBS | TEMPERATURE: 97.8 F | RESPIRATION RATE: 17 BRPM | BODY MASS INDEX: 29.9 KG/M2 | HEART RATE: 59 BPM | DIASTOLIC BLOOD PRESSURE: 103 MMHG | HEIGHT: 62 IN

## 2022-08-18 PROCEDURE — 74011250636 HC RX REV CODE- 250/636: Performed by: INTERNAL MEDICINE

## 2022-08-18 PROCEDURE — 77030003657 HC NDL SCLER BSC -B: Performed by: INTERNAL MEDICINE

## 2022-08-18 PROCEDURE — 77030038665 HC SOL ELEVIEW INJ AGNT ARPH -B: Performed by: INTERNAL MEDICINE

## 2022-08-18 PROCEDURE — 76060000033 HC ANESTHESIA 1 TO 1.5 HR: Performed by: INTERNAL MEDICINE

## 2022-08-18 PROCEDURE — 77030037345 HC NET FB ENDO RETVR RESCUNT DISP BSC -B: Performed by: INTERNAL MEDICINE

## 2022-08-18 PROCEDURE — 77030022875 HC PRB AR PLSM COAG ERBE -C: Performed by: INTERNAL MEDICINE

## 2022-08-18 PROCEDURE — 74011250636 HC RX REV CODE- 250/636: Performed by: NURSE ANESTHETIST, CERTIFIED REGISTERED

## 2022-08-18 PROCEDURE — 2709999900 HC NON-CHARGEABLE SUPPLY: Performed by: INTERNAL MEDICINE

## 2022-08-18 PROCEDURE — 76040000008: Performed by: INTERNAL MEDICINE

## 2022-08-18 PROCEDURE — 88305 TISSUE EXAM BY PATHOLOGIST: CPT

## 2022-08-18 PROCEDURE — 77030008565 HC TBNG SUC IRR ERBE -B: Performed by: INTERNAL MEDICINE

## 2022-08-18 PROCEDURE — 77030013992 HC SNR POLYP ENDOSC BSC -B: Performed by: INTERNAL MEDICINE

## 2022-08-18 PROCEDURE — 77030010936 HC CLP LIG BSC -C: Performed by: INTERNAL MEDICINE

## 2022-08-18 PROCEDURE — 74011000250 HC RX REV CODE- 250: Performed by: NURSE ANESTHETIST, CERTIFIED REGISTERED

## 2022-08-18 DEVICE — CLIP
Type: IMPLANTABLE DEVICE | Site: SIGMOID COLON | Status: FUNCTIONAL
Brand: RESOLUTION 360™ ULTRA CLIP

## 2022-08-18 RX ORDER — SODIUM CHLORIDE 0.9 % (FLUSH) 0.9 %
5-40 SYRINGE (ML) INJECTION AS NEEDED
Status: DISCONTINUED | OUTPATIENT
Start: 2022-08-18 | End: 2022-08-18 | Stop reason: HOSPADM

## 2022-08-18 RX ORDER — PROPOFOL 10 MG/ML
INJECTION, EMULSION INTRAVENOUS AS NEEDED
Status: DISCONTINUED | OUTPATIENT
Start: 2022-08-18 | End: 2022-08-18 | Stop reason: HOSPADM

## 2022-08-18 RX ORDER — ATROPINE SULFATE 0.1 MG/ML
0.5 INJECTION INTRAVENOUS
Status: DISCONTINUED | OUTPATIENT
Start: 2022-08-18 | End: 2022-08-18 | Stop reason: HOSPADM

## 2022-08-18 RX ORDER — GLYCOPYRROLATE 0.2 MG/ML
INJECTION INTRAMUSCULAR; INTRAVENOUS AS NEEDED
Status: DISCONTINUED | OUTPATIENT
Start: 2022-08-18 | End: 2022-08-18 | Stop reason: HOSPADM

## 2022-08-18 RX ORDER — ONDANSETRON 2 MG/ML
INJECTION INTRAMUSCULAR; INTRAVENOUS AS NEEDED
Status: DISCONTINUED | OUTPATIENT
Start: 2022-08-18 | End: 2022-08-18 | Stop reason: HOSPADM

## 2022-08-18 RX ORDER — FENTANYL CITRATE 50 UG/ML
25 INJECTION, SOLUTION INTRAMUSCULAR; INTRAVENOUS
Status: DISCONTINUED | OUTPATIENT
Start: 2022-08-18 | End: 2022-08-18 | Stop reason: HOSPADM

## 2022-08-18 RX ORDER — DEXTROMETHORPHAN/PSEUDOEPHED 2.5-7.5/.8
1.2 DROPS ORAL
Status: DISCONTINUED | OUTPATIENT
Start: 2022-08-18 | End: 2022-08-18 | Stop reason: HOSPADM

## 2022-08-18 RX ORDER — LIDOCAINE HYDROCHLORIDE 20 MG/ML
INJECTION, SOLUTION EPIDURAL; INFILTRATION; INTRACAUDAL; PERINEURAL AS NEEDED
Status: DISCONTINUED | OUTPATIENT
Start: 2022-08-18 | End: 2022-08-18 | Stop reason: HOSPADM

## 2022-08-18 RX ORDER — EPINEPHRINE 0.1 MG/ML
1 INJECTION INTRACARDIAC; INTRAVENOUS
Status: COMPLETED | OUTPATIENT
Start: 2022-08-18 | End: 2022-08-18

## 2022-08-18 RX ORDER — ONDANSETRON 2 MG/ML
INJECTION INTRAMUSCULAR; INTRAVENOUS
Status: DISCONTINUED
Start: 2022-08-18 | End: 2022-08-18 | Stop reason: HOSPADM

## 2022-08-18 RX ORDER — FLUMAZENIL 0.1 MG/ML
0.2 INJECTION INTRAVENOUS
Status: DISCONTINUED | OUTPATIENT
Start: 2022-08-18 | End: 2022-08-18 | Stop reason: HOSPADM

## 2022-08-18 RX ORDER — MIDAZOLAM HYDROCHLORIDE 1 MG/ML
.25-5 INJECTION, SOLUTION INTRAMUSCULAR; INTRAVENOUS
Status: DISCONTINUED | OUTPATIENT
Start: 2022-08-18 | End: 2022-08-18 | Stop reason: HOSPADM

## 2022-08-18 RX ORDER — SODIUM CHLORIDE 9 MG/ML
75 INJECTION, SOLUTION INTRAVENOUS CONTINUOUS
Status: DISCONTINUED | OUTPATIENT
Start: 2022-08-18 | End: 2022-08-18 | Stop reason: HOSPADM

## 2022-08-18 RX ORDER — SODIUM CHLORIDE 0.9 % (FLUSH) 0.9 %
5-40 SYRINGE (ML) INJECTION EVERY 8 HOURS
Status: DISCONTINUED | OUTPATIENT
Start: 2022-08-18 | End: 2022-08-18 | Stop reason: HOSPADM

## 2022-08-18 RX ORDER — NALOXONE HYDROCHLORIDE 0.4 MG/ML
0.4 INJECTION, SOLUTION INTRAMUSCULAR; INTRAVENOUS; SUBCUTANEOUS
Status: DISCONTINUED | OUTPATIENT
Start: 2022-08-18 | End: 2022-08-18 | Stop reason: HOSPADM

## 2022-08-18 RX ADMIN — PROPOFOL 60 MG: 10 INJECTION, EMULSION INTRAVENOUS at 11:41

## 2022-08-18 RX ADMIN — GLYCOPYRROLATE 0.1 MG: 0.2 INJECTION, SOLUTION INTRAMUSCULAR; INTRAVENOUS at 12:21

## 2022-08-18 RX ADMIN — PROPOFOL 100 MG: 10 INJECTION, EMULSION INTRAVENOUS at 11:50

## 2022-08-18 RX ADMIN — PROPOFOL 10 MG: 10 INJECTION, EMULSION INTRAVENOUS at 12:11

## 2022-08-18 RX ADMIN — ONDANSETRON HYDROCHLORIDE 4 MG: 2 INJECTION, SOLUTION INTRAMUSCULAR; INTRAVENOUS at 12:47

## 2022-08-18 RX ADMIN — LIDOCAINE HYDROCHLORIDE 50 MG: 20 INJECTION, SOLUTION EPIDURAL; INFILTRATION; INTRACAUDAL; PERINEURAL at 11:41

## 2022-08-18 RX ADMIN — PROPOFOL 30 MG: 10 INJECTION, EMULSION INTRAVENOUS at 12:21

## 2022-08-18 RX ADMIN — PROPOFOL 50 MG: 10 INJECTION, EMULSION INTRAVENOUS at 12:15

## 2022-08-18 RX ADMIN — PROPOFOL 50 MG: 10 INJECTION, EMULSION INTRAVENOUS at 12:05

## 2022-08-18 RX ADMIN — PROPOFOL 30 MG: 10 INJECTION, EMULSION INTRAVENOUS at 12:23

## 2022-08-18 RX ADMIN — PROPOFOL 20 MG: 10 INJECTION, EMULSION INTRAVENOUS at 12:28

## 2022-08-18 RX ADMIN — EPINEPHRINE 0.4 MG: 0.1 INJECTION INTRACARDIAC; INTRAVENOUS at 12:20

## 2022-08-18 RX ADMIN — PROPOFOL 40 MG: 10 INJECTION, EMULSION INTRAVENOUS at 11:45

## 2022-08-18 RX ADMIN — PROPOFOL 30 MG: 10 INJECTION, EMULSION INTRAVENOUS at 12:26

## 2022-08-18 RX ADMIN — PROPOFOL 30 MG: 10 INJECTION, EMULSION INTRAVENOUS at 12:10

## 2022-08-18 RX ADMIN — SODIUM CHLORIDE 75 ML/HR: 9 INJECTION, SOLUTION INTRAVENOUS at 10:41

## 2022-08-18 RX ADMIN — PROPOFOL 100 MG: 10 INJECTION, EMULSION INTRAVENOUS at 11:57

## 2022-08-18 NOTE — PROCEDURES
NAME:  Taurus Anderson   :   1950   MRN:   [id number]     Date/Time:  2022 12:42 PM    Colonoscopy Operative Report    Procedure Type:   Colonoscopy with polypectomy (snare cautery), argon plasma coagulation, [place] Ink injection, O-rise submucosal injection     Indications:    rectal bleeding  Pre-operative Diagnosis: see indication above  Post-operative Diagnosis:  See findings below  :  Zainab Hawkins MD  Referring Provider:   Sally Blanc MD     Exam:  Airway: clear, no airway problems anticipated  Heart: RRR, without gallops or rubs  Lungs: clear bilaterally without wheezes, crackles, or rhonchi  Abdomen: soft, nontender, nondistended, bowel sounds present  Mental Status: awake, alert and oriented to person, place and time     Sedation:  MAC anesthesia Propofol 550mg IV  Procedure Details:  After informed consent was obtained with all risks and benefits of procedure explained and preoperative exam completed, the patient was taken to the endoscopy suite and placed in the left lateral decubitus position. Upon sequential sedation as per above, a digital rectal exam was performed demonstrating internal hemorrhoids. The Olympus GIF-190 endoscope  was inserted in the rectum and carefully advanced to the cecum, which was identified by the ileocecal valve and appendiceal orifice. The quality of preparation was adequate. The colonoscope was slowly withdrawn with careful evaluation between folds. Retroflexion in the rectum was completed demonstrating internal hemorrhoids. Findings:     -Scattered sigmoid diverticulosis  -Single 2cm prominent rectosigmoid colon lipoma with significant overlying erythema at 20cm; The lesion was injected at the base with two separate 2cc aliquots of Epi 1:80810 mix, followed by two separate 2cc aliquots of O-rise anterior to the polyp and then removed with hot snare cautery used initially as coag setting and then cutting setting. biopsied.   A arsl[place] Ink tattoo was placed opposite the site as a 1 cc aliquot.  -Radiation proctitis with active oozing in distal rectum from 6-10cm; ablated with APC device at rectal setting  -Small grade 1 internal hemorrhoids     Specimen Removed:  #1 rectosigmoid polyp. Complications: None. EBL:  None. Impression:    -Scattered sigmoid diverticulosis  -Single 2cm prominent rectosigmoid colon lipoma with significant overlying erythema at 20cm; The lesion was injected at the base with two separate 2cc aliquots of Epi 1:33062 mix, followed by two separate 2cc aliquots of O-rise anterior to the polyp and then removed with hot snare cautery used initially as coag setting and then cutting setting. biopsied. A arslan Ink tattoo was placed opposite the site as a 1 cc aliquot.  -Radiation proctitis with active oozing in distal rectum from 6-10cm; ablated with APC device at rectal setting  -Small grade 1 internal hemorrhoids     Recommendations: --Repeat colonoscopy in 5 years or as needed for recurrent bleeding. , -Call for biopsy results in 10 days High fiber diet. Resume normal medication(s). Discharge Disposition:  Home in the company of a  when able to ambulate.     January Olguin MD

## 2022-08-18 NOTE — ANESTHESIA POSTPROCEDURE EVALUATION
Procedure(s):  ENDOSCOPIC ARGON PLASMA COAGULATION  COLONOSCOPY  INJECTION x3  ENDOSCOPIC POLYPECTOMY. total IV anesthesia, general    Anesthesia Post Evaluation        Patient location during evaluation: PACU  Note status: Adequate. Level of consciousness: responsive to verbal stimuli and sleepy but conscious  Pain management: satisfactory to patient  Airway patency: patent  Anesthetic complications: no  Cardiovascular status: acceptable  Respiratory status: acceptable  Hydration status: acceptable  Comments: +Post-Anesthesia Evaluation and Assessment    Patient: Damir Luis MRN: [id number]  SSN: [id number]   YOB: 1950  Age: 70 y.o. Sex: female      Cardiovascular Function/Vital Signs    BP (!) 151/103   Pulse (!) 59   Temp 36.6 °C (97.8 °F)   Resp 17   Ht 5' 1.5\" (1.562 m)   Wt 73.7 kg (162 lb 8 oz)   SpO2 100%   Breastfeeding No   BMI 30.21 kg/m²     Patient is status post Procedure(s) with comments:  ENDOSCOPIC ARGON PLASMA COAGULATION  COLONOSCOPY  INJECTION x3 - Epi and [facility] Ink  ENDOSCOPIC POLYPECTOMY. Nausea/Vomiting: Controlled. Postoperative hydration reviewed and adequate. Pain:  Pain Scale 1: Numeric (0 - 10) (08/18/22 1300)  Pain Intensity 1: 0 (08/18/22 1300)   Managed. Neurological Status: At baseline. Mental Status and Level of Consciousness: Arousable. Pulmonary Status:   O2 Device: None (Room air) (08/18/22 1306)   Adequate oxygenation and airway patent. Complications related to anesthesia: None    Post-anesthesia assessment completed. No concerns. Signed By: Kellie Man MD    8/18/2022  Post anesthesia nausea and vomiting:  controlled      INITIAL Post-op Vital signs:   Vitals Value Taken Time   /103 08/18/22 1312   Temp 36.6 °C (97.8 °F) 08/18/22 1300   Pulse 54 08/18/22 1314   Resp 16 08/18/22 1314   SpO2 95 % 08/18/22 1314   Vitals shown include unvalidated device data.

## 2022-08-18 NOTE — H&P
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     Cancer ([address]     uterine     GERD (gastroesophageal reflux disease)     Hypertension     Lichen planus     oral    PUD (peptic ulcer disease)       Past Surgical History:   Procedure Laterality Date    COLONOSCOPY N/A 10/16/2017    COLONOSCOPY performed by Sadi Wang MD at [facility] ENDOSCOPY    COLONOSCOPY N/A 2022    COLONOSCOPY performed by Adina Newman MD at [facility] ENDOSCOPY    COLONOSCOPY,FLEX,W/CONTROL, BLEEDING  2022         COLONOSCOPY,HANS [place],SNARE  10/16/2017         HX  SECTION      X2    HX CHOLECYSTECTOMY      HX DILATION AND CURETTAGE  2021    High-grade endometrial carcinoma, favor serous carcinoma    HX HEENT      EXTRACTION OF WISDOM TEETH X 4    HX HEENT      3 teeth implants    HX HYSTERECTOMY  2021    HX ORTHOPAEDIC      right foot, bunionectomy    HX ORTHOPAEDIC      LEFTl hip arthroplasty    HX ORTHOPAEDIC      RIGHT hip arthroplasty    HX TUBAL LIGATION      HX VASCULAR ACCESS Right     port for chemo    IR INSERT TUNL CVC W PORT OVER 5 YEARS  10/14/2021    [place] COLONOSCOPY FLX DX W/COLLJ SPEC WHEN PFRMD  01/20/2012     x 2    UPPER GI ENDOSCOPY,BIOPSY  2016         UPPER GI ENDOSCOPY,DILATN W GUIDE  2016           Social History     Socioeconomic History    Marital status:    Tobacco Use    Smoking status: Never    Smokeless tobacco: Never   Vaping Use    Vaping Use: Never used   Substance and Sexual Activity    Alcohol use: Not Currently     Comment: VERY RARELY     Drug use: No      Family History   Problem Relation Age of Onset    Cancer Maternal Grandmother 79 colon cancer    Hypertension Mother     Cancer Mother         pacreatic cancer    Cancer Paternal Aunt         ovarian cancer      Patient Active Problem List   Diagnosis Code    DJD (degenerative joint disease) of hip M16.9    Hip arthritis M16.10    PUD (peptic ulcer disease) K27.9    GERD (gastroesophageal reflux disease) K21.9    Arthritis C69.94    Lichen planus J01.5    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    Essential hypertension I10    Abnormal finding on EKG R94.31    Encounter for antineoplastic chemotherapy Z51.11    Opioid use, unspecified with unspecified opioid-induced disorder F11.99       Allergies: Allergies   Allergen Reactions    Codeine Nausea and Vomiting    Penicillins Rash     Rash & dizzy    Sulfa (Sulfonamide Antibiotics) Other (comments)     General redness all over skin    Ciprofloxacin Itching and Other (comments)     Extreme dizziness and rash    Other Medication Nausea and Vomiting     PRESCRIPTION STRENGTH ANTI INFLAMMATORY MEDS      Medications:   Prior to Admission medications    Medication Sig Start Date End Date Taking? Authorizing Provider   losartan (COZAAR) 25 mg tablet Take 1 Tablet by mouth in the morning. 7/28/22  Yes Lakeshia Ludwig MD   amLODIPine (NORVASC) 5 mg tablet Take 5 mg by mouth daily. Yes Provider, Historical   ibuprofen (MOTRIN) 200 mg tablet Take 400 mg by mouth as needed. Yes Provider, Historical   acetaminophen (TYLENOL) 325 mg tablet Take 2 Tablets by mouth every six (6) hours as needed for Pain. 7/30/21  Yes Geovanna Walden PA-C   cyanocobalamin 1,000 mcg tablet Take 1,000 mcg by mouth daily.    Yes Provider, Historical   ergocalciferol (ERGOCALCIFEROL) 1,250 mcg (50,000 unit) capsule TAKE 1 CAPSULE BY MOUTH WEEKLY 6/10/22   Provider, Historical   CRANIAL PROSTHESIS misc Cranial Prosthesis  Diagnosis code:   C54.1  Cpt code:    11/22/21   Piper Martinez MD   lidocaine-prilocaine (EMLA) topical cream APPLY SMALL AMOUNT OVER PORT AREA AND COVER WITH BAND AID ONE HOUR BEFORE CHEMO 10/15/21   Cathie Degroot PA-C   Nystop powder as needed. 8/22/21   Provider, Historical   methylPREDNISolone (MEDROL) 4 mg tab Take 4 mg by mouth daily as needed. Prn for lichen planus  Patient not taking: No sig reported    Provider, Historical     Physical Exam:   Vital Signs: Blood pressure (!) 156/57, pulse 61, temperature 98.1 °F (36.7 °C), resp. rate 15, height 5' 1.5\" (1.562 m), weight 73.7 kg (162 lb 8 oz), SpO2 99 %, not currently breastfeeding.   General: well developed, well nourished   HEENT: unremarkable   Heart: regular rhythm no mumur    Lungs: clear   Abdominal:  benign   Neurological: unremarkable   Extremities: no edema     Findings/Diagnosis: recurrent BRBPR with hx radiation proctitis and internal hemorrhoids  Plan of Care/Planned Procedure: FS with conscious/deep sedation    Signed:  Maxi Cortez MD 8/18/2022

## 2022-08-18 NOTE — PROGRESS NOTES
Kitty Nguyễn  1950  [id number]    Situation:  Verbal report received from: TRISTAN Dewey  Procedure: Procedure(s) with comments:  ENDOSCOPIC ARGON PLASMA COAGULATION  COLONOSCOPY  INJECTION x3 - [facility]  ENDOSCOPIC POLYPECTOMY    Background:    Preoperative diagnosis: RECTAL BLEEDING  Postoperative diagnosis: Diverticulosis, polyp, proctitis, hemorrhoids    :  Dr. Randall De Paz  Assistant(s): Endoscopy RN-1: Gerardo Larsen  Endoscopy RN-2: Nuha Gomez RN    Specimens:   ID Type Source Tests Collected by Time Destination   1 : Polyp Preservative Colon, Recto-sigmoid  Twila Boothe MD 8/18/2022 1233 Pathology     H. Pylori  no    Assessment:  Intra-procedure medications     Anesthesia gave intra-procedure sedation and medications, see anesthesia flow sheet yes    Intravenous fluids: NS@ KVO     Vital signs stable yes    Abdominal assessment: round and soft yes    Recommendation:  Discharge patient per MD order yes.   Return to floor n/a  Family or Friend leroy Oakley  Permission to share finding with family or friend yes

## 2022-08-18 NOTE — DISCHARGE INSTRUCTIONS
Luis Vega  [id number]  1950    COLON DISCHARGE INSTRUCTIONS  Discomfort:  Redness at IV site- apply warm compress to area; if redness or soreness persist- contact your physician  There may be a slight amount of blood passed from the rectum  Gaseous discomfort- walking, belching will help relieve any discomfort  You may not operate a vehicle for 12 hours  You may not engage in an occupation involving machinery or appliances for rest of today  You may not drink alcoholic beverages for at least 12 hours  Avoid making any critical decisions for at least 24 hour  DIET:   High fiber diet. - however -  remember your colon is empty and a heavy meal will produce gas. Avoid these foods:  vegetables, fried / greasy foods, carbonated drinks for today  MEDICATION:         ACTIVITY:  You may not resume your normal daily activities until tomorrow AM; it is recommended that you spend the remainder of the day resting -  avoid any strenuous activity. CALL M.D. ANY SIGN OF:   Increasing pain, nausea, vomiting  Abdominal distension (swelling)  New increased bleeding (oral or rectal)  Fever (chills)  Pain in chest area  Bloody discharge from nose or mouth  Shortness of breath    You may not  take any Advil, Aspirin, Ibuprofen, Motrin, Aleve, or Goodys for 10 days, ONLY  Tylenol as needed for pain. IMPRESSION:  -Scattered sigmoid diverticulosis  -Single 2cm prominent rectosigmoid colon lipoma with significant overlying erythema at 20cm; The lesion was injected at the base with two separate 2cc aliquots of Epi 1:[id number] mix, followed by two separate 2cc aliquots of O-rise anterior to the polyp and then removed with hot snare cautery used initially as coag setting and then cutting setting. biopsied.   A arslan Ink tattoo was placed opposite the site as a 1 cc aliquot.  -Radiation proctitis with active oozing in distal rectum from 6-10cm; ablated with APC device at rectal setting  -Small grade 1 internal hemorrhoids    Follow-up Instructions:   Call Dr. Aleksandr White for the results of procedure / biopsy in 7-10 days  Telephone # [phone]  Repeat colonoscopy in 5 years    Merlin Coop, MD

## 2022-09-07 ENCOUNTER — HOSPITAL ENCOUNTER (OUTPATIENT)
Dept: INFUSION THERAPY | Age: 72
Discharge: HOME | End: 2022-09-07
Payer: MEDICARE

## 2022-09-07 VITALS
DIASTOLIC BLOOD PRESSURE: 67 MMHG | BODY MASS INDEX: 30.45 KG/M2 | RESPIRATION RATE: 16 BRPM | OXYGEN SATURATION: 98 % | WEIGHT: 163.8 LBS | TEMPERATURE: 98 F | SYSTOLIC BLOOD PRESSURE: 133 MMHG | HEART RATE: 60 BPM

## 2022-09-07 DIAGNOSIS — C54.1 PAPILLARY SEROUS ENDOMETRIAL ADENOCARCINOMA (HCC): Primary | ICD-10-CM

## 2022-09-07 PROCEDURE — 96523 IRRIG DRUG DELIVERY DEVICE: CPT

## 2022-09-07 PROCEDURE — 74011250636 HC RX REV CODE- 250/636: Performed by: OBSTETRICS & GYNECOLOGY

## 2022-09-07 PROCEDURE — 74011000250 HC RX REV CODE- 250: Performed by: OBSTETRICS & GYNECOLOGY

## 2022-09-07 PROCEDURE — 77030012965 HC NDL HUBR BBMI -A

## 2022-09-07 RX ORDER — SODIUM CHLORIDE 9 MG/ML
10 INJECTION INTRAMUSCULAR; INTRAVENOUS; SUBCUTANEOUS AS NEEDED
Status: ACTIVE | OUTPATIENT
Start: 2022-09-07 | End: 2022-09-07

## 2022-09-07 RX ORDER — HEPARIN 100 UNIT/ML
500 SYRINGE INTRAVENOUS AS NEEDED
Status: ACTIVE | OUTPATIENT
Start: 2022-09-07 | End: 2022-09-07

## 2022-09-07 RX ORDER — SODIUM CHLORIDE 0.9 % (FLUSH) 0.9 %
5-10 SYRINGE (ML) INJECTION AS NEEDED
Status: DISPENSED | OUTPATIENT
Start: 2022-09-07 | End: 2022-09-07

## 2022-09-07 RX ADMIN — SODIUM CHLORIDE, PRESERVATIVE FREE 10 ML: 5 INJECTION INTRAVENOUS at 09:34

## 2022-09-07 RX ADMIN — HEPARIN 500 UNITS: 100 SYRINGE at 09:34

## 2022-09-07 NOTE — PROGRESS NOTES
[facility] Note:  Arrived - 925     Visit Vitals  /67   Pulse 60   Temp 98 °F (36.7 °C)   Resp 16   Wt 74.3 kg (163 lb 12.8 oz)   SpO2 98%   BMI 30.45 kg/m²        Port accessed & flushed per protocol w/o difficulty, positive blood return noted. Zazueta needle removed. 935 - Tolerated well. Pt denies any acute problems/changes. Discharged from [facility] ambulatory. No distress.  Next appt: 10/6/22

## 2022-09-08 ENCOUNTER — APPOINTMENT (OUTPATIENT)
Dept: INFUSION THERAPY | Age: 72
End: 2022-09-08

## 2022-09-12 RX ORDER — SODIUM CHLORIDE 0.9 % (FLUSH) 0.9 %
5-10 SYRINGE (ML) INJECTION AS NEEDED
Status: CANCELLED | OUTPATIENT
Start: 2022-10-06

## 2022-09-12 RX ORDER — SODIUM CHLORIDE 9 MG/ML
10 INJECTION INTRAMUSCULAR; INTRAVENOUS; SUBCUTANEOUS AS NEEDED
Status: CANCELLED | OUTPATIENT
Start: 2022-10-06

## 2022-09-12 RX ORDER — HEPARIN 100 UNIT/ML
500 SYRINGE INTRAVENOUS AS NEEDED
Status: CANCELLED | OUTPATIENT
Start: 2022-10-06

## 2022-10-06 ENCOUNTER — HOSPITAL ENCOUNTER (OUTPATIENT)
Dept: INFUSION THERAPY | Age: 72
Discharge: HOME | End: 2022-10-06
Payer: MEDICARE

## 2022-10-06 VITALS
TEMPERATURE: 98.1 F | DIASTOLIC BLOOD PRESSURE: 74 MMHG | OXYGEN SATURATION: 99 % | SYSTOLIC BLOOD PRESSURE: 151 MMHG | RESPIRATION RATE: 16 BRPM | HEART RATE: 62 BPM

## 2022-10-06 DIAGNOSIS — C54.1 PAPILLARY SEROUS ENDOMETRIAL ADENOCARCINOMA (HCC): Primary | ICD-10-CM

## 2022-10-06 PROCEDURE — 96523 IRRIG DRUG DELIVERY DEVICE: CPT

## 2022-10-06 PROCEDURE — 74011250636 HC RX REV CODE- 250/636: Performed by: OBSTETRICS & GYNECOLOGY

## 2022-10-06 PROCEDURE — 77030012965 HC NDL HUBR BBMI -A

## 2022-10-06 PROCEDURE — 74011000250 HC RX REV CODE- 250: Performed by: OBSTETRICS & GYNECOLOGY

## 2022-10-06 RX ORDER — SODIUM CHLORIDE 0.9 % (FLUSH) 0.9 %
5-10 SYRINGE (ML) INJECTION AS NEEDED
Status: DISPENSED | OUTPATIENT
Start: 2022-10-06 | End: 2022-10-06

## 2022-10-06 RX ORDER — SODIUM CHLORIDE 9 MG/ML
10 INJECTION INTRAMUSCULAR; INTRAVENOUS; SUBCUTANEOUS AS NEEDED
Status: ACTIVE | OUTPATIENT
Start: 2022-10-06 | End: 2022-10-06

## 2022-10-06 RX ORDER — HEPARIN 100 UNIT/ML
500 SYRINGE INTRAVENOUS AS NEEDED
Status: ACTIVE | OUTPATIENT
Start: 2022-10-06 | End: 2022-10-06

## 2022-10-06 RX ADMIN — SODIUM CHLORIDE, PRESERVATIVE FREE 10 ML: 5 INJECTION INTRAVENOUS at 10:15

## 2022-10-06 RX ADMIN — HEPARIN 500 UNITS: 100 SYRINGE at 10:15

## 2022-10-07 RX ORDER — SODIUM CHLORIDE 0.9 % (FLUSH) 0.9 %
5-10 SYRINGE (ML) INJECTION AS NEEDED
Status: CANCELLED | OUTPATIENT
Start: 2022-11-03

## 2022-10-07 RX ORDER — SODIUM CHLORIDE 9 MG/ML
10 INJECTION INTRAMUSCULAR; INTRAVENOUS; SUBCUTANEOUS AS NEEDED
Status: CANCELLED | OUTPATIENT
Start: 2022-11-03

## 2022-10-07 RX ORDER — HEPARIN 100 UNIT/ML
500 SYRINGE INTRAVENOUS AS NEEDED
Status: CANCELLED | OUTPATIENT
Start: 2022-11-03

## 2022-10-20 ENCOUNTER — OFFICE VISIT (OUTPATIENT)
Dept: CARDIOLOGY CLINIC | Age: 72
End: 2022-10-20
Payer: MEDICARE

## 2022-10-20 VITALS
RESPIRATION RATE: 16 BRPM | OXYGEN SATURATION: 98 % | SYSTOLIC BLOOD PRESSURE: 160 MMHG | BODY MASS INDEX: 30.36 KG/M2 | DIASTOLIC BLOOD PRESSURE: 90 MMHG | WEIGHT: 165 LBS | HEART RATE: 63 BPM | HEIGHT: 62 IN

## 2022-10-20 DIAGNOSIS — I10 WHITE COAT SYNDROME WITH DIAGNOSIS OF HYPERTENSION: Primary | ICD-10-CM

## 2022-10-20 DIAGNOSIS — I10 ESSENTIAL HYPERTENSION: ICD-10-CM

## 2022-10-20 DIAGNOSIS — Z78.9 MEDICATION INTOLERANCE: ICD-10-CM

## 2022-10-20 DIAGNOSIS — M19.90 ARTHRITIS: ICD-10-CM

## 2022-10-20 PROCEDURE — G8417 CALC BMI ABV UP PARAM F/U: HCPCS | Performed by: SPECIALIST

## 2022-10-20 PROCEDURE — 99214 OFFICE O/P EST MOD 30 MIN: CPT | Performed by: SPECIALIST

## 2022-10-20 PROCEDURE — 1101F PT FALLS ASSESS-DOCD LE1/YR: CPT | Performed by: SPECIALIST

## 2022-10-20 PROCEDURE — 1123F ACP DISCUSS/DSCN MKR DOCD: CPT | Performed by: SPECIALIST

## 2022-10-20 PROCEDURE — G8753 SYS BP > OR = 140: HCPCS | Performed by: SPECIALIST

## 2022-10-20 PROCEDURE — G8755 DIAS BP > OR = 90: HCPCS | Performed by: SPECIALIST

## 2022-10-20 PROCEDURE — 1090F PRES/ABSN URINE INCON ASSESS: CPT | Performed by: SPECIALIST

## 2022-10-20 PROCEDURE — 3017F COLORECTAL CA SCREEN DOC REV: CPT | Performed by: SPECIALIST

## 2022-10-20 PROCEDURE — G8432 DEP SCR NOT DOC, RNG: HCPCS | Performed by: SPECIALIST

## 2022-10-20 PROCEDURE — G8427 DOCREV CUR MEDS BY ELIG CLIN: HCPCS | Performed by: SPECIALIST

## 2022-10-20 PROCEDURE — G8399 PT W/DXA RESULTS DOCUMENT: HCPCS | Performed by: SPECIALIST

## 2022-10-20 PROCEDURE — G8536 NO DOC ELDER MAL SCRN: HCPCS | Performed by: SPECIALIST

## 2022-10-20 RX ORDER — TIZANIDINE 2 MG/1
TABLET ORAL
COMMUNITY
Start: 2022-09-04

## 2022-10-20 NOTE — PROGRESS NOTES
HISTORY OF PRESENT ILLNESS  Lorena Ghotra is a 67 y.o. female. She has a history of uterine cancer and hypertension with intolerance to multiple medications. She cannot tolerate losartan with hydrochlorothiazide but is tolerating low-dose losartan 25 mg along with amlodipine. Her weight is down 3 pounds. She had an upper respiratory infection for the last 2 weeks. Her blood pressure at home is 147/75 but wakes goes up in doctors offices. HPI  Patient Active Problem List   Diagnosis Code    DJD (degenerative joint disease) of hip M16.9    Hip arthritis M16.10    PUD (peptic ulcer disease) K27.9    GERD (gastroesophageal reflux disease) K21.9    Arthritis C40.18    Lichen planus S82.9    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    Essential hypertension I10    Abnormal finding on EKG R94.31    Encounter for antineoplastic chemotherapy Z51.11    Opioid use, unspecified with unspecified opioid-induced disorder F11.99     Current Outpatient Medications   Medication Sig Dispense Refill    ergocalciferol (ERGOCALCIFEROL) 1,250 mcg (50,000 unit) capsule TAKE 1 CAPSULE BY MOUTH WEEKLY      losartan (COZAAR) 25 mg tablet Take 1 Tablet by mouth in the morning. 30 Tablet 11    amLODIPine (NORVASC) 5 mg tablet Take 5 mg by mouth daily. ibuprofen (MOTRIN) 200 mg tablet Take 400 mg by mouth as needed. lidocaine-prilocaine (EMLA) topical cream APPLY SMALL AMOUNT OVER PORT AREA AND COVER WITH BAND AID ONE HOUR BEFORE CHEMO 30 g 0    Nystop powder as needed. cyanocobalamin 1,000 mcg tablet Take 1,000 mcg by mouth daily. tiZANidine (ZANAFLEX) 2 mg tablet TAKE 1 TO 2 TABLETS BY MOUTH THREE TIMES DAILY FOR 10 DAYS AS NEEDED      CRANIAL PROSTHESIS misc Cranial Prosthesis  Diagnosis code:   C54.1  Cpt code:    1 Each 1    acetaminophen (TYLENOL) 325 mg tablet Take 2 Tablets by mouth every six (6) hours as needed for Pain.  (Patient not taking: Reported on 10/20/2022) 30 Tablet 0     Past Medical History:   Diagnosis Date    Arthritis     OSTEO HIP JULY. Cancer ([facility] [address]     uterine     GERD (gastroesophageal reflux disease)     Hypertension     Lichen planus     oral    PUD (peptic ulcer disease)      Past Surgical History:   Procedure Laterality Date    COLONOSCOPY N/A 10/16/2017    COLONOSCOPY performed by Dominique Ram MD at [facility] ENDOSCOPY    COLONOSCOPY N/A 2022    COLONOSCOPY performed by Suzanna Blakely MD at [facility] ENDOSCOPY    COLONOSCOPY N/A 2022    COLONOSCOPY performed by Suzanna Blakely MD at [facility] ENDOSCOPY    COLONOSCOPY,FLEX,W/CONTROL, BLEEDING  2022         COLONOSCOPY,REMV LES[place],SNARE  10/16/2017         HX  SECTION      X2    HX CHOLECYSTECTOMY      HX DILATION AND CURETTAGE  2021    High-grade endometrial carcinoma, favor serous carcinoma    HX HEENT      EXTRACTION OF WISDOM TEETH X 4    HX HEENT      3 teeth implants    HX HYSTERECTOMY  2021    HX ORTHOPAEDIC      right foot, bunionectomy    HX ORTHOPAEDIC      LEFTl hip arthroplasty    HX ORTHOPAEDIC      RIGHT hip arthroplasty    HX TUBAL LIGATION      HX VASCULAR ACCESS Right     port for chemo    IR INSERT TUNL CVC W PORT OVER 5 YEARS  10/14/2021    MA COLONOSCOPY FLX DX W/COLLJ SPEC WHEN PFRMD  01/20/2012     x 2    UPPER GI ENDOSCOPY,BIOPSY  2016         UPPER GI ENDOSCOPY,DILATN W GUIDE  2016            Review of Systems   Constitutional:  Positive for weight loss. HENT:  Positive for congestion. All other systems reviewed and are negative. Visit Vitals  BP (!) 160/90   Pulse 63   Resp 16   Ht 5' 1.5\" (1.562 m)   Wt 165 lb (74.8 kg)   SpO2 98%   BMI 30.67 kg/m²       Physical Exam  Vitals and nursing note reviewed. Constitutional:       Appearance: Normal appearance. HENT:      Head: Normocephalic.       Right Ear: External ear normal.      Left Ear: External ear normal.      Nose: Nose normal.      Mouth/Throat:      Mouth: Mucous membranes are moist.   Eyes:      Extraocular Movements: Extraocular movements intact. Cardiovascular:      Rate and Rhythm: Normal rate and regular rhythm. Heart sounds: Normal heart sounds. Pulmonary:      Breath sounds: Normal breath sounds. Abdominal:      Palpations: Abdomen is soft. Musculoskeletal:         General: Normal range of motion. Cervical back: Normal range of motion. Skin:     General: Skin is warm. Neurological:      Mental Status: She is alert and oriented to person, place, and time. Psychiatric:         Behavior: Behavior normal.       ASSESSMENT and PLAN  I do think she has an element of whitecoat hypertension. However since she seems to be able to tolerate losartan without hydrochlorothiazide we will double the dose of the losartan up to 50 mg and she will let us know within a week how her blood pressure is doing and whether she is tolerating it. If so I will call in a higher dose for her and plan to see her back in 6 months.

## 2022-10-24 ENCOUNTER — HOSPITAL ENCOUNTER (OUTPATIENT)
Dept: RADIATION THERAPY | Age: 72
Discharge: HOME | End: 2022-10-24

## 2022-11-03 ENCOUNTER — HOSPITAL ENCOUNTER (OUTPATIENT)
Dept: INFUSION THERAPY | Age: 72
Discharge: HOME | End: 2022-11-03
Payer: MEDICARE

## 2022-11-03 VITALS
HEART RATE: 52 BPM | RESPIRATION RATE: 18 BRPM | OXYGEN SATURATION: 99 % | TEMPERATURE: 97.5 F | DIASTOLIC BLOOD PRESSURE: 75 MMHG | SYSTOLIC BLOOD PRESSURE: 140 MMHG

## 2022-11-03 DIAGNOSIS — C54.1 PAPILLARY SEROUS ENDOMETRIAL ADENOCARCINOMA (HCC): Primary | ICD-10-CM

## 2022-11-03 PROCEDURE — 74011250636 HC RX REV CODE- 250/636: Performed by: OBSTETRICS & GYNECOLOGY

## 2022-11-03 PROCEDURE — 77030012965 HC NDL HUBR BBMI -A

## 2022-11-03 PROCEDURE — 96523 IRRIG DRUG DELIVERY DEVICE: CPT

## 2022-11-03 PROCEDURE — 74011000250 HC RX REV CODE- 250: Performed by: OBSTETRICS & GYNECOLOGY

## 2022-11-03 RX ORDER — SODIUM CHLORIDE 9 MG/ML
10 INJECTION INTRAMUSCULAR; INTRAVENOUS; SUBCUTANEOUS AS NEEDED
Status: ACTIVE | OUTPATIENT
Start: 2022-11-03 | End: 2022-11-03

## 2022-11-03 RX ORDER — SODIUM CHLORIDE 0.9 % (FLUSH) 0.9 %
5-10 SYRINGE (ML) INJECTION AS NEEDED
Status: DISPENSED | OUTPATIENT
Start: 2022-11-03 | End: 2022-11-03

## 2022-11-03 RX ORDER — HEPARIN 100 UNIT/ML
500 SYRINGE INTRAVENOUS AS NEEDED
Status: ACTIVE | OUTPATIENT
Start: 2022-11-03 | End: 2022-11-03

## 2022-11-03 RX ADMIN — HEPARIN SODIUM (PORCINE) LOCK FLUSH IV SOLN 100 UNIT/ML 500 UNITS: 100 SOLUTION at 09:20

## 2022-11-03 RX ADMIN — SODIUM CHLORIDE, PRESERVATIVE FREE 10 ML: 5 INJECTION INTRAVENOUS at 09:20

## 2022-11-03 NOTE — PROGRESS NOTES
[facility] Note:  Arrived - 0910    Visit Vitals  BP (!) 140/75 (BP 1 Location: Left upper arm, BP Patient Position: Sitting)   Pulse (!) 52   Temp 97.5 °F (36.4 °C)   Resp 18   SpO2 99%       Assessment - unchanged. Port accessed & flushed per protocol w/o difficulty. Zazueta needle removed. 0920 - Tolerated well. Pt denies any acute problems/changes. Discharged from [facility] ambulatory. No distress.  Next appt: 12/1/22 at 1000

## 2022-11-09 ENCOUNTER — OFFICE VISIT (OUTPATIENT)
Dept: GYNECOLOGY | Age: 72
End: 2022-11-09
Payer: MEDICARE

## 2022-11-09 VITALS
WEIGHT: 168.2 LBS | BODY MASS INDEX: 30.95 KG/M2 | DIASTOLIC BLOOD PRESSURE: 70 MMHG | HEART RATE: 56 BPM | HEIGHT: 62 IN | SYSTOLIC BLOOD PRESSURE: 167 MMHG

## 2022-11-09 DIAGNOSIS — C54.1 PAPILLARY SEROUS ENDOMETRIAL ADENOCARCINOMA (HCC): Primary | ICD-10-CM

## 2022-11-09 PROCEDURE — G8399 PT W/DXA RESULTS DOCUMENT: HCPCS | Performed by: OBSTETRICS & GYNECOLOGY

## 2022-11-09 PROCEDURE — G8753 SYS BP > OR = 140: HCPCS | Performed by: OBSTETRICS & GYNECOLOGY

## 2022-11-09 PROCEDURE — G8417 CALC BMI ABV UP PARAM F/U: HCPCS | Performed by: OBSTETRICS & GYNECOLOGY

## 2022-11-09 PROCEDURE — 1101F PT FALLS ASSESS-DOCD LE1/YR: CPT | Performed by: OBSTETRICS & GYNECOLOGY

## 2022-11-09 PROCEDURE — G8427 DOCREV CUR MEDS BY ELIG CLIN: HCPCS | Performed by: OBSTETRICS & GYNECOLOGY

## 2022-11-09 PROCEDURE — 3017F COLORECTAL CA SCREEN DOC REV: CPT | Performed by: OBSTETRICS & GYNECOLOGY

## 2022-11-09 PROCEDURE — G0463 HOSPITAL OUTPT CLINIC VISIT: HCPCS | Performed by: OBSTETRICS & GYNECOLOGY

## 2022-11-09 PROCEDURE — 1090F PRES/ABSN URINE INCON ASSESS: CPT | Performed by: OBSTETRICS & GYNECOLOGY

## 2022-11-09 PROCEDURE — 1123F ACP DISCUSS/DSCN MKR DOCD: CPT | Performed by: OBSTETRICS & GYNECOLOGY

## 2022-11-09 PROCEDURE — 3074F SYST BP LT 130 MM HG: CPT | Performed by: OBSTETRICS & GYNECOLOGY

## 2022-11-09 PROCEDURE — 3078F DIAST BP <80 MM HG: CPT | Performed by: OBSTETRICS & GYNECOLOGY

## 2022-11-09 PROCEDURE — G8432 DEP SCR NOT DOC, RNG: HCPCS | Performed by: OBSTETRICS & GYNECOLOGY

## 2022-11-09 PROCEDURE — G8754 DIAS BP LESS 90: HCPCS | Performed by: OBSTETRICS & GYNECOLOGY

## 2022-11-09 PROCEDURE — 99214 OFFICE O/P EST MOD 30 MIN: CPT | Performed by: OBSTETRICS & GYNECOLOGY

## 2022-11-09 PROCEDURE — G8536 NO DOC ELDER MAL SCRN: HCPCS | Performed by: OBSTETRICS & GYNECOLOGY

## 2022-11-09 RX ORDER — METHYLPREDNISOLONE 4 MG/1
4 TABLET ORAL
COMMUNITY
Start: 2022-10-24

## 2022-11-09 NOTE — PROGRESS NOTES
[address] [place] [address] Mathias Moritz [address]77, [address] [facility]  P [phone]  F [phone]    Office Note  Patient ID:  Name:  Deandra Marsh  MRN:  [id number]  :  1950/72 y.o. Date:  2022      HISTORY OF PRESENT ILLNESS:  Ms. Deandra Marsh is a 67 y.o. female who on 2021 underwent Robotic-assisted total laparoscopic hysterectomy, Bilateral salpingo-oophorectomy, Bilateral pelvic sentinel lymph node mapping and biopsy. 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 Negative washings. Stromal cervical involvement. Negative SLNs. 2mm out of 10mm myometrial invasion. Parametrial involvement of tumor. Completed concurrent chemo-EBRT + VBT on 2021. Initiated on adjuvant carboplatin + paclitaxel on 2021. Completed cycle 4 on 2022. CT C/A/P 2022 without evidence of disease. Presents today for continued surveillance. Doing well without complaints. Denies vaginal bleeding/discharge, abdominal/pelvic pain, nausea, vomiting, constipation, diarrhea, CP, SOB, hematuria, hematochezia, change in appetite or bowel movements, bloating, fevers, chills, or urinary symptoms. Stable numbness in her feet. Denies pain or issues with balance. Initial History:  Ms. Deandra Marsh is a 67 y.o.  postmenopausal female who presents as a new patient from Dr. Arie Burciaga for high-grade endometrial cancer. The patient reports vaginal spotting back in April, which resulted in her seeing Dr. Arie Burciaga. Pelvic ultrasound on 2021 demonstrated 12.5mm stripe. Hysteroscopy/D&C on 2021 demonstrated \"high-grade endometrial carcinoma, favor serous carcinoma\". She was subsequently referred to our office for further discussion and management. Denies pelvic or abdominal pain/bloating, change in appetite or bowel habits, nausea, vomiting, CP, SOB, hematuria, hematochezia, or urinary symptoms.        Pertinent PMH/PSH: DJD, h/o  x 2     Active, no restrictions. Imaging Review:   CT C/A/P 2/3/2022:  FINDINGS:  Port-A-Cath terminates at the cavoatrial junction. CHEST WALL: No mass or axillary lymphadenopathy. THYROID: No nodule. MEDIASTINUM: No mass or lymphadenopathy. STEPHANIE: No mass or lymphadenopathy. THORACIC AORTA: No dissection or aneurysm. MAIN PULMONARY ARTERY: Normal in caliber. TRACHEA/BRONCHI: Patent. ESOPHAGUS: No wall thickening or dilatation. HEART: Normal in size. PLEURA: No effusion or pneumothorax. LUNGS: No nodule, mass, or airspace disease. LIVER: No mass or biliary dilatation. Stable 2 mm hypodensities in hepatic  segment 6 and 8, too small to further characterize. No new lesions. GALLBLADDER: Prior cholecystectomy. SPLEEN: No mass. PANCREAS: No mass or ductal dilatation. ADRENALS: Unremarkable. KIDNEYS: No mass, calculus, or hydronephrosis. STOMACH: Diffuse thickening of the antrum  SMALL BOWEL: No dilatation or wall thickening. COLON: No dilatation or wall thickening. APPENDIX: Normal axial image 70  PERITONEUM: No ascites or pneumoperitoneum. RETROPERITONEUM: No lymphadenopathy or aortic aneurysm. Slight decrease in left  para-aortic lymph node, now measuring 3 x 5 mm (series 2, image 74). REPRODUCTIVE ORGANS: Prior hysterectomy and bilateral nephrectomy. URINARY BLADDER: No mass or calculus. BONES: No destructive bone lesion. Bilateral hip replacements of results in a  moderate amount of pelvic streak artifact. Discogenic sclerosis at L2-3 and  T8-9. ADDITIONAL COMMENTS: N/A  IMPRESSION  No evidence of metastatic disease to the chest, abdomen, or pelvis  Incidental gastritis. CT C/A/P 2021:  FINDINGS:   THYROID: No nodule. MEDIASTINUM: No mass or lymphadenopathy. STEPHANIE: No mass or lymphadenopathy. THORACIC AORTA: No dissection or aneurysm. MAIN PULMONARY ARTERY: Normal in caliber. TRACHEA/BRONCHI: Patent.   ESOPHAGUS: No wall thickening or dilatation. HEART: Normal in size. PLEURA: No effusion or pneumothorax. LUNGS: No nodule, mass, or airspace disease. There is minor atelectasis/scarring  right upper lobe  LIVER: No mass or biliary dilatation. There is hepatic steatosis. There is a 5  mm low-density in segment 4A and segment 5 to small to fully characterize but  most likely tiny cysts. GALLBLADDER: Patient status post cholecystectomy  SPLEEN: No mass. PANCREAS: No mass or ductal dilatation. ADRENALS: Unremarkable. KIDNEYS: No mass, calculus, or hydronephrosis. There is a retroaortic left renal  vein  STOMACH: Unremarkable. SMALL BOWEL: No dilatation or wall thickening. COLON: No dilatation or wall thickening. APPENDIX: Unremarkable. PERITONEUM: No ascites or pneumoperitoneum. RETROPERITONEUM: No lymphadenopathy or aortic aneurysm. There is an 8 mm lymph  node at the aortic bifurcation on the left. There is mild stenosis origin of the  SMA  REPRODUCTIVE ORGANS: Uterus and pelvis are obscured by artifact related to  bilateral total hip replacements. URINARY BLADDER: No mass or calculus. BONES: No destructive bone lesion. There is a right shoulder effusion  ADDITIONAL COMMENTS: N/A     IMPRESSION     1. CT of the chest demonstrates no definite evidence of metastatic disease. No  acute abnormality identified. 2. CT of the abdomen and pelvis demonstrates no definite evidence of metastatic  disease. The pelvis is obscured by artifact related to total hip replacements. There is hepatic steatosis. 2 subcentimeter low densities in the liver are too  small to characterize but most likely represent tiny cysts. There is an 8mm lymph node just to the left of the aortic bifurcation which is  within normal limits. Pelvic ultrasound 4/20/2021:  Impression: Normal uterus with 12.5mm stripe. Normal right ovary. Non visible left ovary with otherwise normal left adnexa. No free fluid.      Pathology Review:   7/29/2021:  * * *FINAL PATHOLOGIC DIAGNOSIS* * *   1. Right pelvic sentinel lymph node, biopsy:        One lymph node, negative for carcinoma, levels and cytokeratin stain   examined (0/1)   2. Left pelvic sentinel lymph node #1, biopsy:        One lymph node, negative for carcinoma, levels and cytokeratin stain   examined (0/1)   3. Left pelvic sentinel lymph node, biopsy:        One lymph node, negative for carcinoma, levels and cytokeratin stain   examined (0/1)   4. Uterus, cervix, bilateral fallopian tubes and ovaries; simple   hysterectomy, BSO:        Endometrium: Uterine serous carcinoma, see synoptic report        Cervix: Serous carcinoma focally involves endocervical stroma,   predominantly as lymphovascular                invasion        Bilateral fallopian tubes: No pathologic diagnosis     ENDOMETRIUM    SPECIMEN       Procedure: Total hysterectomy and bilateral salpingo-oophorectomy       Specimen Integrity: Intact    TUMOR       Histologic Type: Serous carcinoma       Myometrial Invasion: Present           Depth of Myometrial Invasion (Millimeters): 2 mm           Myometrial Thickness (Millimeters): 10 mm           Percentage of Myometrial Invasion: 20%       Uterine Serosa Involvement: Not identified       Lower Uterine Segment Involvement: Present, myoinvasive       Cervical Stromal Involvement: Present       Other Tissue / Organ Involvement: Not identified       Peritoneal Ascitic Fluid: Atypical, favor benign reactive process       Lymphovascular Invasion: Present    MARGINS       Margins: Parametrium and focal paracervix involved by tumor           Parametrial and focal Paracervical Margin: Involved by carcinoma    LYMPH NODES       Lymph Node Status:  All lymph nodes negative for tumor cells           Total Number of Pelvic Nodes Examined: 3           Number of Pelvic [place] Nodes Examined: 3           Total Number of Para-aortic Nodes Examined: 0           Number of Para-aortic [place] Nodes Examined: 0    PATHOLOGIC STAGE CLASSIFICATION (pTNM, AJCC 8th Edition)       Primary Tumor (pT): pT2       Regional Lymph Nodes Modifier: (sn)       Regional Lymph Nodes (pN): pN0    FIGO STAGE       FIGO Stage: II   * * *Comment* * *   Immunohistochemical stains with appropriate controls show tumor positive   for P16, P53 with Ki-67 proliferation index of approximately 50%. These   findings support diagnosis of endometrial serous carcinoma. 7/7/2021:  FINAL PATHOLOGIC DIAGNOSIS   Endometrium, curettings:   High-grade endometrial carcinoma, favor serous carcinoma   See comment   Comment   The biopsy contains a malignant glandular neoplasm with a high nuclear grade, papillary growth pattern, and areas of necrosis. Morphologic features suggest a high-grade endometrial carcinoma and are most compatible with a diagnosis of serous carcinoma. ROS:  A comprehensive review of systems was negative except for that written in the History of Present Illness. , 10 point ROS    OB/GYN ROS:  Per HPI    ECOG ndGndrndanddndend:nd nd2nd Problem List:  Patient Active Problem List    Diagnosis Date Noted    Opioid use, unspecified with unspecified opioid-induced disorder 12/07/2021    Encounter for antineoplastic chemotherapy 10/13/2021    Essential hypertension 07/27/2021    Abnormal finding on EKG 07/27/2021    Papillary serous endometrial adenocarcinoma ([facility] [address] 07/14/2021    PUD (peptic ulcer disease)     GERD (gastroesophageal reflux disease)     Arthritis     Lichen planus     Hip arthritis 04/04/2016    DJD (degenerative joint disease) of hip 01/04/2013     PMH:  Past Medical History:   Diagnosis Date    Arthritis     OSTEO HIP JULY.     Cancer ([facility] [address]     uterine     GERD (gastroesophageal reflux disease)     Hypertension     Lichen planus     oral    PUD (peptic ulcer disease)       PSH:  Past Surgical History:   Procedure Laterality Date    COLONOSCOPY N/A 10/16/2017    COLONOSCOPY performed by Rachael Brewer MD at [facility] ENDOSCOPY    COLONOSCOPY N/A 07/11/2022 COLONOSCOPY performed by Adela Rojas MD at [facility] ENDOSCOPY    COLONOSCOPY N/A 2022    COLONOSCOPY performed by Adela Rojas MD at [facility] ENDOSCOPY    COLONOSCOPY,FLEX,W/CONTROL, BLEEDING  2022         COLONOSCOPY,HANS [place]ER[place],SNARE  10/16/2017         HX  SECTION      X2    HX CHOLECYSTECTOMY      HX DILATION AND CURETTAGE  2021    High-grade endometrial carcinoma, favor serous carcinoma    HX HEENT      EXTRACTION OF WISDOM TEETH X 4    HX HEENT      3 teeth implants    HX HYSTERECTOMY  2021    HX ORTHOPAEDIC      right foot, bunionectomy    HX ORTHOPAEDIC      LEFTl hip arthroplasty    HX ORTHOPAEDIC      RIGHT hip arthroplasty    HX TUBAL LIGATION      HX VASCULAR ACCESS Right     port for chemo    IR INSERT TUNL CVC W PORT OVER 5 YEARS  10/14/2021    IN COLONOSCOPY FLX DX W/COLLJ SPEC WHEN PFRMD  01/20/2012     x 2    UPPER GI ENDOSCOPY,BIOPSY  2016         UPPER GI ENDOSCOPY,DILATN W GUIDE  2016           Social History:  Social History     Tobacco Use    Smoking status: Never    Smokeless tobacco: Never   Substance Use Topics    Alcohol use: Not Currently     Comment: VERY RARELY       Family History:  Family History   Problem Relation Age of Onset    Cancer Maternal Grandmother 79        colon cancer    Hypertension Mother     Cancer Mother         pacreatic cancer    Cancer Paternal Aunt         ovarian cancer      Medications: (reviewed)  Current Outpatient Medications   Medication Sig    tiZANidine (ZANAFLEX) 2 mg tablet TAKE 1 TO 2 TABLETS BY MOUTH THREE TIMES DAILY FOR 10 DAYS AS NEEDED    ergocalciferol (ERGOCALCIFEROL) 1,250 mcg (50,000 unit) capsule TAKE 1 CAPSULE BY MOUTH WEEKLY    losartan (COZAAR) 25 mg tablet Take 1 Tablet by mouth in the morning. amLODIPine (NORVASC) 5 mg tablet Take 5 mg by mouth daily. ibuprofen (MOTRIN) 200 mg tablet Take 400 mg by mouth as needed. cyanocobalamin 1,000 mcg tablet Take 1,000 mcg by mouth daily.     methylPREDNISolone (MEDROL) 4 mg tab Take 4 mg by mouth daily as needed. CRANIAL PROSTHESIS misc Cranial Prosthesis  Diagnosis code:   C54.1  Cpt code:       lidocaine-prilocaine (EMLA) topical cream APPLY SMALL AMOUNT OVER PORT AREA AND COVER WITH BAND AID ONE HOUR BEFORE CHEMO (Patient not taking: Reported on 11/9/2022)    Nystop powder as needed. (Patient not taking: Reported on 11/9/2022)    acetaminophen (TYLENOL) 325 mg tablet Take 2 Tablets by mouth every six (6) hours as needed for Pain. (Patient not taking: No sig reported)     No current facility-administered medications for this visit. Allergies: (reviewed)  Allergies   Allergen Reactions    Codeine Nausea and Vomiting    Penicillins Rash     Rash & dizzy    Sulfa (Sulfonamide Antibiotics) Other (comments)     General redness all over skin    Ciprofloxacin Itching and Other (comments)     Extreme dizziness and rash    Other Medication Nausea and Vomiting     PRESCRIPTION STRENGTH ANTI INFLAMMATORY MEDS         OBJECTIVE:  Physical Exam:  Visit Vitals  BP (!) 167/70 (BP 1 Location: Left upper arm, BP Patient Position: Sitting)   Pulse (!) 56   Ht 5' 1.5\" (1.562 m)   Wt 168 lb 3.2 oz (76.3 kg)   BMI 31.27 kg/m²        General: Alert and oriented. No acute distress. Well-nourished  HEENT: No thyroid enlargment. Neck supple without restrictions. Sclera normal. Normal occular motion. Moist mucous membranes. Lymphatics: No evidence of axillary, cervical, or subclavicular adenopathy. Respiratory: clear to auscultation and percussion to the bases. No CVAT. Cardiovascular: regular rate and rhythm. No murmurs, rubs, or gallops. Gastrointestinal: soft, non-tender, non-distended, no masses or organomegaly. Well-healed incision. Musculoskeletal: normal gait. No joint tenderness, deformity or swelling. No muscular tenderness. Extremities: extremities normal, atraumatic, no cyanosis or edema. Pelvic: exam chaperoned by nurse. Normal appearing external genitalia.  On speculum exam, the vagina is atrophic. The uterus and cervix are surgically absent. No evidence of masses or nodularity on bimanual exam. Deferred rectovaginal exam.   Neuro: Grossly intact. Normal gait and movement. No acute deficit  Skin: No evidence of rashes or skin changes. IMPRESSION/PLAN:    Ms. Shawn Shoemaker is a 67 y.o.  female who on 7/29/2021 underwent Robotic-assisted total laparoscopic hysterectomy, Bilateral salpingo-oophorectomy, Bilateral pelvic sentinel lymph node mapping and biopsy. 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 Negative washings. Stromal cervical involvement. Negative SLNs. 2mm out of 10mm myometrial invasion. Parametrial involvement of tumor. Completed concurrent chemo-EBRT + VBT on 11/4/2021. Initiated on adjuvant carboplatin + paclitaxel on 11/18/2021. Completed cycle 4 on 1/20/2022. CT C/A/P 2/4/2022 without evidence of disease. Problems:     Patient Active Problem List    Diagnosis Date Noted    Opioid use, unspecified with unspecified opioid-induced disorder 12/07/2021    Encounter for antineoplastic chemotherapy 10/13/2021    Essential hypertension 07/27/2021    Abnormal finding on EKG 07/27/2021    Papillary serous endometrial adenocarcinoma ([facility] [address] 07/14/2021    PUD (peptic ulcer disease)     GERD (gastroesophageal reflux disease)     Arthritis     Lichen planus     Hip arthritis 04/04/2016    DJD (degenerative joint disease) of hip 01/04/2013     Reviewed patient's course to date. HERMANN on exam today. Reassured patient. Stable neuropathy. Plan to RTC in 3 months for continued surveillance. Reviewed precautionary symptoms to return sooner. 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 Will continue to follow-up with her Gastroenterologist. All questions and concerns were addressed with the patient and she is comfortable with the plan. An electronic signature was used to authenticate this note.      Alva Wyatt MD

## 2022-11-27 RX ORDER — SODIUM CHLORIDE 0.9 % (FLUSH) 0.9 %
5-10 SYRINGE (ML) INJECTION AS NEEDED
Status: CANCELLED | OUTPATIENT
Start: 2022-12-01

## 2022-11-27 RX ORDER — SODIUM CHLORIDE 9 MG/ML
10 INJECTION INTRAMUSCULAR; INTRAVENOUS; SUBCUTANEOUS AS NEEDED
Status: CANCELLED | OUTPATIENT
Start: 2022-12-01

## 2022-11-27 RX ORDER — HEPARIN 100 UNIT/ML
500 SYRINGE INTRAVENOUS AS NEEDED
Status: CANCELLED | OUTPATIENT
Start: 2022-12-01

## 2022-12-01 ENCOUNTER — HOSPITAL ENCOUNTER (OUTPATIENT)
Dept: INFUSION THERAPY | Age: 72
Discharge: HOME | End: 2022-12-01
Payer: MEDICARE

## 2022-12-01 VITALS
WEIGHT: 168 LBS | TEMPERATURE: 97.9 F | BODY MASS INDEX: 31.23 KG/M2 | DIASTOLIC BLOOD PRESSURE: 84 MMHG | SYSTOLIC BLOOD PRESSURE: 160 MMHG | RESPIRATION RATE: 18 BRPM | HEART RATE: 64 BPM | OXYGEN SATURATION: 100 %

## 2022-12-01 DIAGNOSIS — C54.1 PAPILLARY SEROUS ENDOMETRIAL ADENOCARCINOMA (HCC): Primary | ICD-10-CM

## 2022-12-01 PROCEDURE — 96523 IRRIG DRUG DELIVERY DEVICE: CPT

## 2022-12-01 PROCEDURE — 74011000250 HC RX REV CODE- 250: Performed by: OBSTETRICS & GYNECOLOGY

## 2022-12-01 PROCEDURE — 77030012965 HC NDL HUBR BBMI -A

## 2022-12-01 PROCEDURE — 74011250636 HC RX REV CODE- 250/636: Performed by: OBSTETRICS & GYNECOLOGY

## 2022-12-01 RX ORDER — SODIUM CHLORIDE 9 MG/ML
10 INJECTION INTRAMUSCULAR; INTRAVENOUS; SUBCUTANEOUS AS NEEDED
Status: ACTIVE | OUTPATIENT
Start: 2022-12-01 | End: 2022-12-01

## 2022-12-01 RX ORDER — SODIUM CHLORIDE 0.9 % (FLUSH) 0.9 %
5-10 SYRINGE (ML) INJECTION AS NEEDED
Status: DISPENSED | OUTPATIENT
Start: 2022-12-01 | End: 2022-12-01

## 2022-12-01 RX ORDER — HEPARIN 100 UNIT/ML
500 SYRINGE INTRAVENOUS AS NEEDED
Status: ACTIVE | OUTPATIENT
Start: 2022-12-01 | End: 2022-12-01

## 2022-12-01 RX ADMIN — HEPARIN SODIUM (PORCINE) LOCK FLUSH IV SOLN 100 UNIT/ML 500 UNITS: 100 SOLUTION at 10:05

## 2022-12-01 RX ADMIN — SODIUM CHLORIDE, PRESERVATIVE FREE 10 ML: 5 INJECTION INTRAVENOUS at 10:05

## 2022-12-08 RX ORDER — HEPARIN 100 UNIT/ML
500 SYRINGE INTRAVENOUS AS NEEDED
OUTPATIENT
Start: 2022-12-29

## 2022-12-08 RX ORDER — SODIUM CHLORIDE 0.9 % (FLUSH) 0.9 %
5-10 SYRINGE (ML) INJECTION AS NEEDED
OUTPATIENT
Start: 2022-12-29

## 2022-12-08 RX ORDER — SODIUM CHLORIDE 9 MG/ML
10 INJECTION INTRAMUSCULAR; INTRAVENOUS; SUBCUTANEOUS AS NEEDED
OUTPATIENT
Start: 2022-12-29

## 2022-12-29 ENCOUNTER — HOSPITAL ENCOUNTER (OUTPATIENT)
Dept: INFUSION THERAPY | Age: 72
Discharge: HOME | End: 2022-12-29
Payer: MEDICARE

## 2022-12-29 VITALS
SYSTOLIC BLOOD PRESSURE: 179 MMHG | WEIGHT: 169 LBS | DIASTOLIC BLOOD PRESSURE: 77 MMHG | BODY MASS INDEX: 31.42 KG/M2 | OXYGEN SATURATION: 97 % | RESPIRATION RATE: 18 BRPM | HEART RATE: 61 BPM | TEMPERATURE: 97.9 F

## 2022-12-29 DIAGNOSIS — C54.1 PAPILLARY SEROUS ENDOMETRIAL ADENOCARCINOMA (HCC): Primary | ICD-10-CM

## 2022-12-29 PROCEDURE — 77030012965 HC NDL HUBR BBMI -A

## 2022-12-29 PROCEDURE — 74011250636 HC RX REV CODE- 250/636: Performed by: OBSTETRICS & GYNECOLOGY

## 2022-12-29 PROCEDURE — 74011000250 HC RX REV CODE- 250: Performed by: OBSTETRICS & GYNECOLOGY

## 2022-12-29 PROCEDURE — 96523 IRRIG DRUG DELIVERY DEVICE: CPT

## 2022-12-29 RX ORDER — SODIUM CHLORIDE 0.9 % (FLUSH) 0.9 %
5-10 SYRINGE (ML) INJECTION AS NEEDED
Status: DISPENSED | OUTPATIENT
Start: 2022-12-29 | End: 2022-12-29

## 2022-12-29 RX ORDER — SODIUM CHLORIDE 9 MG/ML
10 INJECTION INTRAMUSCULAR; INTRAVENOUS; SUBCUTANEOUS AS NEEDED
Status: ACTIVE | OUTPATIENT
Start: 2022-12-29 | End: 2022-12-29

## 2022-12-29 RX ORDER — HEPARIN 100 UNIT/ML
500 SYRINGE INTRAVENOUS AS NEEDED
Status: ACTIVE | OUTPATIENT
Start: 2022-12-29 | End: 2022-12-29

## 2022-12-29 RX ADMIN — SODIUM CHLORIDE, PRESERVATIVE FREE 10 ML: 5 INJECTION INTRAVENOUS at 10:20

## 2022-12-29 RX ADMIN — HEPARIN SODIUM (PORCINE) LOCK FLUSH IV SOLN 100 UNIT/ML 500 UNITS: 100 SOLUTION at 10:20

## 2022-12-29 NOTE — PROGRESS NOTES
[facility] Visit Note    1869 Pt arrived at [address] [place] [address] ambulatory and in no distress for port flush. No new complaints voiced. Ice to port per pt request.     Patient denied having any symptoms of COVID-19, i.e. SOB, coughing, fever, or generally not feeling well. Also denies having been exposed to COVID-19 recently or having had any recent contact with family/friend that has a pending COVID test.     Patient Vitals for the past 12 hrs:   Temp Pulse Resp BP SpO2   12/29/22 1004 97.9 °F (36.6 °C) 61 18 (!) 179/77 97 %          Medications received:  Medications Administered       0.9% sodium chloride injection 10 mL       Admin Date  12/29/2022 Action  Given Dose  10 mL Route  IntraVENous Administered By  Arturo Fernandez RN              heparin (porcine) pf 500 Units       Admin Date  12/29/2022 Action  Given Dose  500 Units Route  IntraVENous Administered By  Arturo Fernandez RN                     [address] accessed per protocol with positive blood return, flushed and de-accessed. Tolerated treatment well, no adverse reaction noted. D/Cd from [address] [place] [address] and in no distress accompanied by self.   Next appt 1/26/23

## 2023-01-23 RX ORDER — SODIUM CHLORIDE 9 MG/ML
10 INJECTION INTRAVENOUS AS NEEDED
Status: CANCELLED | OUTPATIENT
Start: 2023-01-26

## 2023-01-23 RX ORDER — HEPARIN 100 UNIT/ML
500 SYRINGE INTRAVENOUS AS NEEDED
Status: CANCELLED | OUTPATIENT
Start: 2023-01-26

## 2023-01-23 RX ORDER — SODIUM CHLORIDE 0.9 % (FLUSH) 0.9 %
5-10 SYRINGE (ML) INJECTION AS NEEDED
Status: CANCELLED | OUTPATIENT
Start: 2023-01-26

## 2023-01-26 ENCOUNTER — HOSPITAL ENCOUNTER (OUTPATIENT)
Dept: INFUSION THERAPY | Age: 73
Discharge: HOME | End: 2023-01-26
Payer: MEDICARE

## 2023-01-26 VITALS
WEIGHT: 173.7 LBS | DIASTOLIC BLOOD PRESSURE: 70 MMHG | BODY MASS INDEX: 32.29 KG/M2 | RESPIRATION RATE: 18 BRPM | HEART RATE: 55 BPM | OXYGEN SATURATION: 100 % | SYSTOLIC BLOOD PRESSURE: 140 MMHG

## 2023-01-26 DIAGNOSIS — C54.1 PAPILLARY SEROUS ENDOMETRIAL ADENOCARCINOMA (HCC): Primary | ICD-10-CM

## 2023-01-26 PROCEDURE — 74011250636 HC RX REV CODE- 250/636: Performed by: OBSTETRICS & GYNECOLOGY

## 2023-01-26 PROCEDURE — 74011000250 HC RX REV CODE- 250: Performed by: OBSTETRICS & GYNECOLOGY

## 2023-01-26 PROCEDURE — 96523 IRRIG DRUG DELIVERY DEVICE: CPT

## 2023-01-26 PROCEDURE — 77030012965 HC NDL HUBR BBMI -A

## 2023-01-26 RX ORDER — HEPARIN 100 UNIT/ML
500 SYRINGE INTRAVENOUS AS NEEDED
Status: ACTIVE | OUTPATIENT
Start: 2023-01-26 | End: 2023-01-26

## 2023-01-26 RX ORDER — SODIUM CHLORIDE 9 MG/ML
10 INJECTION INTRAVENOUS AS NEEDED
Status: ACTIVE | OUTPATIENT
Start: 2023-01-26 | End: 2023-01-26

## 2023-01-26 RX ORDER — SODIUM CHLORIDE 0.9 % (FLUSH) 0.9 %
5-10 SYRINGE (ML) INJECTION AS NEEDED
Status: DISPENSED | OUTPATIENT
Start: 2023-01-26 | End: 2023-01-26

## 2023-01-26 RX ADMIN — SODIUM CHLORIDE, PRESERVATIVE FREE 10 ML: 5 INJECTION INTRAVENOUS at 10:30

## 2023-01-26 RX ADMIN — HEPARIN SODIUM (PORCINE) LOCK FLUSH IV SOLN 100 UNIT/ML 500 UNITS: 100 SOLUTION at 10:30

## 2023-01-26 NOTE — PROGRESS NOTES
[address] [facility] Visit Note    1031 Pt arrived at [facility] ambulatory and in no distress for port flush. No new complaints voiced. Port accessed per protocol with positive blood return, flushed and de-accessed. Patient Vitals for the past 12 hrs:   Pulse Resp BP SpO2   01/26/23 1015 (!) 55 18 (!) 140/70 100 %        Medications received:  none    1030 Tolerated treatment well, no adverse reaction noted. D/Cd from [facility] ambulatory and in no distress accompanied by self.   Next appt 2/23/23

## 2023-01-30 RX ORDER — SODIUM CHLORIDE 0.9 % (FLUSH) 0.9 %
5-10 SYRINGE (ML) INJECTION AS NEEDED
OUTPATIENT
Start: 2023-02-23

## 2023-01-30 RX ORDER — SODIUM CHLORIDE 9 MG/ML
10 INJECTION INTRAVENOUS AS NEEDED
OUTPATIENT
Start: 2023-02-23

## 2023-01-30 RX ORDER — HEPARIN 100 UNIT/ML
500 SYRINGE INTRAVENOUS AS NEEDED
OUTPATIENT
Start: 2023-02-23

## 2023-02-07 NOTE — PROGRESS NOTES
3 month follow up for endometrial cancer ,  pt reports no abnormal spotting or bleeding, pt states no questions or concerns for today's visit    1. Have you been to the ER, urgent care clinic since your last visit? Hospitalized since your last visit?  no    2. Have you seen or consulted any other health care providers outside of the [address] [place] [address] since your last visit? Include any pap smears or colon screening.    no

## 2023-02-08 ENCOUNTER — OFFICE VISIT (OUTPATIENT)
Dept: GYNECOLOGY | Age: 73
End: 2023-02-08
Payer: MEDICARE

## 2023-02-08 VITALS
SYSTOLIC BLOOD PRESSURE: 157 MMHG | DIASTOLIC BLOOD PRESSURE: 67 MMHG | WEIGHT: 174.4 LBS | HEART RATE: 53 BPM | HEIGHT: 62 IN | BODY MASS INDEX: 32.09 KG/M2

## 2023-02-08 DIAGNOSIS — C54.1 PAPILLARY SEROUS ENDOMETRIAL ADENOCARCINOMA (HCC): Primary | ICD-10-CM

## 2023-02-08 PROCEDURE — G8536 NO DOC ELDER MAL SCRN: HCPCS | Performed by: OBSTETRICS & GYNECOLOGY

## 2023-02-08 PROCEDURE — G0463 HOSPITAL OUTPT CLINIC VISIT: HCPCS | Performed by: OBSTETRICS & GYNECOLOGY

## 2023-02-08 PROCEDURE — 3078F DIAST BP <80 MM HG: CPT | Performed by: OBSTETRICS & GYNECOLOGY

## 2023-02-08 PROCEDURE — 3017F COLORECTAL CA SCREEN DOC REV: CPT | Performed by: OBSTETRICS & GYNECOLOGY

## 2023-02-08 PROCEDURE — G8432 DEP SCR NOT DOC, RNG: HCPCS | Performed by: OBSTETRICS & GYNECOLOGY

## 2023-02-08 PROCEDURE — 3077F SYST BP >= 140 MM HG: CPT | Performed by: OBSTETRICS & GYNECOLOGY

## 2023-02-08 PROCEDURE — 1090F PRES/ABSN URINE INCON ASSESS: CPT | Performed by: OBSTETRICS & GYNECOLOGY

## 2023-02-08 PROCEDURE — 99214 OFFICE O/P EST MOD 30 MIN: CPT | Performed by: OBSTETRICS & GYNECOLOGY

## 2023-02-08 PROCEDURE — 1101F PT FALLS ASSESS-DOCD LE1/YR: CPT | Performed by: OBSTETRICS & GYNECOLOGY

## 2023-02-08 PROCEDURE — G8399 PT W/DXA RESULTS DOCUMENT: HCPCS | Performed by: OBSTETRICS & GYNECOLOGY

## 2023-02-08 PROCEDURE — G8427 DOCREV CUR MEDS BY ELIG CLIN: HCPCS | Performed by: OBSTETRICS & GYNECOLOGY

## 2023-02-08 PROCEDURE — 1123F ACP DISCUSS/DSCN MKR DOCD: CPT | Performed by: OBSTETRICS & GYNECOLOGY

## 2023-02-08 PROCEDURE — G8417 CALC BMI ABV UP PARAM F/U: HCPCS | Performed by: OBSTETRICS & GYNECOLOGY

## 2023-02-08 NOTE — PROGRESS NOTES
[address] [place] [address] Mathias Moritz 824, 6452 [facility] [phone]  F [phone]    Office Note  Patient ID:  Name:  Kalpana Kimble  MRN:  [id number]  :  72 y.o. Date:  2023      HISTORY OF PRESENT ILLNESS:  Ms. Kalpana Kimble is a 67 y.o. female who on 2021 underwent Robotic-assisted total laparoscopic hysterectomy, Bilateral salpingo-oophorectomy, Bilateral pelvic sentinel lymph node mapping and biopsy. 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 Negative washings. Stromal cervical involvement. Negative SLNs. 2mm out of 10mm myometrial invasion. Parametrial involvement of tumor. Completed concurrent chemo-EBRT + VBT on 2021. Initiated on adjuvant carboplatin + paclitaxel on 2021. Completed cycle 4 on 2022. CT C/A/P 2022 without evidence of disease. Presents today for continued surveillance. Doing well without complaints. Denies vaginal bleeding/discharge, abdominal/pelvic pain, nausea, vomiting, constipation, diarrhea, CP, SOB, hematuria, hematochezia, change in appetite or bowel movements, bloating, fevers, chills, or urinary symptoms. Stable numbness in her feet. Denies pain or issues with balance. Initial History:  Ms. Kalpana Kimble is a 67 y.o.  postmenopausal female who presents as a new patient from Dr. Salina Mckeon for high-grade endometrial cancer. The patient reports vaginal spotting back in April, which resulted in her seeing Dr. Salina Mckeon. Pelvic ultrasound on 2021 demonstrated 12.5mm stripe. Hysteroscopy/D&C on 2021 demonstrated \"high-grade endometrial carcinoma, favor serous carcinoma\". She was subsequently referred to our office for further discussion and management. Denies pelvic or abdominal pain/bloating, change in appetite or bowel habits, nausea, vomiting, CP, SOB, hematuria, hematochezia, or urinary symptoms.        Pertinent PMH/PSH: DJD, h/o  x 2     Active, no restrictions. Imaging Review:   CT C/A/P 2/3/2022:  FINDINGS:  Port-A-Cath terminates at the cavoatrial junction. CHEST WALL: No mass or axillary lymphadenopathy. THYROID: No nodule. MEDIASTINUM: No mass or lymphadenopathy. STEPHANIE: No mass or lymphadenopathy. THORACIC AORTA: No dissection or aneurysm. MAIN PULMONARY ARTERY: Normal in caliber. TRACHEA/BRONCHI: Patent. ESOPHAGUS: No wall thickening or dilatation. HEART: Normal in size. PLEURA: No effusion or pneumothorax. LUNGS: No nodule, mass, or airspace disease. LIVER: No mass or biliary dilatation. Stable 2 mm hypodensities in hepatic  segment 6 and 8, too small to further characterize. No new lesions. GALLBLADDER: Prior cholecystectomy. SPLEEN: No mass. PANCREAS: No mass or ductal dilatation. ADRENALS: Unremarkable. KIDNEYS: No mass, calculus, or hydronephrosis. STOMACH: Diffuse thickening of the antrum  SMALL BOWEL: No dilatation or wall thickening. COLON: No dilatation or wall thickening. APPENDIX: Normal axial image 70  PERITONEUM: No ascites or pneumoperitoneum. RETROPERITONEUM: No lymphadenopathy or aortic aneurysm. Slight decrease in left  para-aortic lymph node, now measuring 3 x 5 mm (series 2, image 74). REPRODUCTIVE ORGANS: Prior hysterectomy and bilateral nephrectomy. URINARY BLADDER: No mass or calculus. BONES: No destructive bone lesion. Bilateral hip replacements of results in a  moderate amount of pelvic streak artifact. Discogenic sclerosis at L2-3 and  T8-9. ADDITIONAL COMMENTS: N/A  IMPRESSION  No evidence of metastatic disease to the chest, abdomen, or pelvis  Incidental gastritis. CT C/A/P 2021:  FINDINGS:   THYROID: No nodule. MEDIASTINUM: No mass or lymphadenopathy. STEPHANIE: No mass or lymphadenopathy. THORACIC AORTA: No dissection or aneurysm. MAIN PULMONARY ARTERY: Normal in caliber. TRACHEA/BRONCHI: Patent. ESOPHAGUS: No wall thickening or dilatation.   HEART: Normal in size. PLEURA: No effusion or pneumothorax. LUNGS: No nodule, mass, or airspace disease. There is minor atelectasis/scarring  right upper lobe  LIVER: No mass or biliary dilatation. There is hepatic steatosis. There is a 5  mm low-density in segment 4A and segment 5 to small to fully characterize but  most likely tiny cysts. GALLBLADDER: Patient status post cholecystectomy  SPLEEN: No mass. PANCREAS: No mass or ductal dilatation. ADRENALS: Unremarkable. KIDNEYS: No mass, calculus, or hydronephrosis. There is a retroaortic left renal  vein  STOMACH: Unremarkable. SMALL BOWEL: No dilatation or wall thickening. COLON: No dilatation or wall thickening. APPENDIX: Unremarkable. PERITONEUM: No ascites or pneumoperitoneum. RETROPERITONEUM: No lymphadenopathy or aortic aneurysm. There is an 8 mm lymph  node at the aortic bifurcation on the left. There is mild stenosis origin of the  SMA  REPRODUCTIVE ORGANS: Uterus and pelvis are obscured by artifact related to  bilateral total hip replacements. URINARY BLADDER: No mass or calculus. BONES: No destructive bone lesion. There is a right shoulder effusion  ADDITIONAL COMMENTS: N/A     IMPRESSION     1. CT of the chest demonstrates no definite evidence of metastatic disease. No  acute abnormality identified. 2. CT of the abdomen and pelvis demonstrates no definite evidence of metastatic  disease. The pelvis is obscured by artifact related to total hip replacements. There is hepatic steatosis. 2 subcentimeter low densities in the liver are too  small to characterize but most likely represent tiny cysts. There is an 8mm lymph node just to the left of the aortic bifurcation which is  within normal limits. Pelvic ultrasound 4/20/2021:  Impression: Normal uterus with 12.5mm stripe. Normal right ovary. Non visible left ovary with otherwise normal left adnexa. No free fluid. Pathology Review:   7/29/2021:  * * *FINAL PATHOLOGIC DIAGNOSIS* * *   1.   Right pelvic sentinel lymph node, biopsy:        One lymph node, negative for carcinoma, levels and cytokeratin stain   examined (0/1)   2. Left pelvic sentinel lymph node #1, biopsy:        One lymph node, negative for carcinoma, levels and cytokeratin stain   examined (0/1)   3. Left pelvic sentinel lymph node, biopsy:        One lymph node, negative for carcinoma, levels and cytokeratin stain   examined (0/1)   4. Uterus, cervix, bilateral fallopian tubes and ovaries; simple   hysterectomy, BSO:        Endometrium: Uterine serous carcinoma, see synoptic report        Cervix: Serous carcinoma focally involves endocervical stroma,   predominantly as lymphovascular                invasion        Bilateral fallopian tubes: No pathologic diagnosis     ENDOMETRIUM    SPECIMEN       Procedure: Total hysterectomy and bilateral salpingo-oophorectomy       Specimen Integrity: Intact    TUMOR       Histologic Type: Serous carcinoma       Myometrial Invasion: Present           Depth of Myometrial Invasion (Millimeters): 2 mm           Myometrial Thickness (Millimeters): 10 mm           Percentage of Myometrial Invasion: 20%       Uterine Serosa Involvement: Not identified       Lower Uterine Segment Involvement: Present, myoinvasive       Cervical Stromal Involvement: Present       Other Tissue / Organ Involvement: Not identified       Peritoneal Ascitic Fluid: Atypical, favor benign reactive process       Lymphovascular Invasion: Present    MARGINS       Margins: Parametrium and focal paracervix involved by tumor           Parametrial and focal Paracervical Margin: Involved by carcinoma    LYMPH NODES       Lymph Node Status:  All lymph nodes negative for tumor cells           Total Number of Pelvic Nodes Examined: 3           Number of Pelvic [place] Nodes Examined: 3           Total Number of Para-aortic Nodes Examined: 0           Number of Para-aortic [place] Nodes Examined: 0    PATHOLOGIC STAGE CLASSIFICATION (pTNM, AJCC 8th Edition)       Primary Tumor (pT): pT2       Regional Lymph Nodes Modifier: (sn)       Regional Lymph Nodes (pN): pN0    FIGO STAGE       FIGO Stage: II   * * *Comment* * *   Immunohistochemical stains with appropriate controls show tumor positive   for P16, P53 with Ki-67 proliferation index of approximately 50%. These   findings support diagnosis of endometrial serous carcinoma. 7/7/2021:  FINAL PATHOLOGIC DIAGNOSIS   Endometrium, curettings:   High-grade endometrial carcinoma, favor serous carcinoma   See comment   Comment   The biopsy contains a malignant glandular neoplasm with a high nuclear grade, papillary growth pattern, and areas of necrosis. Morphologic features suggest a high-grade endometrial carcinoma and are most compatible with a diagnosis of serous carcinoma. ROS:  A comprehensive review of systems was negative except for that written in the History of Present Illness. , 10 point ROS    OB/GYN ROS:  Per HPI    ECOG ndGndrndanddndend:nd nd2nd Problem List:  Patient Active Problem List    Diagnosis Date Noted    Opioid use, unspecified with unspecified opioid-induced disorder 12/07/2021    Encounter for antineoplastic chemotherapy 10/13/2021    Essential hypertension 07/27/2021    Abnormal finding on EKG 07/27/2021    Papillary serous endometrial adenocarcinoma ([address] 07/14/2021    PUD (peptic ulcer disease)     GERD (gastroesophageal reflux disease)     Arthritis     Lichen planus     Hip arthritis 04/04/2016    DJD (degenerative joint disease) of hip 01/04/2013     PMH:  Past Medical History:   Diagnosis Date    Arthritis     OSTEO HIP JULY.     Cancer ([address]     uterine     GERD (gastroesophageal reflux disease)     Hypertension     Lichen planus     oral    PUD (peptic ulcer disease)       PSH:  Past Surgical History:   Procedure Laterality Date    COLONOSCOPY N/A 10/16/2017    COLONOSCOPY performed by Chloe Rice MD at [facility] ENDOSCOPY    COLONOSCOPY N/A 07/11/2022    COLONOSCOPY performed by Kathrene Alpers, MD [facility] at [facility] ENDOSCOPY    COLONOSCOPY N/A 2022    COLONOSCOPY performed by Camila Cordova MD at [facility] ENDOSCOPY    COLONOSCOPY,FLEX,W/CONTROL, BLEEDING  2022         COLONOSCOPY,HANS [place],SNARE  10/16/2017         HX  SECTION      X2    HX CHOLECYSTECTOMY      HX DILATION AND CURETTAGE  2021    High-grade endometrial carcinoma, favor serous carcinoma    HX HEENT      EXTRACTION OF WISDOM TEETH X 4    HX HEENT      3 teeth implants    HX HYSTERECTOMY  2021    HX ORTHOPAEDIC      right foot, bunionectomy    HX ORTHOPAEDIC      LEFTl hip arthroplasty    HX ORTHOPAEDIC      RIGHT hip arthroplasty    HX TUBAL LIGATION      HX VASCULAR ACCESS Right     port for chemo    IR INSERT TUNL CVC W PORT OVER 5 YEARS  10/14/2021    CA COLONOSCOPY FLX DX W/COLLJ SPEC WHEN PFRMD  01/20/2012     x 2    UPPER GI ENDOSCOPY,BIOPSY  2016         UPPER GI ENDOSCOPY,DILATN W GUIDE  2016           Social History:  Social History     Tobacco Use    Smoking status: Never    Smokeless tobacco: Never   Substance Use Topics    Alcohol use: Not Currently     Comment: VERY RARELY       Family History:  Family History   Problem Relation Age of Onset    Cancer Maternal Grandmother 79        colon cancer    Hypertension Mother     Cancer Mother         pacreatic cancer    Cancer Paternal Aunt         ovarian cancer      Medications: (reviewed)  Current Outpatient Medications   Medication Sig    tiZANidine (ZANAFLEX) 2 mg tablet TAKE 1 TO 2 TABLETS BY MOUTH THREE TIMES DAILY FOR 10 DAYS AS NEEDED    ergocalciferol (ERGOCALCIFEROL) 1,250 mcg (50,000 unit) capsule TAKE 1 CAPSULE BY MOUTH WEEKLY    losartan (COZAAR) 25 mg tablet Take 1 Tablet by mouth in the morning. amLODIPine (NORVASC) 5 mg tablet Take 5 mg by mouth daily. ibuprofen (MOTRIN) 200 mg tablet Take 400 mg by mouth as needed. cyanocobalamin 1,000 mcg tablet Take 1,000 mcg by mouth daily.     methylPREDNISolone (MEDROL) 4 mg tab Take 4 mg by mouth daily as needed. (Patient not taking: Reported on 2/8/2023)    CRANIAL PROSTHESIS misc Cranial Prosthesis  Diagnosis code:   C54.1  Cpt code:       Nystop powder as needed. (Patient not taking: No sig reported)    acetaminophen (TYLENOL) 325 mg tablet Take 2 Tablets by mouth every six (6) hours as needed for Pain. (Patient not taking: No sig reported)     No current facility-administered medications for this visit. Allergies: (reviewed)  Allergies   Allergen Reactions    Codeine Nausea and Vomiting    Penicillins Rash     Rash & dizzy    Sulfa (Sulfonamide Antibiotics) Other (comments)     General redness all over skin    Ciprofloxacin Itching and Other (comments)     Extreme dizziness and rash    Other Medication Nausea and Vomiting     PRESCRIPTION STRENGTH ANTI INFLAMMATORY MEDS         OBJECTIVE:  Physical Exam:  Visit Vitals  BP (!) 157/67 (BP 1 Location: Left upper arm, BP Patient Position: Sitting)   Pulse (!) 53   Ht 5' 1.5\" (1.562 m)   Wt 174 lb 6.4 oz (79.1 kg)   BMI 32.42 kg/m²          General: Alert and oriented. No acute distress. Well-nourished  HEENT: No thyroid enlargment. Neck supple without restrictions. Sclera normal. Normal occular motion. Moist mucous membranes. Lymphatics: No evidence of axillary, cervical, or subclavicular adenopathy. Respiratory: clear to auscultation and percussion to the bases. No CVAT. Cardiovascular: regular rate and rhythm. No murmurs, rubs, or gallops. Gastrointestinal: soft, non-tender, non-distended, no masses or organomegaly. Well-healed incision. Musculoskeletal: normal gait. No joint tenderness, deformity or swelling. No muscular tenderness. Extremities: extremities normal, atraumatic, no cyanosis or edema. Pelvic: exam chaperoned by nurse. Normal appearing external genitalia. On speculum exam, the vagina is atrophic. The uterus and cervix are surgically absent.  No evidence of masses or nodularity on bimanual exam. Deferred rectovaginal exam.   Neuro: Grossly intact. Normal gait and movement. No acute deficit  Skin: No evidence of rashes or skin changes. IMPRESSION/PLAN:    Ms. Roland Spear is a 67 y.o.  female who on 7/29/2021 underwent Robotic-assisted total laparoscopic hysterectomy, Bilateral salpingo-oophorectomy, Bilateral pelvic sentinel lymph node mapping and biopsy. 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 Negative washings. Stromal cervical involvement. Negative SLNs. 2mm out of 10mm myometrial invasion. Parametrial involvement of tumor. Completed concurrent chemo-EBRT + VBT on 11/4/2021. Initiated on adjuvant carboplatin + paclitaxel on 11/18/2021. Completed cycle 4 on 1/20/2022. CT C/A/P 2/4/2022 without evidence of disease. Problems:     Patient Active Problem List    Diagnosis Date Noted    Opioid use, unspecified with unspecified opioid-induced disorder 12/07/2021    Encounter for antineoplastic chemotherapy 10/13/2021    Essential hypertension 07/27/2021    Abnormal finding on EKG 07/27/2021    Papillary serous endometrial adenocarcinoma ([facility] [address] [address] 07/14/2021    PUD (peptic ulcer disease)     GERD (gastroesophageal reflux disease)     Arthritis     Lichen planus     Hip arthritis 04/04/2016    DJD (degenerative joint disease) of hip 01/04/2013     Reviewed patient's course to date. HERMANN on exam today. Reassured patient. Stable neuropathy. Plan to RTC in 3 months for continued surveillance. Reviewed precautionary symptoms to return sooner. 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 Will continue to follow-up with her Gastroenterologist. All questions and concerns were addressed with the patient and she is comfortable with the plan. An electronic signature was used to authenticate this note.      Corina Mcrae MD

## 2023-02-23 ENCOUNTER — HOSPITAL ENCOUNTER (OUTPATIENT)
Dept: INFUSION THERAPY | Age: 73
Discharge: HOME | End: 2023-02-23
Payer: MEDICARE

## 2023-02-23 VITALS
TEMPERATURE: 98 F | DIASTOLIC BLOOD PRESSURE: 57 MMHG | SYSTOLIC BLOOD PRESSURE: 126 MMHG | HEART RATE: 57 BPM | RESPIRATION RATE: 18 BRPM

## 2023-02-23 DIAGNOSIS — C54.1 PAPILLARY SEROUS ENDOMETRIAL ADENOCARCINOMA (HCC): Primary | ICD-10-CM

## 2023-02-23 PROCEDURE — 74011000250 HC RX REV CODE- 250: Performed by: PHYSICIAN ASSISTANT

## 2023-02-23 PROCEDURE — 77030012965 HC NDL HUBR BBMI -A

## 2023-02-23 PROCEDURE — 74011250636 HC RX REV CODE- 250/636: Performed by: PHYSICIAN ASSISTANT

## 2023-02-23 PROCEDURE — 96523 IRRIG DRUG DELIVERY DEVICE: CPT

## 2023-02-23 RX ORDER — HEPARIN 100 UNIT/ML
500 SYRINGE INTRAVENOUS AS NEEDED
Status: ACTIVE | OUTPATIENT
Start: 2023-02-23 | End: 2023-02-23

## 2023-02-23 RX ORDER — SODIUM CHLORIDE 0.9 % (FLUSH) 0.9 %
5-10 SYRINGE (ML) INJECTION AS NEEDED
Status: DISPENSED | OUTPATIENT
Start: 2023-02-23 | End: 2023-02-23

## 2023-02-23 RX ORDER — SODIUM CHLORIDE 9 MG/ML
10 INJECTION INTRAVENOUS AS NEEDED
Status: ACTIVE | OUTPATIENT
Start: 2023-02-23 | End: 2023-02-23

## 2023-02-23 RX ADMIN — HEPARIN 500 UNITS: 100 SYRINGE at 10:25

## 2023-02-23 RX ADMIN — SODIUM CHLORIDE, PRESERVATIVE FREE 10 ML: 5 INJECTION INTRAVENOUS at 10:25

## 2023-02-23 NOTE — PROGRESS NOTES
[facility] Note:  Arrived - 1020    Visit Vitals  BP (!) 126/57 (BP 1 Location: Left upper arm, BP Patient Position: Sitting)   Pulse (!) 57   Temp 98 °F (36.7 °C)   Resp 18        Assessment - unchanged. Port accessed & flushed per protocol w/o difficulty. Zazueta needle removed. 1025 - Tolerated well. Pt denies any acute problems/changes. Discharged from [facility] ambulatory. No distress.  Next appt: 3/23/23 at 1000

## 2023-03-06 RX ORDER — SODIUM CHLORIDE 0.9 % (FLUSH) 0.9 %
5-10 SYRINGE (ML) INJECTION AS NEEDED
OUTPATIENT
Start: 2023-03-23

## 2023-03-06 RX ORDER — HEPARIN 100 UNIT/ML
500 SYRINGE INTRAVENOUS AS NEEDED
OUTPATIENT
Start: 2023-03-23

## 2023-03-06 RX ORDER — SODIUM CHLORIDE 9 MG/ML
10 INJECTION INTRAVENOUS AS NEEDED
OUTPATIENT
Start: 2023-03-23

## 2023-03-23 ENCOUNTER — HOSPITAL ENCOUNTER (OUTPATIENT)
Dept: INFUSION THERAPY | Age: 73
Discharge: HOME | End: 2023-03-23
Payer: MEDICARE

## 2023-03-23 VITALS
OXYGEN SATURATION: 100 % | BODY MASS INDEX: 32.31 KG/M2 | SYSTOLIC BLOOD PRESSURE: 183 MMHG | DIASTOLIC BLOOD PRESSURE: 81 MMHG | WEIGHT: 173.8 LBS | RESPIRATION RATE: 18 BRPM | TEMPERATURE: 97.5 F | HEART RATE: 64 BPM

## 2023-03-23 DIAGNOSIS — C54.1 PAPILLARY SEROUS ENDOMETRIAL ADENOCARCINOMA (HCC): Primary | ICD-10-CM

## 2023-03-23 PROCEDURE — 77030012965 HC NDL HUBR BBMI -A

## 2023-03-23 PROCEDURE — 96523 IRRIG DRUG DELIVERY DEVICE: CPT

## 2023-03-23 PROCEDURE — 74011250636 HC RX REV CODE- 250/636: Performed by: OBSTETRICS & GYNECOLOGY

## 2023-03-23 PROCEDURE — 74011000250 HC RX REV CODE- 250: Performed by: OBSTETRICS & GYNECOLOGY

## 2023-03-23 RX ORDER — HEPARIN 100 UNIT/ML
500 SYRINGE INTRAVENOUS AS NEEDED
Status: ACTIVE | OUTPATIENT
Start: 2023-03-23 | End: 2023-03-23

## 2023-03-23 RX ORDER — SODIUM CHLORIDE 0.9 % (FLUSH) 0.9 %
5-10 SYRINGE (ML) INJECTION AS NEEDED
Status: DISPENSED | OUTPATIENT
Start: 2023-03-23 | End: 2023-03-23

## 2023-03-23 RX ADMIN — HEPARIN 500 UNITS: 100 SYRINGE at 10:29

## 2023-03-23 RX ADMIN — SODIUM CHLORIDE, PRESERVATIVE FREE 10 ML: 5 INJECTION INTRAVENOUS at 10:29

## 2023-03-23 NOTE — PROGRESS NOTES
[facility] Progress Note    Date: 2023    Name: Guadalupe Alas    MRN: [id number]         : 1950    Ms. Muhammad Arrived ambulatory and in no distress for PF. Right chest wall port accessed without difficulty, brisk blood return. Ms. Jana Montelongo vitals were reviewed. Visit Vitals  BP (!) 183/81 (BP 1 Location: Left upper arm, BP Patient Position: Sitting)   Pulse 64   Temp 97.5 °F (36.4 °C)   Resp 18   Wt 78.8 kg (173 lb 12.8 oz)   SpO2 100%   BMI 32.31 kg/m²     Pt reports BP elevated due to shoulder pain today and recent change in BP meds. Reports she will check at home and monitor. Medications:  Medications Administered       heparin (porcine) pf 500 Units       Admin Date  2023 Action  Given Dose  500 Units Route  IntraVENous Administered By  Farhan Frederick RN              sodium chloride (NS) flush 5-10 mL       Admin Date  2023 Action  Given Dose  10 mL Route  IntraVENous Administered By  Farhan Frederick RN                    Ms. Cooper Aguayo tolerated treatment well and was discharged from [place] [address] in stable condition. Port de-accessed, flushed & heparinized per protocol. She is to return on 2023 for her next appointment.     Devin Meredith RN  2023

## 2023-03-29 RX ORDER — SODIUM CHLORIDE 9 MG/ML
10 INJECTION INTRAVENOUS AS NEEDED
OUTPATIENT
Start: 2023-04-20

## 2023-03-29 RX ORDER — SODIUM CHLORIDE 0.9 % (FLUSH) 0.9 %
5-10 SYRINGE (ML) INJECTION AS NEEDED
OUTPATIENT
Start: 2023-04-20

## 2023-03-29 RX ORDER — HEPARIN 100 UNIT/ML
500 SYRINGE INTRAVENOUS AS NEEDED
OUTPATIENT
Start: 2023-04-20

## 2023-04-20 ENCOUNTER — HOSPITAL ENCOUNTER (OUTPATIENT)
Dept: INFUSION THERAPY | Age: 73
Discharge: HOME | End: 2023-04-20
Payer: MEDICARE

## 2023-04-20 VITALS
SYSTOLIC BLOOD PRESSURE: 148 MMHG | TEMPERATURE: 97.8 F | OXYGEN SATURATION: 100 % | HEART RATE: 57 BPM | RESPIRATION RATE: 18 BRPM | DIASTOLIC BLOOD PRESSURE: 65 MMHG

## 2023-04-20 DIAGNOSIS — C54.1 PAPILLARY SEROUS ENDOMETRIAL ADENOCARCINOMA (HCC): Primary | ICD-10-CM

## 2023-04-20 PROCEDURE — 74011250636 HC RX REV CODE- 250/636: Performed by: PHYSICIAN ASSISTANT

## 2023-04-20 PROCEDURE — 74011000250 HC RX REV CODE- 250: Performed by: PHYSICIAN ASSISTANT

## 2023-04-20 PROCEDURE — 77030012965 HC NDL HUBR BBMI -A

## 2023-04-20 PROCEDURE — 96523 IRRIG DRUG DELIVERY DEVICE: CPT

## 2023-04-20 RX ORDER — SODIUM CHLORIDE 0.9 % (FLUSH) 0.9 %
5-10 SYRINGE (ML) INJECTION AS NEEDED
Status: DISPENSED | OUTPATIENT
Start: 2023-04-20 | End: 2023-04-20

## 2023-04-20 RX ORDER — HEPARIN 100 UNIT/ML
500 SYRINGE INTRAVENOUS AS NEEDED
Status: ACTIVE | OUTPATIENT
Start: 2023-04-20 | End: 2023-04-20

## 2023-04-20 RX ORDER — SODIUM CHLORIDE 9 MG/ML
10 INJECTION INTRAVENOUS AS NEEDED
Status: ACTIVE | OUTPATIENT
Start: 2023-04-20 | End: 2023-04-20

## 2023-04-20 RX ADMIN — Medication 500 UNITS: at 10:13

## 2023-04-20 RX ADMIN — SODIUM CHLORIDE, PRESERVATIVE FREE 10 ML: 5 INJECTION INTRAVENOUS at 10:13

## 2023-04-20 NOTE — PROGRESS NOTES
[facility] Note:  Arrived - 1005    Visit Vitals  BP (!) 148/65 (BP 1 Location: Left upper arm, BP Patient Position: Sitting)   Pulse (!) 57   Temp 97.8 °F (36.6 °C)   Resp 18   SpO2 100%        Assessment - unchanged. Port accessed & flushed per protocol w/o difficulty. Zazueta needle removed. 1015 - Tolerated well. Pt denies any acute problems/changes. Discharged from [facility] ambulatory. No distress.  Next appt: 5/18/23 at 1000

## 2023-04-21 RX ORDER — SODIUM CHLORIDE 0.9 % (FLUSH) 0.9 %
5-10 SYRINGE (ML) INJECTION AS NEEDED
OUTPATIENT
Start: 2023-05-18

## 2023-04-21 RX ORDER — HEPARIN 100 UNIT/ML
500 SYRINGE INTRAVENOUS AS NEEDED
OUTPATIENT
Start: 2023-05-18

## 2023-04-21 RX ORDER — SODIUM CHLORIDE 9 MG/ML
10 INJECTION INTRAVENOUS AS NEEDED
OUTPATIENT
Start: 2023-05-18

## 2023-05-02 RX ORDER — SODIUM CHLORIDE 9 MG/ML
25 INJECTION, SOLUTION INTRAVENOUS PRN
OUTPATIENT
Start: 2023-05-14

## 2023-05-02 RX ORDER — HEPARIN SODIUM (PORCINE) LOCK FLUSH IV SOLN 100 UNIT/ML 100 UNIT/ML
500 SOLUTION INTRAVENOUS PRN
OUTPATIENT
Start: 2023-05-14

## 2023-05-02 RX ORDER — SODIUM CHLORIDE 0.9 % (FLUSH) 0.9 %
5-40 SYRINGE (ML) INJECTION PRN
OUTPATIENT
Start: 2023-05-14

## 2023-05-08 NOTE — PROGRESS NOTES
3 month follow up for endometrial cancer ,  pt reports no abnormal spotting or bleeding, pt states no questions or concerns for today's visit    1. Have you been to the ER, urgent care clinic since your last visit? Hospitalized since your last visit?  no    2. Have you seen or consulted any other health care providers outside of the [address] [place] [address] since your last visit? Include any pap smears or colon screening.    no

## 2023-05-09 ENCOUNTER — OFFICE VISIT (OUTPATIENT)
Age: 73
End: 2023-05-09
Payer: MEDICARE

## 2023-05-09 VITALS
HEART RATE: 59 BPM | WEIGHT: 170.8 LBS | SYSTOLIC BLOOD PRESSURE: 144 MMHG | HEIGHT: 62 IN | BODY MASS INDEX: 31.43 KG/M2 | DIASTOLIC BLOOD PRESSURE: 73 MMHG

## 2023-05-09 DIAGNOSIS — C54.1 PAPILLARY SEROUS ENDOMETRIAL ADENOCARCINOMA (HCC): Primary | ICD-10-CM

## 2023-05-09 PROCEDURE — G8427 DOCREV CUR MEDS BY ELIG CLIN: HCPCS | Performed by: OBSTETRICS & GYNECOLOGY

## 2023-05-09 PROCEDURE — 3077F SYST BP >= 140 MM HG: CPT | Performed by: OBSTETRICS & GYNECOLOGY

## 2023-05-09 PROCEDURE — 99214 OFFICE O/P EST MOD 30 MIN: CPT | Performed by: OBSTETRICS & GYNECOLOGY

## 2023-05-09 PROCEDURE — 3017F COLORECTAL CA SCREEN DOC REV: CPT | Performed by: OBSTETRICS & GYNECOLOGY

## 2023-05-09 PROCEDURE — G8399 PT W/DXA RESULTS DOCUMENT: HCPCS | Performed by: OBSTETRICS & GYNECOLOGY

## 2023-05-09 PROCEDURE — 1123F ACP DISCUSS/DSCN MKR DOCD: CPT | Performed by: OBSTETRICS & GYNECOLOGY

## 2023-05-09 PROCEDURE — 3078F DIAST BP <80 MM HG: CPT | Performed by: OBSTETRICS & GYNECOLOGY

## 2023-05-09 PROCEDURE — G8419 CALC BMI OUT NRM PARAM NOF/U: HCPCS | Performed by: OBSTETRICS & GYNECOLOGY

## 2023-05-09 PROCEDURE — 1036F TOBACCO NON-USER: CPT | Performed by: OBSTETRICS & GYNECOLOGY

## 2023-05-09 PROCEDURE — 1090F PRES/ABSN URINE INCON ASSESS: CPT | Performed by: OBSTETRICS & GYNECOLOGY

## 2023-05-09 ASSESSMENT — PATIENT HEALTH QUESTIONNAIRE - PHQ9
1. LITTLE INTEREST OR PLEASURE IN DOING THINGS: 0
SUM OF ALL RESPONSES TO PHQ QUESTIONS 1-9: 0
2. FEELING DOWN, DEPRESSED OR HOPELESS: 0
SUM OF ALL RESPONSES TO PHQ QUESTIONS 1-9: 0
SUM OF ALL RESPONSES TO PHQ9 QUESTIONS 1 & 2: 0
SUM OF ALL RESPONSES TO PHQ QUESTIONS 1-9: 0
SUM OF ALL RESPONSES TO PHQ QUESTIONS 1-9: 0

## 2023-05-09 NOTE — PROGRESS NOTES
[address] [place] [address] Mathias Moritz 116, [address] [facility]  P [phone]  F [phone]    Office Note  Patient ID:  Name:  Russell Edge  MRN:  [id number]  :  1950/72 y.o. Date:  2023      HISTORY OF PRESENT ILLNESS:  Ms. Russell Edge is a 67 y.o. female who on 2021 underwent Robotic-assisted total laparoscopic hysterectomy, Bilateral salpingo-oophorectomy, Bilateral pelvic sentinel lymph node mapping and biopsy. 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 Negative washings. Stromal cervical involvement. Negative SLNs. 2mm out of 10mm myometrial invasion. Parametrial involvement of tumor. Completed concurrent chemo-EBRT + VBT on 2021. Initiated on adjuvant carboplatin + paclitaxel on 2021. Completed cycle 4 on 2022. CT C/A/P 2022 without evidence of disease. Presents today for continued surveillance. Doing well without complaints. Denies vaginal bleeding/discharge, abdominal/pelvic pain, nausea, vomiting, constipation, diarrhea, CP, SOB, hematuria, hematochezia, change in appetite or bowel movements, bloating, fevers, chills, or urinary symptoms. Stable numbness in her feet. Denies pain or issues with balance. Initial History:  Ms. Russell Edge is a 67 y.o.  postmenopausal female who presents as a new patient from Dr. Jesse Worthy for high-grade endometrial cancer. The patient reports vaginal spotting back in April, which resulted in her seeing Dr. Jesse Worthy. Pelvic ultrasound on 2021 demonstrated 12.5mm stripe. Hysteroscopy/D&C on 2021 demonstrated \"high-grade endometrial carcinoma, favor serous carcinoma\". She was subsequently referred to our office for further discussion and management. Denies pelvic or abdominal pain/bloating, change in appetite or bowel habits, nausea, vomiting, CP, SOB, hematuria, hematochezia, or urinary symptoms.        Pertinent PMH/PSH:

## 2023-05-15 ENCOUNTER — HOSPITAL ENCOUNTER (OUTPATIENT)
Facility: HOSPITAL | Age: 73
Discharge: HOME | End: 2023-05-18

## 2023-05-18 ENCOUNTER — HOSPITAL ENCOUNTER (OUTPATIENT)
Dept: INFUSION THERAPY | Age: 73
End: 2023-05-18

## 2023-05-18 ENCOUNTER — HOSPITAL ENCOUNTER (OUTPATIENT)
Facility: HOSPITAL | Age: 73
Setting detail: INFUSION SERIES
End: 2023-05-18

## 2023-05-24 ENCOUNTER — HOSPITAL ENCOUNTER (OUTPATIENT)
Facility: HOSPITAL | Age: 73
Discharge: HOME | End: 2023-05-27
Payer: MEDICARE

## 2023-05-24 VITALS
OXYGEN SATURATION: 100 % | SYSTOLIC BLOOD PRESSURE: 116 MMHG | TEMPERATURE: 98 F | DIASTOLIC BLOOD PRESSURE: 48 MMHG | RESPIRATION RATE: 18 BRPM | WEIGHT: 169 LBS | BODY MASS INDEX: 31.91 KG/M2 | HEIGHT: 61 IN | HEART RATE: 50 BPM

## 2023-05-24 DIAGNOSIS — C54.1 PAPILLARY SEROUS ENDOMETRIAL ADENOCARCINOMA (HCC): ICD-10-CM

## 2023-05-24 PROCEDURE — 2500000003 HC RX 250 WO HCPCS: Performed by: STUDENT IN AN ORGANIZED HEALTH CARE EDUCATION/TRAINING PROGRAM

## 2023-05-24 PROCEDURE — 99155 MOD SED OTH PHYS/QHP <5 YRS: CPT

## 2023-05-24 PROCEDURE — 2580000003 HC RX 258: Performed by: STUDENT IN AN ORGANIZED HEALTH CARE EDUCATION/TRAINING PROGRAM

## 2023-05-24 PROCEDURE — 6360000002 HC RX W HCPCS: Performed by: STUDENT IN AN ORGANIZED HEALTH CARE EDUCATION/TRAINING PROGRAM

## 2023-05-24 RX ORDER — FENTANYL CITRATE 50 UG/ML
100 INJECTION, SOLUTION INTRAMUSCULAR; INTRAVENOUS
Status: DISCONTINUED | OUTPATIENT
Start: 2023-05-24 | End: 2023-05-24

## 2023-05-24 RX ORDER — LIDOCAINE HYDROCHLORIDE AND EPINEPHRINE 10; 10 MG/ML; UG/ML
20 INJECTION, SOLUTION INFILTRATION; PERINEURAL ONCE
Status: COMPLETED | OUTPATIENT
Start: 2023-05-24 | End: 2023-05-24

## 2023-05-24 RX ORDER — HEPARIN SODIUM 200 [USP'U]/100ML
200 INJECTION, SOLUTION INTRAVENOUS ONCE
Status: COMPLETED | OUTPATIENT
Start: 2023-05-24 | End: 2023-05-24

## 2023-05-24 RX ORDER — SODIUM CHLORIDE 9 MG/ML
INJECTION, SOLUTION INTRAVENOUS CONTINUOUS
Status: DISCONTINUED | OUTPATIENT
Start: 2023-05-24 | End: 2023-05-24

## 2023-05-24 RX ORDER — CLINDAMYCIN PHOSPHATE 900 MG/50ML
900 INJECTION INTRAVENOUS EVERY 8 HOURS
Status: DISCONTINUED | OUTPATIENT
Start: 2023-05-24 | End: 2023-05-24

## 2023-05-24 RX ORDER — MIDAZOLAM HYDROCHLORIDE 1 MG/ML
1 INJECTION, SOLUTION INTRAMUSCULAR; INTRAVENOUS ONCE
Status: COMPLETED | OUTPATIENT
Start: 2023-05-24 | End: 2023-05-24

## 2023-05-24 RX ADMIN — LIDOCAINE HYDROCHLORIDE AND EPINEPHRINE 10 ML: 10; 10 INJECTION, SOLUTION INFILTRATION; PERINEURAL at 08:46

## 2023-05-24 RX ADMIN — FENTANYL CITRATE 50 MCG: 50 INJECTION, SOLUTION INTRAMUSCULAR; INTRAVENOUS at 08:40

## 2023-05-24 RX ADMIN — SODIUM CHLORIDE: 9 INJECTION, SOLUTION INTRAVENOUS at 08:24

## 2023-05-24 RX ADMIN — HEPARIN SODIUM 400 UNITS: 200 INJECTION, SOLUTION INTRAVENOUS at 08:46

## 2023-05-24 RX ADMIN — MIDAZOLAM 2 MG: 1 INJECTION INTRAMUSCULAR; INTRAVENOUS at 08:39

## 2023-05-24 RX ADMIN — CLINDAMYCIN PHOSPHATE 900 MG: 900 INJECTION, SOLUTION INTRAVENOUS at 08:24

## 2023-05-24 ASSESSMENT — PAIN - FUNCTIONAL ASSESSMENT: PAIN_FUNCTIONAL_ASSESSMENT: NONE - DENIES PAIN

## 2023-05-24 NOTE — PROGRESS NOTES
Pt tolerating saltine crackers and ginger ale per pt. Request. Pt transferred back to xray recovery via stretcher accomp. By nurse. Pt noted with HOB elevated approx 45 degrees.

## 2023-05-24 NOTE — PRE SEDATION
Sedation Pre-Procedure Note    Patient Name: Booker Butler   YOB: 1950  Room/Bed: Room/bed info not found  Medical Record Number: [id number]  Date: 2023   Time: 7:52 AM       Indication:  Port removal     Consent: I have discussed with the patient and/or the patient representative the indication, alternatives, and the possible risks and/or complications of the planned procedure and the anesthesia methods. The patient and/or patient representative appear to understand and agree to proceed. Vital Signs: There were no vitals filed for this visit. Past Medical History:   has a past medical history of Arthritis, Cancer ([facility] [address] GERD (gastroesophageal reflux disease), Hypertension, Lichen planus, and PUD (peptic ulcer disease). Past Surgical History:   has a past surgical history that includes IR PORT PLACEMENT > 5 YEARS (10/14/2021); Colonoscopy (N/A, 2022); vascular surgery (Right); heent; Colonoscopy (N/A, 2022); colonoscopy,flex,w/control, bleeding (2022); Hysterectomy (2021); Dilation and curettage of uterus (2021);  section; IR PORT PLACEMENT > 5 YEARS (10/14/2021); heent; orthopedic surgery; orthopedic surgery; Cholecystectomy; Colonoscopy (N/A, 10/16/2017); colonoscopy,remv lesn,snare (10/16/2017); upper gi endoscopy,dilatn w guide (2016); upper gi endoscopy,biopsy (2016); colonoscopy flx dx w/collj spec when pfrmd (2012); Tubal ligation; and orthopedic surgery. Medications:   Scheduled Meds:    lidocaine-EPINEPHrine  20 mL IntraDERmal Once    Heparin (Porcine)  200 mL Irrigation Once     Continuous Infusions:   PRN Meds:   Home Meds:   Prior to Admission medications    Medication Sig Start Date End Date Taking?  Authorizing Provider   acetaminophen (TYLENOL) 325 MG tablet Take 650 mg by mouth every 6 hours as needed  Patient not taking: Reported on 2023   Ar Automatic Reconciliation   amLODIPine (NORVASC) 5 MG tablet

## 2023-05-24 NOTE — PROGRESS NOTES
I have reviewed discharge instructions with the patient. The patient verbalized understanding. Copy of discharge instructions per AVS copied, reviewed with pt., signature sheet signed and sent to med. Rec. For scanning r/t penpad not working and copy to pt. Prior to discharge. Port removal site noted clean/dry/intact right upper chest (dermabond intact). Pt without c/o pain. Pt dressed self and sat in w/c for discharge to home via private vehicle with Holly driving her home, with PCT, Lynsey Hughes. Pt. To car.

## 2023-05-24 NOTE — DISCHARGE INSTRUCTIONS
[address]  Special Procedures/Angiography Department    Radiologist:  Dr. Walter Rodriguez    Nurse:  Michele Aguiar RN     Date:   5-     Portacath Removal Discharge Instructions      Watch for signs of infection:  redness, fever, chills, increased pain, and/or drainage from the site. If this occurs, call your physician at once. Keep your dressing clean and dry. Leave the dressing in place for the next  three days    Resume your previous diet and follow medication reconciliation form. Do not lift anything heavier than 5 pounds with the affected arm for the next week. You may take Tylenol, as directed on the label, for pain. Avoid ibuprofen (Advil, Motrin) and aspirin as they may cause you to bleed. Because you received sedation, you are not to drive or sign any legal documents for the next 24 hours.       If you have any questions or concerns, please call [phone] and ask for the nurse on-call

## 2023-08-09 ENCOUNTER — OFFICE VISIT (OUTPATIENT)
Age: 73
End: 2023-08-09
Payer: MEDICARE

## 2023-08-09 VITALS
BODY MASS INDEX: 33.69 KG/M2 | WEIGHT: 171.6 LBS | SYSTOLIC BLOOD PRESSURE: 159 MMHG | HEART RATE: 54 BPM | DIASTOLIC BLOOD PRESSURE: 68 MMHG | HEIGHT: 60 IN

## 2023-08-09 DIAGNOSIS — G62.0 CHEMOTHERAPY-INDUCED NEUROPATHY (HCC): ICD-10-CM

## 2023-08-09 DIAGNOSIS — T45.1X5A CHEMOTHERAPY-INDUCED NEUROPATHY (HCC): ICD-10-CM

## 2023-08-09 DIAGNOSIS — I10 ESSENTIAL HYPERTENSION: ICD-10-CM

## 2023-08-09 DIAGNOSIS — C54.1 PAPILLARY SEROUS ENDOMETRIAL ADENOCARCINOMA (HCC): Primary | ICD-10-CM

## 2023-08-09 PROCEDURE — 3078F DIAST BP <80 MM HG: CPT | Performed by: OBSTETRICS & GYNECOLOGY

## 2023-08-09 PROCEDURE — 99214 OFFICE O/P EST MOD 30 MIN: CPT | Performed by: OBSTETRICS & GYNECOLOGY

## 2023-08-09 PROCEDURE — 1123F ACP DISCUSS/DSCN MKR DOCD: CPT | Performed by: OBSTETRICS & GYNECOLOGY

## 2023-08-09 PROCEDURE — 3017F COLORECTAL CA SCREEN DOC REV: CPT | Performed by: OBSTETRICS & GYNECOLOGY

## 2023-08-09 PROCEDURE — 1090F PRES/ABSN URINE INCON ASSESS: CPT | Performed by: OBSTETRICS & GYNECOLOGY

## 2023-08-09 PROCEDURE — 3077F SYST BP >= 140 MM HG: CPT | Performed by: OBSTETRICS & GYNECOLOGY

## 2023-08-09 PROCEDURE — G8428 CUR MEDS NOT DOCUMENT: HCPCS | Performed by: OBSTETRICS & GYNECOLOGY

## 2023-08-09 PROCEDURE — G8417 CALC BMI ABV UP PARAM F/U: HCPCS | Performed by: OBSTETRICS & GYNECOLOGY

## 2023-08-09 PROCEDURE — 1036F TOBACCO NON-USER: CPT | Performed by: OBSTETRICS & GYNECOLOGY

## 2023-08-09 PROCEDURE — G8399 PT W/DXA RESULTS DOCUMENT: HCPCS | Performed by: OBSTETRICS & GYNECOLOGY

## 2023-08-09 NOTE — PROGRESS NOTES
[address] [place] [address] [place]  P [phone]  F [phone]    Office Note  Patient ID:  Name:  Jeannette Fowler  MRN:  [id number]  :   y.o. Date:  2023      HISTORY OF PRESENT ILLNESS:  Ms. Jeannette Fowler is a 67 y.o. female who on 2021 underwent Robotic-assisted total laparoscopic hysterectomy, Bilateral salpingo-oophorectomy, Bilateral pelvic sentinel lymph node mapping and biopsy. 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 Negative washings. Stromal cervical involvement. Negative SLNs. 2mm out of 10mm myometrial invasion. Parametrial involvement of tumor. Completed concurrent chemo-EBRT + VBT on 2021. Initiated on adjuvant carboplatin + paclitaxel on 2021. Completed cycle 4 on 2022. CT C/A/P 2022 without evidence of disease. Presents today for continued surveillance. Doing well without complaints. Denies vaginal bleeding/discharge, abdominal/pelvic pain, nausea, vomiting, constipation, diarrhea, CP, SOB, hematuria, hematochezia, change in appetite or bowel movements, bloating, fevers, chills, or urinary symptoms. Stable numbness in her feet. Denies pain or issues with balance. The patient reports that she sprained her right pinky toe putting on her shoes 3 days ago. She does report some swelling and bruising in this area. Initial History:  Ms. Jeannette Fowler is a 67 y.o.  postmenopausal female who presents as a new patient from Dr. Jesenia López for high-grade endometrial cancer. The patient reports vaginal spotting back in April, which resulted in her seeing Dr. Jesenia López. Pelvic ultrasound on 2021 demonstrated 12.5mm stripe. Hysteroscopy/D&C on 2021 demonstrated \"high-grade endometrial carcinoma, favor serous carcinoma\". She was subsequently referred to our office for further discussion and management.      Denies pelvic or

## 2023-08-09 NOTE — PROGRESS NOTES
3 month follow up for endometrial cancer ,  pt reports no abnormal spotting or bleeding, pt states no questions or concerns for today's visit    1. Have you been to the ER, urgent care clinic since your last visit? Hospitalized since your last visit?  no    2. Have you seen or consulted any other health care providers outside of the [address] [place] [address] Avenue since your last visit? Include any pap smears or colon screening.    no

## 2023-11-08 ENCOUNTER — OFFICE VISIT (OUTPATIENT)
Age: 73
End: 2023-11-08
Payer: MEDICARE

## 2023-11-08 VITALS
HEART RATE: 62 BPM | BODY MASS INDEX: 33.38 KG/M2 | SYSTOLIC BLOOD PRESSURE: 181 MMHG | WEIGHT: 170 LBS | DIASTOLIC BLOOD PRESSURE: 96 MMHG | HEIGHT: 60 IN

## 2023-11-08 DIAGNOSIS — C54.1 PAPILLARY SEROUS ENDOMETRIAL ADENOCARCINOMA (HCC): Primary | ICD-10-CM

## 2023-11-08 PROCEDURE — G8417 CALC BMI ABV UP PARAM F/U: HCPCS | Performed by: OBSTETRICS & GYNECOLOGY

## 2023-11-08 PROCEDURE — 1123F ACP DISCUSS/DSCN MKR DOCD: CPT | Performed by: OBSTETRICS & GYNECOLOGY

## 2023-11-08 PROCEDURE — 3080F DIAST BP >= 90 MM HG: CPT | Performed by: OBSTETRICS & GYNECOLOGY

## 2023-11-08 PROCEDURE — G8399 PT W/DXA RESULTS DOCUMENT: HCPCS | Performed by: OBSTETRICS & GYNECOLOGY

## 2023-11-08 PROCEDURE — 3017F COLORECTAL CA SCREEN DOC REV: CPT | Performed by: OBSTETRICS & GYNECOLOGY

## 2023-11-08 PROCEDURE — G8427 DOCREV CUR MEDS BY ELIG CLIN: HCPCS | Performed by: OBSTETRICS & GYNECOLOGY

## 2023-11-08 PROCEDURE — G8484 FLU IMMUNIZE NO ADMIN: HCPCS | Performed by: OBSTETRICS & GYNECOLOGY

## 2023-11-08 PROCEDURE — 1090F PRES/ABSN URINE INCON ASSESS: CPT | Performed by: OBSTETRICS & GYNECOLOGY

## 2023-11-08 PROCEDURE — 3077F SYST BP >= 140 MM HG: CPT | Performed by: OBSTETRICS & GYNECOLOGY

## 2023-11-08 PROCEDURE — 1036F TOBACCO NON-USER: CPT | Performed by: OBSTETRICS & GYNECOLOGY

## 2023-11-08 PROCEDURE — 99212 OFFICE O/P EST SF 10 MIN: CPT | Performed by: OBSTETRICS & GYNECOLOGY

## 2023-11-08 NOTE — PROGRESS NOTES
3 month follow up for endometrial cancer ,  pt reports no abnormal spotting or bleeding, pt states no questions or concerns for today's visit    1. Have you been to the ER, urgent care clinic since your last visit? Hospitalized since your last visit?  no    2. Have you seen or consulted any other health care providers outside of the [address] [place] [address] Avenue since your last visit? Include any pap smears or colon screening.    no
tablet Take 2 tablets by mouth as needed      losartan (COZAAR) 25 MG tablet Take 1 tablet by mouth daily      tiZANidine (ZANAFLEX) 2 MG tablet TAKE 1 TO 2 TABLETS BY MOUTH THREE TIMES DAILY FOR 10 DAYS AS NEEDED      acetaminophen (TYLENOL) 325 MG tablet Take 650 mg by mouth every 6 hours as needed (Patient not taking: Reported on 5/9/2023)      amLODIPine (NORVASC) 5 MG tablet Take 1 tablet by mouth daily (Patient not taking: Reported on 8/9/2023)      methylPREDNISolone (MEDROL) 4 MG tablet Take 4 mg by mouth daily as needed (Patient not taking: Reported on 5/9/2023)      nystatin (MYCOSTATIN) 476920 UNIT/GM powder as needed (Patient not taking: Reported on 8/9/2023)       No current facility-administered medications on file prior to visit. Allergies   Allergen Reactions    Codeine Nausea And Vomiting    Penicillins Rash     Rash & dizzy    Sulfa Antibiotics Other (See Comments)     General redness all over skin    Ciprofloxacin Itching and Other (See Comments)     Extreme dizziness and rash     OBJECTIVE:  Physical Exam:  Vitals:    11/08/23 0929   BP: (!) 181/96   Site: Left Upper Arm   Position: Sitting   Pulse: 62   Weight: 77.1 kg (170 lb)   Height: 1.524 m (5')         General: Alert and oriented. No acute distress. Well-nourished  HEENT: No thyroid enlargment. Neck supple without restrictions. Sclera normal. Normal occular motion. Moist mucous membranes. Lymphatics: No evidence of axillary, cervical, or subclavicular adenopathy. Respiratory: clear to auscultation and percussion to the bases. No CVAT. Cardiovascular: regular rate and rhythm. No murmurs, rubs, or gallops. Gastrointestinal: soft, non-tender, non-distended, no masses or organomegaly. Well-healed incision. Musculoskeletal: normal gait. No joint tenderness, deformity or swelling. No muscular tenderness. Extremities: extremities normal, atraumatic, no cyanosis or edema. Pelvic: exam chaperoned by nurse.  Normal appearing external

## 2024-02-13 ENCOUNTER — OFFICE VISIT (OUTPATIENT)
Age: 74
End: 2024-02-13
Payer: MEDICARE

## 2024-02-13 VITALS
SYSTOLIC BLOOD PRESSURE: 190 MMHG | WEIGHT: 173.2 LBS | DIASTOLIC BLOOD PRESSURE: 83 MMHG | HEIGHT: 60 IN | HEART RATE: 61 BPM | BODY MASS INDEX: 34 KG/M2

## 2024-02-13 DIAGNOSIS — C54.1 PAPILLARY SEROUS ENDOMETRIAL ADENOCARCINOMA (HCC): Primary | ICD-10-CM

## 2024-02-13 PROCEDURE — G8399 PT W/DXA RESULTS DOCUMENT: HCPCS | Performed by: OBSTETRICS & GYNECOLOGY

## 2024-02-13 PROCEDURE — 3077F SYST BP >= 140 MM HG: CPT | Performed by: OBSTETRICS & GYNECOLOGY

## 2024-02-13 PROCEDURE — G8427 DOCREV CUR MEDS BY ELIG CLIN: HCPCS | Performed by: OBSTETRICS & GYNECOLOGY

## 2024-02-13 PROCEDURE — 1123F ACP DISCUSS/DSCN MKR DOCD: CPT | Performed by: OBSTETRICS & GYNECOLOGY

## 2024-02-13 PROCEDURE — 1036F TOBACCO NON-USER: CPT | Performed by: OBSTETRICS & GYNECOLOGY

## 2024-02-13 PROCEDURE — G8417 CALC BMI ABV UP PARAM F/U: HCPCS | Performed by: OBSTETRICS & GYNECOLOGY

## 2024-02-13 PROCEDURE — 3017F COLORECTAL CA SCREEN DOC REV: CPT | Performed by: OBSTETRICS & GYNECOLOGY

## 2024-02-13 PROCEDURE — 99212 OFFICE O/P EST SF 10 MIN: CPT | Performed by: OBSTETRICS & GYNECOLOGY

## 2024-02-13 PROCEDURE — 1090F PRES/ABSN URINE INCON ASSESS: CPT | Performed by: OBSTETRICS & GYNECOLOGY

## 2024-02-13 PROCEDURE — 3078F DIAST BP <80 MM HG: CPT | Performed by: OBSTETRICS & GYNECOLOGY

## 2024-02-13 PROCEDURE — G8484 FLU IMMUNIZE NO ADMIN: HCPCS | Performed by: OBSTETRICS & GYNECOLOGY

## 2024-02-13 NOTE — PROGRESS NOTES
3 month follow up for endometrial cancer ,  pt reports no abnormal spotting or bleeding, pt states no questions or concerns for today's visit, pt states she does check her blood pressures at home, will continue to take at home and if has high readings will contact her PCP    Vitals:    02/13/24 0851 02/13/24 0935   BP: (!) 203/73 (!) 190/83   Site: Left Upper Arm Left Upper Arm   Position: Sitting Sitting   Pulse: 61 61   Weight: 78.6 kg (173 lb 3.2 oz)    Height: 1.524 m (5')          1. Have you been to the ER, urgent care clinic since your last visit?  Hospitalized since your last visit?  no    2. Have you seen or consulted any other health care providers outside of the [facility] since your last visit?  Include any pap smears or colon screening.   no          
used to authenticate this note.     Dominic Nicole MD    Please note that portions of this dictation were completed with Dragon, the computer voice recognition software.  Quite often unanticipated grammatical, syntax, homophones, and other interpretive errors are inadvertently transcribed by the computer software.  Please disregard these errors.  Please excuse any errors that have escaped final proofreading.

## 2024-06-29 ENCOUNTER — HOSPITAL ENCOUNTER (EMERGENCY)
Facility: HOSPITAL | Age: 74
Discharge: HOME | End: 2024-06-29
Attending: EMERGENCY MEDICINE
Payer: MEDICARE

## 2024-06-29 ENCOUNTER — APPOINTMENT (OUTPATIENT)
Facility: HOSPITAL | Age: 74
End: 2024-06-29
Payer: MEDICARE

## 2024-06-29 VITALS
HEART RATE: 56 BPM | BODY MASS INDEX: 33.28 KG/M2 | DIASTOLIC BLOOD PRESSURE: 64 MMHG | TEMPERATURE: 99.1 F | WEIGHT: 170.42 LBS | RESPIRATION RATE: 17 BRPM | OXYGEN SATURATION: 92 % | SYSTOLIC BLOOD PRESSURE: 132 MMHG

## 2024-06-29 DIAGNOSIS — N20.0 KIDNEY STONE: Primary | ICD-10-CM

## 2024-06-29 LAB
ALBUMIN SERPL-MCNC: 3.4 G/DL (ref 3.5–5)
ALBUMIN/GLOB SERPL: 0.9 (ref 1.1–2.2)
ALP SERPL-CCNC: 63 U/L (ref 45–117)
ALT SERPL-CCNC: 24 U/L (ref 12–78)
ANION GAP SERPL CALC-SCNC: 4 MMOL/L (ref 5–15)
APPEARANCE UR: CLEAR
AST SERPL-CCNC: 27 U/L (ref 15–37)
BACTERIA URNS QL MICRO: NEGATIVE /HPF
BASOPHILS # BLD: 0 K/UL (ref 0–0.1)
BASOPHILS NFR BLD: 0 % (ref 0–1)
BILIRUB SERPL-MCNC: 0.6 MG/DL (ref 0.2–1)
BILIRUB UR QL: NEGATIVE
BUN SERPL-MCNC: 16 MG/DL (ref 6–20)
CALCIUM SERPL-MCNC: 9.2 MG/DL (ref 8.5–10.1)
CHLORIDE SERPL-SCNC: 109 MMOL/L (ref 97–108)
CO2 SERPL-SCNC: 26 MMOL/L (ref 21–32)
COLOR UR: ABNORMAL
CREAT SERPL-MCNC: 0.78 MG/DL (ref 0.55–1.02)
DIFFERENTIAL METHOD BLD: ABNORMAL
EOSINOPHIL # BLD: 0 K/UL (ref 0–0.4)
EOSINOPHIL NFR BLD: 0 % (ref 0–7)
EPITH CASTS URNS QL MICRO: ABNORMAL /LPF
ERYTHROCYTE [DISTWIDTH] IN BLOOD BY AUTOMATED COUNT: 13.7 % (ref 11.5–14.5)
GLOBULIN SER CALC-MCNC: 3.8 G/DL (ref 2–4)
GLUCOSE SERPL-MCNC: 103 MG/DL (ref 65–100)
GLUCOSE UR STRIP.AUTO-MCNC: NEGATIVE MG/DL
HCT VFR BLD AUTO: 37.4 % (ref 35–47)
HGB BLD-MCNC: 12.2 G/DL (ref 11.5–16)
HGB UR QL STRIP: ABNORMAL
HYALINE CASTS URNS QL MICRO: ABNORMAL /LPF (ref 0–2)
IMM GRANULOCYTES # BLD AUTO: 0 K/UL (ref 0–0.04)
IMM GRANULOCYTES NFR BLD AUTO: 0 % (ref 0–0.5)
KETONES UR QL STRIP.AUTO: ABNORMAL MG/DL
LEUKOCYTE ESTERASE UR QL STRIP.AUTO: NEGATIVE
LIPASE SERPL-CCNC: 22 U/L (ref 13–75)
LYMPHOCYTES # BLD: 0.6 K/UL (ref 0.8–3.5)
LYMPHOCYTES NFR BLD: 7 % (ref 12–49)
MCH RBC QN AUTO: 30.1 PG (ref 26–34)
MCHC RBC AUTO-ENTMCNC: 32.6 G/DL (ref 30–36.5)
MCV RBC AUTO: 92.3 FL (ref 80–99)
MONOCYTES # BLD: 0.5 K/UL (ref 0–1)
MONOCYTES NFR BLD: 6 % (ref 5–13)
NEUTS SEG # BLD: 7.9 K/UL (ref 1.8–8)
NEUTS SEG NFR BLD: 87 % (ref 32–75)
NITRITE UR QL STRIP.AUTO: NEGATIVE
NRBC # BLD: 0 K/UL (ref 0–0.01)
NRBC BLD-RTO: 0 PER 100 WBC
PH UR STRIP: 6 (ref 5–8)
PLATELET # BLD AUTO: 221 K/UL (ref 150–400)
PMV BLD AUTO: 9.8 FL (ref 8.9–12.9)
POTASSIUM SERPL-SCNC: 4.6 MMOL/L (ref 3.5–5.1)
PROT SERPL-MCNC: 7.2 G/DL (ref 6.4–8.2)
PROT UR STRIP-MCNC: NEGATIVE MG/DL
RBC # BLD AUTO: 4.05 M/UL (ref 3.8–5.2)
RBC #/AREA URNS HPF: ABNORMAL /HPF (ref 0–5)
RBC MORPH BLD: ABNORMAL
SODIUM SERPL-SCNC: 139 MMOL/L (ref 136–145)
SP GR UR REFRACTOMETRY: 1.02
URINE CULTURE IF INDICATED: ABNORMAL
UROBILINOGEN UR QL STRIP.AUTO: 0.2 EU/DL (ref 0.2–1)
WBC # BLD AUTO: 9 K/UL (ref 3.6–11)
WBC URNS QL MICRO: ABNORMAL /HPF (ref 0–4)

## 2024-06-29 PROCEDURE — 36415 COLL VENOUS BLD VENIPUNCTURE: CPT

## 2024-06-29 PROCEDURE — 99284 EMERGENCY DEPT VISIT MOD MDM: CPT

## 2024-06-29 PROCEDURE — 96375 TX/PRO/DX INJ NEW DRUG ADDON: CPT

## 2024-06-29 PROCEDURE — 80053 COMPREHEN METABOLIC PANEL: CPT

## 2024-06-29 PROCEDURE — 83690 ASSAY OF LIPASE: CPT

## 2024-06-29 PROCEDURE — 6370000000 HC RX 637 (ALT 250 FOR IP): Performed by: EMERGENCY MEDICINE

## 2024-06-29 PROCEDURE — 74176 CT ABD & PELVIS W/O CONTRAST: CPT

## 2024-06-29 PROCEDURE — 6360000002 HC RX W HCPCS: Performed by: EMERGENCY MEDICINE

## 2024-06-29 PROCEDURE — 85025 COMPLETE CBC W/AUTO DIFF WBC: CPT

## 2024-06-29 PROCEDURE — 81001 URINALYSIS AUTO W/SCOPE: CPT

## 2024-06-29 PROCEDURE — 2580000003 HC RX 258: Performed by: EMERGENCY MEDICINE

## 2024-06-29 PROCEDURE — 96374 THER/PROPH/DIAG INJ IV PUSH: CPT

## 2024-06-29 RX ORDER — ONDANSETRON 4 MG/1
4 TABLET, ORALLY DISINTEGRATING ORAL ONCE
Status: COMPLETED | OUTPATIENT
Start: 2024-06-29 | End: 2024-06-29

## 2024-06-29 RX ORDER — FENTANYL CITRATE 50 UG/ML
50 INJECTION, SOLUTION INTRAMUSCULAR; INTRAVENOUS
Status: COMPLETED | OUTPATIENT
Start: 2024-06-29 | End: 2024-06-29

## 2024-06-29 RX ORDER — 0.9 % SODIUM CHLORIDE 0.9 %
500 INTRAVENOUS SOLUTION INTRAVENOUS ONCE
Status: COMPLETED | OUTPATIENT
Start: 2024-06-29 | End: 2024-06-29

## 2024-06-29 RX ORDER — OXYCODONE HYDROCHLORIDE AND ACETAMINOPHEN 5; 325 MG/1; MG/1
1 TABLET ORAL EVERY 6 HOURS PRN
Qty: 12 TABLET | Refills: 0 | Status: SHIPPED | OUTPATIENT
Start: 2024-06-29 | End: 2024-07-02

## 2024-06-29 RX ORDER — KETOROLAC TROMETHAMINE 30 MG/ML
15 INJECTION, SOLUTION INTRAMUSCULAR; INTRAVENOUS
Status: COMPLETED | OUTPATIENT
Start: 2024-06-29 | End: 2024-06-29

## 2024-06-29 RX ORDER — ONDANSETRON 4 MG/1
4 TABLET, ORALLY DISINTEGRATING ORAL 3 TIMES DAILY PRN
Qty: 21 TABLET | Refills: 0 | Status: SHIPPED | OUTPATIENT
Start: 2024-06-29

## 2024-06-29 RX ORDER — ONDANSETRON 2 MG/ML
4 INJECTION INTRAMUSCULAR; INTRAVENOUS ONCE
Status: COMPLETED | OUTPATIENT
Start: 2024-06-29 | End: 2024-06-29

## 2024-06-29 RX ORDER — OXYCODONE HYDROCHLORIDE AND ACETAMINOPHEN 5; 325 MG/1; MG/1
1 TABLET ORAL
Status: COMPLETED | OUTPATIENT
Start: 2024-06-29 | End: 2024-06-29

## 2024-06-29 RX ADMIN — ONDANSETRON 4 MG: 4 TABLET, ORALLY DISINTEGRATING ORAL at 18:26

## 2024-06-29 RX ADMIN — KETOROLAC TROMETHAMINE 15 MG: 30 INJECTION, SOLUTION INTRAMUSCULAR at 17:01

## 2024-06-29 RX ADMIN — OXYCODONE HYDROCHLORIDE AND ACETAMINOPHEN 1 TABLET: 5; 325 TABLET ORAL at 18:26

## 2024-06-29 RX ADMIN — ONDANSETRON 4 MG: 2 INJECTION INTRAMUSCULAR; INTRAVENOUS at 17:01

## 2024-06-29 RX ADMIN — FENTANYL CITRATE 50 MCG: 50 INJECTION INTRAMUSCULAR; INTRAVENOUS at 17:00

## 2024-06-29 RX ADMIN — SODIUM CHLORIDE 500 ML: 9 INJECTION, SOLUTION INTRAVENOUS at 17:00

## 2024-06-29 ASSESSMENT — LIFESTYLE VARIABLES
HOW OFTEN DO YOU HAVE A DRINK CONTAINING ALCOHOL: NEVER
HOW MANY STANDARD DRINKS CONTAINING ALCOHOL DO YOU HAVE ON A TYPICAL DAY: PATIENT DOES NOT DRINK

## 2024-06-29 ASSESSMENT — PAIN SCALES - GENERAL
PAINLEVEL_OUTOF10: 7
PAINLEVEL_OUTOF10: 9
PAINLEVEL_OUTOF10: 9

## 2024-06-29 ASSESSMENT — PAIN DESCRIPTION - LOCATION: LOCATION: ABDOMEN

## 2024-06-29 NOTE — ED NOTES
Rec'd report from Nany, met with the patient and informed them that we anticipate d/c after fluid completion.

## 2024-06-29 NOTE — ED NOTES
Patient transferred to [facility], report given to MACI Hilario.  Discharge papers instructed to patient and can be discharged after IV fluids.

## 2024-06-29 NOTE — ED NOTES
Report given to MACI Shanks. Nurse was informed of reason for arrival, vitals, labs, medications, orders, procedures, results, anything left pending and current plan of action. Questions were asked and received prior to departure from the patient.

## 2024-07-02 ENCOUNTER — HOSPITAL ENCOUNTER (EMERGENCY)
Facility: HOSPITAL | Age: 74
Discharge: HOME | End: 2024-07-02
Payer: MEDICARE

## 2024-07-02 ENCOUNTER — APPOINTMENT (OUTPATIENT)
Facility: HOSPITAL | Age: 74
End: 2024-07-02
Payer: MEDICARE

## 2024-07-02 VITALS
TEMPERATURE: 99.3 F | WEIGHT: 173.06 LBS | RESPIRATION RATE: 18 BRPM | SYSTOLIC BLOOD PRESSURE: 139 MMHG | HEIGHT: 60 IN | DIASTOLIC BLOOD PRESSURE: 64 MMHG | BODY MASS INDEX: 33.98 KG/M2 | OXYGEN SATURATION: 97 % | HEART RATE: 72 BPM

## 2024-07-02 DIAGNOSIS — R53.81 MALAISE AND FATIGUE: ICD-10-CM

## 2024-07-02 DIAGNOSIS — R10.10 UPPER ABDOMINAL PAIN: Primary | ICD-10-CM

## 2024-07-02 DIAGNOSIS — R53.83 MALAISE AND FATIGUE: ICD-10-CM

## 2024-07-02 LAB
ALBUMIN SERPL-MCNC: 3 G/DL (ref 3.5–5)
ALBUMIN/GLOB SERPL: 0.8 (ref 1.1–2.2)
ALP SERPL-CCNC: 68 U/L (ref 45–117)
ALT SERPL-CCNC: 25 U/L (ref 12–78)
ANION GAP SERPL CALC-SCNC: 7 MMOL/L (ref 5–15)
APPEARANCE UR: CLEAR
AST SERPL-CCNC: 20 U/L (ref 15–37)
BACTERIA URNS QL MICRO: NEGATIVE /HPF
BASOPHILS # BLD: 0 K/UL (ref 0–0.1)
BASOPHILS NFR BLD: 0 % (ref 0–1)
BILIRUB SERPL-MCNC: 0.5 MG/DL (ref 0.2–1)
BILIRUB UR QL: NEGATIVE
BUN SERPL-MCNC: 16 MG/DL (ref 6–20)
BUN/CREAT SERPL: 16 (ref 12–20)
CALCIUM SERPL-MCNC: 8.7 MG/DL (ref 8.5–10.1)
CHLORIDE SERPL-SCNC: 106 MMOL/L (ref 97–108)
CO2 SERPL-SCNC: 25 MMOL/L (ref 21–32)
COLOR UR: ABNORMAL
CREAT SERPL-MCNC: 0.97 MG/DL (ref 0.55–1.02)
DIFFERENTIAL METHOD BLD: ABNORMAL
EOSINOPHIL # BLD: 0.1 K/UL (ref 0–0.4)
EOSINOPHIL NFR BLD: 1 % (ref 0–7)
EPITH CASTS URNS QL MICRO: ABNORMAL /LPF
ERYTHROCYTE [DISTWIDTH] IN BLOOD BY AUTOMATED COUNT: 13.4 % (ref 11.5–14.5)
GLOBULIN SER CALC-MCNC: 3.8 G/DL (ref 2–4)
GLUCOSE SERPL-MCNC: 106 MG/DL (ref 65–100)
GLUCOSE UR STRIP.AUTO-MCNC: NEGATIVE MG/DL
HCT VFR BLD AUTO: 35.6 % (ref 35–47)
HGB BLD-MCNC: 11.8 G/DL (ref 11.5–16)
HGB UR QL STRIP: ABNORMAL
IMM GRANULOCYTES # BLD AUTO: 0 K/UL (ref 0–0.04)
IMM GRANULOCYTES NFR BLD AUTO: 0 % (ref 0–0.5)
KETONES UR QL STRIP.AUTO: 40 MG/DL
LEUKOCYTE ESTERASE UR QL STRIP.AUTO: ABNORMAL
LYMPHOCYTES # BLD: 0.4 K/UL (ref 0.8–3.5)
LYMPHOCYTES NFR BLD: 4 % (ref 12–49)
MCH RBC QN AUTO: 30.3 PG (ref 26–34)
MCHC RBC AUTO-ENTMCNC: 33.1 G/DL (ref 30–36.5)
MCV RBC AUTO: 91.3 FL (ref 80–99)
MONOCYTES # BLD: 0.7 K/UL (ref 0–1)
MONOCYTES NFR BLD: 7 % (ref 5–13)
NEUTS SEG # BLD: 9.1 K/UL (ref 1.8–8)
NEUTS SEG NFR BLD: 88 % (ref 32–75)
NITRITE UR QL STRIP.AUTO: NEGATIVE
NRBC # BLD: 0 K/UL (ref 0–0.01)
NRBC BLD-RTO: 0 PER 100 WBC
PH UR STRIP: 5.5 (ref 5–8)
PLATELET # BLD AUTO: 222 K/UL (ref 150–400)
PMV BLD AUTO: 10.3 FL (ref 8.9–12.9)
POTASSIUM SERPL-SCNC: 3.7 MMOL/L (ref 3.5–5.1)
PROT SERPL-MCNC: 6.8 G/DL (ref 6.4–8.2)
PROT UR STRIP-MCNC: NEGATIVE MG/DL
RBC # BLD AUTO: 3.9 M/UL (ref 3.8–5.2)
RBC #/AREA URNS HPF: ABNORMAL /HPF (ref 0–5)
RBC MORPH BLD: ABNORMAL
SODIUM SERPL-SCNC: 138 MMOL/L (ref 136–145)
SP GR UR REFRACTOMETRY: 1.02
URINE CULTURE IF INDICATED: ABNORMAL
UROBILINOGEN UR QL STRIP.AUTO: 1 EU/DL (ref 0.2–1)
WBC # BLD AUTO: 10.3 K/UL (ref 3.6–11)
WBC URNS QL MICRO: ABNORMAL /HPF (ref 0–4)

## 2024-07-02 PROCEDURE — 96374 THER/PROPH/DIAG INJ IV PUSH: CPT

## 2024-07-02 PROCEDURE — 36415 COLL VENOUS BLD VENIPUNCTURE: CPT

## 2024-07-02 PROCEDURE — 6360000002 HC RX W HCPCS

## 2024-07-02 PROCEDURE — 6370000000 HC RX 637 (ALT 250 FOR IP)

## 2024-07-02 PROCEDURE — 96375 TX/PRO/DX INJ NEW DRUG ADDON: CPT

## 2024-07-02 PROCEDURE — 74176 CT ABD & PELVIS W/O CONTRAST: CPT

## 2024-07-02 PROCEDURE — 80053 COMPREHEN METABOLIC PANEL: CPT

## 2024-07-02 PROCEDURE — 99284 EMERGENCY DEPT VISIT MOD MDM: CPT

## 2024-07-02 PROCEDURE — 2580000003 HC RX 258

## 2024-07-02 PROCEDURE — 85025 COMPLETE CBC W/AUTO DIFF WBC: CPT

## 2024-07-02 PROCEDURE — 81001 URINALYSIS AUTO W/SCOPE: CPT

## 2024-07-02 RX ORDER — KETOROLAC TROMETHAMINE 30 MG/ML
15 INJECTION, SOLUTION INTRAMUSCULAR; INTRAVENOUS
Status: COMPLETED | OUTPATIENT
Start: 2024-07-02 | End: 2024-07-02

## 2024-07-02 RX ORDER — MORPHINE SULFATE 4 MG/ML
4 INJECTION, SOLUTION INTRAMUSCULAR; INTRAVENOUS
Status: COMPLETED | OUTPATIENT
Start: 2024-07-02 | End: 2024-07-02

## 2024-07-02 RX ORDER — FAMOTIDINE 20 MG/1
20 TABLET, FILM COATED ORAL 2 TIMES DAILY
Qty: 20 TABLET | Refills: 0 | Status: SHIPPED | OUTPATIENT
Start: 2024-07-02

## 2024-07-02 RX ORDER — METOCLOPRAMIDE HYDROCHLORIDE 5 MG/ML
10 INJECTION INTRAMUSCULAR; INTRAVENOUS
Status: DISCONTINUED | OUTPATIENT
Start: 2024-07-02 | End: 2024-07-02

## 2024-07-02 RX ORDER — 0.9 % SODIUM CHLORIDE 0.9 %
1000 INTRAVENOUS SOLUTION INTRAVENOUS
Status: COMPLETED | OUTPATIENT
Start: 2024-07-02 | End: 2024-07-02

## 2024-07-02 RX ORDER — KETOROLAC TROMETHAMINE 10 MG/1
10 TABLET, FILM COATED ORAL EVERY 6 HOURS PRN
Qty: 20 TABLET | Refills: 0 | Status: SHIPPED | OUTPATIENT
Start: 2024-07-02

## 2024-07-02 RX ORDER — ONDANSETRON 2 MG/ML
4 INJECTION INTRAMUSCULAR; INTRAVENOUS
Status: COMPLETED | OUTPATIENT
Start: 2024-07-02 | End: 2024-07-02

## 2024-07-02 RX ORDER — LIDOCAINE HYDROCHLORIDE 20 MG/ML
15 SOLUTION OROPHARYNGEAL
Status: COMPLETED | OUTPATIENT
Start: 2024-07-02 | End: 2024-07-02

## 2024-07-02 RX ORDER — MAGNESIUM HYDROXIDE/ALUMINUM HYDROXICE/SIMETHICONE 120; 1200; 1200 MG/30ML; MG/30ML; MG/30ML
30 SUSPENSION ORAL
Status: COMPLETED | OUTPATIENT
Start: 2024-07-02 | End: 2024-07-02

## 2024-07-02 RX ADMIN — MORPHINE SULFATE 4 MG: 4 INJECTION, SOLUTION INTRAMUSCULAR; INTRAVENOUS at 19:00

## 2024-07-02 RX ADMIN — ONDANSETRON 4 MG: 2 INJECTION INTRAMUSCULAR; INTRAVENOUS at 19:00

## 2024-07-02 RX ADMIN — ALUMINUM HYDROXIDE, MAGNESIUM HYDROXIDE, AND SIMETHICONE 30 ML: 1200; 120; 1200 SUSPENSION ORAL at 20:24

## 2024-07-02 RX ADMIN — SODIUM CHLORIDE 1000 ML: 9 INJECTION, SOLUTION INTRAVENOUS at 19:01

## 2024-07-02 RX ADMIN — LIDOCAINE HYDROCHLORIDE 15 ML: 20 SOLUTION ORAL at 20:24

## 2024-07-02 RX ADMIN — KETOROLAC TROMETHAMINE 15 MG: 30 INJECTION, SOLUTION INTRAMUSCULAR at 19:00

## 2024-07-02 ASSESSMENT — PAIN DESCRIPTION - LOCATION
LOCATION: ABDOMEN
LOCATION: ABDOMEN

## 2024-07-02 ASSESSMENT — PAIN DESCRIPTION - PAIN TYPE: TYPE: ACUTE PAIN

## 2024-07-02 ASSESSMENT — LIFESTYLE VARIABLES
HOW MANY STANDARD DRINKS CONTAINING ALCOHOL DO YOU HAVE ON A TYPICAL DAY: PATIENT DOES NOT DRINK
HOW OFTEN DO YOU HAVE A DRINK CONTAINING ALCOHOL: NEVER

## 2024-07-02 ASSESSMENT — PAIN SCALES - GENERAL
PAINLEVEL_OUTOF10: 7
PAINLEVEL_OUTOF10: 7
PAINLEVEL_OUTOF10: 2

## 2024-07-02 ASSESSMENT — PAIN DESCRIPTION - ORIENTATION: ORIENTATION: LEFT

## 2024-07-02 ASSESSMENT — PAIN - FUNCTIONAL ASSESSMENT: PAIN_FUNCTIONAL_ASSESSMENT: 0-10

## 2024-07-02 ASSESSMENT — PAIN DESCRIPTION - FREQUENCY: FREQUENCY: CONTINUOUS

## 2024-07-02 NOTE — ED PROVIDER NOTES
[facility] EMERGENCY DEPT  EMERGENCY DEPARTMENT HISTORY AND PHYSICAL EXAM      Date: 2024  Patient Name: Elenita Benitez  MRN: [id number]  YOB: 1950  Date of evaluation: 2024  Provider: YODIT Mc NP   Note Started: 6:45 PM EDT 24    HISTORY OF PRESENT ILLNESS     Chief Complaint   Patient presents with    Fatigue    Fever     Pt presents to the ED via triage with c/o generalized malaise and a fever.  Pt states she was here 4 days ago and diagnosed with a kidney stone.  Pt states she passed the stone yesterday and started feeling ill this AM.      Nausea       History Provided By: Patient    HPI: Elenita Benitez is a 73 y.o. female with past medical history as listed below, presents ambulatory to the emergency department with complaints of abdominal pain, generalized malaise, and fever.  Seen in the ED on  with complaints of left flank pain, was diagnosed with a kidney stone and discharged home with pain medication.  Patient states she passed a stone yesterday and thought she would feel better, however is feeling worse.  Patient states she has had a Tmax of 100.1 °F but has not taken any Tylenol or ibuprofen to treat the fever.  Denies vomiting, diarrhea, dysuria, hematuria, urinary frequency. Upon arrival to the ED pt is alert and oriented x 3, well-appearing, and interacting appropriately; no obvious distress noted.     PAST MEDICAL HISTORY   Past Medical History:  Past Medical History:   Diagnosis Date    Arthritis     OSTEO HIP ANNE.    Cancer (HCC)     uterine     GERD (gastroesophageal reflux disease)     Hypertension     Lichen planus     oral    PUD (peptic ulcer disease)        Past Surgical History:  Past Surgical History:   Procedure Laterality Date     SECTION      X2    CHOLECYSTECTOMY      COLONOSCOPY N/A 2022    COLONOSCOPY performed by Norberto Vigil MD at [facility] ENDOSCOPY    COLONOSCOPY N/A 2022    COLONOSCOPY performed by Norberto Vigil MD at [facility]

## 2024-07-03 NOTE — DISCHARGE INSTRUCTIONS
you go to your follow-up appointment.     If you have any problem arranging a follow-up appointment, contact us!  If your symptoms become worse or you do not improve as expected, please return to us. We are available 24 hours a day.     If a prescription has been provided, please fill it as soon as possible to prevent a delay in treatment. If you have any questions or reservations about taking the medication due to side effects or interactions with other medications, please call your primary care provider or contact us directly.  Again, THANK YOU for choosing us to care for YOU!

## 2024-08-07 ENCOUNTER — OFFICE VISIT (OUTPATIENT)
Age: 74
End: 2024-08-07
Payer: MEDICARE

## 2024-08-07 VITALS
WEIGHT: 163.2 LBS | HEART RATE: 66 BPM | HEIGHT: 60 IN | SYSTOLIC BLOOD PRESSURE: 186 MMHG | BODY MASS INDEX: 32.04 KG/M2 | DIASTOLIC BLOOD PRESSURE: 85 MMHG

## 2024-08-07 DIAGNOSIS — C54.1 PAPILLARY SEROUS ENDOMETRIAL ADENOCARCINOMA (HCC): Primary | ICD-10-CM

## 2024-08-07 PROCEDURE — G8399 PT W/DXA RESULTS DOCUMENT: HCPCS | Performed by: OBSTETRICS & GYNECOLOGY

## 2024-08-07 PROCEDURE — 1036F TOBACCO NON-USER: CPT | Performed by: OBSTETRICS & GYNECOLOGY

## 2024-08-07 PROCEDURE — 3079F DIAST BP 80-89 MM HG: CPT | Performed by: OBSTETRICS & GYNECOLOGY

## 2024-08-07 PROCEDURE — 1090F PRES/ABSN URINE INCON ASSESS: CPT | Performed by: OBSTETRICS & GYNECOLOGY

## 2024-08-07 PROCEDURE — 3077F SYST BP >= 140 MM HG: CPT | Performed by: OBSTETRICS & GYNECOLOGY

## 2024-08-07 PROCEDURE — 99212 OFFICE O/P EST SF 10 MIN: CPT | Performed by: OBSTETRICS & GYNECOLOGY

## 2024-08-07 PROCEDURE — 1123F ACP DISCUSS/DSCN MKR DOCD: CPT | Performed by: OBSTETRICS & GYNECOLOGY

## 2024-08-07 PROCEDURE — G8427 DOCREV CUR MEDS BY ELIG CLIN: HCPCS | Performed by: OBSTETRICS & GYNECOLOGY

## 2024-08-07 PROCEDURE — 3017F COLORECTAL CA SCREEN DOC REV: CPT | Performed by: OBSTETRICS & GYNECOLOGY

## 2024-08-07 PROCEDURE — G8417 CALC BMI ABV UP PARAM F/U: HCPCS | Performed by: OBSTETRICS & GYNECOLOGY

## 2024-08-07 NOTE — PROGRESS NOTES
4 month follow up for endometrial cancer ,  pt reports no abnormal spotting or bleeding, pt states no questions or concerns for today's visit    1. Have you been to the ER, urgent care clinic since your last visit?  Hospitalized since your last visit?  Yes, to ER on 6/29/24 for kidney stone, on 7/2/24 for abdominal pain    2. Have you seen or consulted any other health care providers outside of the [facility] since your last visit?  Include any pap smears or colon screening.   no          
(gastroesophageal reflux disease)     Lichen planus     PUD (peptic ulcer disease)     Arthritis     Hip arthritis 04/04/2016       Reviewed patient's course to date. MARLEY on exam today. Reassured patient. Stable neuropathy. Plan to RTC in 4 months for continued surveillance. Reviewed precautionary symptoms to return sooner.  All questions and concerns were addressed with the patient and she is comfortable with the plan.     An electronic signature was used to authenticate this note.     Dominic Nicole MD    Please note that portions of this dictation were completed with Dragon, the [employer] voice recognition software.  Quite often unanticipated grammatical, syntax, homophones, and other interpretive errors are inadvertently transcribed by the computer software.  Please disregard these errors.  Please excuse any errors that have escaped final proofreading.

## 2024-10-07 ENCOUNTER — HOSPITAL ENCOUNTER (OUTPATIENT)
Facility: HOSPITAL | Age: 74
Discharge: HOME | End: 2024-10-10

## 2024-10-07 VITALS
WEIGHT: 160 LBS | HEIGHT: 60 IN | DIASTOLIC BLOOD PRESSURE: 75 MMHG | HEART RATE: 56 BPM | BODY MASS INDEX: 31.41 KG/M2 | SYSTOLIC BLOOD PRESSURE: 172 MMHG

## 2024-10-07 DIAGNOSIS — C54.1 PAPILLARY SEROUS ENDOMETRIAL ADENOCARCINOMA (HCC): Primary | ICD-10-CM

## 2024-10-07 ASSESSMENT — PAIN SCALES - GENERAL: PAINLEVEL_OUTOF10: 4

## 2024-10-07 ASSESSMENT — PAIN DESCRIPTION - LOCATION: LOCATION: ABDOMEN

## 2024-10-07 NOTE — PROGRESS NOTES
causing her pain and was \"just examined\" by Dr. Nicole, so she prefers to defer examination.    Assessment & Plan   Ms. Benitez is a 74 y.o. female with high grade serous endometrial carcinoma. RA-TLH, BSP, BPLND 7/29/21- stage II uterine serous carcinoma, negative washings, +cervical stromal involvement, predominantly LVI, negative (0/3) SLNs. 2/10mm MMI, +PM and focal paracervical involvement, +PM and paracervical margins. pT2N0.    On 11/4/21 she completed 45Gy in 25 fractions followed by vaginal cuff brachy 6Gy x 3 fractions with concurrent chemotherapy.    She sees Dr. Nicole every 4 months and has no vaginal fibrosis at this time, so she prefers to follow with Dr. Nicole (along with her urologist for her kidney stones), but she knows I am here for her if she would ever like to return for examination or if she develops vaginal scarring or has any other complaints or concerns.    -     Hong Alcazar MD  [facility]  [facility]  [address] [place] [address]  P: [phone]  [facility] [address], [place] [address]  P: [phone]  [facility]  [address] [place] [address], [address], [place] [address]  P: [phone]    Time and Counseling: I spent a total of 20 minutes in care of this patient during today's encounter on 10/7/24.    Carbon Copy:  Dominic Nicole,*

## 2024-10-17 ENCOUNTER — TELEPHONE (OUTPATIENT)
Age: 74
End: 2024-10-17

## 2024-10-17 NOTE — TELEPHONE ENCOUNTER
Pt calling to inform Dr. Nicole she had a CT scan of kidney by [facility] and results were and enlarded lymph node and mass on Left kidney 6cm.  [facility] waiting for formal report, and then will proceed with scheduling a bx.

## 2024-10-23 DIAGNOSIS — C54.1 PAPILLARY SEROUS ENDOMETRIAL ADENOCARCINOMA (HCC): Primary | ICD-10-CM

## 2024-10-25 ENCOUNTER — HOSPITAL ENCOUNTER (OUTPATIENT)
Facility: HOSPITAL | Age: 74
Discharge: HOME | End: 2024-10-28
Attending: RADIOLOGY
Payer: MEDICARE

## 2024-10-25 VITALS — BODY MASS INDEX: 31.41 KG/M2 | HEIGHT: 60 IN | WEIGHT: 160 LBS

## 2024-10-25 DIAGNOSIS — C54.1 PAPILLARY SEROUS ENDOMETRIAL ADENOCARCINOMA (HCC): ICD-10-CM

## 2024-10-25 LAB
GLUCOSE BLD STRIP.AUTO-MCNC: 94 MG/DL (ref 65–117)
SERVICE CMNT-IMP: NORMAL

## 2024-10-25 PROCEDURE — A9609 HC RX DIAGNOSTIC RADIOPHARMACEUTICAL: HCPCS | Performed by: RADIOLOGY

## 2024-10-25 PROCEDURE — 82962 GLUCOSE BLOOD TEST: CPT

## 2024-10-25 PROCEDURE — 3430000000 HC RX DIAGNOSTIC RADIOPHARMACEUTICAL: Performed by: RADIOLOGY

## 2024-10-25 PROCEDURE — 78815 PET IMAGE W/CT SKULL-THIGH: CPT

## 2024-10-25 RX ORDER — FLUDEOXYGLUCOSE F-18 500 MCI/ML
10 INJECTION INTRAVENOUS
Status: COMPLETED | OUTPATIENT
Start: 2024-10-25 | End: 2024-10-25

## 2024-10-25 RX ADMIN — FLUDEOXYGLUCOSE F-18 10 MILLICURIE: 500 INJECTION INTRAVENOUS at 10:34

## 2024-10-28 DIAGNOSIS — C54.1 PAPILLARY SEROUS ENDOMETRIAL ADENOCARCINOMA (HCC): Primary | ICD-10-CM

## 2024-11-12 ENCOUNTER — HOSPITAL ENCOUNTER (OUTPATIENT)
Facility: HOSPITAL | Age: 74
Discharge: HOME | End: 2024-11-15
Attending: RADIOLOGY
Payer: MEDICARE

## 2024-11-12 DIAGNOSIS — C54.1 PAPILLARY SEROUS ENDOMETRIAL ADENOCARCINOMA (HCC): ICD-10-CM

## 2024-11-12 PROCEDURE — 88305 TISSUE EXAM BY PATHOLOGIST: CPT

## 2024-11-12 PROCEDURE — 76942 ECHO GUIDE FOR BIOPSY: CPT

## 2024-11-12 PROCEDURE — 2500000003 HC RX 250 WO HCPCS

## 2024-11-12 PROCEDURE — 88333 PATH CONSLTJ SURG CYTO XM 1: CPT

## 2024-11-12 RX ORDER — LIDOCAINE HYDROCHLORIDE 10 MG/ML
10 INJECTION, SOLUTION EPIDURAL; INFILTRATION; INTRACAUDAL; PERINEURAL ONCE
Status: COMPLETED | OUTPATIENT
Start: 2024-11-12 | End: 2024-11-12

## 2024-11-12 RX ADMIN — LIDOCAINE HYDROCHLORIDE 10 ML: 10 INJECTION, SOLUTION EPIDURAL; INFILTRATION; INTRACAUDAL; PERINEURAL at 11:29

## 2024-11-18 ENCOUNTER — TELEPHONE (OUTPATIENT)
Age: 74
End: 2024-11-18

## 2024-11-18 DIAGNOSIS — C54.1 PAPILLARY SEROUS ENDOMETRIAL ADENOCARCINOMA (HCC): Primary | ICD-10-CM

## 2024-11-20 ENCOUNTER — HOSPITAL ENCOUNTER (OUTPATIENT)
Facility: HOSPITAL | Age: 74
Setting detail: RECURRING SERIES
Discharge: HOME | End: 2024-11-23
Attending: RADIOLOGY
Payer: MEDICARE

## 2024-11-20 PROCEDURE — 97535 SELF CARE MNGMENT TRAINING: CPT

## 2024-11-20 PROCEDURE — 97161 PT EVAL LOW COMPLEX 20 MIN: CPT

## 2024-11-20 NOTE — THERAPY EVALUATION
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   8-22 min = 1 unit; 23-37 min = 2 units; 38-52 min = 3 units;  53-67 min = 4 units; 68-82 min = 5 units     SUBJECTIVE  Pain Level (0-10 scale): pain in the L LE 8/10   [x]constant []intermittent []improving []worsening []no change since onset    Any medication changes, allergies to medications, adverse drug reactions, diagnosis change, or new procedure performed?: [x] No    [] Yes (see summary sheet for update) PET/CT on 10/25/24 revealed malignancy to the supraclavicular and B iliac lymph nodes. She has an appointment with Dr. Nicole tomorrow.   Medications: Verified on Patient Summary List    Subjective functional status/changes:   Patient reports that pain and swelling in the L LE from hip to foot began about 2 months ago. She has noticed fullness in the abdominal area as well. The pain is making it difficult to sleep. She believes the swelling is due to an enlarged iliac lymph node.   Start of Care: 2024  Onset Date: 2024  Current

## 2024-11-21 ENCOUNTER — SOCIAL WORK (OUTPATIENT)
Age: 74
End: 2024-11-21

## 2024-11-21 ENCOUNTER — OFFICE VISIT (OUTPATIENT)
Age: 74
End: 2024-11-21
Payer: MEDICARE

## 2024-11-21 VITALS
HEART RATE: 65 BPM | SYSTOLIC BLOOD PRESSURE: 220 MMHG | BODY MASS INDEX: 31.92 KG/M2 | HEIGHT: 60 IN | WEIGHT: 162.6 LBS | DIASTOLIC BLOOD PRESSURE: 85 MMHG

## 2024-11-21 DIAGNOSIS — C54.1 PAPILLARY SEROUS ENDOMETRIAL ADENOCARCINOMA (HCC): Primary | ICD-10-CM

## 2024-11-21 DIAGNOSIS — C54.1 PAPILLARY SEROUS ENDOMETRIAL ADENOCARCINOMA (HCC): ICD-10-CM

## 2024-11-21 DIAGNOSIS — C54.1 MALIGNANT NEOPLASM OF ENDOMETRIUM (HCC): Primary | ICD-10-CM

## 2024-11-21 DIAGNOSIS — G89.3 CANCER RELATED PAIN: ICD-10-CM

## 2024-11-21 PROCEDURE — 1160F RVW MEDS BY RX/DR IN RCRD: CPT | Performed by: OBSTETRICS & GYNECOLOGY

## 2024-11-21 PROCEDURE — 1090F PRES/ABSN URINE INCON ASSESS: CPT | Performed by: OBSTETRICS & GYNECOLOGY

## 2024-11-21 PROCEDURE — G8417 CALC BMI ABV UP PARAM F/U: HCPCS | Performed by: OBSTETRICS & GYNECOLOGY

## 2024-11-21 PROCEDURE — 3077F SYST BP >= 140 MM HG: CPT | Performed by: OBSTETRICS & GYNECOLOGY

## 2024-11-21 PROCEDURE — G8484 FLU IMMUNIZE NO ADMIN: HCPCS | Performed by: OBSTETRICS & GYNECOLOGY

## 2024-11-21 PROCEDURE — 3017F COLORECTAL CA SCREEN DOC REV: CPT | Performed by: OBSTETRICS & GYNECOLOGY

## 2024-11-21 PROCEDURE — 99215 OFFICE O/P EST HI 40 MIN: CPT | Performed by: OBSTETRICS & GYNECOLOGY

## 2024-11-21 PROCEDURE — G8399 PT W/DXA RESULTS DOCUMENT: HCPCS | Performed by: OBSTETRICS & GYNECOLOGY

## 2024-11-21 PROCEDURE — G8427 DOCREV CUR MEDS BY ELIG CLIN: HCPCS | Performed by: OBSTETRICS & GYNECOLOGY

## 2024-11-21 PROCEDURE — 1036F TOBACCO NON-USER: CPT | Performed by: OBSTETRICS & GYNECOLOGY

## 2024-11-21 PROCEDURE — 1126F AMNT PAIN NOTED NONE PRSNT: CPT | Performed by: OBSTETRICS & GYNECOLOGY

## 2024-11-21 PROCEDURE — 1159F MED LIST DOCD IN RCRD: CPT | Performed by: OBSTETRICS & GYNECOLOGY

## 2024-11-21 PROCEDURE — 3079F DIAST BP 80-89 MM HG: CPT | Performed by: OBSTETRICS & GYNECOLOGY

## 2024-11-21 PROCEDURE — 1123F ACP DISCUSS/DSCN MKR DOCD: CPT | Performed by: OBSTETRICS & GYNECOLOGY

## 2024-11-21 RX ORDER — TRAMADOL HYDROCHLORIDE 50 MG/1
50 TABLET ORAL EVERY 4 HOURS PRN
Qty: 40 TABLET | Refills: 0 | Status: SHIPPED | OUTPATIENT
Start: 2024-11-21 | End: 2024-12-05

## 2024-11-21 RX ORDER — ONDANSETRON 4 MG/1
TABLET, ORALLY DISINTEGRATING ORAL
Qty: 30 TABLET | Refills: 2 | Status: SHIPPED | OUTPATIENT
Start: 2024-11-21

## 2024-11-21 RX ORDER — ONDANSETRON 4 MG/1
4 TABLET, ORALLY DISINTEGRATING ORAL 3 TIMES DAILY PRN
Qty: 21 TABLET | Refills: 0 | Status: SHIPPED | OUTPATIENT
Start: 2024-11-21 | End: 2024-11-21 | Stop reason: SDUPTHER

## 2024-11-21 RX ORDER — LIDOCAINE/PRILOCAINE 2.5 %-2.5%
CREAM (GRAM) TOPICAL
Qty: 30 G | Refills: 2 | Status: SHIPPED | OUTPATIENT
Start: 2024-11-21

## 2024-11-21 RX ORDER — DEXAMETHASONE 4 MG/1
TABLET ORAL
Qty: 36 TABLET | Refills: 2 | Status: SHIPPED | OUTPATIENT
Start: 2024-11-21

## 2024-11-21 ASSESSMENT — PATIENT HEALTH QUESTIONNAIRE - PHQ9
1. LITTLE INTEREST OR PLEASURE IN DOING THINGS: NOT AT ALL
2. FEELING DOWN, DEPRESSED OR HOPELESS: NOT AT ALL
SUM OF ALL RESPONSES TO PHQ QUESTIONS 1-9: 0
SUM OF ALL RESPONSES TO PHQ9 QUESTIONS 1 & 2: 0

## 2024-11-21 NOTE — PROGRESS NOTES
Orders placed for port a cath insertion per nadja Nicole  
Per Dr. Nicole patient was given written materials with review for Taxol, Carboplatin and Keytruda.  Side effects with management recomendations:     1. Nausea and vomiting ( Zofran ODT, eating small frequent meals, hydration, and to call office if unable to eat or drink with vomiting X4 over 24 hours), Eating Hints before during and after Chemotherapy given,     2. Hair loss ( ACS book with wigs/hair accessories and a 2 page list of names with address and phone numbers of where she may purchase wigs given )  pt was offered Paxman cold cap and declined.   3. Lab abnormalities: Neutropenia (to call office with chills and fever of 100.4), use good handwashing with soap and water frequently, stay away from anyone sick, and stay out of crowds,   4. Diarrhea/Constipation:  Advise she may use Imodium as directed, but if continues with 4 watery stools to call the office, for constipation she can use Miralax  Prescriptions for Zofran ODT 4 mg, Decadron 4 mg #36, and Emela Cream escribed to pts pharmacy.  All questions answered.  Office contact phone number given.  Patient given schedule for MD/6 cycles chemotherapy.  Consent signed and scanned into CC.  Chemotherapy orders set up and sent to Dr. Nicole for signature.       
Problem Visit, recurrent disease, biopsy done on 11/12/24, Metastatic adenocarcinoma, morphologically compatible with high-grade serous carcinoma of endometrial origin. Pt reports pain and swelling in her left leg, she states her left leg is 15% larger than her right leg, she saw the lymphedema clinic yesterday, pt states pain is a 10/10 at night    1. Have you been to the ER, urgent care clinic since your last visit?  Hospitalized since your last visit?  no    2. Have you seen or consulted any other health care providers outside of the [facility] since your last visit?  Include any pap smears or colon screening.   no    
questions and concerns were addressed with the patient and she is comfortable with the plan. Today's visit was 45 minutes, including review of records, examination, discussion and coordination of care.     An electronic signature was used to authenticate this note.     Dominic Nicole MD    Please note that portions of this dictation were completed with Dragon, the computer voice recognition software.  Quite often unanticipated grammatical, syntax, homophones, and other interpretive errors are inadvertently transcribed by the computer software.  Please disregard these errors.  Please excuse any errors that have escaped final proofreading.

## 2024-11-21 NOTE — TELEPHONE ENCOUNTER
Requested Prescriptions     Signed Prescriptions Disp Refills    dexAMETHasone (DECADRON) 4 MG tablet 36 tablet 2     Sig: Take 2 tablets by mouth with breakfast the day before chemotherapy and repeat for 2 days after chemotherapy     Authorizing Provider: REJI HOPKINS     Ordering User: WOODY BAE    lidocaine-prilocaine (EMLA) 2.5-2.5 % cream 30 g 2     Sig: Apply small amount over port area and cover with a band-aid one (1) hour before chemotherapy treatment     Authorizing Provider: REJI HOPKINS     Ordering User: WOODY BAE    ondansetron (ZOFRAN-ODT) 4 MG disintegrating tablet 30 tablet 2     Sig: Place one (1) tablet under tongue every 8 hour when needed for nausea and vomiting     Authorizing Provider: REJI HOPKINS     Ordering User: WOODY BAE     Prescriptions sent to pharmacy per nadja Hopkins.

## 2024-11-21 NOTE — PROGRESS NOTES
NCCN Distress Thermometer    [facility]    Date Screening Completed: 11/21/24    Screening Declined:  [] Yes    Number that best describes how much distress you've experienced in the past week, including today?  0 [] - No distress 1 []      2 []      3 []      4 []       5 []       6 []      7 []      8 [x]      9 []       10 [] - Extreme distress    PROBLEM LIST  Have you had concerns about any of the items below in the past week, including today?      Physical Concerns Practical Concerns   [x] Pain [] Taking care of myself    [x] Sleep [] Taking care of others    [x] Fatigue [x] Work   [] Tobacco use  [] School   [] Substance use  [] Housing   [] Memory or concentration [] Finances   [] Sexual health [] Insurance   [x] Changes in eating  [] Transportation   [] Loss or change of physical abilities  []     [] Having enough food   Emotional Concerns [] Access to medicine   [] Worry or anxiety [] Treatment decisions   [] Sadness or depression    [] Loss of interest or enjoyment  Spiritual or Worship Concerns   [] Grief or loss  [] Sense of meaning or purpose   [] Fear [] Changes in aidee or beliefs   [] Loneliness  [] Death, dying, or afterlife   [x] Anger [] Conflict between beliefs and cancer treatments    [] Changes in appearance [] Relationship with the sacred   [] Feelings of worthlessness or being a burden [] Ritual or dietary needs        Social Concerns     [] Relationship with spouse or partner     [] Relationship with children    [] Relationship with family members     [] Relationship with friends or coworkers     [] Communication with health care team     [] Ability to have children     [] Prejudice or discrimination        Other Concerns: \"State of [place]\"    Patient received resource information and education:  [x] Yes  [] No    [facility] On[facility] Social Work   Psychosocial Assessment    [] Med-Onc [facility] [] Med-Onc [facility] [] Med-Onc [facility] [] Rad-Onc [facility] [] Rad-Onc [facility] []

## 2024-11-22 LAB
BUN SERPL-MCNC: 19 MG/DL (ref 8–27)
BUN/CREAT SERPL: 22 (ref 12–28)
CALCIUM SERPL-MCNC: 9.2 MG/DL (ref 8.7–10.3)
CHLORIDE SERPL-SCNC: 102 MMOL/L (ref 96–106)
CO2 SERPL-SCNC: 25 MMOL/L (ref 20–29)
CREAT SERPL-MCNC: 0.87 MG/DL (ref 0.57–1)
EGFRCR SERPLBLD CKD-EPI 2021: 70 ML/MIN/1.73
GLUCOSE SERPL-MCNC: 95 MG/DL (ref 70–99)
POTASSIUM SERPL-SCNC: 3.4 MMOL/L (ref 3.5–5.2)
SODIUM SERPL-SCNC: 142 MMOL/L (ref 134–144)

## 2024-11-26 RX ORDER — ONDANSETRON 2 MG/ML
8 INJECTION INTRAMUSCULAR; INTRAVENOUS
OUTPATIENT
Start: 2024-12-18

## 2024-11-26 RX ORDER — EPINEPHRINE 1 MG/ML
0.3 INJECTION, SOLUTION, CONCENTRATE INTRAVENOUS PRN
OUTPATIENT
Start: 2024-12-18

## 2024-11-26 RX ORDER — SODIUM CHLORIDE 9 MG/ML
5-250 INJECTION, SOLUTION INTRAVENOUS PRN
OUTPATIENT
Start: 2024-12-18

## 2024-11-26 RX ORDER — ALBUTEROL SULFATE 90 UG/1
4 INHALANT RESPIRATORY (INHALATION) PRN
OUTPATIENT
Start: 2024-12-18

## 2024-11-26 RX ORDER — PALONOSETRON 0.05 MG/ML
0.25 INJECTION, SOLUTION INTRAVENOUS ONCE
OUTPATIENT
Start: 2024-12-18 | End: 2024-12-18

## 2024-11-26 RX ORDER — PROCHLORPERAZINE EDISYLATE 5 MG/ML
5 INJECTION INTRAMUSCULAR; INTRAVENOUS
OUTPATIENT
Start: 2024-12-18

## 2024-11-26 RX ORDER — DIPHENHYDRAMINE HYDROCHLORIDE 50 MG/ML
50 INJECTION INTRAMUSCULAR; INTRAVENOUS
OUTPATIENT
Start: 2024-12-18

## 2024-11-26 RX ORDER — FAMOTIDINE 10 MG/ML
20 INJECTION, SOLUTION INTRAVENOUS
OUTPATIENT
Start: 2024-12-18

## 2024-11-26 RX ORDER — ACETAMINOPHEN 325 MG/1
650 TABLET ORAL
OUTPATIENT
Start: 2024-12-18

## 2024-11-26 RX ORDER — DIPHENHYDRAMINE HYDROCHLORIDE 50 MG/ML
50 INJECTION INTRAMUSCULAR; INTRAVENOUS ONCE
OUTPATIENT
Start: 2024-12-18 | End: 2024-12-18

## 2024-11-26 RX ORDER — SODIUM CHLORIDE 9 MG/ML
INJECTION, SOLUTION INTRAVENOUS CONTINUOUS
OUTPATIENT
Start: 2024-12-18

## 2024-11-26 RX ORDER — HYDROCORTISONE SODIUM SUCCINATE 100 MG/2ML
100 INJECTION INTRAMUSCULAR; INTRAVENOUS
OUTPATIENT
Start: 2024-12-18

## 2024-11-26 RX ORDER — HEPARIN SODIUM (PORCINE) LOCK FLUSH IV SOLN 100 UNIT/ML 100 UNIT/ML
500 SOLUTION INTRAVENOUS PRN
OUTPATIENT
Start: 2024-12-18

## 2024-11-26 RX ORDER — MEPERIDINE HYDROCHLORIDE 50 MG/ML
12.5 INJECTION INTRAMUSCULAR; INTRAVENOUS; SUBCUTANEOUS PRN
OUTPATIENT
Start: 2024-12-18

## 2024-11-26 RX ORDER — SODIUM CHLORIDE 0.9 % (FLUSH) 0.9 %
5-40 SYRINGE (ML) INJECTION PRN
OUTPATIENT
Start: 2024-12-18

## 2024-11-26 RX ORDER — FAMOTIDINE 10 MG/ML
20 INJECTION, SOLUTION INTRAVENOUS ONCE
OUTPATIENT
Start: 2024-12-18 | End: 2024-12-18

## 2024-11-27 ENCOUNTER — HOSPITAL ENCOUNTER (OUTPATIENT)
Facility: HOSPITAL | Age: 74
Setting detail: RECURRING SERIES
Discharge: HOME | End: 2024-11-30
Attending: RADIOLOGY
Payer: MEDICARE

## 2024-11-27 PROCEDURE — 97110 THERAPEUTIC EXERCISES: CPT

## 2024-11-27 PROCEDURE — 97760 ORTHOTIC MGMT&TRAING 1ST ENC: CPT

## 2024-11-27 NOTE — PROGRESS NOTES
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   8-22 min = 1 unit; 23-37 min = 2 units; 38-52 min = 3 units;  53-67 min = 4 units; 68-82 min = 5 units     SUBJECTIVE    Pain Level (0-10 scale): no pain reported    Any medication changes, allergies to medications, adverse drug reactions, diagnosis change, or new procedure performed?: [] No    [x] Yes (see summary sheet for update)Patient had an appointment 24 with Dr. Nicole for metastatic adenocarcinoma.  She had a PET scan 10/25/24 revealing Bulky retroperitoneal lymphadenopathy, hypermetabolic left supraclavicular and bilateral iliac lymph nodes compatible with malignancy, moderately severe left-sided hydronephrosis; no hypermetabolic left renal mass  is visualized. Patient was first treated for endometrial cancer in .   Medications: Verified on Patient Summary List    Subjective functional status/changes:   Patient arrives to the visit today complaining of fatigue and the onset of R LE swelling. She is scheduled to have estelle cath placement 24 and then chemotherapy two weeks later.     OBJECTIVE    Therapeutic Procedures:  Tx Min Billable or 1:1 Min (if diff from Tx Min) Procedure, Rationale, Specifics   50  [id number] [employer] UE, LE and/or

## 2024-12-02 LAB
CARIS ER: NEGATIVE
CARIS GENOMIC LOH - EXOME: NORMAL
CARIS HER2/NEU: NEGATIVE
CARIS HLA-A: NORMAL
CARIS HLA-B: NORMAL
CARIS HLA-C: NORMAL
CARIS MISMATCH REPAIR STATUS: NORMAL
CARIS MLH1: NORMAL
CARIS MSH2: NORMAL
CARIS MSH6: NORMAL
CARIS MSI - EXOME: NORMAL
CARIS PD-L1 (SP142): NEGATIVE
CARIS PMS2: NORMAL
CARIS PR: NEGATIVE
CARIS TMB - EXOME: NORMAL

## 2024-12-04 ENCOUNTER — HOSPITAL ENCOUNTER (OUTPATIENT)
Facility: HOSPITAL | Age: 74
Discharge: HOME | End: 2024-12-04
Attending: OBSTETRICS & GYNECOLOGY | Admitting: RADIOLOGY
Payer: MEDICARE

## 2024-12-04 VITALS
DIASTOLIC BLOOD PRESSURE: 78 MMHG | SYSTOLIC BLOOD PRESSURE: 210 MMHG | OXYGEN SATURATION: 97 % | HEIGHT: 60 IN | TEMPERATURE: 98.5 F | RESPIRATION RATE: 18 BRPM | WEIGHT: 159 LBS | HEART RATE: 55 BPM | BODY MASS INDEX: 31.22 KG/M2

## 2024-12-04 DIAGNOSIS — C54.1 PAPILLARY SEROUS ENDOMETRIAL ADENOCARCINOMA (HCC): ICD-10-CM

## 2024-12-04 PROCEDURE — 36561 INSERT TUNNELED CV CATH: CPT

## 2024-12-04 PROCEDURE — 77001 FLUOROGUIDE FOR VEIN DEVICE: CPT

## 2024-12-04 PROCEDURE — 6360000002 HC RX W HCPCS: Performed by: PHYSICIAN ASSISTANT

## 2024-12-04 PROCEDURE — 2580000003 HC RX 258: Performed by: PHYSICIAN ASSISTANT

## 2024-12-04 RX ORDER — HEPARIN SODIUM 1000 [USP'U]/ML
INJECTION, SOLUTION INTRAVENOUS; SUBCUTANEOUS PRN
Status: COMPLETED | OUTPATIENT
Start: 2024-12-04 | End: 2024-12-04

## 2024-12-04 RX ORDER — LIDOCAINE HYDROCHLORIDE 10 MG/ML
INJECTION, SOLUTION EPIDURAL; INFILTRATION; INTRACAUDAL; PERINEURAL PRN
Status: COMPLETED | OUTPATIENT
Start: 2024-12-04 | End: 2024-12-04

## 2024-12-04 RX ORDER — LIDOCAINE HYDROCHLORIDE AND EPINEPHRINE BITARTRATE 20; .01 MG/ML; MG/ML
INJECTION, SOLUTION SUBCUTANEOUS PRN
Status: COMPLETED | OUTPATIENT
Start: 2024-12-04 | End: 2024-12-04

## 2024-12-04 RX ORDER — MIDAZOLAM HYDROCHLORIDE 2 MG/2ML
INJECTION, SOLUTION INTRAMUSCULAR; INTRAVENOUS PRN
Status: COMPLETED | OUTPATIENT
Start: 2024-12-04 | End: 2024-12-04

## 2024-12-04 RX ORDER — FENTANYL CITRATE 50 UG/ML
INJECTION, SOLUTION INTRAMUSCULAR; INTRAVENOUS PRN
Status: COMPLETED | OUTPATIENT
Start: 2024-12-04 | End: 2024-12-04

## 2024-12-04 RX ADMIN — LIDOCAINE HYDROCHLORIDE AND EPINEPHRINE 5 ML: 20; 10 INJECTION, SOLUTION INFILTRATION; PERINEURAL at 09:08

## 2024-12-04 RX ADMIN — FENTANYL CITRATE 50 MCG: 50 INJECTION, SOLUTION INTRAMUSCULAR; INTRAVENOUS at 09:01

## 2024-12-04 RX ADMIN — HEPARIN SODIUM 400 UNITS: 1000 INJECTION INTRAVENOUS; SUBCUTANEOUS at 09:24

## 2024-12-04 RX ADMIN — MIDAZOLAM HYDROCHLORIDE 1 MG: 1 INJECTION, SOLUTION INTRAMUSCULAR; INTRAVENOUS at 09:01

## 2024-12-04 RX ADMIN — WATER 2000 MG: 1 INJECTION INTRAMUSCULAR; INTRAVENOUS; SUBCUTANEOUS at 08:54

## 2024-12-04 RX ADMIN — LIDOCAINE HYDROCHLORIDE 5 ML: 10 INJECTION, SOLUTION EPIDURAL; INFILTRATION; INTRACAUDAL; PERINEURAL at 09:06

## 2024-12-04 ASSESSMENT — PAIN SCALES - GENERAL
PAINLEVEL_OUTOF10: 4
PAINLEVEL_OUTOF10: 4

## 2024-12-04 ASSESSMENT — PULMONARY FUNCTION TESTS
PIF_VALUE: 0
PIF_VALUE: 1
PIF_VALUE: 0

## 2024-12-04 ASSESSMENT — PAIN DESCRIPTION - LOCATION: LOCATION: BACK

## 2024-12-04 ASSESSMENT — PAIN DESCRIPTION - ORIENTATION: ORIENTATION: LOWER

## 2024-12-04 NOTE — DISCHARGE INSTRUCTIONS
days.  The port can be used right away. You may have the port for weeks, months, or longer.  Your port will need to be flushed out regularly to keep it open. A nurse or other health professional will do this for you.  This care sheet gives you a general idea about how long it will take for you to recover. But each person recovers at a different pace. Follow the steps below to feel better as quickly as possible.  How can you care for yourself at home?  Activity    Avoid arm and upper body movements that may pull on the catheter for the first few days. These movements include heavy weight lifting and vigorous use of your arms.     You will probably need to take 1 day off from work and will be able to return to normal activities shortly after. This depends on the type of work you do, why you have the catheter, and how you feel.     You probably will be able to take baths and swim. But you may need to avoid some activities. Talk to your doctor about any limits on your activity.     Ask your doctor when you can drive again. Be careful when you pull your seat belt across your chest so it doesn't pull out the catheter. It's okay if the seat belt lays over the catheter.   Medicines    Your doctor will tell you if and when you can restart your medicines. He or she will also give you instructions about taking any new medicines.     If you stopped taking aspirin or some other blood thinner, your doctor will tell you when to start taking it again.     Be safe with medicines. Read and follow all instructions on the label.  If the doctor gave you a prescription medicine for pain, take it as prescribed.  If you are not taking a prescription pain medicine, ask your doctor if you can take an over-the-counter medicine.   Incision care    You have surgical glue over the incision site. Do not pick at the area and let the clue fall off naturally like a scab. You may shower with the surgical glue, no need to cover the site with any

## 2024-12-04 NOTE — PROGRESS NOTES
0725: Pt arrives ambulatory to angio department accompanied by son for port placement procedure. All assessments completed and consent was reviewed.  Education given was regarding procedure, mod sedation, post-procedure care and  management/follow-up. Opportunity for questions was provided and all questions and concerns were addressed.     0935: pt in recovery    1020: Name of procedure:  port placement     Sedation medications given: versed 1mg, fentanyl 50mcg     Sedation tolerated: well     Total Procedure time: 25 min     Vital Signs: stable     Any complications related to procedure: see orders     Post Procedure Care Needed/order sets placed in connect care: see orders     Pt tolerated procedure well. VSS. No C/O pain. Dressing to site D&I. No bleeding or hematoma noted to site. IV D/Cd. Discharge instructions given. Copy on chart and copy given to pt. Pt verbalized understanding. Pt taken to car by wheelchair and taken home by family. NAD noted at time of discharge.    1130: pt called to report her BP at home is 179/89. This RN instructed pt to follow up with her PCP

## 2024-12-04 NOTE — H&P
Not on file   Social Connections: Not on file   Intimate Partner Violence: Not on file   Housing Stability: Not on file       Allergies:   Allergies   Allergen Reactions    Codeine Nausea And Vomiting    Penicillins Rash     Rash & dizzy    Sulfa Antibiotics Other (See Comments)     General redness all over skin    Ciprofloxacin Itching and Other (See Comments)     Extreme dizziness and rash       Current Medications:  No current facility-administered medications for this encounter.        Review of Systems:  Patient reports headache, abdominal discomfort, left lower leg swelling from lymphedema. Patient denies fever, chills, cough, vision changes, difficulty swallowing, shortness of breath, chest pain, nausea, vomiting, changes in bladder or bowel habits, extremity weakness, numbness, tingling. The remainder of the review of systems is negative for any additional contributing elements.        Physical Exam:  Blood pressure (!) 194/88, pulse 59, temperature 98.5 °F (36.9 °C), temperature source Oral, resp. rate 19, height 1.525 m (5' 0.05\"), weight 72.1 kg (159 lb), SpO2 98%.  General:  Calm, cooperative, NAD  HEENT:  NCAT, EOMI, conjunctiva clear, MMM  Heart:  RRR, S1S2 normal  Lungs:  CTAB, NWOB  Abdomen:  Soft, NT, ND  Extremities:  MAEW, no cyanosis or edema  Skin:  Warm and dry, color normal, no rashes  Neurological:  AAOX3, speech clear and coherent    Mallampati Airway Assessment: II (soft palate, uvula, fauces visible)    ASA Classification: ASA 3 - Patient with moderate systemic disease with functional limitations    Laboratory:    No results for input(s): \"HGB\", \"HGBEXT\", \"HCT\", \"HCTEXT\", \"WBC\", \"PLT\", \"PLTEXT\", \"INR\", \"BUN\", \"K\", \"CRCLT\" in the last 72 hours.    Invalid input(s): \"PTT\", \"PT\", \"CREA\"    Imaging:  All appropriate imaging has been reviewed by the radiologist.    Impression/Plan:  Patient has been evaluated and deemed an appropriate candidate for intravenous sedation. Based on history and

## 2024-12-10 ENCOUNTER — HOSPITAL ENCOUNTER (OUTPATIENT)
Facility: HOSPITAL | Age: 74
Setting detail: RECURRING SERIES
Discharge: HOME | End: 2024-12-13
Attending: RADIOLOGY
Payer: MEDICARE

## 2024-12-10 PROCEDURE — 97535 SELF CARE MNGMENT TRAINING: CPT

## 2024-12-10 PROCEDURE — 97140 MANUAL THERAPY 1/> REGIONS: CPT

## 2024-12-10 NOTE — PROGRESS NOTES
of toes to below knee on the R LE and Comperm G double up from base of toes to below knee on the L LE. She will don L LE velcro products when she returns home.     A reduction kit for the L lower leg has been ordered as well but is on back order.      Footwear/Clothing with compression bandaging or compression garments     [x]  Footwear recommendations for use with bandaging/ compression garments - role of wearing compressive liner socks as well         Home program Continued education:   Daily compression use  Elevation of legs throughout the day  Importance of exercise and to avoid sitting for long periods at one time.         40  14222 Manual Therapy (timed):  decrease pain, increase tissue extensibility, and decrease edema to improve patient's ability to progress to PLOF and address remaining functional goals.  The manual therapy interventions were performed at a separate and distinct time from the therapeutic activities interventions . (see flow sheet as applicable)    Details if applicable:   Manual Lymphatic Drainage (MLD):  Area to decongest: L LE > R LE and trunk   Sequence used and effectiveness: Secondary sequence for lower extremities with trunk involvement. Cervical techniques deferred. Patient reports making an effort to research MLD online and practice techniques. She was educated in the self MLD packet today during MLD portion of the visit.    Applied multi-layer compression bandaging to: Deferred by patient due to lengthy drive to and from clinic and work commitments. Patient agreeable to work with adjustable compression products.              75     Total Total     Skin assessment post-treatment (if applicable):    [x]  intact    []  redness- no adverse reaction                 []redness - adverse reaction:        Patient Education: [x] Review HEP    [x]  Patient Education billed concurrently with other procedures   [x] MLD Patient Education Reviewed with patient, as well as demonstration and

## 2024-12-16 ENCOUNTER — TRANSCRIBE ORDERS (OUTPATIENT)
Facility: HOSPITAL | Age: 74
End: 2024-12-16

## 2024-12-16 DIAGNOSIS — C54.1 MALIGNANT NEOPLASM OF ENDOMETRIUM (HCC): ICD-10-CM

## 2024-12-16 DIAGNOSIS — N13.30 HYDRONEPHROSIS, UNSPECIFIED HYDRONEPHROSIS TYPE: Primary | ICD-10-CM

## 2024-12-16 LAB
ALBUMIN SERPL-MCNC: 3.4 G/DL (ref 3.5–5)
ALBUMIN/GLOB SERPL: 1.1 (ref 1.1–2.2)
ALP SERPL-CCNC: 69 U/L (ref 45–117)
ALT SERPL-CCNC: 18 U/L (ref 12–78)
ANION GAP SERPL CALC-SCNC: 7 MMOL/L (ref 2–12)
AST SERPL-CCNC: 21 U/L (ref 15–37)
BASOPHILS # BLD: 0.1 K/UL (ref 0–0.1)
BASOPHILS NFR BLD: 1 % (ref 0–1)
BILIRUB SERPL-MCNC: 0.5 MG/DL (ref 0.2–1)
BUN SERPL-MCNC: 16 MG/DL (ref 6–20)
BUN/CREAT SERPL: 14 (ref 12–20)
CALCIUM SERPL-MCNC: 9.4 MG/DL (ref 8.5–10.1)
CANCER AG125 SERPL-ACNC: 16 U/ML (ref 1.5–35)
CHLORIDE SERPL-SCNC: 100 MMOL/L (ref 97–108)
CO2 SERPL-SCNC: 32 MMOL/L (ref 21–32)
CREAT SERPL-MCNC: 1.11 MG/DL (ref 0.55–1.02)
DIFFERENTIAL METHOD BLD: ABNORMAL
EOSINOPHIL # BLD: 0.1 K/UL (ref 0–0.4)
EOSINOPHIL NFR BLD: 2 % (ref 0–7)
ERYTHROCYTE [DISTWIDTH] IN BLOOD BY AUTOMATED COUNT: 13.7 % (ref 11.5–14.5)
GLOBULIN SER CALC-MCNC: 3.1 G/DL (ref 2–4)
GLUCOSE SERPL-MCNC: 94 MG/DL (ref 65–100)
HCT VFR BLD AUTO: 33.7 % (ref 35–47)
HGB BLD-MCNC: 10.9 G/DL (ref 11.5–16)
IMM GRANULOCYTES # BLD AUTO: 0 K/UL (ref 0–0.04)
IMM GRANULOCYTES NFR BLD AUTO: 0 % (ref 0–0.5)
LYMPHOCYTES # BLD: 0.7 K/UL (ref 0.8–3.5)
LYMPHOCYTES NFR BLD: 12 % (ref 12–49)
MAGNESIUM SERPL-MCNC: 2 MG/DL (ref 1.6–2.4)
MCH RBC QN AUTO: 29.5 PG (ref 26–34)
MCHC RBC AUTO-ENTMCNC: 32.3 G/DL (ref 30–36.5)
MCV RBC AUTO: 91.1 FL (ref 80–99)
MONOCYTES # BLD: 0.6 K/UL (ref 0–1)
MONOCYTES NFR BLD: 9 % (ref 5–13)
NEUTS SEG # BLD: 4.7 K/UL (ref 1.8–8)
NEUTS SEG NFR BLD: 76 % (ref 32–75)
NRBC # BLD: 0 K/UL (ref 0–0.01)
NRBC BLD-RTO: 0 PER 100 WBC
PLATELET # BLD AUTO: 250 K/UL (ref 150–400)
PMV BLD AUTO: 9.5 FL (ref 8.9–12.9)
POTASSIUM SERPL-SCNC: 3 MMOL/L (ref 3.5–5.1)
PROT SERPL-MCNC: 6.5 G/DL (ref 6.4–8.2)
RBC # BLD AUTO: 3.7 M/UL (ref 3.8–5.2)
RBC MORPH BLD: ABNORMAL
SODIUM SERPL-SCNC: 139 MMOL/L (ref 136–145)
T4 FREE SERPL-MCNC: 1 NG/DL (ref 0.8–1.5)
TSH SERPL DL<=0.05 MIU/L-ACNC: 2.32 UIU/ML (ref 0.36–3.74)
WBC # BLD AUTO: 6.2 K/UL (ref 3.6–11)

## 2024-12-17 ENCOUNTER — HOSPITAL ENCOUNTER (OUTPATIENT)
Facility: HOSPITAL | Age: 74
Setting detail: RECURRING SERIES
Discharge: HOME | End: 2024-12-20
Attending: RADIOLOGY
Payer: MEDICARE

## 2024-12-17 PROCEDURE — 97110 THERAPEUTIC EXERCISES: CPT

## 2024-12-17 PROCEDURE — 97535 SELF CARE MNGMENT TRAINING: CPT

## 2024-12-17 PROCEDURE — 97140 MANUAL THERAPY 1/> REGIONS: CPT

## 2024-12-17 NOTE — PROGRESS NOTES
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 She begins chemotherapy tomorrow and has supportive family at home to assist as needed. She is taking a leave of absence from work and is making more of an effort to wear compression products on a consistent basis.  Skin texture of the L LE and body weight have improved since last visit.  Continued education in home program again today and and patient will work on her home program and daily use of garments and return to the clinic in 2 weeks.   Patient will continue to benefit from skilled PT / OT services to address swelling, analyze and address soft tissue restrictions, analyze compression product fit and use, instruct in home lymphedema management program, and measure for compression products to address functional deficits and attain remaining goals    CURRENT STATUS/GOALS    Short Term Goals: To be accomplished in 5 treatments.  Patient and/or caregiver will verbalize 3/3 signs and symptoms of infection without external cueing, in order to reduce the risk of infection and skin impairment and to promote optimal self management of condition.  In progress  2.   Patient to perform 5/5 lymphedema remedial exercises in session with modified independence utilizing HEP handout, in order to promote optimal independence with management of         condition, as well as promote optimal limb volume reduction required for proper fit of donned clothing in 6 weeks. In progress  3.   
instruction during MLD portion of session, Continued education in self MLD technique with bathing and skin care, and Provided patient with the written instructions to follow  [] Progressed/Changed HEP based on:   [x] positioning   [] Kinesiotape   [x] Skin care   [] wound care   [x] other: garment management  Patient / caregiver re-demonstrated bandaging. [] Yes  [] No  Compression bandaging/garment precautions reviewed: [x] Yes  [] No      Other Objective/Functional Measures    Height:  5'0\"  Weight:  164.8 lb today; 168.4 lb 12/10/24 compared to 160.4 lb on evaluation 11/20/24     Full LE volumes:  Not assessed today. Will assess next visit.     Pain Level at end of session (0-10 scale): 0/10    Assessment   Patient returns today for the fourth visit. She begins chemotherapy tomorrow and has supportive family at home to assist as needed. She is taking a leave of absence from work and is making more of an effort to wear compression products on a consistent basis.  Skin texture of the L LE and body weight have improved since last visit.  Continued education in home program again today and and patient will work on her home program and daily use of garments and return to the clinic in 2 weeks.   Patient will continue to benefit from skilled PT / OT services to address swelling, analyze and address soft tissue restrictions, analyze compression product fit and use, instruct in home lymphedema management program, and measure for compression products to address functional deficits and attain remaining goals.    Progress toward goals / Updated goals:  [x]  See Progress Note/Recertification    Short Term Goals: To be accomplished in 5 treatments.  Patient and/or caregiver will verbalize 3/3 signs and symptoms of infection without external cueing, in order to reduce the risk of infection and skin impairment and to promote optimal self management of condition.  In progress  2.   Patient to perform 5/5 lymphedema remedial

## 2024-12-18 ENCOUNTER — HOSPITAL ENCOUNTER (OUTPATIENT)
Facility: HOSPITAL | Age: 74
Setting detail: INFUSION SERIES
Discharge: HOME | End: 2024-12-18
Payer: MEDICARE

## 2024-12-18 VITALS
HEIGHT: 61 IN | HEART RATE: 65 BPM | SYSTOLIC BLOOD PRESSURE: 141 MMHG | RESPIRATION RATE: 18 BRPM | DIASTOLIC BLOOD PRESSURE: 72 MMHG | WEIGHT: 164.5 LBS | OXYGEN SATURATION: 96 % | BODY MASS INDEX: 31.06 KG/M2 | TEMPERATURE: 97.8 F

## 2024-12-18 DIAGNOSIS — C54.1 PAPILLARY SEROUS ENDOMETRIAL ADENOCARCINOMA (HCC): Primary | ICD-10-CM

## 2024-12-18 PROCEDURE — 96417 CHEMO IV INFUS EACH ADDL SEQ: CPT

## 2024-12-18 PROCEDURE — 6360000002 HC RX W HCPCS: Performed by: OBSTETRICS & GYNECOLOGY

## 2024-12-18 PROCEDURE — 96413 CHEMO IV INFUSION 1 HR: CPT

## 2024-12-18 PROCEDURE — 96415 CHEMO IV INFUSION ADDL HR: CPT

## 2024-12-18 PROCEDURE — 2500000003 HC RX 250 WO HCPCS: Performed by: OBSTETRICS & GYNECOLOGY

## 2024-12-18 PROCEDURE — 2580000003 HC RX 258: Performed by: OBSTETRICS & GYNECOLOGY

## 2024-12-18 PROCEDURE — 96375 TX/PRO/DX INJ NEW DRUG ADDON: CPT

## 2024-12-18 RX ORDER — MEPERIDINE HYDROCHLORIDE 25 MG/ML
12.5 INJECTION INTRAMUSCULAR; INTRAVENOUS; SUBCUTANEOUS PRN
Status: DISCONTINUED | OUTPATIENT
Start: 2024-12-18 | End: 2024-12-19 | Stop reason: HOSPADM

## 2024-12-18 RX ORDER — DIPHENHYDRAMINE HYDROCHLORIDE 50 MG/ML
50 INJECTION INTRAMUSCULAR; INTRAVENOUS ONCE
Status: COMPLETED | OUTPATIENT
Start: 2024-12-18 | End: 2024-12-18

## 2024-12-18 RX ORDER — SODIUM CHLORIDE 0.9 % (FLUSH) 0.9 %
5-40 SYRINGE (ML) INJECTION PRN
Status: DISCONTINUED | OUTPATIENT
Start: 2024-12-18 | End: 2024-12-19 | Stop reason: HOSPADM

## 2024-12-18 RX ORDER — ACETAMINOPHEN 325 MG/1
650 TABLET ORAL
Status: DISCONTINUED | OUTPATIENT
Start: 2024-12-18 | End: 2024-12-19 | Stop reason: HOSPADM

## 2024-12-18 RX ORDER — DIPHENHYDRAMINE HYDROCHLORIDE 50 MG/ML
50 INJECTION INTRAMUSCULAR; INTRAVENOUS
Status: DISCONTINUED | OUTPATIENT
Start: 2024-12-18 | End: 2024-12-19 | Stop reason: HOSPADM

## 2024-12-18 RX ORDER — DEXAMETHASONE SODIUM PHOSPHATE 10 MG/ML
10 INJECTION, SOLUTION INTRAMUSCULAR; INTRAVENOUS ONCE
Status: COMPLETED | OUTPATIENT
Start: 2024-12-18 | End: 2024-12-18

## 2024-12-18 RX ORDER — PALONOSETRON 0.05 MG/ML
0.25 INJECTION, SOLUTION INTRAVENOUS ONCE
Status: COMPLETED | OUTPATIENT
Start: 2024-12-18 | End: 2024-12-18

## 2024-12-18 RX ORDER — SODIUM CHLORIDE 9 MG/ML
INJECTION, SOLUTION INTRAVENOUS CONTINUOUS
Status: DISCONTINUED | OUTPATIENT
Start: 2024-12-18 | End: 2024-12-19 | Stop reason: HOSPADM

## 2024-12-18 RX ORDER — ALBUTEROL SULFATE 90 UG/1
4 INHALANT RESPIRATORY (INHALATION) PRN
Status: DISCONTINUED | OUTPATIENT
Start: 2024-12-18 | End: 2024-12-19 | Stop reason: HOSPADM

## 2024-12-18 RX ORDER — EPINEPHRINE 1 MG/ML
0.3 INJECTION, SOLUTION INTRAMUSCULAR; SUBCUTANEOUS PRN
Status: DISCONTINUED | OUTPATIENT
Start: 2024-12-18 | End: 2024-12-19 | Stop reason: HOSPADM

## 2024-12-18 RX ORDER — HYDROCORTISONE SODIUM SUCCINATE 100 MG/2ML
100 INJECTION INTRAMUSCULAR; INTRAVENOUS
Status: DISCONTINUED | OUTPATIENT
Start: 2024-12-18 | End: 2024-12-19 | Stop reason: HOSPADM

## 2024-12-18 RX ORDER — ONDANSETRON 2 MG/ML
8 INJECTION INTRAMUSCULAR; INTRAVENOUS
Status: DISCONTINUED | OUTPATIENT
Start: 2024-12-18 | End: 2024-12-19 | Stop reason: HOSPADM

## 2024-12-18 RX ADMIN — CARBOPLATIN 330 MG: 600 INJECTION, SOLUTION INTRAVENOUS at 13:18

## 2024-12-18 RX ADMIN — FOSAPREPITANT 150 MG: 150 INJECTION, POWDER, LYOPHILIZED, FOR SOLUTION INTRAVENOUS at 09:01

## 2024-12-18 RX ADMIN — DEXAMETHASONE SODIUM PHOSPHATE 10 MG: 10 INJECTION INTRAMUSCULAR; INTRAVENOUS at 08:43

## 2024-12-18 RX ADMIN — PALONOSETRON HYDROCHLORIDE 0.25 MG: 0.25 INJECTION INTRAVENOUS at 08:47

## 2024-12-18 RX ADMIN — DIPHENHYDRAMINE HYDROCHLORIDE 50 MG: 50 INJECTION INTRAMUSCULAR; INTRAVENOUS at 08:52

## 2024-12-18 RX ADMIN — FAMOTIDINE 20 MG: 10 INJECTION INTRAVENOUS at 08:56

## 2024-12-18 RX ADMIN — SODIUM CHLORIDE 200 MG: 9 INJECTION, SOLUTION INTRAVENOUS at 09:41

## 2024-12-18 RX ADMIN — PACLITAXEL 312 MG: 6 INJECTION, SOLUTION, CONCENTRATE INTRAVENOUS at 10:18

## 2024-12-18 NOTE — PROGRESS NOTES
[facility] Visit Note    Pt arrived to [facility] ambulatory in no acute distress for Taxol/Carbo/Keytruda C1D1.  Assessment completed.  R chest port accessed without issue and positive blood return noted.      Two nurses verified prior to administering:  Drug name  Drug dose  Infusion volume or drug volume when prepared in a syringe  Rate of administration  Route of administration  Expiration dates and/or times  Appearance and physical integrity of the drugs  Rate set on infusion pump, when used  Sequencing of drug administration     The following medications administered:  Medications Administered         CARBOplatin (PARAPLATIN) 330 mg in sodium chloride 0.9 % 100 mL chemo IVPB Admin Date  12/18/2024 Action  New Bag Dose  330 mg Rate  286 mL/hr Route  IntraVENous Documented By  Sherri Nunez RN        dexAMETHasone (PF) (DECADRON) injection 10 mg Admin Date  12/18/2024 Action  Given Dose  10 mg Rate   Route  IntraVENous Documented By  Sherri Nunez RN        diphenhydrAMINE (BENADRYL) injection 50 mg Admin Date  12/18/2024 Action  Given Dose  50 mg Rate   Route  IntraVENous Documented By  Sherri Nunez RN        famotidine (PEPCID) 20 mg in sodium chloride (PF) 0.9 % 10 mL injection Admin Date  12/18/2024 Action  Given Dose  20 mg Rate   Route  IntraVENous Documented By  Sherri Nunez RN        fosaprepitant (EMEND) 150 mg in sodium chloride 0.9 % 250 mL IVPB ([id number]) Admin Date  12/18/2024 Action  Given Dose  150 mg Rate  750 mL/hr Route  IntraVENous Documented By  Sherri Nunez RN        PACLitaxel (TAXOL) 312 mg in sodium chloride 0.9 % 250 mL chemo infusion Admin Date  12/18/2024 Action  New Bag Dose  312 mg Rate  109 mL/hr Route  IntraVENous Documented By  Sherri Nunez RN        palonosetron (ALOXI) injection 0.25 mg Admin Date  12/18/2024 Action  Given Dose  0.25 mg Rate   Route  IntraVENous Documented By  Sherri Nunez RN

## 2024-12-20 ENCOUNTER — HOSPITAL ENCOUNTER (OUTPATIENT)
Facility: HOSPITAL | Age: 74
Discharge: HOME | End: 2024-12-23
Attending: UROLOGY
Payer: MEDICARE

## 2024-12-20 DIAGNOSIS — N13.30 HYDRONEPHROSIS, UNSPECIFIED HYDRONEPHROSIS TYPE: ICD-10-CM

## 2024-12-20 PROCEDURE — 6360000002 HC RX W HCPCS: Performed by: UROLOGY

## 2024-12-20 PROCEDURE — 3430000000 HC RX DIAGNOSTIC RADIOPHARMACEUTICAL: Performed by: UROLOGY

## 2024-12-20 PROCEDURE — 78708 K FLOW/FUNCT IMAGE W/DRUG: CPT

## 2024-12-20 PROCEDURE — A9562 TC99M MERTIATIDE: HCPCS | Performed by: UROLOGY

## 2024-12-20 RX ORDER — FUROSEMIDE 10 MG/ML
20 INJECTION INTRAMUSCULAR; INTRAVENOUS ONCE
Status: COMPLETED | OUTPATIENT
Start: 2024-12-20 | End: 2024-12-20

## 2024-12-20 RX ADMIN — FUROSEMIDE 20 MG: 10 INJECTION, SOLUTION INTRAMUSCULAR; INTRAVENOUS at 09:47

## 2024-12-20 RX ADMIN — TECHNESCAN TC 99M MERTIATIDE 10.3 MILLICURIE: 1 INJECTION, POWDER, LYOPHILIZED, FOR SOLUTION INTRAVENOUS at 09:15

## 2024-12-23 ENCOUNTER — HOSPITAL ENCOUNTER (INPATIENT)
Facility: HOSPITAL | Age: 74
LOS: 3 days | Discharge: HOME | End: 2024-12-26
Attending: EMERGENCY MEDICINE | Admitting: STUDENT IN AN ORGANIZED HEALTH CARE EDUCATION/TRAINING PROGRAM
Payer: MEDICARE

## 2024-12-23 ENCOUNTER — APPOINTMENT (OUTPATIENT)
Facility: HOSPITAL | Age: 74
End: 2024-12-23
Payer: MEDICARE

## 2024-12-23 ENCOUNTER — TELEPHONE (OUTPATIENT)
Age: 74
End: 2024-12-23

## 2024-12-23 DIAGNOSIS — R11.2 NAUSEA VOMITING AND DIARRHEA: Primary | ICD-10-CM

## 2024-12-23 DIAGNOSIS — R19.7 NAUSEA VOMITING AND DIARRHEA: Primary | ICD-10-CM

## 2024-12-23 DIAGNOSIS — E87.6 HYPOKALEMIA: ICD-10-CM

## 2024-12-23 PROBLEM — K57.32 SIGMOID DIVERTICULITIS: Status: ACTIVE | Noted: 2024-12-23

## 2024-12-23 LAB
ALBUMIN SERPL-MCNC: 3.2 G/DL (ref 3.5–5)
ALBUMIN/GLOB SERPL: 0.9 (ref 1.1–2.2)
ALP SERPL-CCNC: 66 U/L (ref 45–117)
ALT SERPL-CCNC: 25 U/L (ref 12–78)
ANION GAP SERPL CALC-SCNC: 6 MMOL/L (ref 2–12)
APPEARANCE UR: CLEAR
AST SERPL-CCNC: 22 U/L (ref 15–37)
BACTERIA URNS QL MICRO: NEGATIVE /HPF
BASOPHILS # BLD: 0 K/UL (ref 0–0.1)
BASOPHILS NFR BLD: 1 % (ref 0–1)
BILIRUB SERPL-MCNC: 1 MG/DL (ref 0.2–1)
BILIRUB UR QL: NEGATIVE
BUN SERPL-MCNC: 19 MG/DL (ref 6–20)
BUN/CREAT SERPL: 18 (ref 12–20)
CALCIUM SERPL-MCNC: 8.5 MG/DL (ref 8.5–10.1)
CHLORIDE SERPL-SCNC: 94 MMOL/L (ref 97–108)
CO2 SERPL-SCNC: 32 MMOL/L (ref 21–32)
COLOR UR: ABNORMAL
COMMENT:: NORMAL
CREAT SERPL-MCNC: 1.05 MG/DL (ref 0.55–1.02)
DIFFERENTIAL METHOD BLD: ABNORMAL
EOSINOPHIL # BLD: 0.1 K/UL (ref 0–0.4)
EOSINOPHIL NFR BLD: 3 % (ref 0–7)
EPITH CASTS URNS QL MICRO: ABNORMAL /LPF
ERYTHROCYTE [DISTWIDTH] IN BLOOD BY AUTOMATED COUNT: 13.4 % (ref 11.5–14.5)
GLOBULIN SER CALC-MCNC: 3.6 G/DL (ref 2–4)
GLUCOSE SERPL-MCNC: 124 MG/DL (ref 65–100)
GLUCOSE UR STRIP.AUTO-MCNC: NEGATIVE MG/DL
HCT VFR BLD AUTO: 35.7 % (ref 35–47)
HGB BLD-MCNC: 12.1 G/DL (ref 11.5–16)
HGB UR QL STRIP: NEGATIVE
HYALINE CASTS URNS QL MICRO: ABNORMAL /LPF (ref 0–5)
IMM GRANULOCYTES # BLD AUTO: 0 K/UL (ref 0–0.04)
IMM GRANULOCYTES NFR BLD AUTO: 1 % (ref 0–0.5)
KETONES UR QL STRIP.AUTO: 15 MG/DL
LEUKOCYTE ESTERASE UR QL STRIP.AUTO: NEGATIVE
LYMPHOCYTES # BLD: 0.3 K/UL (ref 0.8–3.5)
LYMPHOCYTES NFR BLD: 8 % (ref 12–49)
MAGNESIUM SERPL-MCNC: 2.2 MG/DL (ref 1.6–2.4)
MCH RBC QN AUTO: 29.7 PG (ref 26–34)
MCHC RBC AUTO-ENTMCNC: 33.9 G/DL (ref 30–36.5)
MCV RBC AUTO: 87.7 FL (ref 80–99)
MONOCYTES # BLD: 0.1 K/UL (ref 0–1)
MONOCYTES NFR BLD: 2 % (ref 5–13)
NEUTS SEG # BLD: 2.7 K/UL (ref 1.8–8)
NEUTS SEG NFR BLD: 85 % (ref 32–75)
NITRITE UR QL STRIP.AUTO: NEGATIVE
NRBC # BLD: 0 K/UL (ref 0–0.01)
NRBC BLD-RTO: 0 PER 100 WBC
PH UR STRIP: 8 (ref 5–8)
PLATELET # BLD AUTO: 211 K/UL (ref 150–400)
PMV BLD AUTO: 10.3 FL (ref 8.9–12.9)
POTASSIUM SERPL-SCNC: 2.6 MMOL/L (ref 3.5–5.1)
PROT SERPL-MCNC: 6.8 G/DL (ref 6.4–8.2)
PROT UR STRIP-MCNC: NEGATIVE MG/DL
RBC # BLD AUTO: 4.07 M/UL (ref 3.8–5.2)
RBC #/AREA URNS HPF: ABNORMAL /HPF (ref 0–5)
RBC MORPH BLD: ABNORMAL
SODIUM SERPL-SCNC: 132 MMOL/L (ref 136–145)
SP GR UR REFRACTOMETRY: 1.01 (ref 1–1.03)
SPECIMEN HOLD: NORMAL
SPECIMEN HOLD: NORMAL
UROBILINOGEN UR QL STRIP.AUTO: 0.2 EU/DL (ref 0.2–1)
WBC # BLD AUTO: 3.2 K/UL (ref 3.6–11)
WBC URNS QL MICRO: ABNORMAL /HPF (ref 0–4)

## 2024-12-23 PROCEDURE — 83735 ASSAY OF MAGNESIUM: CPT

## 2024-12-23 PROCEDURE — 96374 THER/PROPH/DIAG INJ IV PUSH: CPT

## 2024-12-23 PROCEDURE — 96375 TX/PRO/DX INJ NEW DRUG ADDON: CPT

## 2024-12-23 PROCEDURE — 6360000002 HC RX W HCPCS: Performed by: EMERGENCY MEDICINE

## 2024-12-23 PROCEDURE — 6360000002 HC RX W HCPCS: Performed by: STUDENT IN AN ORGANIZED HEALTH CARE EDUCATION/TRAINING PROGRAM

## 2024-12-23 PROCEDURE — 99285 EMERGENCY DEPT VISIT HI MDM: CPT

## 2024-12-23 PROCEDURE — 36415 COLL VENOUS BLD VENIPUNCTURE: CPT

## 2024-12-23 PROCEDURE — 6360000004 HC RX CONTRAST MEDICATION: Performed by: EMERGENCY MEDICINE

## 2024-12-23 PROCEDURE — 74177 CT ABD & PELVIS W/CONTRAST: CPT

## 2024-12-23 PROCEDURE — 6370000000 HC RX 637 (ALT 250 FOR IP): Performed by: STUDENT IN AN ORGANIZED HEALTH CARE EDUCATION/TRAINING PROGRAM

## 2024-12-23 PROCEDURE — 80053 COMPREHEN METABOLIC PANEL: CPT

## 2024-12-23 PROCEDURE — 2500000003 HC RX 250 WO HCPCS: Performed by: STUDENT IN AN ORGANIZED HEALTH CARE EDUCATION/TRAINING PROGRAM

## 2024-12-23 PROCEDURE — 1100000000 HC RM PRIVATE

## 2024-12-23 PROCEDURE — 85025 COMPLETE CBC W/AUTO DIFF WBC: CPT

## 2024-12-23 PROCEDURE — 81001 URINALYSIS AUTO W/SCOPE: CPT

## 2024-12-23 RX ORDER — AMLODIPINE BESYLATE 5 MG/1
10 TABLET ORAL DAILY
Status: DISCONTINUED | OUTPATIENT
Start: 2024-12-24 | End: 2024-12-26 | Stop reason: HOSPADM

## 2024-12-23 RX ORDER — SODIUM CHLORIDE 0.9 % (FLUSH) 0.9 %
5-40 SYRINGE (ML) INJECTION EVERY 12 HOURS SCHEDULED
Status: DISCONTINUED | OUTPATIENT
Start: 2024-12-23 | End: 2024-12-26 | Stop reason: HOSPADM

## 2024-12-23 RX ORDER — MORPHINE SULFATE 2 MG/ML
2 INJECTION, SOLUTION INTRAMUSCULAR; INTRAVENOUS
Status: DISCONTINUED | OUTPATIENT
Start: 2024-12-23 | End: 2024-12-26 | Stop reason: HOSPADM

## 2024-12-23 RX ORDER — HYDROMORPHONE HYDROCHLORIDE 1 MG/ML
0.5 INJECTION, SOLUTION INTRAMUSCULAR; INTRAVENOUS; SUBCUTANEOUS
Status: COMPLETED | OUTPATIENT
Start: 2024-12-23 | End: 2024-12-23

## 2024-12-23 RX ORDER — DEXTROSE MONOHYDRATE, SODIUM CHLORIDE, AND POTASSIUM CHLORIDE 50; 1.49; 4.5 G/1000ML; G/1000ML; G/1000ML
INJECTION, SOLUTION INTRAVENOUS CONTINUOUS
Status: DISPENSED | OUTPATIENT
Start: 2024-12-23 | End: 2024-12-24

## 2024-12-23 RX ORDER — METRONIDAZOLE 500 MG/100ML
500 INJECTION, SOLUTION INTRAVENOUS EVERY 8 HOURS
Status: DISCONTINUED | OUTPATIENT
Start: 2024-12-23 | End: 2024-12-26

## 2024-12-23 RX ORDER — POTASSIUM CHLORIDE 7.45 MG/ML
10 INJECTION INTRAVENOUS PRN
Status: DISCONTINUED | OUTPATIENT
Start: 2024-12-23 | End: 2024-12-26 | Stop reason: HOSPADM

## 2024-12-23 RX ORDER — ONDANSETRON 2 MG/ML
4 INJECTION INTRAMUSCULAR; INTRAVENOUS ONCE
Status: COMPLETED | OUTPATIENT
Start: 2024-12-23 | End: 2024-12-23

## 2024-12-23 RX ORDER — POLYETHYLENE GLYCOL 3350 17 G/17G
17 POWDER, FOR SOLUTION ORAL DAILY PRN
Status: DISCONTINUED | OUTPATIENT
Start: 2024-12-23 | End: 2024-12-26 | Stop reason: HOSPADM

## 2024-12-23 RX ORDER — ACETAMINOPHEN 650 MG/1
650 SUPPOSITORY RECTAL EVERY 6 HOURS PRN
Status: DISCONTINUED | OUTPATIENT
Start: 2024-12-23 | End: 2024-12-26 | Stop reason: HOSPADM

## 2024-12-23 RX ORDER — POTASSIUM CHLORIDE 750 MG/1
40 TABLET, EXTENDED RELEASE ORAL EVERY 4 HOURS
Status: DISPENSED | OUTPATIENT
Start: 2024-12-23 | End: 2024-12-24

## 2024-12-23 RX ORDER — ONDANSETRON 2 MG/ML
4 INJECTION INTRAMUSCULAR; INTRAVENOUS EVERY 6 HOURS PRN
Status: DISCONTINUED | OUTPATIENT
Start: 2024-12-23 | End: 2024-12-24

## 2024-12-23 RX ORDER — SODIUM CHLORIDE AND POTASSIUM CHLORIDE 300; 900 MG/100ML; MG/100ML
INJECTION, SOLUTION INTRAVENOUS ONCE
Status: COMPLETED | OUTPATIENT
Start: 2024-12-23 | End: 2024-12-23

## 2024-12-23 RX ORDER — ONDANSETRON 4 MG/1
4 TABLET, ORALLY DISINTEGRATING ORAL EVERY 8 HOURS PRN
Status: DISCONTINUED | OUTPATIENT
Start: 2024-12-23 | End: 2024-12-24

## 2024-12-23 RX ORDER — SODIUM CHLORIDE 9 MG/ML
INJECTION, SOLUTION INTRAVENOUS PRN
Status: DISCONTINUED | OUTPATIENT
Start: 2024-12-23 | End: 2024-12-26 | Stop reason: HOSPADM

## 2024-12-23 RX ORDER — MAGNESIUM SULFATE IN WATER 40 MG/ML
2000 INJECTION, SOLUTION INTRAVENOUS PRN
Status: DISCONTINUED | OUTPATIENT
Start: 2024-12-23 | End: 2024-12-26 | Stop reason: HOSPADM

## 2024-12-23 RX ORDER — POTASSIUM CHLORIDE 750 MG/1
40 TABLET, EXTENDED RELEASE ORAL PRN
Status: DISCONTINUED | OUTPATIENT
Start: 2024-12-23 | End: 2024-12-26 | Stop reason: HOSPADM

## 2024-12-23 RX ORDER — ACETAMINOPHEN 325 MG/1
650 TABLET ORAL EVERY 6 HOURS PRN
Status: DISCONTINUED | OUTPATIENT
Start: 2024-12-23 | End: 2024-12-26 | Stop reason: HOSPADM

## 2024-12-23 RX ORDER — OXYCODONE HYDROCHLORIDE 5 MG/1
5 TABLET ORAL EVERY 8 HOURS PRN
Status: DISCONTINUED | OUTPATIENT
Start: 2024-12-23 | End: 2024-12-26 | Stop reason: HOSPADM

## 2024-12-23 RX ORDER — IOPAMIDOL 755 MG/ML
100 INJECTION, SOLUTION INTRAVASCULAR
Status: COMPLETED | OUTPATIENT
Start: 2024-12-23 | End: 2024-12-23

## 2024-12-23 RX ORDER — HYDRALAZINE HYDROCHLORIDE 20 MG/ML
10 INJECTION INTRAMUSCULAR; INTRAVENOUS EVERY 6 HOURS PRN
Status: DISCONTINUED | OUTPATIENT
Start: 2024-12-23 | End: 2024-12-26 | Stop reason: HOSPADM

## 2024-12-23 RX ORDER — POTASSIUM CHLORIDE 750 MG/1
40 TABLET, EXTENDED RELEASE ORAL
Status: ACTIVE | OUTPATIENT
Start: 2024-12-23 | End: 2024-12-24

## 2024-12-23 RX ORDER — SODIUM CHLORIDE 0.9 % (FLUSH) 0.9 %
5-40 SYRINGE (ML) INJECTION PRN
Status: DISCONTINUED | OUTPATIENT
Start: 2024-12-23 | End: 2024-12-26 | Stop reason: HOSPADM

## 2024-12-23 RX ADMIN — ONDANSETRON 4 MG: 2 INJECTION INTRAMUSCULAR; INTRAVENOUS at 20:33

## 2024-12-23 RX ADMIN — Medication 10 ML: at 22:32

## 2024-12-23 RX ADMIN — HYDROMORPHONE HYDROCHLORIDE 0.5 MG: 1 INJECTION, SOLUTION INTRAMUSCULAR; INTRAVENOUS; SUBCUTANEOUS at 20:34

## 2024-12-23 RX ADMIN — IOPAMIDOL 100 ML: 755 INJECTION, SOLUTION INTRAVENOUS at 21:01

## 2024-12-23 RX ADMIN — POTASSIUM CHLORIDE AND SODIUM CHLORIDE: 900; 300 INJECTION, SOLUTION INTRAVENOUS at 20:28

## 2024-12-23 RX ADMIN — METRONIDAZOLE 500 MG: 500 INJECTION, SOLUTION INTRAVENOUS at 23:34

## 2024-12-23 RX ADMIN — WATER 2000 MG: 1 INJECTION INTRAMUSCULAR; INTRAVENOUS; SUBCUTANEOUS at 23:01

## 2024-12-23 RX ADMIN — POTASSIUM CHLORIDE 40 MEQ: 750 TABLET, FILM COATED, EXTENDED RELEASE ORAL at 23:20

## 2024-12-23 ASSESSMENT — PAIN DESCRIPTION - LOCATION
LOCATION: ABDOMEN
LOCATION: ABDOMEN

## 2024-12-23 ASSESSMENT — PAIN DESCRIPTION - ONSET: ONSET: ON-GOING

## 2024-12-23 ASSESSMENT — PAIN DESCRIPTION - DESCRIPTORS: DESCRIPTORS: SHARP

## 2024-12-23 ASSESSMENT — PAIN SCALES - GENERAL
PAINLEVEL_OUTOF10: 8
PAINLEVEL_OUTOF10: 5
PAINLEVEL_OUTOF10: 8

## 2024-12-23 ASSESSMENT — PAIN - FUNCTIONAL ASSESSMENT
PAIN_FUNCTIONAL_ASSESSMENT: 0-10
PAIN_FUNCTIONAL_ASSESSMENT: PREVENTS OR INTERFERES SOME ACTIVE ACTIVITIES AND ADLS
PAIN_FUNCTIONAL_ASSESSMENT: 0-10

## 2024-12-23 ASSESSMENT — PAIN DESCRIPTION - ORIENTATION: ORIENTATION: MID

## 2024-12-23 ASSESSMENT — PAIN DESCRIPTION - FREQUENCY: FREQUENCY: CONTINUOUS

## 2024-12-23 ASSESSMENT — PAIN DESCRIPTION - PAIN TYPE: TYPE: ACUTE PAIN

## 2024-12-23 NOTE — ED TRIAGE NOTES
Patient started Ketruda last week with first dose. Patient started with abdominal pain and diarrhea yesterday. + vomiting. Denies urinary symptoms.

## 2024-12-23 NOTE — TELEPHONE ENCOUNTER
Spoke with patient, she states she has been having worsening abdominal/pelvic pain today, nausea/vomiting even of liquids, and persistent diarrhea with at least 5 large bouts of watery diarrhea today. She feels weak and cannot control the diarrhea at times. The diarrhea is worse than the nausea/vomiting per pt. Based on her amount of diarrhea, recommend she proceed to the ED for evaluation and IV hydration. Pt expressed not wanting to go to the ED, but considering her weakness and inability to tolerate or keep fluids, I stressed the importance of being seen. Pt agreeable and will head to ED, I recommended she come to [facility].

## 2024-12-23 NOTE — TELEPHONE ENCOUNTER
Patient  this morning, she states she believes she is having a reaction to the keytruda, she had one episode of vomiting last evening, she can't eat, \"pelvic pains are unbearable\", she is having diarrhea, when asked how many episodes of diarrhea she said \"a lot, it is non stop\", no fever or shortness of breath, please call patient [phone]

## 2024-12-24 PROBLEM — E87.6 HYPOKALEMIA: Status: ACTIVE | Noted: 2024-12-24

## 2024-12-24 PROBLEM — R19.7 NAUSEA VOMITING AND DIARRHEA: Status: ACTIVE | Noted: 2024-12-24

## 2024-12-24 PROBLEM — R11.2 NAUSEA VOMITING AND DIARRHEA: Status: ACTIVE | Noted: 2024-12-24

## 2024-12-24 PROBLEM — C54.1 ENDOMETRIAL CANCER (HCC): Status: ACTIVE | Noted: 2024-12-24

## 2024-12-24 LAB
ALBUMIN SERPL-MCNC: 3.1 G/DL (ref 3.5–5)
ALBUMIN/GLOB SERPL: 1 (ref 1.1–2.2)
ALP SERPL-CCNC: 58 U/L (ref 45–117)
ALT SERPL-CCNC: 24 U/L (ref 12–78)
ANION GAP SERPL CALC-SCNC: 4 MMOL/L (ref 2–12)
ANION GAP SERPL CALC-SCNC: 7 MMOL/L (ref 2–12)
AST SERPL-CCNC: 18 U/L (ref 15–37)
BASOPHILS # BLD: 0 K/UL (ref 0–0.1)
BASOPHILS NFR BLD: 0 % (ref 0–1)
BILIRUB SERPL-MCNC: 0.9 MG/DL (ref 0.2–1)
BUN SERPL-MCNC: 16 MG/DL (ref 6–20)
BUN SERPL-MCNC: 16 MG/DL (ref 6–20)
BUN/CREAT SERPL: 15 (ref 12–20)
BUN/CREAT SERPL: 18 (ref 12–20)
CALCIUM SERPL-MCNC: 7.3 MG/DL (ref 8.5–10.1)
CALCIUM SERPL-MCNC: 8 MG/DL (ref 8.5–10.1)
CHLORIDE SERPL-SCNC: 106 MMOL/L (ref 97–108)
CHLORIDE SERPL-SCNC: 97 MMOL/L (ref 97–108)
CO2 SERPL-SCNC: 26 MMOL/L (ref 21–32)
CO2 SERPL-SCNC: 32 MMOL/L (ref 21–32)
CREAT SERPL-MCNC: 0.91 MG/DL (ref 0.55–1.02)
CREAT SERPL-MCNC: 1.05 MG/DL (ref 0.55–1.02)
DIFFERENTIAL METHOD BLD: ABNORMAL
EOSINOPHIL # BLD: 0.1 K/UL (ref 0–0.4)
EOSINOPHIL NFR BLD: 5 % (ref 0–7)
ERYTHROCYTE [DISTWIDTH] IN BLOOD BY AUTOMATED COUNT: 13.7 % (ref 11.5–14.5)
GLOBULIN SER CALC-MCNC: 3.1 G/DL (ref 2–4)
GLUCOSE SERPL-MCNC: 107 MG/DL (ref 65–100)
GLUCOSE SERPL-MCNC: 112 MG/DL (ref 65–100)
HCT VFR BLD AUTO: 33 % (ref 35–47)
HGB BLD-MCNC: 11 G/DL (ref 11.5–16)
IMM GRANULOCYTES # BLD AUTO: 0 K/UL
IMM GRANULOCYTES NFR BLD AUTO: 0 %
LYMPHOCYTES # BLD: 0.3 K/UL (ref 0.8–3.5)
LYMPHOCYTES NFR BLD: 14 % (ref 12–49)
MCH RBC QN AUTO: 29.8 PG (ref 26–34)
MCHC RBC AUTO-ENTMCNC: 33.3 G/DL (ref 30–36.5)
MCV RBC AUTO: 89.4 FL (ref 80–99)
MONOCYTES # BLD: 0 K/UL (ref 0–1)
MONOCYTES NFR BLD: 0 % (ref 5–13)
NEUTS SEG # BLD: 2 K/UL (ref 1.8–8)
NEUTS SEG NFR BLD: 81 % (ref 32–75)
NRBC # BLD: 0 K/UL (ref 0–0.01)
NRBC BLD-RTO: 0 PER 100 WBC
PLATELET # BLD AUTO: 161 K/UL (ref 150–400)
PMV BLD AUTO: 9.8 FL (ref 8.9–12.9)
POTASSIUM SERPL-SCNC: 2.7 MMOL/L (ref 3.5–5.1)
POTASSIUM SERPL-SCNC: 3.7 MMOL/L (ref 3.5–5.1)
PROT SERPL-MCNC: 6.2 G/DL (ref 6.4–8.2)
RBC # BLD AUTO: 3.69 M/UL (ref 3.8–5.2)
RBC MORPH BLD: ABNORMAL
SODIUM SERPL-SCNC: 136 MMOL/L (ref 136–145)
SODIUM SERPL-SCNC: 136 MMOL/L (ref 136–145)
WBC # BLD AUTO: 2.4 K/UL (ref 3.6–11)

## 2024-12-24 PROCEDURE — 6360000002 HC RX W HCPCS: Performed by: STUDENT IN AN ORGANIZED HEALTH CARE EDUCATION/TRAINING PROGRAM

## 2024-12-24 PROCEDURE — 6360000002 HC RX W HCPCS: Performed by: HOSPITALIST

## 2024-12-24 PROCEDURE — 1200000000 HC SEMI PRIVATE

## 2024-12-24 PROCEDURE — 2500000003 HC RX 250 WO HCPCS: Performed by: HOSPITALIST

## 2024-12-24 PROCEDURE — 80048 BASIC METABOLIC PNL TOTAL CA: CPT

## 2024-12-24 PROCEDURE — 85025 COMPLETE CBC W/AUTO DIFF WBC: CPT

## 2024-12-24 PROCEDURE — 97116 GAIT TRAINING THERAPY: CPT

## 2024-12-24 PROCEDURE — 6370000000 HC RX 637 (ALT 250 FOR IP): Performed by: HOSPITALIST

## 2024-12-24 PROCEDURE — 2500000003 HC RX 250 WO HCPCS: Performed by: STUDENT IN AN ORGANIZED HEALTH CARE EDUCATION/TRAINING PROGRAM

## 2024-12-24 PROCEDURE — 6370000000 HC RX 637 (ALT 250 FOR IP): Performed by: STUDENT IN AN ORGANIZED HEALTH CARE EDUCATION/TRAINING PROGRAM

## 2024-12-24 PROCEDURE — 97161 PT EVAL LOW COMPLEX 20 MIN: CPT

## 2024-12-24 PROCEDURE — 2580000003 HC RX 258: Performed by: HOSPITALIST

## 2024-12-24 PROCEDURE — 99223 1ST HOSP IP/OBS HIGH 75: CPT | Performed by: OBSTETRICS & GYNECOLOGY

## 2024-12-24 RX ORDER — ONDANSETRON 2 MG/ML
4 INJECTION INTRAMUSCULAR; INTRAVENOUS EVERY 6 HOURS PRN
Status: DISCONTINUED | OUTPATIENT
Start: 2024-12-24 | End: 2024-12-26 | Stop reason: HOSPADM

## 2024-12-24 RX ORDER — DEXTROSE MONOHYDRATE, SODIUM CHLORIDE, AND POTASSIUM CHLORIDE 50; 1.49; 4.5 G/1000ML; G/1000ML; G/1000ML
INJECTION, SOLUTION INTRAVENOUS CONTINUOUS
Status: DISPENSED | OUTPATIENT
Start: 2024-12-24 | End: 2024-12-25

## 2024-12-24 RX ORDER — PROCHLORPERAZINE MALEATE 10 MG
10 TABLET ORAL ONCE
Status: COMPLETED | OUTPATIENT
Start: 2024-12-24 | End: 2024-12-24

## 2024-12-24 RX ORDER — ONDANSETRON 4 MG/1
4 TABLET, ORALLY DISINTEGRATING ORAL EVERY 6 HOURS PRN
Status: DISCONTINUED | OUTPATIENT
Start: 2024-12-24 | End: 2024-12-26 | Stop reason: HOSPADM

## 2024-12-24 RX ORDER — HYDRALAZINE HYDROCHLORIDE 20 MG/ML
10 INJECTION INTRAMUSCULAR; INTRAVENOUS EVERY 8 HOURS PRN
Status: DISCONTINUED | OUTPATIENT
Start: 2024-12-24 | End: 2024-12-26 | Stop reason: HOSPADM

## 2024-12-24 RX ORDER — HEPARIN SODIUM 5000 [USP'U]/ML
5000 INJECTION, SOLUTION INTRAVENOUS; SUBCUTANEOUS EVERY 8 HOURS SCHEDULED
Status: DISCONTINUED | OUTPATIENT
Start: 2024-12-24 | End: 2024-12-26 | Stop reason: HOSPADM

## 2024-12-24 RX ORDER — LACTOBACILLUS RHAMNOSUS GG 10B CELL
2 CAPSULE ORAL
Status: DISCONTINUED | OUTPATIENT
Start: 2024-12-24 | End: 2024-12-26 | Stop reason: HOSPADM

## 2024-12-24 RX ADMIN — POTASSIUM CHLORIDE 10 MEQ: 7.46 INJECTION, SOLUTION INTRAVENOUS at 03:09

## 2024-12-24 RX ADMIN — MORPHINE SULFATE 2 MG: 2 INJECTION, SOLUTION INTRAMUSCULAR; INTRAVENOUS at 05:54

## 2024-12-24 RX ADMIN — AMLODIPINE BESYLATE 10 MG: 5 TABLET ORAL at 09:53

## 2024-12-24 RX ADMIN — POTASSIUM CHLORIDE 10 MEQ: 7.46 INJECTION, SOLUTION INTRAVENOUS at 01:14

## 2024-12-24 RX ADMIN — Medication 2 CAPSULE: at 09:52

## 2024-12-24 RX ADMIN — POTASSIUM CHLORIDE 10 MEQ: 7.46 INJECTION, SOLUTION INTRAVENOUS at 01:15

## 2024-12-24 RX ADMIN — MORPHINE SULFATE 2 MG: 2 INJECTION, SOLUTION INTRAMUSCULAR; INTRAVENOUS at 00:35

## 2024-12-24 RX ADMIN — METRONIDAZOLE 500 MG: 500 INJECTION, SOLUTION INTRAVENOUS at 15:08

## 2024-12-24 RX ADMIN — METRONIDAZOLE 500 MG: 500 INJECTION, SOLUTION INTRAVENOUS at 22:27

## 2024-12-24 RX ADMIN — ONDANSETRON 4 MG: 2 INJECTION INTRAMUSCULAR; INTRAVENOUS at 23:46

## 2024-12-24 RX ADMIN — ACETAMINOPHEN 650 MG: 325 TABLET ORAL at 21:11

## 2024-12-24 RX ADMIN — ONDANSETRON 4 MG: 2 INJECTION INTRAMUSCULAR; INTRAVENOUS at 05:54

## 2024-12-24 RX ADMIN — ONDANSETRON 4 MG: 2 INJECTION INTRAMUSCULAR; INTRAVENOUS at 00:35

## 2024-12-24 RX ADMIN — METRONIDAZOLE 500 MG: 500 INJECTION, SOLUTION INTRAVENOUS at 07:10

## 2024-12-24 RX ADMIN — PROCHLORPERAZINE MALEATE 10 MG: 10 TABLET ORAL at 10:32

## 2024-12-24 RX ADMIN — MORPHINE SULFATE 2 MG: 2 INJECTION, SOLUTION INTRAMUSCULAR; INTRAVENOUS at 09:54

## 2024-12-24 RX ADMIN — HEPARIN SODIUM 5000 UNITS: 5000 INJECTION INTRAVENOUS; SUBCUTANEOUS at 22:26

## 2024-12-24 RX ADMIN — HEPARIN SODIUM 5000 UNITS: 5000 INJECTION INTRAVENOUS; SUBCUTANEOUS at 15:07

## 2024-12-24 RX ADMIN — MORPHINE SULFATE 2 MG: 2 INJECTION, SOLUTION INTRAMUSCULAR; INTRAVENOUS at 23:46

## 2024-12-24 RX ADMIN — DEXTROSE MONOHYDRATE, SODIUM CHLORIDE, AND POTASSIUM CHLORIDE: 50; 4.5; 1.49 INJECTION, SOLUTION INTRAVENOUS at 19:04

## 2024-12-24 RX ADMIN — HEPARIN SODIUM 5000 UNITS: 5000 INJECTION INTRAVENOUS; SUBCUTANEOUS at 09:53

## 2024-12-24 RX ADMIN — POTASSIUM CHLORIDE 10 MEQ: 7.46 INJECTION, SOLUTION INTRAVENOUS at 03:10

## 2024-12-24 RX ADMIN — POTASSIUM CHLORIDE 10 MEQ: 7.46 INJECTION, SOLUTION INTRAVENOUS at 05:04

## 2024-12-24 RX ADMIN — CEFEPIME 2000 MG: 2 INJECTION, POWDER, FOR SOLUTION INTRAVENOUS at 21:11

## 2024-12-24 RX ADMIN — POTASSIUM CHLORIDE, DEXTROSE MONOHYDRATE AND SODIUM CHLORIDE: 150; 5; 450 INJECTION, SOLUTION INTRAVENOUS at 03:08

## 2024-12-24 ASSESSMENT — PAIN DESCRIPTION - DESCRIPTORS
DESCRIPTORS: ACHING

## 2024-12-24 ASSESSMENT — PAIN DESCRIPTION - ORIENTATION
ORIENTATION: RIGHT;LEFT
ORIENTATION: ANTERIOR

## 2024-12-24 ASSESSMENT — PAIN DESCRIPTION - LOCATION
LOCATION: ABDOMEN

## 2024-12-24 ASSESSMENT — PAIN SCALES - GENERAL
PAINLEVEL_OUTOF10: 6
PAINLEVEL_OUTOF10: 3
PAINLEVEL_OUTOF10: 5
PAINLEVEL_OUTOF10: 5

## 2024-12-24 NOTE — ED NOTES
ED TO INPATIENT SBAR HANDOFF    Patient Name: Elenita Benitez   :  1950  74 y.o.   MRN:  [id number]  ED Room #:  ER25/25     Situation  Code Status: Full Code   Allergies: Codeine, Penicillins, Sulfa antibiotics, and Ciprofloxacin  Weight: Patient Vitals for the past 96 hrs (Last 3 readings):   Weight   24 1635 71.4 kg (157 lb 6.5 oz)       Arrived from: home    Chief Complaint:   Chief Complaint   Patient presents with    Abdominal Pain       Hospital Problem/Diagnosis:  Principal Problem:    Sigmoid diverticulitis  Resolved Problems:    * No resolved hospital problems. *      Mobility: no current mobility problem   ED Fall Risk: Presents to emergency department  because of falls (Syncope, seizure, or loss of consciousness): No, Age > 70: No, Altered Mental Status, Intoxication with alcohol or substance confusion (Disorientation, impaired judgment, poor safety awaremess, or inability to follow instructions): No, Impaired Mobility: Ambulates or transfers with assistive devices or assistance; Unable to ambulate or transer.: No, Nursing Judgement: No   Fell in ED or prior to admission: no   Restraints: no     Sitter: no   Family/Caregiver Present: yes    Neet to know social/safety information: None    Background  History:   Past Medical History:   Diagnosis Date    Arthritis     OSTEO HIP ANNE.    Cancer (HCC)     uterine     Cataract 2022    Beginning stage    GERD (gastroesophageal reflux disease)     Hypertension     Kidney stones     Lichen planus     oral    PUD (peptic ulcer disease)     Uterine cancer (HCC) 2021       Assessment    Abnormal Assessment Findings: Low K+, initial 2.6, diffuse abdominal pain, diverticulitis  Imaging:   CT ABDOMEN PELVIS W IV CONTRAST Additional Contrast? None   Final Result      1. Increased bulky lymphadenopathy in the retroperitoneum. 2 lymph nodes lateral   to the left psoas muscle have increased as well.   2. Unchanged severe left hydronephrosis secondary

## 2024-12-24 NOTE — H&P
History & Physical    Primary Care Provider: Artis Bangura MD  Source of Information: Patient and chart review    History of Presenting Illness:   Elenita Benitez is a 74 y.o. female with a history of endometrial carcinoma recently started on Keytruda, hypertension, GERD who presented to ED with complaints of nausea, vomiting, generalized cramping abdominal pain and loose stools.  Symptoms have been ongoing for the last 24 hours.  She has had limited p.o. intake as a result.  She has attempted home treatment with Imodium which has been essentially ineffective.    The patient denies any fever, chills, chest pain, cough, congestion, recent illness, palpitations, or dysuria.  Remarkable vitals on ER Presentation: BP 1940/79  Labs Remarkable for: Potassium 2.6, WBC 3.2  ER Images: CT abdomen and pelvis with contrast:   Increased bulky retroperitoneal lymphadenopathy.  Unchanged severe left hydronephrosis.  Numerous colonic diverticula with wall thickening in the sigmoid colon likely due to diverticulitis.  ER Rx: Dilaudid 0.5 mg, p.o. potassium 40 mill equivalent     Review of Systems:  Pertinent items are noted in the History of Present Illness.     Past Medical History:   Diagnosis Date    Arthritis     OSTEO HIP ANNE.    Cancer (HCC)     uterine     Cataract 2022    Beginning stage    GERD (gastroesophageal reflux disease)     Hypertension     Kidney stones     Lichen planus     oral    PUD (peptic ulcer disease)     Uterine cancer (HCC) 2021      Past Surgical History:   Procedure Laterality Date     SECTION      X2    CHOLECYSTECTOMY      COLONOSCOPY N/A 2022    COLONOSCOPY performed by Norberto Vigil MD at [facility] ENDOSCOPY    COLONOSCOPY N/A 2022    COLONOSCOPY performed by Norberto Vigil MD at [facility] ENDOSCOPY    COLONOSCOPY N/A 10/16/2017    COLONOSCOPY performed by Norberto Vigil MD at [facility] ENDOSCOPY    COLONOSCOPY FLX DX W/COLLJ SPEC WHEN PFRMD  01/20/2012     x 2     16.0 g/dL    Hematocrit 35.7 35.0 - 47.0 %    MCV 87.7 80.0 - 99.0 FL    MCH 29.7 26.0 - 34.0 PG    MCHC 33.9 30.0 - 36.5 g/dL    RDW 13.4 11.5 - 14.5 %    Platelets 211 150 - 400 K/uL    MPV 10.3 8.9 - 12.9 FL    Nucleated RBCs 0.0 0  WBC    nRBC 0.00 0.00 - 0.01 K/uL    Neutrophils % 85 (H) 32 - 75 %    Lymphocytes % 8 (L) 12 - 49 %    Monocytes % 2 (L) 5 - 13 %    Eosinophils % 3 0 - 7 %    Basophils % 1 0 - 1 %    Immature Granulocytes % 1 (H) 0.0 - 0.5 %    Neutrophils Absolute 2.7 1.8 - 8.0 K/UL    Lymphocytes Absolute 0.3 (L) 0.8 - 3.5 K/UL    Monocytes Absolute 0.1 0.0 - 1.0 K/UL    Eosinophils Absolute 0.1 0.0 - 0.4 K/UL    Basophils Absolute 0.0 0.0 - 0.1 K/UL    Immature Granulocytes Absolute 0.0 0.00 - 0.04 K/UL    Differential Type SMEAR SCANNED      RBC Comment NORMOCYTIC, NORMOCHROMIC     Comprehensive Metabolic Panel    Collection Time: 12/23/24  4:42 PM   Result Value Ref Range    Sodium 132 (L) 136 - 145 mmol/L    Potassium 2.6 (LL) 3.5 - 5.1 mmol/L    Chloride 94 (L) 97 - 108 mmol/L    CO2 32 21 - 32 mmol/L    Anion Gap 6 2 - 12 mmol/L    Glucose 124 (H) 65 - 100 mg/dL    BUN 19 6 - 20 MG/DL    Creatinine 1.05 (H) 0.55 - 1.02 MG/DL    BUN/Creatinine Ratio 18 12 - 20      Est, Glom Filt Rate 56 (L) >60 ml/min/1.73m2    Calcium 8.5 8.5 - 10.1 MG/DL    Total Bilirubin 1.0 0.2 - 1.0 MG/DL    ALT 25 12 - 78 U/L    AST 22 15 - 37 U/L    Alk Phosphatase 66 45 - 117 U/L    Total Protein 6.8 6.4 - 8.2 g/dL    Albumin 3.2 (L) 3.5 - 5.0 g/dL    Globulin 3.6 2.0 - 4.0 g/dL    Albumin/Globulin Ratio 0.9 (L) 1.1 - 2.2     Extra Tubes Hold    Collection Time: 12/23/24  4:42 PM   Result Value Ref Range    Specimen HOld 1RED, 1BLUE     Comment:        Add-on orders for these samples will be processed based on acceptable specimen integrity and analyte stability, which may vary by analyte.   Magnesium    Collection Time: 12/23/24  4:42 PM   Result Value Ref Range    Magnesium 2.2 1.6 - 2.4 mg/dL

## 2024-12-24 NOTE — ED PROVIDER NOTES
Movements: Extraocular movements intact.      Conjunctiva/sclera: Conjunctivae normal.   Cardiovascular:      Rate and Rhythm: Normal rate.   Pulmonary:      Effort: Pulmonary effort is normal. No respiratory distress.   Abdominal:      General: There is no distension.      Tenderness: There is no abdominal tenderness.   Musculoskeletal:         General: No deformity.      Cervical back: Normal range of motion.   Skin:     General: Skin is warm and dry.   Neurological:      General: No focal deficit present.      Mental Status: She is alert.         DIAGNOSTIC RESULTS     EKG: All EKG's are interpreted by the Emergency Department Physician listed in the interpretation in the absence of a cardiologist and may also be found below under Reassessment/ED Course.    ED EKG Interpretation:  Time: 8:31 PM  Rhythm: sinus bradycardia; and regular . Rate (approx.): 57; Axis: normal; P wave: normal; QRS interval: LVH; ST/T wave: non-specific changes; Other findings: abnormal.  EKG interpreted by Bette Patrick MD.        RADIOLOGY:   Non-plain film images such as CT, Ultrasound and MRI are read by the radiologist. Plain radiographic images are visualized and preliminarily interpreted by the emergency physician with the below findings:    Interpretation per the Radiologist below, if available at the time of this note:    No orders to display        LABS:  Labs Reviewed   CBC WITH AUTO DIFFERENTIAL - Abnormal; Notable for the following components:       Result Value    WBC 3.2 (*)     Neutrophils % 85 (*)     Lymphocytes % 8 (*)     Monocytes % 2 (*)     Immature Granulocytes % 1 (*)     Lymphocytes Absolute 0.3 (*)     All other components within normal limits   COMPREHENSIVE METABOLIC PANEL - Abnormal; Notable for the following components:    Sodium 132 (*)     Potassium 2.6 (*)     Chloride 94 (*)     Glucose 124 (*)     Creatinine 1.05 (*)     Est, Glom Filt Rate 56 (*)     Albumin 3.2 (*)     Albumin/Globulin Ratio 0.9 (*)      All other components within normal limits   URINE CULTURE HOLD SAMPLE   EXTRA TUBES HOLD   MAGNESIUM   URINALYSIS WITH MICROSCOPIC       All other labs were within normal range or not returned as of this dictation.    EMERGENCY DEPARTMENT COURSE and DIFFERENTIAL DIAGNOSIS/MDM:   Vitals:    Vitals:    12/23/24 1635   BP: (!) 166/74   Pulse: 72   Resp: 20   Temp: 98.7 °F (37.1 °C)   TempSrc: Oral   SpO2: 99%   Weight: 71.4 kg (157 lb 6.5 oz)   Height: 1.549 m (5' 1\")           Medical Decision Making  Amount and/or Complexity of Data Reviewed  Labs: ordered.  Radiology: ordered.  ECG/medicine tests: ordered.    Risk  Prescription drug management.  Decision regarding hospitalization.            REASSESSMENT      8:20 PM   Discussed with Dr Nicole.  Agrees with plan for admit to hospitalist for IV hydration and potassium replacement.  Recommend obtaining CT abd/pelvis to r/o colitis though suspects her symptoms due to Keytruda.      CRITICAL CARE TIME   Total Critical Care time was 35 minutes, excluding separately reportable procedures.     CONSULTS:  IP CONSULT TO GYNECOLOGIC ONCOLOGY    PROCEDURES:  Unless otherwise noted below, none     Procedures        FINAL IMPRESSION      1. Nausea vomiting and diarrhea    2. Hypokalemia          DISPOSITION/PLAN   DISPOSITION      Perfect Serve Consult for Admission  8:22 PM    ED Room Number: ER25/25  Patient Name and age:  Elenita Benitez 74 y.o.  female  Working Diagnosis:   1. Nausea vomiting and diarrhea    2. Hypokalemia        COVID-19 Suspicion: No  Sepsis present:  No  Reassessment needed: No  Code Status:  Full Code  Readmission: No  Isolation Requirements: no  Recommended Level of Care: telemetry  Department: [facility] - [phone]  Consulting Provider: Ciaran    Other:  h/o endometrial carcinoma, followed by Dr Nicole.  Started on Keytruda last week and yesterday developed vomiting and diarrhea which has continued today.  K 2.6, Mg normal.  I have

## 2024-12-24 NOTE — PROGRESS NOTES
Clinical Pharmacy Note - Extended Infusion Cefepime Dosing    This patient has been ordered cefepime at 2 g IV every 12 hours. P&T protocol allows automatic substitution to extended infusion cefepime and dose adjustment based on indication and renal function (adjustment required if creatinine clearance < 60 mL/min).    Indication: intra-abdominal infection    CrCl: 49    Per P&T protocol, the cefepime dosing will be adjusted to 2 g IV every 24 hours (each dose will be infused over 4 hours) for crcl 30-59 ml/min.    Please call pharmacy with any questions.

## 2024-12-24 NOTE — PROGRESS NOTES
Occupational Therapy  12/24/24    Chart reviewed and OT order received. Met with pt who expressed no concerns regarding performing ADLs. Pt has been up to bathroom. Will sign off from acute OT and pt in agreement. Lauryn Marin, OT

## 2024-12-24 NOTE — CONSULTS
PEDRO [address] [address]          GASTROENTEROLOGY CONSULTATION NOTE  Lima Paredes PA-C  [phone] office  NP/PA in-hospital M-F until 4:30PM  After 5PM or on weekends, please call  for physician on call        NAME:  Elenita Benitez   :   1950   MRN:   [id number]       Referring Physician: Galdino    Consult Date: 2024 9:39 AM    Chief Complaint: Diverticulitis     History of Present Illness:  Patient is a 74 y.o. who is seen in consultation at the request of Dr. Ahmadi for diverticulitis. The patient has a past medical history significant for endometrial carcinoma recently started on Keytruda, hypertension, GERD. Initially presented to the ER for nausea, vomiting, abdominal pain, and diarrhea. Stools are urgent and watery. No hematochezia or melena. Abdominal pain is located in the LLQ. Reports a previously scheduled EGD/colonoscopy with Dr. Vigil but had to cancel due to symptoms. Reports dysphagia, no odynophagia. Takes pepcid for GERD.    Last colonoscopy 2022: sigmoid diverticulosis, rectosigmoid lipoma, radiation proctitis, grade 1 internal hemorrhoids, recall 5 years    I have reviewed the emergency room note, hospital admission note, notes by all other clinicians who have seen the patient during this hospitalization to date. I have reviewed the problem list and the reason for this hospitalization. I have reviewed the allergies and the medications the patient was taking at home prior to this hospitalization.    PMH:  Past Medical History:   Diagnosis Date    Arthritis     OSTEO HIP ANNE.    Cancer (HCC)     uterine     Cataract 2022    Beginning stage    GERD (gastroesophageal reflux disease)     Hypertension     Kidney stones     Lichen planus     oral    PUD (peptic ulcer disease)     Uterine cancer (HCC) 2021       PSH:  Past Surgical History:   Procedure Laterality Date     SECTION      X2    CHOLECYSTECTOMY

## 2024-12-24 NOTE — PROGRESS NOTES
Hospitalist Progress Note                               Vic Ahmadi MD                                     Answering service: [phone]                               OR [phone] from in house phone                                         Date of Service:  2024  NAME:  Elenita Benitez  :  1950  MRN:  [id number]      Admission Summary:   Elenita Benitez is a 74 y.o. female with a history of endometrial carcinoma recently started on Keytruda, hypertension, GERD who presented to ED with complaints of nausea, vomiting, generalized cramping abdominal pain and loose stools.  Symptoms have been ongoing for the last 24 hours.  She has had limited p.o. intake as a result.  She has attempted home treatment with Imodium which has been essentially ineffective.    Reason for follow up:     Diarrhea and vomiting. No vomiting since admission.     Assessment & Plan:     Endometrial Cancer on Keytruda/ Carb : Seen with Gyn Oncology . Discussed plan.  Sigmoid Diverticulitis: Focal probably not Immune mediated colitis. She has history of Diverticulitis in past as well had last Colonoscopy few years ago.  Nausea and vomiting: Probably due to chemo on Zofran.  Hypertension: Bp is ok .  Hypokalemia: Due to Diarrhea. Replace k.  Severe Left HN due to bulky LN enlargement not new has out patient follow up with Urology. Creatinine 1.05            Diet: clears  Code status: Full  DVT prophylaxis: Lovenox  Care Plan discussed with: Patient and Oncology team   Patient has given Verbal permission to discuss medical care with   persons present in the room and and also with contact as listed on face sheet.   Discharge planning/disposition:TBD          Review of Systems:   Pertinent items are noted in HPI.     Physical Examination:      Last 24hrs VS reviewed since prior progress note. Most recent are:  Vitals:    24 0545   BP:    Pulse: 80   Resp:    Temp:    SpO2:

## 2024-12-24 NOTE — PROGRESS NOTES
Behavioral-Environmental Outcomes: None Identified  Food/Nutrient Intake Outcomes: Progression of Nutrition, Diet Advancement/Tolerance, Food and Nutrient Intake, Supplement Intake  Physical Signs/Symptoms Outcomes: Biochemical Data, GI Status, Weight    Discharge Planning:    Continue Oral Nutrition Supplement     Anne Palmer RD  Available via [employer]

## 2024-12-24 NOTE — CONSULTS
secondary to obstruction of the ureter  by the lymphadenopathy.  3. Numerous clonic diverticula with wall thickening in the sigmoid colon likely  due to diverticulitis.     Electronically signed by AARON HOLDER    IMPRESSION/PLAN:    Patient Active Problem List   Diagnosis    GERD (gastroesophageal reflux disease)    Abnormal finding on EKG    Essential hypertension    Hip arthritis    Opioid use, unspecified with unspecified opioid-induced disorder (HCC)    Lichen planus    Encounter for antineoplastic chemotherapy    Papillary serous endometrial adenocarcinoma (HCC)    PUD (peptic ulcer disease)    Arthritis    Chemotherapy-induced neuropathy (HCC)    Sigmoid diverticulitis       I reviewed with Elenita Benitez her medical records, physical exam, and review of symptoms.     74 y.o. female with high-grade endometrial cancer. She recently had a recurrence and was started back on chemotherapy last week, including carbo/taxol/keytruda. Now with nausea, vomiting, abd pain, and diarrhea. CT scan showed acute sigmoid diverticulitis. Suspect many of her symptoms are secondary to her treatment, namely the keytruda, but that her diarrhea and pelvic pain are largely from diverticulitis based on the locality of her inflammation seen on CT. Agree with IVF, IV abx, and repletion of K. Once diarrhea and pain are controlled, she is ok from a gyn onc standpoint to continue with PO abx at home, but will need supportive care until then. Gyn onc will continue to follow. Thank you for this consult.      Signed By: Pauly Kang PA-C     12/24/2024/8:24 AM       Attending Attestation    I have seen and examined the above patient and agree with the care provider's assessment and plan.  I have personally reviewed the patient's CT imaging.  It does appear that she has some diverticulitis.  Part of the differential in the setting of being on carboplatin, paclitaxel, and pembrolizumab is the possibility of a immune mediated  colitis.  Having said that, this is generally more likely if there is a more generalized colitis rather than a small segmental colitis.  I do believe that the patient most likely has diverticulitis which is complicated by the fact that she is on both chemotherapy and immunotherapy.  Counseled the patient that I believe her diarrhea is multifactorial related to her diverticulitis and the fact that she is on immunotherapy.  While her nausea might be partially secondary to her diverticulitis, I also believe that this is related to her current chemotherapy.  Recommended continuation of treatment of her diverticulitis with antibiotics, along with symptomatic management of her diarrhea and nausea.  Given that she is having less than 6 bouts of diarrhea daily, and the fact that she has underlying diverticulitis, I do not believe that steroids are indicated at this time and the possibility that this is related to immune mediated diarrhea.  However, if the patient does not continue to improve clinically over the coming days then we may need to consider initiation of steroids in relation to her pembrolizumab.  Discussed the case with the hospitalist Dr. Ahmadi.  Regarding the patient's hydronephrosis, the patient has outpatient follow-up with urology and is planned for urinary stents on 12/14/2024.  Gynecologic oncology will continue to follow daily.  Please do not hesitate to contact our team with any questions or concerns.    An electronic signature was used to authenticate this note.     Dominic Nicole MD    Please note that portions of this dictation were completed with Dragon, the [employer] voice recognition software.  Quite often unanticipated grammatical, syntax, homophones, and other interpretive errors are inadvertently transcribed by the computer software.  Please disregard these errors.  Please excuse any errors that have escaped final proofreading.

## 2024-12-24 NOTE — PLAN OF CARE
Problem: Physical Therapy - Adult  Goal: By Discharge: Performs mobility at highest level of function for planned discharge setting.  See evaluation for individualized goals.  Description: FUNCTIONAL STATUS PRIOR TO ADMISSION: Patient was independent and active without use of DME.    HOME SUPPORT PRIOR TO ADMISSION: The patient lived with her son but did not require assistance.    Physical Therapy Goals  Initiated 12/24/2024  1.  Patient will move from supine to sit and sit to supine in bed with independence within 7 day(s).    2.  Patient will perform sit to stand with independence within 7 day(s).  3.  Patient will transfer from bed to chair and chair to bed with independence using the least restrictive device within 7 day(s).  4.  Patient will ambulate with modified independence for 150 feet with the least restrictive device within 7 day(s).     Outcome: Progressing   PHYSICAL THERAPY EVALUATION    Patient: Elenita Benitez (74 y.o. female)  Date: 12/24/2024  Primary Diagnosis: Hypokalemia [E87.6]  Sigmoid diverticulitis [K57.32]  Nausea vomiting and diarrhea [R11.2, R19.7]       Precautions:                  fall      ASSESSMENT :   DEFICITS/IMPAIRMENTS:   The patient is limited by decreased functional mobility, activity tolerance, balance, abdominal pain and mild queasiness.  Pt reports she was recently received IV morphine.      Based on the impairments listed above pt is limited by decreased activity tolerance, mild nausea, recently medicated with IV morphine, and unsteady gait.  Pt did well with bed mobility and demonstrates modified independence.  She needs CGA for transfers and ambulation with light support of rail in hallway due to mild unsteadiness. Recommend trial with SPC on next visit versus none pending progress. Anticipate pt will progress quickly to her baseline of independence.     Patient will benefit from skilled intervention to address the above impairments.    Functional Outcome Measure:   None  Education Outcome: Verbalized understanding    Thank you for this referral.  SANCHO TATE, PT  Minutes: 19      Physical Therapy Evaluation Charge Determination   History Examination Presentation Decision-Making   LOW Complexity : Zero comorbidities / personal factors that will impact the outcome / POC LOW Complexity : 1-2 Standardized tests and measures addressing body structure, function, activity limitation and / or participation in recreation  LOW Complexity : Stable, uncomplicated  AM-PAC  LOW    Based on the above components, the patient evaluation is determined to be of the following complexity level: Low

## 2024-12-25 LAB
ANION GAP SERPL CALC-SCNC: 8 MMOL/L (ref 2–12)
BASOPHILS # BLD: 0 K/UL (ref 0–0.1)
BASOPHILS NFR BLD: 0 % (ref 0–1)
BUN SERPL-MCNC: 12 MG/DL (ref 6–20)
BUN/CREAT SERPL: 13 (ref 12–20)
CALCIUM SERPL-MCNC: 8 MG/DL (ref 8.5–10.1)
CHLORIDE SERPL-SCNC: 109 MMOL/L (ref 97–108)
CO2 SERPL-SCNC: 21 MMOL/L (ref 21–32)
CREAT SERPL-MCNC: 0.96 MG/DL (ref 0.55–1.02)
DIFFERENTIAL METHOD BLD: ABNORMAL
EOSINOPHIL # BLD: 0.1 K/UL (ref 0–0.4)
EOSINOPHIL NFR BLD: 6 % (ref 0–7)
ERYTHROCYTE [DISTWIDTH] IN BLOOD BY AUTOMATED COUNT: 13.8 % (ref 11.5–14.5)
GLUCOSE SERPL-MCNC: 108 MG/DL (ref 65–100)
HCT VFR BLD AUTO: 30 % (ref 35–47)
HGB BLD-MCNC: 9.7 G/DL (ref 11.5–16)
IMM GRANULOCYTES # BLD AUTO: 0 K/UL
IMM GRANULOCYTES NFR BLD AUTO: 0 %
LYMPHOCYTES # BLD: 0.3 K/UL (ref 0.8–3.5)
LYMPHOCYTES NFR BLD: 17 % (ref 12–49)
MCH RBC QN AUTO: 29.3 PG (ref 26–34)
MCHC RBC AUTO-ENTMCNC: 32.3 G/DL (ref 30–36.5)
MCV RBC AUTO: 90.6 FL (ref 80–99)
MONOCYTES # BLD: 0.1 K/UL (ref 0–1)
MONOCYTES NFR BLD: 4 % (ref 5–13)
NEUTS SEG # BLD: 1.3 K/UL (ref 1.8–8)
NEUTS SEG NFR BLD: 73 % (ref 32–75)
NRBC # BLD: 0 K/UL (ref 0–0.01)
NRBC BLD-RTO: 0 PER 100 WBC
PLATELET # BLD AUTO: 160 K/UL (ref 150–400)
PMV BLD AUTO: 10.4 FL (ref 8.9–12.9)
POTASSIUM SERPL-SCNC: 3.1 MMOL/L (ref 3.5–5.1)
RBC # BLD AUTO: 3.31 M/UL (ref 3.8–5.2)
RBC MORPH BLD: ABNORMAL
SODIUM SERPL-SCNC: 138 MMOL/L (ref 136–145)
WBC # BLD AUTO: 1.8 K/UL (ref 3.6–11)

## 2024-12-25 PROCEDURE — 85025 COMPLETE CBC W/AUTO DIFF WBC: CPT

## 2024-12-25 PROCEDURE — 6360000002 HC RX W HCPCS: Performed by: HOSPITALIST

## 2024-12-25 PROCEDURE — 6370000000 HC RX 637 (ALT 250 FOR IP): Performed by: HOSPITALIST

## 2024-12-25 PROCEDURE — 6360000002 HC RX W HCPCS: Performed by: STUDENT IN AN ORGANIZED HEALTH CARE EDUCATION/TRAINING PROGRAM

## 2024-12-25 PROCEDURE — 6370000000 HC RX 637 (ALT 250 FOR IP): Performed by: STUDENT IN AN ORGANIZED HEALTH CARE EDUCATION/TRAINING PROGRAM

## 2024-12-25 PROCEDURE — 99233 SBSQ HOSP IP/OBS HIGH 50: CPT | Performed by: OBSTETRICS & GYNECOLOGY

## 2024-12-25 PROCEDURE — 80048 BASIC METABOLIC PNL TOTAL CA: CPT

## 2024-12-25 PROCEDURE — 1200000000 HC SEMI PRIVATE

## 2024-12-25 PROCEDURE — 6370000000 HC RX 637 (ALT 250 FOR IP): Performed by: OBSTETRICS & GYNECOLOGY

## 2024-12-25 PROCEDURE — 2580000003 HC RX 258: Performed by: HOSPITALIST

## 2024-12-25 RX ORDER — POTASSIUM CHLORIDE 750 MG/1
40 TABLET, EXTENDED RELEASE ORAL ONCE
Status: COMPLETED | OUTPATIENT
Start: 2024-12-25 | End: 2024-12-25

## 2024-12-25 RX ORDER — HYDRALAZINE HYDROCHLORIDE 25 MG/1
25 TABLET, FILM COATED ORAL EVERY 12 HOURS
Status: DISCONTINUED | OUTPATIENT
Start: 2024-12-25 | End: 2024-12-26 | Stop reason: HOSPADM

## 2024-12-25 RX ORDER — LOPERAMIDE HYDROCHLORIDE 2 MG/1
2 CAPSULE ORAL 4 TIMES DAILY PRN
Status: DISCONTINUED | OUTPATIENT
Start: 2024-12-25 | End: 2024-12-26 | Stop reason: HOSPADM

## 2024-12-25 RX ADMIN — POTASSIUM CHLORIDE 40 MEQ: 750 TABLET, EXTENDED RELEASE ORAL at 07:28

## 2024-12-25 RX ADMIN — LOPERAMIDE HYDROCHLORIDE 2 MG: 2 CAPSULE ORAL at 13:36

## 2024-12-25 RX ADMIN — CEFEPIME 2000 MG: 2 INJECTION, POWDER, FOR SOLUTION INTRAVENOUS at 21:43

## 2024-12-25 RX ADMIN — METRONIDAZOLE 500 MG: 500 INJECTION, SOLUTION INTRAVENOUS at 15:24

## 2024-12-25 RX ADMIN — AMLODIPINE BESYLATE 10 MG: 5 TABLET ORAL at 08:45

## 2024-12-25 RX ADMIN — POTASSIUM CHLORIDE 40 MEQ: 750 TABLET, EXTENDED RELEASE ORAL at 10:49

## 2024-12-25 RX ADMIN — HEPARIN SODIUM 5000 UNITS: 5000 INJECTION INTRAVENOUS; SUBCUTANEOUS at 15:23

## 2024-12-25 RX ADMIN — Medication 2 CAPSULE: at 06:56

## 2024-12-25 RX ADMIN — ONDANSETRON 4 MG: 4 TABLET, ORALLY DISINTEGRATING ORAL at 19:20

## 2024-12-25 RX ADMIN — METRONIDAZOLE 500 MG: 500 INJECTION, SOLUTION INTRAVENOUS at 06:53

## 2024-12-25 RX ADMIN — LOPERAMIDE HYDROCHLORIDE 2 MG: 2 CAPSULE ORAL at 19:20

## 2024-12-25 RX ADMIN — HEPARIN SODIUM 5000 UNITS: 5000 INJECTION INTRAVENOUS; SUBCUTANEOUS at 21:44

## 2024-12-25 RX ADMIN — HYDRALAZINE HYDROCHLORIDE 25 MG: 25 TABLET ORAL at 10:49

## 2024-12-25 RX ADMIN — HYDRALAZINE HYDROCHLORIDE 25 MG: 25 TABLET ORAL at 21:44

## 2024-12-25 RX ADMIN — HEPARIN SODIUM 5000 UNITS: 5000 INJECTION INTRAVENOUS; SUBCUTANEOUS at 06:51

## 2024-12-25 RX ADMIN — ONDANSETRON 4 MG: 4 TABLET, ORALLY DISINTEGRATING ORAL at 13:36

## 2024-12-25 RX ADMIN — LOPERAMIDE HYDROCHLORIDE 2 MG: 2 CAPSULE ORAL at 06:57

## 2024-12-25 RX ADMIN — ONDANSETRON 4 MG: 4 TABLET, ORALLY DISINTEGRATING ORAL at 06:57

## 2024-12-25 ASSESSMENT — PAIN SCALES - GENERAL
PAINLEVEL_OUTOF10: 0
PAINLEVEL_OUTOF10: 0

## 2024-12-25 NOTE — PROGRESS NOTES
input(s): \"CPKMB\", \"CKNDX\", \"TROIQ\"  No results found for: \"CHOL\", \"CHLST\", \"CHOLV\", \"HDL\", \"HDLC\", \"LDL\", \"LDLC\"  No results found for: \"GLUCPOC\"  [unfilled]      Medications Reviewed:     Current Facility-Administered Medications   Medication Dose Route Frequency    loperamide (IMODIUM) capsule 2 mg  2 mg Oral 4x Daily PRN    potassium chloride (KLOR-CON) extended release tablet 40 mEq  40 mEq Oral Once    lactobacillus (CULTURELLE) capsule 2 capsule  2 capsule Oral Daily with breakfast    heparin (porcine) injection 5,000 Units  5,000 Units SubCUTAneous 3 times per day    hydrALAZINE (APRESOLINE) injection 10 mg  10 mg IntraVENous Q8H PRN    ceFEPIme (MAXIPIME) 2,000 mg in sodium chloride 0.9 % 100 mL IVPB (mini-bag)  2,000 mg IntraVENous Q24H    ondansetron (ZOFRAN-ODT) disintegrating tablet 4 mg  4 mg Oral Q6H PRN    Or    ondansetron (ZOFRAN) injection 4 mg  4 mg IntraVENous Q6H PRN    amLODIPine (NORVASC) tablet 10 mg  10 mg Oral Daily    hydrALAZINE (APRESOLINE) injection 10 mg  10 mg IntraVENous Q6H PRN    sodium chloride flush 0.9 % injection 5-40 mL  5-40 mL IntraVENous 2 times per day    sodium chloride flush 0.9 % injection 5-40 mL  5-40 mL IntraVENous PRN    0.9 % sodium chloride infusion   IntraVENous PRN    potassium chloride (KLOR-CON) extended release tablet 40 mEq  40 mEq Oral PRN    Or    potassium bicarb-citric acid (EFFER-K) effervescent tablet 40 mEq  40 mEq Oral PRN    Or    potassium chloride 10 mEq/100 mL IVPB (Peripheral Line)  10 mEq IntraVENous PRN    magnesium sulfate 2000 mg in 50 mL IVPB premix  2,000 mg IntraVENous PRN    polyethylene glycol (GLYCOLAX) packet 17 g  17 g Oral Daily PRN    acetaminophen (TYLENOL) tablet 650 mg  650 mg Oral Q6H PRN    Or    acetaminophen (TYLENOL) suppository 650 mg  650 mg Rectal Q6H PRN    metroNIDAZOLE (FLAGYL) 500 mg in 0.9% NaCl 100 mL IVPB premix  500 mg IntraVENous Q8H    morphine (PF) injection 2 mg  2 mg IntraVENous Q3H PRN    oxyCODONE  (ROXICODONE) immediate release tablet 5 mg  5 mg Oral Q8H PRN     ______________________________________________________________________  EXPECTED LENGTH OF STAY: 3  ACTUAL LENGTH OF STAY:          2                 Vic Ahmadi MD

## 2024-12-25 NOTE — PROGRESS NOTES
[facility] GYNECOLOGIC ONCOLOGY  [address] [place] [address], [address]  [place] [address]  P [phone]  F [phone]       Elenita Benitez       [id number]  1950    Admitted 2024 Date 2024     Admit dx: Hypokalemia [E87.6]  Sigmoid diverticulitis [K57.32]  Nausea vomiting and diarrhea [R11.2, R19.7]      HPI:    Elenita Benitez is a 74 y.o.  female with a history of who on 2021 underwent Robotic-assisted total laparoscopic hysterectomy, Bilateral salpingo-oophorectomy, Bilateral pelvic sentinel lymph node mapping and biopsy. 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 Negative washings. Stromal cervical involvement. Negative SLNs. 2mm out of 10mm myometrial invasion. Parametrial involvement of tumor.      Completed concurrent chemo-EBRT + VBT on 2021. Initiated on adjuvant carboplatin + paclitaxel on 2021. Completed cycle 4 on 2022. CT C/A/P 2022 without evidence of disease.     Presents today for further discussion after recently being diagnosed with recurrence.        Initial History:  Ms. Elenita Benitez is a 72 y.o.  postmenopausal female who presents for high-grade endometrial cancer.     The patient reports vaginal spotting back in April, which resulted in her seeing Dr. Nix. Pelvic ultrasound on 2021 demonstrated 12.5mm stripe. Hysteroscopy/D&C on 2021 demonstrated \"high-grade endometrial carcinoma, favor serous carcinoma\". She was subsequently referred to our office for further discussion and management.     Imaging Review:   PET 10/25/2024:  FINDINGS:  HEAD/NECK: 2 hypermetabolic, mildly enlarged left supraclavicular lymph nodes  are present, maximal SUV 9.8.  CHEST: No foci of abnormal hypermetabolism.  ABDOMEN/PELVIS: There is moderately severe left-sided hydronephrosis and  diminished left renal cortical activity. There is confluent left-sided  retroperitoneal lymphadenopathy at the  level of the lower pole of the left  kidney, maximal SUV 13.5. Multiple smaller hypermetabolic retroperitoneal,  retrocrural and bilateral common iliac lymph nodes are seen.  SKELETON: No foci of abnormal hypermetabolism in the axial and visualized  appendicular skeleton.  IMPRESSION:  Bulky retroperitoneal lymphadenopathy as well as hypermetabolic left  supraclavicular and bilateral iliac lymph nodes are compatible with malignancy.  Moderately severe left-sided hydronephrosis; no hypermetabolic left renal mass  is visualized.  The larger supraclavicular lymph node would likely be the easiest target for  ultrasound-guided core needle biopsy, if clinically indicated; alternatively,  retroperitoneal lymphadenopathy could be targeted with CT guidance.     Pathology Review:   11/12/2024:  CYTOLOGIC INTERPRETATION:   Lymph Node, Left Supraclavicular, core biopsy with touch preparation:     Metastatic adenocarcinoma, morphologically compatible with high-grade   serous carcinoma of endometrial origin.   Core biopsy shows similar features.      7/29/2021:  * * *FINAL PATHOLOGIC DIAGNOSIS* * *   1.  Right pelvic sentinel lymph node, biopsy:        One lymph node, negative for carcinoma, levels and cytokeratin stain   examined (0/1)   2.  Left pelvic sentinel lymph node #1, biopsy:        One lymph node, negative for carcinoma, levels and cytokeratin stain   examined (0/1)   3.  Left pelvic sentinel lymph node, biopsy:        One lymph node, negative for carcinoma, levels and cytokeratin stain   examined (0/1)   4. Uterus, cervix, bilateral fallopian tubes and ovaries; simple   hysterectomy, BSO:        Endometrium: Uterine serous carcinoma, see synoptic report        Cervix: Serous carcinoma focally involves endocervical stroma,   predominantly as lymphovascular                invasion        Bilateral fallopian tubes: No pathologic diagnosis     ENDOMETRIUM    SPECIMEN       Procedure: Total hysterectomy and bilateral

## 2024-12-26 VITALS
HEART RATE: 62 BPM | OXYGEN SATURATION: 98 % | WEIGHT: 157.41 LBS | SYSTOLIC BLOOD PRESSURE: 153 MMHG | HEIGHT: 61 IN | DIASTOLIC BLOOD PRESSURE: 86 MMHG | BODY MASS INDEX: 29.72 KG/M2 | RESPIRATION RATE: 18 BRPM | TEMPERATURE: 98.2 F

## 2024-12-26 PROCEDURE — 6360000002 HC RX W HCPCS: Performed by: STUDENT IN AN ORGANIZED HEALTH CARE EDUCATION/TRAINING PROGRAM

## 2024-12-26 PROCEDURE — 2500000003 HC RX 250 WO HCPCS: Performed by: STUDENT IN AN ORGANIZED HEALTH CARE EDUCATION/TRAINING PROGRAM

## 2024-12-26 PROCEDURE — 6370000000 HC RX 637 (ALT 250 FOR IP): Performed by: HOSPITALIST

## 2024-12-26 PROCEDURE — 6370000000 HC RX 637 (ALT 250 FOR IP): Performed by: STUDENT IN AN ORGANIZED HEALTH CARE EDUCATION/TRAINING PROGRAM

## 2024-12-26 PROCEDURE — 99233 SBSQ HOSP IP/OBS HIGH 50: CPT | Performed by: PHYSICIAN ASSISTANT

## 2024-12-26 PROCEDURE — 6360000002 HC RX W HCPCS: Performed by: HOSPITALIST

## 2024-12-26 PROCEDURE — 6370000000 HC RX 637 (ALT 250 FOR IP): Performed by: NURSE PRACTITIONER

## 2024-12-26 PROCEDURE — 6370000000 HC RX 637 (ALT 250 FOR IP): Performed by: OBSTETRICS & GYNECOLOGY

## 2024-12-26 RX ORDER — METRONIDAZOLE 500 MG/1
500 TABLET ORAL EVERY 8 HOURS SCHEDULED
Qty: 9 TABLET | Refills: 0 | Status: SHIPPED | OUTPATIENT
Start: 2024-12-26 | End: 2024-12-29

## 2024-12-26 RX ORDER — LOPERAMIDE HYDROCHLORIDE 2 MG/1
2 CAPSULE ORAL 4 TIMES DAILY PRN
Qty: 40 CAPSULE | Refills: 0 | Status: SHIPPED | OUTPATIENT
Start: 2024-12-26 | End: 2025-01-05

## 2024-12-26 RX ORDER — HYDRALAZINE HYDROCHLORIDE 25 MG/1
25 TABLET, FILM COATED ORAL EVERY 12 HOURS
Qty: 90 TABLET | Refills: 3 | Status: SHIPPED | OUTPATIENT
Start: 2024-12-26

## 2024-12-26 RX ORDER — CEFUROXIME AXETIL 250 MG/1
500 TABLET ORAL EVERY 12 HOURS SCHEDULED
Status: DISCONTINUED | OUTPATIENT
Start: 2024-12-26 | End: 2024-12-26 | Stop reason: HOSPADM

## 2024-12-26 RX ORDER — LACTOBACILLUS RHAMNOSUS GG 10B CELL
2 CAPSULE ORAL
Qty: 28 CAPSULE | Refills: 0 | Status: SHIPPED | OUTPATIENT
Start: 2024-12-27 | End: 2025-01-10

## 2024-12-26 RX ORDER — POTASSIUM CHLORIDE 750 MG/1
40 TABLET, EXTENDED RELEASE ORAL EVERY 4 HOURS
Status: DISCONTINUED | OUTPATIENT
Start: 2024-12-26 | End: 2024-12-26 | Stop reason: HOSPADM

## 2024-12-26 RX ORDER — METRONIDAZOLE 250 MG/1
500 TABLET ORAL EVERY 8 HOURS SCHEDULED
Status: DISCONTINUED | OUTPATIENT
Start: 2024-12-26 | End: 2024-12-26 | Stop reason: HOSPADM

## 2024-12-26 RX ORDER — CEFUROXIME AXETIL 500 MG/1
500 TABLET ORAL EVERY 12 HOURS SCHEDULED
Qty: 4 TABLET | Refills: 0 | Status: SHIPPED | OUTPATIENT
Start: 2024-12-26 | End: 2024-12-28

## 2024-12-26 RX ADMIN — Medication 10 ML: at 09:16

## 2024-12-26 RX ADMIN — ONDANSETRON 4 MG: 2 INJECTION INTRAMUSCULAR; INTRAVENOUS at 01:42

## 2024-12-26 RX ADMIN — Medication 2 CAPSULE: at 07:19

## 2024-12-26 RX ADMIN — LOPERAMIDE HYDROCHLORIDE 2 MG: 2 CAPSULE ORAL at 01:41

## 2024-12-26 RX ADMIN — POTASSIUM CHLORIDE 40 MEQ: 750 TABLET, EXTENDED RELEASE ORAL at 09:13

## 2024-12-26 RX ADMIN — AMLODIPINE BESYLATE 10 MG: 5 TABLET ORAL at 09:13

## 2024-12-26 RX ADMIN — HYDRALAZINE HYDROCHLORIDE 25 MG: 25 TABLET ORAL at 09:13

## 2024-12-26 RX ADMIN — CEFUROXIME AXETIL 500 MG: 250 TABLET, FILM COATED ORAL at 10:46

## 2024-12-26 RX ADMIN — ONDANSETRON 4 MG: 2 INJECTION INTRAMUSCULAR; INTRAVENOUS at 07:19

## 2024-12-26 RX ADMIN — METRONIDAZOLE 500 MG: 500 INJECTION, SOLUTION INTRAVENOUS at 01:45

## 2024-12-26 RX ADMIN — LOPERAMIDE HYDROCHLORIDE 2 MG: 2 CAPSULE ORAL at 07:19

## 2024-12-26 ASSESSMENT — PAIN SCALES - GENERAL: PAINLEVEL_OUTOF10: 0

## 2024-12-26 NOTE — PROGRESS NOTES
[facility] GYNECOLOGIC ONCOLOGY  [address] [place] [address], [address]  [place] [address]  P [phone]  F [phone]       Elenita Benitez       [id number]  1950    Admitted 2024 Date 2024     Admit dx: Hypokalemia [E87.6]  Sigmoid diverticulitis [K57.32]  Nausea vomiting and diarrhea [R11.2, R19.7]      HPI:    Elenita Benitez is a 74 y.o.  female with a history of who on 2021 underwent Robotic-assisted total laparoscopic hysterectomy, Bilateral salpingo-oophorectomy, Bilateral pelvic sentinel lymph node mapping and biopsy. 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 Negative washings. Stromal cervical involvement. Negative SLNs. 2mm out of 10mm myometrial invasion. Parametrial involvement of tumor.      Completed concurrent chemo-EBRT + VBT on 2021. Initiated on adjuvant carboplatin + paclitaxel on 2021. Completed cycle 4 on 2022. CT C/A/P 2022 without evidence of disease.     Presents today for further discussion after recently being diagnosed with recurrence.        Initial History:  Ms. Elenita Benitez is a 72 y.o.  postmenopausal female who presents for high-grade endometrial cancer.     The patient reports vaginal spotting back in April, which resulted in her seeing Dr. Nix. Pelvic ultrasound on 2021 demonstrated 12.5mm stripe. Hysteroscopy/D&C on 2021 demonstrated \"high-grade endometrial carcinoma, favor serous carcinoma\". She was subsequently referred to our office for further discussion and management.     Imaging Review:   PET 10/25/2024:  FINDINGS:  HEAD/NECK: 2 hypermetabolic, mildly enlarged left supraclavicular lymph nodes  are present, maximal SUV 9.8.  CHEST: No foci of abnormal hypermetabolism.  ABDOMEN/PELVIS: There is moderately severe left-sided hydronephrosis and  diminished left renal cortical activity. There is confluent left-sided  retroperitoneal lymphadenopathy at the  (PF) injection 2 mg  2 mg IntraVENous Q3H PRN    oxyCODONE (ROXICODONE) immediate release tablet 5 mg  5 mg Oral Q8H PRN     Allergies   Allergen Reactions    Codeine Nausea And Vomiting    Penicillins Rash     Rash & dizzy    Sulfa Antibiotics Other (See Comments)     General redness all over skin    Ciprofloxacin Itching and Other (See Comments)     Extreme dizziness and rash        Review of Systems  A comprehensive review of systems was negative except for that written in the History of Present Illness. , 10 point ROS    OBJECTIVE:    Physical Exam  BP (!) 153/86   Pulse 62   Temp 98.2 °F (36.8 °C) (Oral)   Resp 18   Ht 1.549 m (5' 1\")   Wt 71.4 kg (157 lb 6.5 oz)   SpO2 98%   BMI 29.74 kg/m²   General appearance: alert, appears stated age, cooperative, and no distress  Eyes: conjunctivae/corneas clear. PERRL, EOM's intact. Fundi benign.  Back: symmetric, no curvature. ROM normal. No CVA tenderness.  Lungs: clear to auscultation bilaterally  Heart: regular rate and rhythm  Abdomen:  soft, nondistended, tender to palpation lower abdomen, worst in LLQ. No palpable masses or organomegaly  Pelvic: deferred  Extremities: extremities normal, atraumatic, no cyanosis or edema  Skin: Skin color, texture, turgor normal. No rashes or lesions  Lymph nodes: Cervical, supraclavicular, and axillary nodes normal.  Neurologic: Grossly normal    Data Review      No results found for this or any previous visit (from the past 24 hour(s)).      Imaging  CT ABDOMEN PELVIS W IV CONTRAST 12/24/24  FINDINGS:   LOWER THORAX: No significant abnormality in the incidentally imaged lower chest.  LIVER: Subtle hypodensity is seen adjacent to ligamentum teres. Several tiny  hypodensities in the liver are too small to fully characterize, likely represent  cysts.  BILIARY TREE: Status post cholecystectomy. Mild intra and extrahepatic biliary  dilatation is likely due to postcholecystectomy change.  SPLEEN: within normal limits.  PANCREAS:

## 2024-12-26 NOTE — DISCHARGE SUMMARY
Discharge Summary       PATIENT ID: Elenita Benitez  MRN: [id number]   YOB: 1950    DATE OF ADMISSION: 12/23/2024  6:36 PM    DATE OF DISCHARGE: 12/26/2024   PRIMARY CARE PROVIDER: Artis Bangura MD     ATTENDING PHYSICIAN: AYANA Jim MD  DISCHARGING PROVIDER: YODIT Ward NP    To contact this individual call [phone] and ask the  to page.  If unavailable ask to be transferred the Adult Hospitalist Department.    CONSULTATIONS: IP CONSULT TO GYNECOLOGIC ONCOLOGY  IP CONSULT TO HOSPITALIST  IP CONSULT TO GI    PROCEDURES/SURGERIES: * No surgery found *     ADMITTING DIAGNOSES & HOSPITAL COURSE:   Per H&P 12/23   Elenita Benitez is a 74 y.o. female with a history of endometrial carcinoma recently started on Keytruda, hypertension, GERD who presented to ED with complaints of nausea, vomiting, generalized cramping abdominal pain and loose stools.  Symptoms have been ongoing for the last 24 hours.  She has had limited p.o. intake as a result.  She has attempted home treatment with Imodium which has been essentially ineffective.     The patient denies any fever, chills, chest pain, cough, congestion, recent illness, palpitations, or dysuria.  Remarkable vitals on ER Presentation: BP 2/2/1940/79  Labs Remarkable for: Potassium 2.6, WBC 3.2  ER Images: CT abdomen and pelvis with contrast:   Increased bulky retroperitoneal lymphadenopathy.  Unchanged severe left hydronephrosis.  Numerous colonic diverticula with wall thickening in the sigmoid colon likely due to diverticulitis.  ER Rx: Dilaudid 0.5 mg, p.o. potassium 40 mill equivalent        DISCHARGE DIAGNOSES / PLAN:      Endometrial Cancer    GYN ONC following  Received first dose of chemotherapy 12/18  Continue supportive care        Sigmoid Diverticulitis  Seen on CT scan  Continuing antibiotics  In the form of cefepime and metronidazole        Nausea and vomiting  Likely due to chemotherapy  Continuing antiemetics      Hypertension  Blood pressure mildly elevated  Continue hydralazine        Hypokalemia:  Likely due to diarrhea  Potassium 3.1 yesterday  repleting        Left hydronephrosis  Likely due to increased bulky lymphadenopathy in the retroperitoneum, unchanged from previous imaging  Outpatient follow-up with urology  Creatinine within normal limits        Leukopenia:  Due to chemotherapy     Diarrhea   Will advance diet as tolerated  Continue Imodium          PENDING TEST RESULTS:   At the time of discharge the following test results are still pending:     FOLLOW UP APPOINTMENTS:    Follow-up Information    Follow-up with your PCP and with oncology           ADDITIONAL CARE RECOMMENDATIONS:     DIET: regular diet      ACTIVITY: activity as tolerated          DISCHARGE MEDICATIONS:     Medication List        START taking these medications      cefUROXime 500 MG tablet  Commonly known as: CEFTIN  Take 1 tablet by mouth every 12 hours for 4 doses     hydrALAZINE 25 MG tablet  Commonly known as: APRESOLINE  Take 1 tablet by mouth in the morning and 1 tablet in the evening.     lactobacillus capsule  Take 2 capsules by mouth daily (with breakfast) for 14 days  Start taking on: December 27, 2024     loperamide 2 MG capsule  Commonly known as: IMODIUM  Take 1 capsule by mouth 4 times daily as needed for Diarrhea     metroNIDAZOLE 500 MG tablet  Commonly known as: FLAGYL  Take 1 tablet by mouth every 8 hours for 9 doses            CONTINUE taking these medications      amLODIPine 5 MG tablet  Commonly known as: NORVASC     dexAMETHasone 4 MG tablet  Commonly known as: DECADRON  Take 2 tablets by mouth with breakfast the day before chemotherapy and repeat for 2 days after chemotherapy     FOLIC ACID PO     ibuprofen 200 MG tablet  Commonly known as: ADVIL;MOTRIN     lidocaine-prilocaine 2.5-2.5 % cream  Commonly known as: EMLA  Apply small amount over port area and cover with a band-aid one (1) hour before chemotherapy

## 2024-12-26 NOTE — PLAN OF CARE
Problem: Discharge Planning  Goal: Discharge to home or other facility with appropriate resources  12/26/2024 1035 by Destiny Jiménez RN  Outcome: Adequate for Discharge  12/26/2024 1035 by Destiny Jiménez RN  Outcome: Adequate for Discharge  12/26/2024 1033 by Destiny Jiménez RN  Outcome: Progressing     Problem: Pain  Goal: Verbalizes/displays adequate comfort level or baseline comfort level  12/26/2024 1035 by Destiny Jiménez RN  Outcome: Adequate for Discharge  12/26/2024 1035 by Destiny Jiménez RN  Outcome: Adequate for Discharge  12/26/2024 1033 by Destiny Jiménez RN  Outcome: Progressing     Problem: Nutrition Deficit:  Goal: Optimize nutritional status  12/26/2024 1035 by Destiny Jiménez RN  Outcome: Adequate for Discharge  12/26/2024 1035 by Destiny Jiménez RN  Outcome: Adequate for Discharge  12/26/2024 1033 by Destiny Jiménez RN  Outcome: Progressing     Problem: Safety - Adult  Goal: Free from fall injury  12/26/2024 1035 by Destiny Jiménez RN  Outcome: Adequate for Discharge  12/26/2024 1035 by Destiny Jiménez RN  Outcome: Adequate for Discharge  12/26/2024 1033 by Destiny Jiménez RN  Outcome: Progressing

## 2024-12-26 NOTE — DISCHARGE INSTRUCTIONS
Discharge Instructions       PATIENT ID: Elenita Benitez  MRN: [id number]   YOB: 1950    DATE OF ADMISSION: 12/23/2024   DATE OF DISCHARGE: 12/26/2024    PRIMARY CARE PROVIDER: Artis Bangura     ATTENDING PHYSICIAN: Maranda Jim MD   DISCHARGING PROVIDER: YODIT Ward NP    To contact this individual call [phone] and ask the  to page.   If unavailable ask to be transferred the Adult Hospitalist Department.    DISCHARGE DIAGNOSES diverticulitis    CONSULTATIONS: IP CONSULT TO GYNECOLOGIC ONCOLOGY  IP CONSULT TO HOSPITALIST  IP CONSULT TO GI    PROCEDURES/SURGERIES: * No surgery found *    PENDING TEST RESULTS:   At the time of discharge the following test results are still pending:     FOLLOW UP APPOINTMENTS:   Follow-up Information    Follow-up with your PCP and with oncology               ADDITIONAL CARE RECOMMENDATIONS: May take Imodium as needed     DIET: regular diet        ACTIVITY: activity as tolerated      DISCHARGE MEDICATIONS:   See Medication Reconciliation Form    It is important that you take the medication exactly as they are prescribed.   Keep your medication in the bottles provided by the pharmacist and keep a list of the medication names, dosages, and times to be taken in your wallet.   Do not take other medications without consulting your doctor.       NOTIFY YOUR PHYSICIAN FOR ANY OF THE FOLLOWING:   Fever over 101 degrees for 24 hours.   Chest pain, shortness of breath, fever, chills, nausea, vomiting, diarrhea, change in mentation, falling, weakness, bleeding. Severe pain or pain not relieved by medications.  Or, any other signs or symptoms that you may have questions about.      DISPOSITION:    Home With:   OT  PT  HH  RN       SNF/Inpatient Rehab/LTAC    Independent/assisted living    Hospice    Other:     CDMP Checked:   Yes ***

## 2024-12-26 NOTE — PROGRESS NOTES
Name: Elenita Benitez    MRN: [id number]         : 1950      Patient discharged home with family in family vehicle. At time of discharge the patient was alert and orientated X 4. Room air, no shortness of breath. Patient had no complaints of pain. AVS printed and given to patient with medications, follow up, signs/symptoms of infection and safety. Patient verbalized understanding. Patient escorted down stairs with Volunteer service and personal belongings.

## 2024-12-27 ENCOUNTER — APPOINTMENT (OUTPATIENT)
Facility: HOSPITAL | Age: 74
End: 2024-12-27
Attending: RADIOLOGY
Payer: MEDICARE

## 2024-12-27 LAB
EKG ATRIAL RATE: 57 BPM
EKG DIAGNOSIS: NORMAL
EKG P AXIS: 27 DEGREES
EKG P-R INTERVAL: 130 MS
EKG Q-T INTERVAL: 422 MS
EKG QRS DURATION: 86 MS
EKG QTC CALCULATION (BAZETT): 410 MS
EKG R AXIS: -9 DEGREES
EKG T AXIS: 164 DEGREES
EKG VENTRICULAR RATE: 57 BPM

## 2024-12-31 ENCOUNTER — APPOINTMENT (OUTPATIENT)
Facility: HOSPITAL | Age: 74
End: 2024-12-31
Attending: RADIOLOGY
Payer: MEDICARE

## 2025-01-02 RX ORDER — PROCHLORPERAZINE EDISYLATE 5 MG/ML
5 INJECTION INTRAMUSCULAR; INTRAVENOUS
OUTPATIENT
Start: 2025-01-09

## 2025-01-02 RX ORDER — SODIUM CHLORIDE 0.9 % (FLUSH) 0.9 %
5-40 SYRINGE (ML) INJECTION PRN
OUTPATIENT
Start: 2025-01-09

## 2025-01-02 RX ORDER — ACETAMINOPHEN 325 MG/1
650 TABLET ORAL
OUTPATIENT
Start: 2025-01-09

## 2025-01-02 RX ORDER — PALONOSETRON 0.05 MG/ML
0.25 INJECTION, SOLUTION INTRAVENOUS ONCE
OUTPATIENT
Start: 2025-01-09 | End: 2025-01-09

## 2025-01-02 RX ORDER — ONDANSETRON 2 MG/ML
8 INJECTION INTRAMUSCULAR; INTRAVENOUS
OUTPATIENT
Start: 2025-01-09

## 2025-01-02 RX ORDER — FAMOTIDINE 10 MG/ML
20 INJECTION, SOLUTION INTRAVENOUS ONCE
OUTPATIENT
Start: 2025-01-09 | End: 2025-01-09

## 2025-01-02 RX ORDER — DIPHENHYDRAMINE HYDROCHLORIDE 50 MG/ML
50 INJECTION INTRAMUSCULAR; INTRAVENOUS ONCE
OUTPATIENT
Start: 2025-01-09 | End: 2025-01-09

## 2025-01-02 RX ORDER — HYDROCORTISONE SODIUM SUCCINATE 100 MG/2ML
100 INJECTION INTRAMUSCULAR; INTRAVENOUS
OUTPATIENT
Start: 2025-01-09

## 2025-01-02 RX ORDER — EPINEPHRINE 1 MG/ML
0.3 INJECTION, SOLUTION, CONCENTRATE INTRAVENOUS PRN
OUTPATIENT
Start: 2025-01-09

## 2025-01-02 RX ORDER — MEPERIDINE HYDROCHLORIDE 50 MG/ML
12.5 INJECTION INTRAMUSCULAR; INTRAVENOUS; SUBCUTANEOUS PRN
OUTPATIENT
Start: 2025-01-09

## 2025-01-02 RX ORDER — DIPHENHYDRAMINE HYDROCHLORIDE 50 MG/ML
50 INJECTION INTRAMUSCULAR; INTRAVENOUS
OUTPATIENT
Start: 2025-01-09

## 2025-01-02 RX ORDER — HEPARIN SODIUM (PORCINE) LOCK FLUSH IV SOLN 100 UNIT/ML 100 UNIT/ML
500 SOLUTION INTRAVENOUS PRN
OUTPATIENT
Start: 2025-01-09

## 2025-01-02 RX ORDER — ALBUTEROL SULFATE 90 UG/1
4 INHALANT RESPIRATORY (INHALATION) PRN
OUTPATIENT
Start: 2025-01-09

## 2025-01-02 RX ORDER — SODIUM CHLORIDE 9 MG/ML
5-250 INJECTION, SOLUTION INTRAVENOUS PRN
OUTPATIENT
Start: 2025-01-09

## 2025-01-02 RX ORDER — DEXAMETHASONE SODIUM PHOSPHATE 10 MG/ML
10 INJECTION, SOLUTION INTRAMUSCULAR; INTRAVENOUS ONCE
OUTPATIENT
Start: 2025-01-09 | End: 2025-01-09

## 2025-01-02 RX ORDER — SODIUM CHLORIDE 9 MG/ML
INJECTION, SOLUTION INTRAVENOUS CONTINUOUS
OUTPATIENT
Start: 2025-01-09

## 2025-01-02 RX ORDER — FAMOTIDINE 10 MG/ML
20 INJECTION, SOLUTION INTRAVENOUS
OUTPATIENT
Start: 2025-01-09

## 2025-01-07 DIAGNOSIS — C54.1 MALIGNANT NEOPLASM OF ENDOMETRIUM (HCC): Primary | ICD-10-CM

## 2025-01-07 LAB
ANION GAP SERPL CALC-SCNC: 7 MMOL/L (ref 2–12)
BUN SERPL-MCNC: 20 MG/DL (ref 6–20)
BUN/CREAT SERPL: 17 (ref 12–20)
CALCIUM SERPL-MCNC: 9 MG/DL (ref 8.5–10.1)
CHLORIDE SERPL-SCNC: 98 MMOL/L (ref 97–108)
CO2 SERPL-SCNC: 30 MMOL/L (ref 21–32)
CREAT SERPL-MCNC: 1.21 MG/DL (ref 0.55–1.02)
GLUCOSE SERPL-MCNC: 166 MG/DL (ref 65–100)
POTASSIUM SERPL-SCNC: 3.1 MMOL/L (ref 3.5–5.1)
SODIUM SERPL-SCNC: 135 MMOL/L (ref 136–145)

## 2025-01-08 ENCOUNTER — TELEPHONE (OUTPATIENT)
Age: 75
End: 2025-01-08

## 2025-01-08 ENCOUNTER — TELEMEDICINE (OUTPATIENT)
Age: 75
End: 2025-01-08
Payer: MEDICARE

## 2025-01-08 ENCOUNTER — HOSPITAL ENCOUNTER (INPATIENT)
Facility: HOSPITAL | Age: 75
LOS: 2 days | Discharge: HOME | DRG: 322 | End: 2025-01-10
Attending: EMERGENCY MEDICINE | Admitting: INTERNAL MEDICINE
Payer: MEDICARE

## 2025-01-08 DIAGNOSIS — I21.3 STEMI (ST ELEVATION MYOCARDIAL INFARCTION) (HCC): ICD-10-CM

## 2025-01-08 DIAGNOSIS — Z51.11 ENCOUNTER FOR ANTINEOPLASTIC CHEMOTHERAPY AND IMMUNOTHERAPY: ICD-10-CM

## 2025-01-08 DIAGNOSIS — I21.3 ST ELEVATION MYOCARDIAL INFARCTION (STEMI), UNSPECIFIED ARTERY (HCC): Primary | ICD-10-CM

## 2025-01-08 DIAGNOSIS — C54.1 PAPILLARY SEROUS ENDOMETRIAL ADENOCARCINOMA (HCC): Primary | ICD-10-CM

## 2025-01-08 DIAGNOSIS — Z51.12 ENCOUNTER FOR ANTINEOPLASTIC CHEMOTHERAPY AND IMMUNOTHERAPY: ICD-10-CM

## 2025-01-08 DIAGNOSIS — I24.9 ACUTE CORONARY SYNDROME (HCC): ICD-10-CM

## 2025-01-08 LAB
ACT BLD: 268 SECS (ref 79–138)
ACT BLD: 279 SECS (ref 79–138)
ALBUMIN SERPL-MCNC: 3.4 G/DL (ref 3.5–5)
ALBUMIN/GLOB SERPL: 0.8 (ref 1.1–2.2)
ALP SERPL-CCNC: 80 U/L (ref 45–117)
ALT SERPL-CCNC: 50 U/L (ref 12–78)
ANION GAP SERPL CALC-SCNC: 12 MMOL/L (ref 2–12)
AST SERPL-CCNC: 56 U/L (ref 15–37)
BASOPHILS # BLD: 0.01 K/UL (ref 0–0.1)
BASOPHILS NFR BLD: 0.2 % (ref 0–1)
BILIRUB SERPL-MCNC: 0.4 MG/DL (ref 0.2–1)
BUN SERPL-MCNC: 18 MG/DL (ref 6–20)
BUN/CREAT SERPL: 14 (ref 12–20)
CALCIUM SERPL-MCNC: 9.5 MG/DL (ref 8.5–10.1)
CHLORIDE SERPL-SCNC: 93 MMOL/L (ref 97–108)
CO2 SERPL-SCNC: 26 MMOL/L (ref 21–32)
CREAT SERPL-MCNC: 1.33 MG/DL (ref 0.55–1.02)
DIFFERENTIAL METHOD BLD: ABNORMAL
EKG ATRIAL RATE: 54 BPM
EKG DIAGNOSIS: NORMAL
EKG P AXIS: 33 DEGREES
EKG P-R INTERVAL: 210 MS
EKG Q-T INTERVAL: 558 MS
EKG QRS DURATION: 82 MS
EKG QTC CALCULATION (BAZETT): 529 MS
EKG R AXIS: 10 DEGREES
EKG T AXIS: 113 DEGREES
EKG VENTRICULAR RATE: 54 BPM
EOSINOPHIL # BLD: 0 K/UL (ref 0–0.4)
EOSINOPHIL NFR BLD: 0 % (ref 0–7)
ERYTHROCYTE [DISTWIDTH] IN BLOOD BY AUTOMATED COUNT: 14.8 % (ref 11.5–14.5)
GLOBULIN SER CALC-MCNC: 4.1 G/DL (ref 2–4)
GLUCOSE SERPL-MCNC: 241 MG/DL (ref 65–100)
HCT VFR BLD AUTO: 34.8 % (ref 35–47)
HGB BLD-MCNC: 11.7 G/DL (ref 11.5–16)
IMM GRANULOCYTES # BLD AUTO: 0.05 K/UL (ref 0–0.04)
IMM GRANULOCYTES NFR BLD AUTO: 1 % (ref 0–0.5)
LYMPHOCYTES # BLD: 0.32 K/UL (ref 0.8–3.5)
LYMPHOCYTES NFR BLD: 6.2 % (ref 12–49)
MCH RBC QN AUTO: 29.9 PG (ref 26–34)
MCHC RBC AUTO-ENTMCNC: 33.6 G/DL (ref 30–36.5)
MCV RBC AUTO: 89 FL (ref 80–99)
MONOCYTES # BLD: 0.08 K/UL (ref 0–1)
MONOCYTES NFR BLD: 1.5 % (ref 5–13)
NEUTS SEG # BLD: 4.74 K/UL (ref 1.8–8)
NEUTS SEG NFR BLD: 91.1 % (ref 32–75)
NRBC # BLD: 0 K/UL (ref 0–0.01)
NRBC BLD-RTO: 0 PER 100 WBC
PLATELET # BLD AUTO: 249 K/UL (ref 150–400)
PMV BLD AUTO: 9.7 FL (ref 8.9–12.9)
POTASSIUM SERPL-SCNC: 3 MMOL/L (ref 3.5–5.1)
PROT SERPL-MCNC: 7.5 G/DL (ref 6.4–8.2)
RBC # BLD AUTO: 3.91 M/UL (ref 3.8–5.2)
RBC MORPH BLD: ABNORMAL
SODIUM SERPL-SCNC: 131 MMOL/L (ref 136–145)
TROPONIN I SERPL HS-MCNC: 2151 NG/L (ref 0–51)
WBC # BLD AUTO: 5.2 K/UL (ref 3.6–11)

## 2025-01-08 PROCEDURE — G8427 DOCREV CUR MEDS BY ELIG CLIN: HCPCS | Performed by: OBSTETRICS & GYNECOLOGY

## 2025-01-08 PROCEDURE — C1887 CATHETER, GUIDING: HCPCS | Performed by: STUDENT IN AN ORGANIZED HEALTH CARE EDUCATION/TRAINING PROGRAM

## 2025-01-08 PROCEDURE — 6360000002 HC RX W HCPCS

## 2025-01-08 PROCEDURE — 6360000004 HC RX CONTRAST MEDICATION: Performed by: STUDENT IN AN ORGANIZED HEALTH CARE EDUCATION/TRAINING PROGRAM

## 2025-01-08 PROCEDURE — C1769 GUIDE WIRE: HCPCS | Performed by: STUDENT IN AN ORGANIZED HEALTH CARE EDUCATION/TRAINING PROGRAM

## 2025-01-08 PROCEDURE — 84484 ASSAY OF TROPONIN QUANT: CPT

## 2025-01-08 PROCEDURE — 93005 ELECTROCARDIOGRAM TRACING: CPT | Performed by: INTERNAL MEDICINE

## 2025-01-08 PROCEDURE — C1725 CATH, TRANSLUMIN NON-LASER: HCPCS | Performed by: STUDENT IN AN ORGANIZED HEALTH CARE EDUCATION/TRAINING PROGRAM

## 2025-01-08 PROCEDURE — 93005 ELECTROCARDIOGRAM TRACING: CPT | Performed by: STUDENT IN AN ORGANIZED HEALTH CARE EDUCATION/TRAINING PROGRAM

## 2025-01-08 PROCEDURE — 6370000000 HC RX 637 (ALT 250 FOR IP): Performed by: STUDENT IN AN ORGANIZED HEALTH CARE EDUCATION/TRAINING PROGRAM

## 2025-01-08 PROCEDURE — 2580000003 HC RX 258: Performed by: STUDENT IN AN ORGANIZED HEALTH CARE EDUCATION/TRAINING PROGRAM

## 2025-01-08 PROCEDURE — 85347 COAGULATION TIME ACTIVATED: CPT

## 2025-01-08 PROCEDURE — C1874 STENT, COATED/COV W/DEL SYS: HCPCS | Performed by: STUDENT IN AN ORGANIZED HEALTH CARE EDUCATION/TRAINING PROGRAM

## 2025-01-08 PROCEDURE — 2060000000 HC ICU INTERMEDIATE R&B

## 2025-01-08 PROCEDURE — 80053 COMPREHEN METABOLIC PANEL: CPT

## 2025-01-08 PROCEDURE — 3017F COLORECTAL CA SCREEN DOC REV: CPT | Performed by: OBSTETRICS & GYNECOLOGY

## 2025-01-08 PROCEDURE — 99152 MOD SED SAME PHYS/QHP 5/>YRS: CPT | Performed by: STUDENT IN AN ORGANIZED HEALTH CARE EDUCATION/TRAINING PROGRAM

## 2025-01-08 PROCEDURE — 1090F PRES/ABSN URINE INCON ASSESS: CPT | Performed by: OBSTETRICS & GYNECOLOGY

## 2025-01-08 PROCEDURE — C1713 ANCHOR/SCREW BN/BN,TIS/BN: HCPCS | Performed by: STUDENT IN AN ORGANIZED HEALTH CARE EDUCATION/TRAINING PROGRAM

## 2025-01-08 PROCEDURE — 6370000000 HC RX 637 (ALT 250 FOR IP): Performed by: INTERNAL MEDICINE

## 2025-01-08 PROCEDURE — 96374 THER/PROPH/DIAG INJ IV PUSH: CPT

## 2025-01-08 PROCEDURE — 99285 EMERGENCY DEPT VISIT HI MDM: CPT

## 2025-01-08 PROCEDURE — 1160F RVW MEDS BY RX/DR IN RCRD: CPT | Performed by: OBSTETRICS & GYNECOLOGY

## 2025-01-08 PROCEDURE — 6360000002 HC RX W HCPCS: Performed by: STUDENT IN AN ORGANIZED HEALTH CARE EDUCATION/TRAINING PROGRAM

## 2025-01-08 PROCEDURE — 2709999900 HC NON-CHARGEABLE SUPPLY: Performed by: STUDENT IN AN ORGANIZED HEALTH CARE EDUCATION/TRAINING PROGRAM

## 2025-01-08 PROCEDURE — 1159F MED LIST DOCD IN RCRD: CPT | Performed by: OBSTETRICS & GYNECOLOGY

## 2025-01-08 PROCEDURE — 1123F ACP DISCUSS/DSCN MKR DOCD: CPT | Performed by: OBSTETRICS & GYNECOLOGY

## 2025-01-08 PROCEDURE — 99215 OFFICE O/P EST HI 40 MIN: CPT | Performed by: OBSTETRICS & GYNECOLOGY

## 2025-01-08 PROCEDURE — 2500000003 HC RX 250 WO HCPCS: Performed by: STUDENT IN AN ORGANIZED HEALTH CARE EDUCATION/TRAINING PROGRAM

## 2025-01-08 PROCEDURE — G8399 PT W/DXA RESULTS DOCUMENT: HCPCS | Performed by: OBSTETRICS & GYNECOLOGY

## 2025-01-08 PROCEDURE — 85025 COMPLETE CBC W/AUTO DIFF WBC: CPT

## 2025-01-08 PROCEDURE — C1894 INTRO/SHEATH, NON-LASER: HCPCS | Performed by: STUDENT IN AN ORGANIZED HEALTH CARE EDUCATION/TRAINING PROGRAM

## 2025-01-08 PROCEDURE — 99153 MOD SED SAME PHYS/QHP EA: CPT | Performed by: STUDENT IN AN ORGANIZED HEALTH CARE EDUCATION/TRAINING PROGRAM

## 2025-01-08 PROCEDURE — 1111F DSCHRG MED/CURRENT MED MERGE: CPT | Performed by: OBSTETRICS & GYNECOLOGY

## 2025-01-08 PROCEDURE — 2720000010 HC SURG SUPPLY STERILE: Performed by: STUDENT IN AN ORGANIZED HEALTH CARE EDUCATION/TRAINING PROGRAM

## 2025-01-08 PROCEDURE — 36415 COLL VENOUS BLD VENIPUNCTURE: CPT

## 2025-01-08 RX ORDER — ONDANSETRON 2 MG/ML
INJECTION INTRAMUSCULAR; INTRAVENOUS
Status: COMPLETED
Start: 2025-01-08 | End: 2025-01-08

## 2025-01-08 RX ORDER — HEPARIN SODIUM 1000 [USP'U]/ML
INJECTION, SOLUTION INTRAVENOUS; SUBCUTANEOUS PRN
Status: DISCONTINUED | OUTPATIENT
Start: 2025-01-08 | End: 2025-01-08 | Stop reason: HOSPADM

## 2025-01-08 RX ORDER — ASPIRIN 81 MG/1
TABLET, CHEWABLE ORAL DAILY PRN
Status: DISCONTINUED | OUTPATIENT
Start: 2025-01-08 | End: 2025-01-08 | Stop reason: ALTCHOICE

## 2025-01-08 RX ORDER — ONDANSETRON 2 MG/ML
4 INJECTION INTRAMUSCULAR; INTRAVENOUS EVERY 6 HOURS PRN
Status: DISCONTINUED | OUTPATIENT
Start: 2025-01-08 | End: 2025-01-10 | Stop reason: HOSPADM

## 2025-01-08 RX ORDER — PHENYLEPHRINE HYDROCHLORIDE 10 MG/ML
INJECTION INTRAVENOUS PRN
Status: DISCONTINUED | OUTPATIENT
Start: 2025-01-08 | End: 2025-01-08 | Stop reason: HOSPADM

## 2025-01-08 RX ORDER — ASPIRIN 81 MG/1
81 TABLET, CHEWABLE ORAL DAILY
Status: DISCONTINUED | OUTPATIENT
Start: 2025-01-09 | End: 2025-01-10 | Stop reason: HOSPADM

## 2025-01-08 RX ORDER — ATROPINE SULFATE 0.1 MG/ML
INJECTION INTRAVENOUS
Status: DISPENSED
Start: 2025-01-08 | End: 2025-01-09

## 2025-01-08 RX ORDER — NITROGLYCERIN 0.4 MG/1
0.4 TABLET SUBLINGUAL EVERY 5 MIN PRN
Status: DISCONTINUED | OUTPATIENT
Start: 2025-01-08 | End: 2025-01-10 | Stop reason: HOSPADM

## 2025-01-08 RX ORDER — POTASSIUM CHLORIDE 750 MG/1
40 TABLET, EXTENDED RELEASE ORAL ONCE
Status: DISCONTINUED | OUTPATIENT
Start: 2025-01-08 | End: 2025-01-10 | Stop reason: HOSPADM

## 2025-01-08 RX ORDER — HYDRALAZINE HYDROCHLORIDE 25 MG/1
25 TABLET, FILM COATED ORAL EVERY 12 HOURS
Status: DISCONTINUED | OUTPATIENT
Start: 2025-01-09 | End: 2025-01-10 | Stop reason: HOSPADM

## 2025-01-08 RX ORDER — ATROPINE SULFATE 0.1 MG/ML
1 INJECTION INTRAVENOUS
Status: ACTIVE | OUTPATIENT
Start: 2025-01-08 | End: 2025-01-09

## 2025-01-08 RX ORDER — ATROPINE SULFATE 0.1 MG/ML
INJECTION INTRAVENOUS PRN
Status: DISCONTINUED | OUTPATIENT
Start: 2025-01-08 | End: 2025-01-08 | Stop reason: HOSPADM

## 2025-01-08 RX ORDER — ACETAMINOPHEN 650 MG/1
650 SUPPOSITORY RECTAL EVERY 6 HOURS PRN
Status: DISCONTINUED | OUTPATIENT
Start: 2025-01-08 | End: 2025-01-10 | Stop reason: HOSPADM

## 2025-01-08 RX ORDER — SODIUM CHLORIDE 9 MG/ML
INJECTION, SOLUTION INTRAVENOUS PRN
Status: DISCONTINUED | OUTPATIENT
Start: 2025-01-08 | End: 2025-01-10 | Stop reason: HOSPADM

## 2025-01-08 RX ORDER — 0.9 % SODIUM CHLORIDE 0.9 %
INTRAVENOUS SOLUTION INTRAVENOUS CONTINUOUS PRN
Status: COMPLETED | OUTPATIENT
Start: 2025-01-08 | End: 2025-01-08

## 2025-01-08 RX ORDER — ACETAMINOPHEN 325 MG/1
650 TABLET ORAL EVERY 6 HOURS PRN
Status: DISCONTINUED | OUTPATIENT
Start: 2025-01-08 | End: 2025-01-10 | Stop reason: HOSPADM

## 2025-01-08 RX ORDER — HEPARIN SODIUM 1000 [USP'U]/ML
INJECTION, SOLUTION INTRAVENOUS; SUBCUTANEOUS DAILY PRN
Status: DISCONTINUED | OUTPATIENT
Start: 2025-01-08 | End: 2025-01-08 | Stop reason: ALTCHOICE

## 2025-01-08 RX ORDER — EPTIFIBATIDE 0.75 MG/ML
INJECTION, SOLUTION INTRAVENOUS CONTINUOUS PRN
Status: COMPLETED | OUTPATIENT
Start: 2025-01-08 | End: 2025-01-08

## 2025-01-08 RX ORDER — ONDANSETRON 2 MG/ML
INJECTION INTRAMUSCULAR; INTRAVENOUS PRN
Status: DISCONTINUED | OUTPATIENT
Start: 2025-01-08 | End: 2025-01-08 | Stop reason: HOSPADM

## 2025-01-08 RX ORDER — VERAPAMIL HYDROCHLORIDE 2.5 MG/ML
INJECTION, SOLUTION INTRAVENOUS PRN
Status: DISCONTINUED | OUTPATIENT
Start: 2025-01-08 | End: 2025-01-08 | Stop reason: HOSPADM

## 2025-01-08 RX ORDER — ONDANSETRON 4 MG/1
4 TABLET, ORALLY DISINTEGRATING ORAL EVERY 8 HOURS PRN
Status: DISCONTINUED | OUTPATIENT
Start: 2025-01-08 | End: 2025-01-10 | Stop reason: HOSPADM

## 2025-01-08 RX ORDER — AMIODARONE HYDROCHLORIDE 150 MG/3ML
INJECTION, SOLUTION INTRAVENOUS PRN
Status: DISCONTINUED | OUTPATIENT
Start: 2025-01-08 | End: 2025-01-08 | Stop reason: HOSPADM

## 2025-01-08 RX ORDER — SODIUM CHLORIDE 0.9 % (FLUSH) 0.9 %
5-40 SYRINGE (ML) INJECTION EVERY 12 HOURS SCHEDULED
Status: DISCONTINUED | OUTPATIENT
Start: 2025-01-08 | End: 2025-01-10 | Stop reason: HOSPADM

## 2025-01-08 RX ORDER — IOPAMIDOL 755 MG/ML
INJECTION, SOLUTION INTRAVASCULAR PRN
Status: DISCONTINUED | OUTPATIENT
Start: 2025-01-08 | End: 2025-01-08 | Stop reason: HOSPADM

## 2025-01-08 RX ORDER — AMLODIPINE BESYLATE 5 MG/1
5 TABLET ORAL DAILY
Status: DISCONTINUED | OUTPATIENT
Start: 2025-01-09 | End: 2025-01-10 | Stop reason: HOSPADM

## 2025-01-08 RX ORDER — POLYETHYLENE GLYCOL 3350 17 G/17G
17 POWDER, FOR SOLUTION ORAL DAILY PRN
Status: DISCONTINUED | OUTPATIENT
Start: 2025-01-08 | End: 2025-01-10 | Stop reason: HOSPADM

## 2025-01-08 RX ORDER — TRAMADOL HYDROCHLORIDE 50 MG/1
50 TABLET ORAL EVERY 6 HOURS PRN
Status: DISCONTINUED | OUTPATIENT
Start: 2025-01-08 | End: 2025-01-10 | Stop reason: HOSPADM

## 2025-01-08 RX ORDER — SODIUM CHLORIDE 0.9 % (FLUSH) 0.9 %
5-40 SYRINGE (ML) INJECTION PRN
Status: DISCONTINUED | OUTPATIENT
Start: 2025-01-08 | End: 2025-01-10 | Stop reason: HOSPADM

## 2025-01-08 RX ORDER — LACTOBACILLUS RHAMNOSUS GG 10B CELL
2 CAPSULE ORAL
Status: DISCONTINUED | OUTPATIENT
Start: 2025-01-09 | End: 2025-01-10 | Stop reason: HOSPADM

## 2025-01-08 RX ORDER — PANTOPRAZOLE SODIUM 40 MG/1
40 TABLET, DELAYED RELEASE ORAL
Status: DISCONTINUED | OUTPATIENT
Start: 2025-01-08 | End: 2025-01-10 | Stop reason: HOSPADM

## 2025-01-08 RX ORDER — MORPHINE SULFATE 2 MG/ML
1 INJECTION, SOLUTION INTRAMUSCULAR; INTRAVENOUS EVERY 4 HOURS PRN
Status: DISCONTINUED | OUTPATIENT
Start: 2025-01-08 | End: 2025-01-10 | Stop reason: HOSPADM

## 2025-01-08 RX ORDER — FENTANYL CITRATE 50 UG/ML
INJECTION, SOLUTION INTRAMUSCULAR; INTRAVENOUS PRN
Status: DISCONTINUED | OUTPATIENT
Start: 2025-01-08 | End: 2025-01-08 | Stop reason: HOSPADM

## 2025-01-08 RX ORDER — LIDOCAINE HYDROCHLORIDE 10 MG/ML
INJECTION, SOLUTION INFILTRATION; PERINEURAL PRN
Status: DISCONTINUED | OUTPATIENT
Start: 2025-01-08 | End: 2025-01-08 | Stop reason: HOSPADM

## 2025-01-08 RX ADMIN — TICAGRELOR 180 MG: 90 TABLET ORAL at 16:33

## 2025-01-08 RX ADMIN — HEPARIN SODIUM 4000 UNITS: 1000 INJECTION, SOLUTION INTRAVENOUS; SUBCUTANEOUS at 16:29

## 2025-01-08 RX ADMIN — ASPIRIN 324 MG: 81 TABLET, CHEWABLE ORAL at 16:29

## 2025-01-08 RX ADMIN — TRAMADOL HYDROCHLORIDE 50 MG: 50 TABLET, COATED ORAL at 18:45

## 2025-01-08 RX ADMIN — ONDANSETRON 4 MG: 2 INJECTION INTRAMUSCULAR; INTRAVENOUS at 16:30

## 2025-01-08 RX ADMIN — TICAGRELOR 90 MG: 90 TABLET ORAL at 22:37

## 2025-01-08 ASSESSMENT — PAIN DESCRIPTION - ORIENTATION
ORIENTATION: RIGHT;LEFT;MID
ORIENTATION: MID;RIGHT;LEFT

## 2025-01-08 ASSESSMENT — PAIN - FUNCTIONAL ASSESSMENT
PAIN_FUNCTIONAL_ASSESSMENT: ACTIVITIES ARE NOT PREVENTED
PAIN_FUNCTIONAL_ASSESSMENT: ACTIVITIES ARE NOT PREVENTED

## 2025-01-08 ASSESSMENT — PAIN DESCRIPTION - DESCRIPTORS
DESCRIPTORS: ACHING
DESCRIPTORS: ACHING

## 2025-01-08 ASSESSMENT — PAIN SCALES - GENERAL
PAINLEVEL_OUTOF10: 0
PAINLEVEL_OUTOF10: 4
PAINLEVEL_OUTOF10: 4

## 2025-01-08 ASSESSMENT — PAIN DESCRIPTION - LOCATION
LOCATION: ABDOMEN
LOCATION: ABDOMEN

## 2025-01-08 NOTE — ED PROVIDER NOTES
interpreted by me sinus bradycardia with first-degree AV block, rate 54, ST elevation noted in inferior leads with reciprocal T wave inversion lateral leads.    EMS had initially administered 324 aspirin however patient vomited immediately following this.  Patient was given 4 mg of Zofran IV by EMS.  Will redosed with aspirin here in addition to 4000 units of heparin.      Dr. Nguyễn with [facility] at the bedside.  Patient will go to the Cath Lab with him.      Consult note:  Case discussed with hospitalist to make them aware of the patient that she will need admission following cardiac cath.    FINAL IMPRESSION     1. ST elevation myocardial infarction (STEMI), unspecified artery (HCC)    2. STEMI (ST elevation myocardial infarction) (HCC)          DISPOSITION/PLAN   Elenita Benitez's  results have been reviewed with her.  She has been counseled regarding her diagnosis, treatment, and plan.  She verbally conveys understanding and agreement of the signs, symptoms, diagnosis, treatment and prognosis and additionally agrees to follow up as discussed.  She also agrees with the care-plan and conveys that all of her questions have been answered.      CLINICAL IMPRESSION    Admit Note: Pt is being admitted by hospitalist. The results of their tests and reason(s) for their admission have been discussed with pt and/or available family. They convey agreement and understanding for the need to be admitted and for the admission diagnosis.           I am the Primary Clinician of Record.   Reinier Florian DO (electronically signed)    (Please note that parts of this dictation were completed with voice recognition software. Quite often unanticipated grammatical, syntax, homophones, and other interpretive errors are inadvertently transcribed by the computer software. Please disregards these errors. Please excuse any errors that have escaped final proofreading.)          Reinier Florian DO  01/08/25 [id number]

## 2025-01-08 NOTE — H&P
the kidneys is asymmetrically diminished on the left. Time to peak renal activity: Right kidney: 3.7 minutes. Left kidney: 30 minutes. Split renal function: Right kidney: 84% Left kidney: 16%. Excretion T1/2: Right kidney: 19 minutes. Left kidney:.     1. Abnormal split function, diminished on the left. 2. Left renal flat curve with no visualization of radiotracer in the collecting system or ureter. 3. Partial delay of right renal excretion. Electronically signed by MALINDA RODAS     _______________________________________________________________________    TOTAL TIME:  76 Minutes    Critical Care Provided     Minutes non procedure based    Signed: Renata Nguyễn MD    Procedures: see electronic medical records for all procedures/Xrays and details which were not copied into this note but were reviewed prior to creation of Plan.

## 2025-01-08 NOTE — TELEPHONE ENCOUNTER
Nabil Alvarez  Oncology Social Work Encounter    [facility] Gynecologic Oncology      Patient: Elenita Benitez    Encounter Type:    [] Initial SW Encounter  [] Patient Initiated  [x] Referral  [] Distress/PHQ Screening  [] Other:      Concern(s)/Barrier(s) to Care: Desire for medical cannabis    Narrative: LMSW called patient following notification from MD that patient is interested in obtaining access to medical cannabis. Patient confirmed desire for medical cannabis to assist with sleep. LMSW directed patient to [facility] to locate a practitioner to start the process. Patient stated that documentation might be needed to confirm cancer diagnosis. LMSW encouraged patient to call the practitioner's office that she chooses to inquire about what is needed and then call [facility] back.    Plan:   Provide ongoing psychosocial support as desired by the patient.   LMSW remains available for further support and assistance.

## 2025-01-08 NOTE — CONSULTS
Years of education: Not on file    Highest education level: Not on file   Occupational History    Not on file   Tobacco Use    Smoking status: Never    Smokeless tobacco: Never   Substance and Sexual Activity    Alcohol use: Not Currently     Comment: May have small glass of wine on special occasions.    Drug use: No    Sexual activity: Not on file   Other Topics Concern    Not on file   Social History Narrative    Not on file     Social Determinants of Health     Financial Resource Strain: Not on file   Food Insecurity: Not on file   Transportation Needs: Not on file   Physical Activity: Not on file   Stress: Not on file   Social Connections: Not on file   Intimate Partner Violence: Not on file   Housing Stability: Not on file         Review of Systems        Total of 12 systems reviewed, all systems review was negative except Pertinent Positives included in HPI     BP (!) 148/78   Pulse 51   Resp 16   Wt 73.7 kg (162 lb 7.7 oz)   SpO2 97%   BMI 30.70 kg/m²       Examination:     General: Alert + Oriented x3, no acute distress   HEENT: Normocephalic aromatic, MMM   Neck: Supple, JVP- not well appreciated   RS: Non labored, clear   CVS: Regular rate and rhythm, S1S2, no murmur   Abd: Soft, non tender, non distended   Lower extremity: Warm to touch, Edema- None   Skin: Warm and dry, No significant bruises or rash   CNS: Oriented x3, no focal neuro deficit     Lab review:    Recent Results (from the past 72 hour(s))   Basic Metabolic Panel    Collection Time: 01/07/25  1:05 PM   Result Value Ref Range    Sodium 135 (L) 136 - 145 mmol/L    Potassium 3.1 (L) 3.5 - 5.1 mmol/L    Chloride 98 97 - 108 mmol/L    CO2 30 21 - 32 mmol/L    Anion Gap 7 2 - 12 mmol/L    Glucose 166 (H) 65 - 100 mg/dL    BUN 20 6 - 20 MG/DL    Creatinine 1.21 (H) 0.55 - 1.02 MG/DL    BUN/Creatinine Ratio 17 12 - 20      Est, Glom Filt Rate 47 (L) >60 ml/min/1.73m2    Calcium 9.0 8.5 - 10.1 MG/DL          No results found.        Data

## 2025-01-08 NOTE — PROGRESS NOTES
Virtual Visit, pre chemo for C2 Taxol, Carbo, Keytruda on 1/9/2025    1. Have you been to the ER, urgent care clinic since your last visit?  Hospitalized since your last visit?  no    2. Have you seen or consulted any other health care providers outside of the [facility] since your last visit?  Include any pap smears or colon screening.   no    
capsule Take 2 capsules by mouth daily (with breakfast) for 14 days 28 capsule 0    dexAMETHasone (DECADRON) 4 MG tablet Take 2 tablets by mouth with breakfast the day before chemotherapy and repeat for 2 days after chemotherapy 36 tablet 2    lidocaine-prilocaine (EMLA) 2.5-2.5 % cream Apply small amount over port area and cover with a band-aid one (1) hour before chemotherapy treatment 30 g 2    ondansetron (ZOFRAN-ODT) 4 MG disintegrating tablet Place one (1) tablet under tongue every 8 hour when needed for nausea and vomiting 30 tablet 2    FOLIC ACID PO Take by mouth (Patient not taking: Reported on 10/7/2024)      amLODIPine (NORVASC) 5 MG tablet Take 1 tablet by mouth daily      ergocalciferol (ERGOCALCIFEROL) 1.25 MG (68756 UT) capsule TAKE 1 CAPSULE BY MOUTH WEEKLY (Patient not taking: Reported on 8/7/2024)      ibuprofen (ADVIL;MOTRIN) 200 MG tablet Take 2 tablets by mouth as needed      methylPREDNISolone (MEDROL) 4 MG tablet Take 1 tablet by mouth daily as needed       No current facility-administered medications on file prior to visit.       Allergies   Allergen Reactions    Codeine Nausea And Vomiting    Penicillins Rash     Rash & dizzy    Sulfa Antibiotics Other (See Comments)     General redness all over skin    Ciprofloxacin Itching and Other (See Comments)     Extreme dizziness and rash     OBJECTIVE:  Physical Exam:  There were no vitals filed for this visit.    General: Alert and oriented. No acute distress. Well-nourished  HEENT: No thyroid enlargment. Neck supple without restrictions. Sclera normal. Normal occular motion. Moist mucous membranes.  Lymphatics: No evidence of axillary, cervical, or subclavicular adenopathy.   Respiratory: clear to auscultation and percussion to the bases. No CVAT.  Cardiovascular: regular rate and rhythm. No murmurs, rubs, or gallops.  Gastrointestinal: soft, non-tender, non-distended, no masses or organomegaly. Well-healed incision.   Musculoskeletal: normal

## 2025-01-09 ENCOUNTER — APPOINTMENT (OUTPATIENT)
Facility: HOSPITAL | Age: 75
DRG: 322 | End: 2025-01-09
Attending: STUDENT IN AN ORGANIZED HEALTH CARE EDUCATION/TRAINING PROGRAM
Payer: MEDICARE

## 2025-01-09 ENCOUNTER — HOSPITAL ENCOUNTER (OUTPATIENT)
Facility: HOSPITAL | Age: 75
Setting detail: INFUSION SERIES
End: 2025-01-09

## 2025-01-09 LAB
ANION GAP SERPL CALC-SCNC: 9 MMOL/L (ref 2–12)
BASOPHILS # BLD: 0.01 K/UL (ref 0–0.1)
BASOPHILS NFR BLD: 0.1 % (ref 0–1)
BUN SERPL-MCNC: 17 MG/DL (ref 6–20)
BUN/CREAT SERPL: 14 (ref 12–20)
CALCIUM SERPL-MCNC: 8.7 MG/DL (ref 8.5–10.1)
CHLORIDE SERPL-SCNC: 98 MMOL/L (ref 97–108)
CHOLEST SERPL-MCNC: 253 MG/DL
CO2 SERPL-SCNC: 27 MMOL/L (ref 21–32)
CREAT SERPL-MCNC: 1.21 MG/DL (ref 0.55–1.02)
DIFFERENTIAL METHOD BLD: ABNORMAL
ECHO AO ASC DIAM: 3.5 CM
ECHO AO ASCENDING AORTA INDEX: 2.02 CM/M2
ECHO AO ROOT DIAM: 3.4 CM
ECHO AO ROOT INDEX: 1.97 CM/M2
ECHO AV AREA PEAK VELOCITY: 2 CM2
ECHO AV AREA VTI: 2 CM2
ECHO AV AREA/BSA PEAK VELOCITY: 1.2 CM2/M2
ECHO AV AREA/BSA VTI: 1.2 CM2/M2
ECHO AV MEAN GRADIENT: 4 MMHG
ECHO AV MEAN VELOCITY: 0.9 M/S
ECHO AV PEAK GRADIENT: 8 MMHG
ECHO AV PEAK VELOCITY: 1.4 M/S
ECHO AV VELOCITY RATIO: 0.79
ECHO AV VTI: 28.8 CM
ECHO BSA: 1.78 M2
ECHO LA DIAMETER INDEX: 2.2 CM/M2
ECHO LA DIAMETER: 3.8 CM
ECHO LA TO AORTIC ROOT RATIO: 1.12
ECHO LA VOL A-L A4C: 43 ML (ref 22–52)
ECHO LA VOL MOD A4C: 39 ML (ref 22–52)
ECHO LA VOLUME INDEX A-L A4C: 25 ML/M2 (ref 16–34)
ECHO LA VOLUME INDEX MOD A4C: 23 ML/M2 (ref 16–34)
ECHO LV EDV A2C: 79 ML
ECHO LV EDV A4C: 76 ML
ECHO LV EDV BP: 78 ML (ref 56–104)
ECHO LV EDV INDEX A4C: 44 ML/M2
ECHO LV EDV INDEX BP: 45 ML/M2
ECHO LV EDV NDEX A2C: 46 ML/M2
ECHO LV EF PHYSICIAN: 64 %
ECHO LV EJECTION FRACTION A2C: 62 %
ECHO LV EJECTION FRACTION A4C: 36 %
ECHO LV ESV A2C: 30 ML
ECHO LV ESV A4C: 49 ML
ECHO LV ESV BP: 40 ML (ref 19–49)
ECHO LV ESV INDEX A2C: 17 ML/M2
ECHO LV ESV INDEX A4C: 28 ML/M2
ECHO LV ESV INDEX BP: 23 ML/M2
ECHO LV FRACTIONAL SHORTENING: 35 % (ref 28–44)
ECHO LV INTERNAL DIMENSION DIASTOLE INDEX: 2.49 CM/M2
ECHO LV INTERNAL DIMENSION DIASTOLIC: 4.3 CM (ref 3.9–5.3)
ECHO LV INTERNAL DIMENSION SYSTOLIC INDEX: 1.62 CM/M2
ECHO LV INTERNAL DIMENSION SYSTOLIC: 2.8 CM
ECHO LV IVSD: 0.8 CM (ref 0.6–0.9)
ECHO LV MASS 2D: 96.8 G (ref 67–162)
ECHO LV MASS INDEX 2D: 55.9 G/M2 (ref 43–95)
ECHO LV POSTERIOR WALL DIASTOLIC: 0.7 CM (ref 0.6–0.9)
ECHO LV RELATIVE WALL THICKNESS RATIO: 0.33
ECHO LVOT AREA: 2.5 CM2
ECHO LVOT AV VTI INDEX: 0.84
ECHO LVOT DIAM: 1.8 CM
ECHO LVOT MEAN GRADIENT: 2 MMHG
ECHO LVOT PEAK GRADIENT: 5 MMHG
ECHO LVOT PEAK VELOCITY: 1.1 M/S
ECHO LVOT STROKE VOLUME INDEX: 35.4 ML/M2
ECHO LVOT SV: 61.3 ML
ECHO LVOT VTI: 24.1 CM
ECHO MV A VELOCITY: 0.94 M/S
ECHO MV AREA VTI: 2.7 CM2
ECHO MV E DECELERATION TIME (DT): 269.8 MS
ECHO MV E VELOCITY: 0.46 M/S
ECHO MV E/A RATIO: 0.49
ECHO MV LVOT VTI INDEX: 0.93
ECHO MV MAX VELOCITY: 1 M/S
ECHO MV MEAN GRADIENT: 1 MMHG
ECHO MV MEAN VELOCITY: 0.4 M/S
ECHO MV PEAK GRADIENT: 4 MMHG
ECHO MV REGURGITANT PEAK GRADIENT: 0 MMHG
ECHO MV REGURGITANT PEAK VELOCITY: 0 M/S
ECHO MV VTI: 22.4 CM
ECHO PV MAX VELOCITY: 0.9 M/S
ECHO PV MEAN GRADIENT: 2 MMHG
ECHO PV MEAN VELOCITY: 0.7 M/S
ECHO PV PEAK GRADIENT: 3 MMHG
ECHO RV INTERNAL DIMENSION: 3.3 CM
ECHO RV TAPSE: 1.9 CM (ref 1.7–?)
ECHO TV REGURGITANT MAX VELOCITY: 2.3 M/S
ECHO TV REGURGITANT PEAK GRADIENT: 21 MMHG
EOSINOPHIL # BLD: 0 K/UL (ref 0–0.4)
EOSINOPHIL NFR BLD: 0 % (ref 0–7)
ERYTHROCYTE [DISTWIDTH] IN BLOOD BY AUTOMATED COUNT: 14.9 % (ref 11.5–14.5)
EST. AVERAGE GLUCOSE BLD GHB EST-MCNC: 114 MG/DL
GLUCOSE SERPL-MCNC: 122 MG/DL (ref 65–100)
HBA1C MFR BLD: 5.6 % (ref 4–5.6)
HCT VFR BLD AUTO: 28.5 % (ref 35–47)
HDLC SERPL-MCNC: 57 MG/DL
HDLC SERPL: 4.4 (ref 0–5)
HGB BLD-MCNC: 9.5 G/DL (ref 11.5–16)
IMM GRANULOCYTES # BLD AUTO: 0.05 K/UL (ref 0–0.04)
IMM GRANULOCYTES NFR BLD AUTO: 0.7 % (ref 0–0.5)
LDLC SERPL CALC-MCNC: 159.4 MG/DL (ref 0–100)
LYMPHOCYTES # BLD: 0.46 K/UL (ref 0.8–3.5)
LYMPHOCYTES NFR BLD: 6.5 % (ref 12–49)
MCH RBC QN AUTO: 29.9 PG (ref 26–34)
MCHC RBC AUTO-ENTMCNC: 33.3 G/DL (ref 30–36.5)
MCV RBC AUTO: 89.6 FL (ref 80–99)
MONOCYTES # BLD: 0.42 K/UL (ref 0–1)
MONOCYTES NFR BLD: 5.9 % (ref 5–13)
NEUTS SEG # BLD: 6.14 K/UL (ref 1.8–8)
NEUTS SEG NFR BLD: 86.8 % (ref 32–75)
NRBC # BLD: 0 K/UL (ref 0–0.01)
NRBC BLD-RTO: 0 PER 100 WBC
PLATELET # BLD AUTO: 249 K/UL (ref 150–400)
PMV BLD AUTO: 9.5 FL (ref 8.9–12.9)
POTASSIUM SERPL-SCNC: 3.2 MMOL/L (ref 3.5–5.1)
RBC # BLD AUTO: 3.18 M/UL (ref 3.8–5.2)
SODIUM SERPL-SCNC: 134 MMOL/L (ref 136–145)
TRIGL SERPL-MCNC: 183 MG/DL
TROPONIN I SERPL HS-MCNC: ABNORMAL NG/L (ref 0–51)
VLDLC SERPL CALC-MCNC: 36.6 MG/DL
WBC # BLD AUTO: 7.1 K/UL (ref 3.6–11)

## 2025-01-09 PROCEDURE — 80061 LIPID PANEL: CPT

## 2025-01-09 PROCEDURE — 027034Z DILATION OF CORONARY ARTERY, ONE ARTERY WITH DRUG-ELUTING INTRALUMINAL DEVICE, PERCUTANEOUS APPROACH: ICD-10-PCS | Performed by: STUDENT IN AN ORGANIZED HEALTH CARE EDUCATION/TRAINING PROGRAM

## 2025-01-09 PROCEDURE — B2111ZZ FLUOROSCOPY OF MULTIPLE CORONARY ARTERIES USING LOW OSMOLAR CONTRAST: ICD-10-PCS | Performed by: STUDENT IN AN ORGANIZED HEALTH CARE EDUCATION/TRAINING PROGRAM

## 2025-01-09 PROCEDURE — 93306 TTE W/DOPPLER COMPLETE: CPT

## 2025-01-09 PROCEDURE — 4A023N7 MEASUREMENT OF CARDIAC SAMPLING AND PRESSURE, LEFT HEART, PERCUTANEOUS APPROACH: ICD-10-PCS | Performed by: STUDENT IN AN ORGANIZED HEALTH CARE EDUCATION/TRAINING PROGRAM

## 2025-01-09 PROCEDURE — 6370000000 HC RX 637 (ALT 250 FOR IP): Performed by: INTERNAL MEDICINE

## 2025-01-09 PROCEDURE — 80048 BASIC METABOLIC PNL TOTAL CA: CPT

## 2025-01-09 PROCEDURE — 6360000002 HC RX W HCPCS: Performed by: INTERNAL MEDICINE

## 2025-01-09 PROCEDURE — 83036 HEMOGLOBIN GLYCOSYLATED A1C: CPT

## 2025-01-09 PROCEDURE — 84484 ASSAY OF TROPONIN QUANT: CPT

## 2025-01-09 PROCEDURE — 02C03ZZ EXTIRPATION OF MATTER FROM CORONARY ARTERY, ONE ARTERY, PERCUTANEOUS APPROACH: ICD-10-PCS | Performed by: STUDENT IN AN ORGANIZED HEALTH CARE EDUCATION/TRAINING PROGRAM

## 2025-01-09 PROCEDURE — 6370000000 HC RX 637 (ALT 250 FOR IP): Performed by: STUDENT IN AN ORGANIZED HEALTH CARE EDUCATION/TRAINING PROGRAM

## 2025-01-09 PROCEDURE — 6360000002 HC RX W HCPCS: Performed by: STUDENT IN AN ORGANIZED HEALTH CARE EDUCATION/TRAINING PROGRAM

## 2025-01-09 PROCEDURE — 2500000003 HC RX 250 WO HCPCS: Performed by: INTERNAL MEDICINE

## 2025-01-09 PROCEDURE — 85025 COMPLETE CBC W/AUTO DIFF WBC: CPT

## 2025-01-09 PROCEDURE — 36415 COLL VENOUS BLD VENIPUNCTURE: CPT

## 2025-01-09 PROCEDURE — 2060000000 HC ICU INTERMEDIATE R&B

## 2025-01-09 RX ORDER — POTASSIUM CHLORIDE 1500 MG/1
40 TABLET, EXTENDED RELEASE ORAL ONCE
Status: COMPLETED | OUTPATIENT
Start: 2025-01-09 | End: 2025-01-09

## 2025-01-09 RX ORDER — ENOXAPARIN SODIUM 100 MG/ML
40 INJECTION SUBCUTANEOUS DAILY
Status: DISCONTINUED | OUTPATIENT
Start: 2025-01-09 | End: 2025-01-10 | Stop reason: HOSPADM

## 2025-01-09 RX ORDER — METOPROLOL SUCCINATE 25 MG/1
25 TABLET, EXTENDED RELEASE ORAL DAILY
Status: DISCONTINUED | OUTPATIENT
Start: 2025-01-09 | End: 2025-01-10 | Stop reason: HOSPADM

## 2025-01-09 RX ORDER — ATORVASTATIN CALCIUM 40 MG/1
80 TABLET, FILM COATED ORAL NIGHTLY
Status: DISCONTINUED | OUTPATIENT
Start: 2025-01-09 | End: 2025-01-10 | Stop reason: HOSPADM

## 2025-01-09 RX ADMIN — ENOXAPARIN SODIUM 40 MG: 100 INJECTION SUBCUTANEOUS at 10:26

## 2025-01-09 RX ADMIN — POTASSIUM CHLORIDE 40 MEQ: 1500 TABLET, EXTENDED RELEASE ORAL at 10:26

## 2025-01-09 RX ADMIN — SODIUM CHLORIDE, PRESERVATIVE FREE 10 ML: 5 INJECTION INTRAVENOUS at 10:30

## 2025-01-09 RX ADMIN — TICAGRELOR 90 MG: 90 TABLET ORAL at 20:34

## 2025-01-09 RX ADMIN — PANTOPRAZOLE SODIUM 40 MG: 40 TABLET, DELAYED RELEASE ORAL at 05:39

## 2025-01-09 RX ADMIN — ATORVASTATIN CALCIUM 80 MG: 40 TABLET, FILM COATED ORAL at 20:34

## 2025-01-09 RX ADMIN — SODIUM CHLORIDE, PRESERVATIVE FREE 10 ML: 5 INJECTION INTRAVENOUS at 20:36

## 2025-01-09 RX ADMIN — ONDANSETRON 4 MG: 2 INJECTION INTRAMUSCULAR; INTRAVENOUS at 00:56

## 2025-01-09 RX ADMIN — TRAMADOL HYDROCHLORIDE 50 MG: 50 TABLET, COATED ORAL at 00:40

## 2025-01-09 RX ADMIN — ASPIRIN 81 MG: 81 TABLET, CHEWABLE ORAL at 10:29

## 2025-01-09 RX ADMIN — TICAGRELOR 90 MG: 90 TABLET ORAL at 10:26

## 2025-01-09 RX ADMIN — METOPROLOL SUCCINATE 25 MG: 25 TABLET, FILM COATED, EXTENDED RELEASE ORAL at 15:28

## 2025-01-09 ASSESSMENT — PAIN DESCRIPTION - DESCRIPTORS: DESCRIPTORS: DULL

## 2025-01-09 ASSESSMENT — PAIN SCALES - GENERAL
PAINLEVEL_OUTOF10: 4
PAINLEVEL_OUTOF10: 0

## 2025-01-09 ASSESSMENT — PAIN DESCRIPTION - LOCATION: LOCATION: ABDOMEN;HEAD

## 2025-01-09 NOTE — PLAN OF CARE
Problem: Discharge Planning  Goal: Discharge to home or other facility with appropriate resources  Outcome: Progressing  Flowsheets (Taken 1/8/2025 2115)  Discharge to home or other facility with appropriate resources: Identify barriers to discharge with patient and caregiver     Problem: Pain  Goal: Verbalizes/displays adequate comfort level or baseline comfort level  Outcome: Progressing     Problem: Safety - Adult  Goal: Free from fall injury  Outcome: Progressing     Problem: ABCDS Injury Assessment  Goal: Absence of physical injury  Outcome: Progressing

## 2025-01-09 NOTE — CARE COORDINATION
Care Management Initial Assessment       RUR:  1/8/25  Readmission? Yes - Pt was discharged from [facility] on 12/26/24.  1st IM letter given? Yes - 1/8/25  1st  letter given: No         01/09/25 1023   Service Assessment   Patient Orientation Alert and Oriented;Person;Place;Situation;Self   Cognition Alert   History Provided By Patient   Primary Caregiver Self   Support Systems Children  (sons)   Patient's Healthcare Decision Maker is: Legal Next of Kin   PCP Verified by CM Yes  (Pt sees Dr. Artis Bangura)   Last Visit to PCP Within last 3 months  (\"last wk\")   Prior Functional Level Independent in ADLs/IADLs   Current Functional Level Assistance with the following:   Can patient return to prior living arrangement Yes   Ability to make needs known: Good   Family able to assist with home care needs: Yes  (son lives with her)   Would you like for me to discuss the discharge plan with any other family members/significant others, and if so, who? Yes  (sons)   Financial Resources Medicare   Community Resources None   Social/Functional History   Lives With Son   Type of Home House  (one fl 0 shari)   Home Equipment Cane   Active  Yes   Discharge Planning   Type of Residence House   Condition of Participation: Discharge Planning   The Patient and/or Patient Representative was provided with a Choice of Provider? Patient   The Patient and/Or Patient Representative agree with the Discharge Plan? Yes   Freedom of Choice list was provided with basic dialogue that supports the patient's individualized plan of care/goals, treatment preferences, and shares the quality data associated with the providers?  Yes            01/09/25 1025   Readmission Assessment   Number of Days since last admission? 8-30 days   Previous Disposition Home with Family   Who is being Interviewed Patient   What was the patient's/caregiver's perception as to why they think they needed to return back to the hospital? Other (Comment)  (\"I fainted after

## 2025-01-09 NOTE — ACP (ADVANCE CARE PLANNING)
Advance Care Planning     Advance Care Planning Inpatient Note  [facility] Department    Today's Date: 1/9/2025  Unit: [facility] 2 [facility] CARDIO    Received request from IDT Member.  Upon review of chart and communication with care team, patient's decision making abilities are not in question.. Patient and Child/Children was/were present in the room during visit.    Goals of ACP Conversation:  Discuss advance care planning documents  Facilitate a discussion related to patient's goals of care as they align with the patient's values and beliefs.  Establish health care decision maker    Health Care Decision Makers:     No healthcare decision makers have been documented.      Summary:  No Decision Maker named by patient at this time    Advance Care Planning Documents (Patient Wishes):  None     Assessment:  Received request from IDT Member.  Upon review of chart and communication with care team, patient's decision making abilities are not in question.. Patient and Child/Children was/were present in the room during visit.  Explained state hierarchy of health care decision makers and reviewed advance medical directive.  Ms Benitez is interested in completing advance medical directive but would like to review it with her sons.  Left a copy of the advance directive and advised she ask nurse to page  when she is ready to complete the advance directive.      Interventions:  Provided education on documents for clarity and greater understanding  Discussed and provided education on state decision maker hierarchy  Encouraged ongoing ACP conversation with future decision makers and loved ones  Reviewed but did not complete ACP document    Care Preferences Communicated:   No    Outcomes/Plan:  ACP Discussion: Completed    Electronically signed by Chaplain SEAN FRANCISCO on 1/9/2025 at 2:44 PM

## 2025-01-09 NOTE — PLAN OF CARE
Problem: Discharge Planning  Goal: Discharge to home or other facility with appropriate resources  1/9/2025 1559 by Julienne Esparza RN  Outcome: Progressing  1/9/2025 0319 by Nadia Fletcher RN  Outcome: Progressing  Flowsheets (Taken 1/8/2025 2115)  Discharge to home or other facility with appropriate resources: Identify barriers to discharge with patient and caregiver     Problem: Pain  Goal: Verbalizes/displays adequate comfort level or baseline comfort level  1/9/2025 1559 by Julienne Esparza RN  Outcome: Progressing  1/9/2025 0319 by Nadia Fletcher RN  Outcome: Progressing     Problem: Safety - Adult  Goal: Free from fall injury  1/9/2025 1559 by Julienne Esparza RN  Outcome: Progressing  1/9/2025 0319 by Nadia Fletcher RN  Outcome: Progressing     Problem: ABCDS Injury Assessment  Goal: Absence of physical injury  1/9/2025 1559 by Julienne Esparza RN  Outcome: Progressing  1/9/2025 0319 by Nadia Fletcher RN  Outcome: Progressing

## 2025-01-10 ENCOUNTER — TELEPHONE (OUTPATIENT)
Age: 75
End: 2025-01-10

## 2025-01-10 VITALS
HEART RATE: 60 BPM | DIASTOLIC BLOOD PRESSURE: 61 MMHG | RESPIRATION RATE: 19 BRPM | SYSTOLIC BLOOD PRESSURE: 141 MMHG | BODY MASS INDEX: 30.58 KG/M2 | WEIGHT: 162 LBS | HEIGHT: 61 IN | OXYGEN SATURATION: 96 % | TEMPERATURE: 97.8 F

## 2025-01-10 DIAGNOSIS — I21.3 ST ELEVATION MYOCARDIAL INFARCTION (STEMI), UNSPECIFIED ARTERY (HCC): Primary | ICD-10-CM

## 2025-01-10 LAB
ANION GAP SERPL CALC-SCNC: 3 MMOL/L (ref 2–12)
BUN SERPL-MCNC: 18 MG/DL (ref 6–20)
BUN/CREAT SERPL: 15 (ref 12–20)
CALCIUM SERPL-MCNC: 8.4 MG/DL (ref 8.5–10.1)
CHLORIDE SERPL-SCNC: 105 MMOL/L (ref 97–108)
CO2 SERPL-SCNC: 30 MMOL/L (ref 21–32)
CREAT SERPL-MCNC: 1.23 MG/DL (ref 0.55–1.02)
GLUCOSE SERPL-MCNC: 89 MG/DL (ref 65–100)
HCT VFR BLD AUTO: 30.8 % (ref 35–47)
HGB BLD-MCNC: 10 G/DL (ref 11.5–16)
MAGNESIUM SERPL-MCNC: 1.9 MG/DL (ref 1.6–2.4)
POTASSIUM SERPL-SCNC: 3.1 MMOL/L (ref 3.5–5.1)
SODIUM SERPL-SCNC: 138 MMOL/L (ref 136–145)

## 2025-01-10 PROCEDURE — 6370000000 HC RX 637 (ALT 250 FOR IP): Performed by: STUDENT IN AN ORGANIZED HEALTH CARE EDUCATION/TRAINING PROGRAM

## 2025-01-10 PROCEDURE — 2500000003 HC RX 250 WO HCPCS: Performed by: INTERNAL MEDICINE

## 2025-01-10 PROCEDURE — 85018 HEMOGLOBIN: CPT

## 2025-01-10 PROCEDURE — 6360000002 HC RX W HCPCS: Performed by: INTERNAL MEDICINE

## 2025-01-10 PROCEDURE — 83735 ASSAY OF MAGNESIUM: CPT

## 2025-01-10 PROCEDURE — 36415 COLL VENOUS BLD VENIPUNCTURE: CPT

## 2025-01-10 PROCEDURE — 85014 HEMATOCRIT: CPT

## 2025-01-10 PROCEDURE — 80048 BASIC METABOLIC PNL TOTAL CA: CPT

## 2025-01-10 RX ORDER — LOSARTAN POTASSIUM 25 MG/1
12.5 TABLET ORAL DAILY
Qty: 30 TABLET | Refills: 11 | Status: SHIPPED | OUTPATIENT
Start: 2025-01-10 | End: 2025-01-10 | Stop reason: HOSPADM

## 2025-01-10 RX ORDER — METOPROLOL SUCCINATE 25 MG/1
25 TABLET, EXTENDED RELEASE ORAL DAILY
Qty: 30 TABLET | Refills: 11 | Status: SHIPPED | OUTPATIENT
Start: 2025-01-10

## 2025-01-10 RX ORDER — POTASSIUM CHLORIDE 1500 MG/1
40 TABLET, EXTENDED RELEASE ORAL ONCE
Status: COMPLETED | OUTPATIENT
Start: 2025-01-10 | End: 2025-01-10

## 2025-01-10 RX ORDER — NITROGLYCERIN 0.4 MG/1
0.4 TABLET SUBLINGUAL EVERY 5 MIN PRN
Qty: 25 TABLET | Refills: 0 | Status: SHIPPED | OUTPATIENT
Start: 2025-01-10

## 2025-01-10 RX ORDER — LOSARTAN POTASSIUM 25 MG/1
12.5 TABLET ORAL DAILY
Status: DISCONTINUED | OUTPATIENT
Start: 2025-01-10 | End: 2025-01-10

## 2025-01-10 RX ORDER — ATORVASTATIN CALCIUM 80 MG/1
80 TABLET, FILM COATED ORAL NIGHTLY
Qty: 30 TABLET | Refills: 11 | Status: SHIPPED | OUTPATIENT
Start: 2025-01-10

## 2025-01-10 RX ORDER — ASPIRIN 81 MG/1
81 TABLET, CHEWABLE ORAL DAILY
Qty: 90 TABLET | Refills: 3 | Status: SHIPPED | OUTPATIENT
Start: 2025-01-10

## 2025-01-10 RX ADMIN — SODIUM CHLORIDE, PRESERVATIVE FREE 10 ML: 5 INJECTION INTRAVENOUS at 09:21

## 2025-01-10 RX ADMIN — PANTOPRAZOLE SODIUM 40 MG: 40 TABLET, DELAYED RELEASE ORAL at 09:17

## 2025-01-10 RX ADMIN — ASPIRIN 81 MG: 81 TABLET, CHEWABLE ORAL at 09:17

## 2025-01-10 RX ADMIN — ENOXAPARIN SODIUM 40 MG: 100 INJECTION SUBCUTANEOUS at 09:18

## 2025-01-10 RX ADMIN — TICAGRELOR 90 MG: 90 TABLET ORAL at 09:17

## 2025-01-10 RX ADMIN — POTASSIUM CHLORIDE 40 MEQ: 1500 TABLET, EXTENDED RELEASE ORAL at 09:20

## 2025-01-10 NOTE — PLAN OF CARE
Problem: Discharge Planning  Goal: Discharge to home or other facility with appropriate resources  1/10/2025 1118 by Julienne Esparza RN  Outcome: Adequate for Discharge  1/9/2025 2230 by Bette Felix RN  Outcome: Progressing  Flowsheets (Taken 1/9/2025 1940)  Discharge to home or other facility with appropriate resources: Identify barriers to discharge with patient and caregiver     Problem: Pain  Goal: Verbalizes/displays adequate comfort level or baseline comfort level  1/10/2025 1118 by Julienne Esparza RN  Outcome: Adequate for Discharge  Flowsheets  Taken 1/10/2025 0428 by Bette Felix RN  Verbalizes/displays adequate comfort level or baseline comfort level: Encourage patient to monitor pain and request assistance  Taken 1/9/2025 2319 by Bette Felix RN  Verbalizes/displays adequate comfort level or baseline comfort level: Encourage patient to monitor pain and request assistance  1/9/2025 2230 by Bette Felix RN  Outcome: Progressing  Flowsheets (Taken 1/9/2025 1940)  Verbalizes/displays adequate comfort level or baseline comfort level:   Encourage patient to monitor pain and request assistance   Assess pain using appropriate pain scale   Administer analgesics based on type and severity of pain and evaluate response   Implement non-pharmacological measures as appropriate and evaluate response   Consider cultural and social influences on pain and pain management     Problem: Safety - Adult  Goal: Free from fall injury  1/10/2025 1118 by Julienne Esparza RN  Outcome: Adequate for Discharge  Flowsheets (Taken 1/9/2025 2349 by Bette Felix RN)  Free From Fall Injury: Instruct family/caregiver on patient safety  1/9/2025 2230 by Bette Felix RN  Outcome: Progressing  Flowsheets (Taken 1/9/2025 1940)  Free From Fall Injury: Instruct family/caregiver on patient safety     Problem: ABCDS Injury Assessment  Goal: Absence of

## 2025-01-10 NOTE — PROGRESS NOTES
Progress Note      1/10/2025 7:32 AM  NAME: Elenita Benitez   MRN:  [id number]   Admit Diagnosis: Acute coronary syndrome (HCC) [I24.9]  STEMI (ST elevation myocardial infarction) (HCC) [I21.3]  ST elevation myocardial infarction (STEMI), unspecified artery (HCC) [I21.3]  Chest pain, unspecified type [R07.9]       Assessment:     Problem list:   Inferior ST elevation MI  Metastatic endometrial carcinoma, history of surgery in 2021, recurrence and now undergoing chemotherapy, Dr Nicole  Hypertension  Left hydronephrosis, may need urinary stents in near future   Family history of CAD  Diarrhea   HLP with    Brief episode of Afib during coronary intervention      Lives with son, has 2 son       LHC: Thrombotic occlusion of proximal RCA s/p PCI with MITZI x1   Echocardiogram shows normal LV ejection fraction      Recommendations:    Cont aspirin and ticagrelor   Cont  lipitor 80 mg po daily .  Cont metoprolol   If needed resume amlodipine   Can stay off of hctz, She also has low K    Did not tolerate losartan in the past  Cont  Protonix  Ambulate    Cardiac rehab   I have called Dr Roberts's [facility] re her [facility] stent procedure and left message but no call back, will try today       Will check hemoglobin 1 more time, to make sure it is in stable range   Hopefully  home later today           Subjective:     HPI:   No CP or SOB   Have sensation of taking deep breath, likely due to brilinta     ROS: No CP, SOB, Abd pain, nausea, vomiting, syncope, palpitations, new focal neurological symptoms     Objective:      Physical Exam:    Last 24hrs VS reviewed since prior progress note. Most recent are:    BP (!) 126/53   Pulse 57   Temp 97.7 °F (36.5 °C) (Oral)   Resp 16   Ht 1.549 m (5' 1\")   Wt 73.5 kg (162 lb)   SpO2 99%   BMI 30.61 kg/m²     Intake/Output Summary (Last 24 hours) at 1/10/2025 0732  Last data filed at 1/10/2025 0428  Gross per 24 hour   Intake 1270 ml   Output 850 ml   Net 
        Progress Note      1/9/2025 7:28 AM  NAME: Elenita Benitez   MRN:  [id number]   Admit Diagnosis: Acute coronary syndrome (HCC) [I24.9]  STEMI (ST elevation myocardial infarction) (HCC) [I21.3]  ST elevation myocardial infarction (STEMI), unspecified artery (HCC) [I21.3]  Chest pain, unspecified type [R07.9]       Assessment:     Problem list:   Inferior ST elevation MI  Metastatic endometrial carcinoma, history of surgery in 2021, recurrence and now undergoing chemotherapy, Dr Nicole  Hypertension  Left hydronephrosis, may need urinary stents in near future   Family history of CAD  Diarrhea   Brief episode of Afib during coronary intervention      Lives with son, has 2 son       LHC: Thrombotic occlusion of proximal RCA s/p PCI with MITZI x1       Recommendations:    Cont aspirin and ticagrelor   Add lipitor 80 mg po daily   Add metoprolol   If BP allows will add losartan   Cont  Protonix  Ambulate   Monitor H/H   Cardiac rehab   Echo   Will [facility] with [facility] re her urinary stent procedure   Hopefully home tomorrow          Subjective:     HPI:   No CP or SOB     ROS: No CP, SOB, Abd pain, nausea, vomiting, syncope, palpitations, new focal neurological symptoms     Objective:      Physical Exam:    Last 24hrs VS reviewed since prior progress note. Most recent are:    BP (!) 124/59   Pulse 73   Temp 97.9 °F (36.6 °C) (Oral)   Resp 16   Wt 73.7 kg (162 lb 7.7 oz)   SpO2 95%   BMI 30.70 kg/m²     Intake/Output Summary (Last 24 hours) at 1/9/2025 0728  Last data filed at 1/8/2025 1804  Gross per 24 hour   Intake --   Output 10 ml   Net -10 ml           General: Alert and oriented x3, no acute distress   Neck: Supple   Respiratory: No respiratory distress, clear lung sound   Cardiovascular: Regular rate rhythm, S1S2, no murmur   Abdomen: soft, non tender, non distended   Neuro: moves all extremities, oriented x3   Skin: warm and dry   Extremity: no edema, warm to touch      Data 
      Hospitalist Progress Note    NAME:   Elenita Benitez   : 1950   MRN: [id number]     Date/Time: 2025 8:17 AM  Patient PCP: Artis Bangura MD    Estimated discharge date: 1/10, home  Barriers:       Assessment / Plan:    Chest pain POA- resolved  Due to STEMI POA-inferior wall MI, status post cardiac cath with MITZI x 1 to RCA proximal  Post cath Paroxsymal A-fib- status post amiodarone x 1, resolved now, in sinus  Mild Bradycardia POA- resolved  Hypokalemia POA- mild, persistent 3.2  Metastatic endometrial cancer status post surgery in  with recurrence and now undergoing chemotherapy with Dr. Nicole  Hypertension  Chronic Left hydronephrosis POA- plan for ureteral stent as per pt with Dr Roberts? elective  Recent diverticulitis (sigmoid) in 2024  A1c= 5.6  LDL= 159     Cont monitoring for another 24 hrs on tele on IVCU bed per cardiology  Continue aspirin and Brilinta - DAPT  Added Lipitor,   Add Losartan when able next  Hold off on BP meds amlodipine and hydralazine to make room for Metoprolol - added today by cardio  Replenish potassium p.o. x 1 again today  Not needing much IV morphine for abdominal pain, cont p.o. tramadol as needed for moderate pain  Will defer CT abdomen and pelvis at this time as pain resolved  Recent history of diverticulitis 1 month ago if pain recurs consider CT  Cont Liquid diet for now- advance if pt ready for it  No need for Amio drip at this time  Check 2D echo- pending  S/L Nitroglycerin as needed chest pain  Atropine as needed severe bradycardia                Medical Decision Making:   I personally reviewed labs: Pending  I personally reviewed imaging: None  I personally reviewed EKG: Inferior wall STEMI  Toxic drug monitoring: None  Discussed case with:  Pt, RN, sons at bedside post cath, Dr Jimmy Nguyễn (cardio)        Code Status: Full code  DVT Prophylaxis: Subcu Lovenox  Baseline: Patient is independent with ADLs at home    Subjective: 
Bedside and Verbal shift change report given to Elkin Robins (oncoming nurse) by ELKIN Ellis (offgoing nurse). Report included the following information Nurse Handoff Report, Recent Results, and Cardiac Rhythm SB/SR .     1230: I have reviewed Discharge Instructions with the patient. The patient verbalized understanding. Discharge medications reviewed with patient along with appropriate educational materials. Opportunity for questions and clarification was provided. All lines removed without difficulty. Cath sites are clean, dry, and intact. Patient's belongings gathered and with patient. Patient is ready for discharge.      
Brief Procedure Note:         Indication:   Inferior STEMI     Procedure:   LCH, Cors   PCI of RCA  Aspiration thrombectomy of RCA    Complications: None   Blood loss: Minimal   Condition: Stable     Brief procedure Result:     Thrombotic occlusion of proximal RCA   Aspiration thrombectomy of high thrombus burden and distal thrombus   Successful PCI of proximal RCA with MITZI x1   LVEDP 25 mm Hg     Recommendations:   Cont aspirin and ticagrelor   Statin   Afib developed during cath, given Amiodarone, cont to monitor if remains in Afib consider amio and heparin gtt   IV hydration   Add metoprolol and losartan as BP and HR allows   Echocardiogram   Post procedure care       Full note and recommendations to follow. '         
End of Shift Note    Bedside shift change report given to  Josette      (oncoming nurse) by Nadia Fletcher RN (offgoing nurse).  Report included the following information Nurse Handoff Report    Shift worked:  Night shift      Shift summary and any significant changes:     2300: Patient had 8 beats of vtach. No acute changes overnight.      Concerns for physician to address:  N/a     Zone phone for oncoming shift:   [phone]       Activity:  Activity: In bed  Number times ambulated in hallways past shift: 0  Number of times OOB to chair past shift: 1    Cardiac:   Cardiac Monitoring: Yes      Cardiac Rhythm: Atrial fib    Access:  Current line(s): PIV     Genitourinary:   Urinary status: voiding    Respiratory:   O2 Device: None (Room air)  Chronic home O2 use?: NO  Incentive spirometer at bedside: YES       GI:  Last BM (including prior to admit): 01/08/25  Current diet:    ADULT DIET; Full Liquid  Passing flatus: YES  Tolerating current diet: YES       Pain Management:   Patient states pain is manageable on current regimen: YES    Skin:  Fritz Scale Score: 20  Interventions: increase time out of bed    Patient Safety:  Fall Score: Rodriguez Total Score: 60  Interventions: bed/chair alarm, gripper socks, and pt to call before getting OOB       Length of Stay:  Expected LOS: 2  Actual LOS: 1      Nadia Fletcher RN  Predictive Model Details          24 (Normal)  Factor Value    Calculated 1/9/2025 03:35 53% Age 74 years old    Deterioration Index Model 18% Cardiac rhythm Atrial fib     13% Sodium abnormal (131 mmol/L)     6% Hematocrit abnormal (28.5 %)     6% Potassium abnormal (3.0 mmol/L)     4% Systolic 124     0% WBC count 7.1 K/uL     0% Pulse oximetry 95 %     0% Temperature 98.1 °F (36.7 °C)     0% Respiratory rate 16     0% Pulse 73        
POST DISCHARGE NOTE 1444 - PCP hospital follow-up transitional care appointment has been scheduled with Dr. Artis Bangura on 1/14/25 5209. Spoke with patient by phone to give PCP appt time/date/location of appt. Patient verbalized understanding of the appt time/date/location. [facility] information on AVS for patient resource. Pending patient discharge.       Attempted to schedule PCP hospital follow up appointment. Unable to reach anyone, unable to leave voicemail. [facility] information on AVS for patient resource.  Pending patient discharge  
Spiritual Health History and Assessment/Progress Note  [facility]    Advance Care Planning,  ,  ,      Name: Elenita Benitez MRN: [id number]    Age: 74 y.o.     Sex: female   Language: English   Orthodoxy: Buddhist   STEMI (ST elevation myocardial infarction) (HCC)     Date: 1/9/2025            Total Time Calculated: 28 min              Spiritual Assessment began in [facility] 2 [facility]TRV[facility] CARDIO        Referral/Consult From: Multi-disciplinary team   Encounter Overview/Reason: Advance Care Planning  Service Provided For: Patient and family together    Annamaria, Belief, Meaning:   Patient identifies as spiritual, is connected with a annamaria tradition or spiritual practice, and has beliefs or practices that help with coping during difficult times  Family/Friends Other: unable to assess      Importance and Influence:  Patient has spiritual/personal beliefs that influence decisions regarding their health  Family/Friends have no beliefs influential to healthcare decision-making identified during this visit    Community:  Patient feels well-supported. Support system includes: Children  Family/Friends Other: did not assess    Assessment and Plan of Care:     Patient Interventions include: Facilitated expression of thoughts and feelings, Explored spiritual coping/struggle/distress, Affirmed coping skills/support systems, and Assisted in Advance Care Planning conversation  Family/Friends Interventions include: Facilitated expression of thoughts and feelings and Assisted in Advance Care Planning conversation    Patient Plan of Care: No spiritual needs identified for follow-up and Spiritual Care available upon further referral  Family/Friends Plan of Care: No spiritual needs identified for follow-up and Spiritual Care available upon further referral    ACP Assessment:  Received request from IDT Member.  Upon review of chart and communication with care team, patient's decision making abilities are not in question.. 
Spiritual Health History and Assessment/Progress Note  [facility]    Advance Care Planning,  ,  ,      Name: Elenita Benitez MRN: [id number]    Age: 74 y.o.     Sex: female   Language: English   Spiritism: Sikh   STEMI (ST elevation myocardial infarction) (HCC)     Date: 1/10/2025            Total Time Calculated: 44 min              Spiritual Assessment continued in [facility] 2 INTRV[facility] CARDIO        Referral/Consult From: Nurse   Encounter Overview/Reason: Advance Care Planning  Service Provided For: Patient    Annamaria, Belief, Meaning:   Patient identifies as spiritual, is connected with a annamaria tradition or spiritual practice, and has beliefs or practices that help with coping during difficult times  Family/Friends No family/friends present      Importance and Influence:  Patient has spiritual/personal beliefs that influence decisions regarding their health  Family/Friends No family/friends present    Community:  Patient feels well-supported. Support system includes: Children, Friends, and Extended family  Family/Friends No family/friends present    Assessment and Plan of Care:     Patient Interventions include: Facilitated expression of thoughts and feelings, Explored spiritual coping/struggle/distress, Affirmed coping skills/support systems, and Assisted in Advance Care Planning conversation  Family/Friends Interventions include: No family/friends present    Patient Plan of Care: No spiritual needs identified for follow-up and Spiritual Care available upon further referral  Family/Friends Plan of Care: No family/friends present        Electronically signed by Chaplain SEAN FRANCISCO on 1/10/2025 at 11:58 AM    
TRANSFER - IN REPORT:    Verbal report received from Papo LUX on Elenita Benitez  being received from [facility] for routine progression of care. Report consisted of patient’s Situation, Background, Assessment and Recommendations(SBAR).  Opportunity for questions and clarification was provided. Assessment completed upon patient’s arrival to IVCU and care assumed.       PROCEDURE SITE CHECK:    Procedure site: R radial. TR band at 12. . Patient currently has no c/o pain/discomfort reported at procedure site.    1810: Patient c/o abdominal pain after LHC. Per Jamie if pain does not subside with prn pain medication possible CT without contrast tomorrow.   1815: Patient having difficulties saying/forming words. Patient states her mouth is dry. Oral care provided. [facility] completed in flowsheet.   0720: Bedside/verbal report given to MACI Zuniga.         
count 7.1 K/uL     0% Pulse oximetry 95 %     0% Temperature 98.2 °F (36.8 °C)        Julienne Esparza RN

## 2025-01-10 NOTE — PLAN OF CARE
Predictive Model Details          24 (Normal)  Factor Value    Calculated 1/9/2025 22:50 52% Age 74 years old    Deterioration Index Model 17% Cardiac rhythm Sinus rhythm with PAC     10% Respiratory rate 18     8% Sodium abnormal (134 mmol/L)     6% Hematocrit abnormal (28.5 %)     5% Potassium abnormal (3.2 mmol/L)     1% Pulse 63     1% Systolic 109     1% Pulse oximetry 98 %     0% WBC count 7.1 K/uL     0% Temperature 98.5 °F (36.9 °C)    End of Shift Note    Bedside shift change report given to AM RN (oncoming nurse) by RENNY STEVENS RN (offgoing nurse).  Report included the following information SBAR, Kardex, Intake/Output, MAR, Recent Results, and Cardiac Rhythm SR w occaisional PAC's    Shift worked:  7454     Shift summary and any significant changes:     Uneventful.  Has medication questions for MD.     Concerns for physician to address:  DC plans     Zone phone for oncoming shift:   [phone]       Activity:  Level of Assistance: Independent  Number times ambulated in hallways past shift: 1  Number of times OOB to chair past shift: 0    Cardiac:   Cardiac Monitoring: Yes      Cardiac Rhythm: Sinus rhythm with PAC    Access:  Current line(s): PIV     Genitourinary:   Urinary Status: Voiding, Bathroom privileges    Respiratory:   O2 Device: None (Room air)  Chronic home O2 use?: NO  Incentive spirometer at bedside: NO    GI:  Last BM (including prior to admit): 01/08/25  Current diet:  DIET ONE TIME MESSAGE;  ADULT DIET; Regular; Low Fat/Low Chol/High Fiber/2 gm Na  Passing flatus: YES    Pain Management:   Patient states pain is manageable on current regimen: N/A    Skin:  Fritz Scale Score: 22  Interventions: Wound Offloading (Prevention Methods): Repositioning, Turning    Patient Safety:  Fall Risk: Nursing Judgement-Fall Risk High(Add Comments): No  Fall Risk Interventions  Nursing Judgement-Fall Risk High(Add Comments): No  Toilet Every 2 Hours-In Advance of Need: Yes  Hourly Visual Checks:

## 2025-01-10 NOTE — DISCHARGE SUMMARY
Discharge Summary    Name: Elenita Benitez  [id number]  YOB: 1950 (Age: 74 y.o.)   Date of Admission: 1/8/2025  Date of Discharge: 1/10/2025  Attending Physician: Renata Nguyễn MD    Discharge Diagnosis:   Chest pain POA- resolved  Due to STEMI POA-inferior wall MI, status post cardiac cath with MITZI x 1 to RCA proximal, cont ASA, Brilinta, Lipitor, OP cardio followup  Post cath Paroxsymal A-fib- status post amiodarone x 1, resolved now, in sinus  Mild Bradycardia POA- resolved  Hypokalemia POA- replenished  Metastatic endometrial cancer status post surgery in 2021 with recurrence and now undergoing chemotherapy with Dr. Nicole  Hypertension  Chronic Left hydronephrosis POA- plan for ureteral stent as per pt with Dr Roberts? elective  Recent diverticulitis (sigmoid) in December 2024  Full code    Consultations:  IP CONSULT TO CARDIOLOGY  IP CONSULT TO CARDIAC REHAB  IP CONSULT TO SPIRITUAL CARE      Brief Admission History/Reason for Admission Per Renata Nguyễn MD:   \" 74 y.o.  female with PMHx significant for uterine cancer on chemotherapy for recurrence with Dr. Nicole, hypertension, recent diverticulitis comes in with chief complaints of Chest pain since today a.m.  Patient was found to have STEMI code per EMS and was found to be bradycardic  Patient was promptly taken to the Cath Lab by Dr. VLADIMIR Nguyễn and MITZI x 1 was placed in RCA proximal.  Patient complaining of abdominal pain post cath with some improvement by the time I see patient in room 2214.  Patient saying that she will be able to tolerate p.o. diet but liquids as she had recently had a bout of sigmoid diverticulitis and had taken a dose of tramadol last night for abdominal pain.     We were asked to admit for work up and evaluation of the above problems. \"    Brief Hospital Course by Main Problems:   Chest pain POA- resolved  Due to STEMI POA-inferior wall MI, status post cardiac cath with MITZI x 1

## 2025-01-10 NOTE — ACP (ADVANCE CARE PLANNING)
Advance Care Planning     Advance Care Planning Inpatient Note  [facility] Department    Today's Date: 1/10/2025  Unit: [facility] 2 INTRVNTNL CARDIO    Received request from IDT Member.  Upon review of chart and communication with care team, patient's decision making abilities are not in question.. Patient was/were present in the room during visit.    Goals of ACP Conversation:  Discuss advance care planning documents  Facilitate a discussion related to patient's goals of care as they align with the patient's values and beliefs.  Establish health care decision makers    Health Care Decision Makers:       Primary Decision Maker: Caesar Benitez - Child - [phone]    Primary Decision Maker: Oneida Benitez - Child - [phone]  Summary:  Completed New Documents    Advance Care Planning Documents (Patient Wishes):  Healthcare Power of /Advance Directive Appointment of Health Care Agent  Living Will/Advance Directive     Assessment:  Received request from IDT Member.  Upon review of chart and communication with care team, patient's decision making abilities are not in question.. Patient was/were present in the room during visit.  Patient had reviewed advance medical directive with her sons.  Reviewed and assisted in completing advance medical directive.  Returned original and two copies.  Placed     Interventions:  Provided education on documents for clarity and greater understanding  Assisted in the completion of documents according to patient's wishes at this time  Encouraged ongoing ACP conversation with future decision makers and loved ones    Care Preferences Communicated:   No    Outcomes/Plan:  ACP Discussion: Completed  New advance directive completed.  Returned original document(s) to patient, as well as copies for distribution to appointed agents  Placed copy on chart for scanning     ACP Assessment:  Received request from IDT Member.  Upon review of chart and communication with care team,

## 2025-01-10 NOTE — DISCHARGE INSTRUCTIONS
infection such as fever, chills, or poorly healing incision, persistent tenderness or swelling in the groin, redness and/or warmth to the touch, numbness, significant tingling or pain at the groin site or affected extremity, rash, drainage from the cath site, or if the leg feels tight or swollen, call your physician right away.    If bleeding at the cath site occurs, take a clean gauze pad and apply direct pressure to the groin just above the puncture site.  Call 911 immediately, and continue to apply direct pressure until an ambulance gets to your location.      Information obtained by :  I understand that if any problems occur once I am at home I am to contact my physician.    I understand and acknowledge receipt of the instructions indicated above.                                                                                                                                           R.N.'s Signature                                                                  Date/Time                                                                                                                                              Patient or Representative Signature                                                          Date/Time      Markus Nguyễn MD      Where can you learn more?      Go to [email or web address]             [address], [address]   [phone]  [place] [address]    [email or web address]

## 2025-01-10 NOTE — CARE COORDINATION
Pt is cleared for d/c from a CM standpoint.        01/10/25 1018   Services At/After Discharge   Transition of Care Consult (CM Consult) Discharge Planning   Services At/After Discharge None   Mode of Transport at Discharge Other (see comment)  (son)         CM acknowledged d/c order.  CM spoke with pt who is in agreement with d/c.  Pt's son will transport her home today.  IM notice previously provided and active.     Kianna Alva, JEROME  Supervisee in Social Work  Care Management, [facility]  [phone]

## 2025-01-10 NOTE — CARDIO/PULMONARY
Chart reviewed: Patient is 74 y.o. female admitted with Acute coronary syndrome (HCC) [I24.9]  STEMI (ST elevation myocardial infarction) (HCC) [I21.3]  ST elevation myocardial infarction (STEMI), unspecified artery (HCC) [I21.3]  Chest pain, unspecified type [R07.9]    S/p PCI/MITZI on 1/8/25.    Education: MI education folder, with catheterization brochure, to bedside of Elenita Benitez.                                                Educated using teach back method. Reviewed MI diagnosis definition and purpose of intervention.  Discussed risk factors for CAD to include the following: family history, elevated BMI, hyperlipidemia, hypertension, diabetes, stress, and smoking. Discussed Heart Healthy/Low Sodium (2000 mg) diet. Reviewed the importance of medication compliance, the purpose of Brilinta, and potential side effects. Discussed follow up appointments with cardiologist, signs and symptoms of angina, and what to report to physician after discharge.  Emphasized the value of cardiac rehab. Discussed Cardiac Rehab Program format, benefits, and encouraged enrollment to assist with risk modification and management. Will follow up outpatient for possible cardiac rehab enrollment. Pt prefers to wait until ureter stent plan and return to chemo plan is clear. Contact information provided.     Elenita Benitez verbalized understanding with questions answered.     DAMIEN JOSEPH RN

## 2025-01-11 LAB
EKG DIAGNOSIS: NORMAL
EKG Q-T INTERVAL: 406 MS
EKG QRS DURATION: 82 MS
EKG QTC CALCULATION (BAZETT): 453 MS
EKG R AXIS: 20 DEGREES
EKG T AXIS: 121 DEGREES
EKG VENTRICULAR RATE: 75 BPM

## 2025-01-13 NOTE — TELEPHONE ENCOUNTER
Pt informed per Dr. Nicole to keep her appt with him on 1/29/25 to give her body time to recuperate from MI.

## 2025-01-14 ENCOUNTER — APPOINTMENT (OUTPATIENT)
Facility: HOSPITAL | Age: 75
End: 2025-01-14
Attending: RADIOLOGY
Payer: MEDICARE

## 2025-01-15 ENCOUNTER — TELEPHONE (OUTPATIENT)
Age: 75
End: 2025-01-15

## 2025-01-17 ENCOUNTER — TRANSCRIBE ORDERS (OUTPATIENT)
Facility: HOSPITAL | Age: 75
End: 2025-01-17

## 2025-01-17 DIAGNOSIS — I89.0 LYMPHEDEMA: Primary | ICD-10-CM

## 2025-01-23 RX ORDER — FAMOTIDINE 10 MG/ML
20 INJECTION, SOLUTION INTRAVENOUS ONCE
OUTPATIENT
Start: 2025-01-30 | End: 2025-01-30

## 2025-01-23 RX ORDER — MEPERIDINE HYDROCHLORIDE 50 MG/ML
12.5 INJECTION INTRAMUSCULAR; INTRAVENOUS; SUBCUTANEOUS PRN
OUTPATIENT
Start: 2025-01-30

## 2025-01-23 RX ORDER — EPINEPHRINE 1 MG/ML
0.3 INJECTION, SOLUTION, CONCENTRATE INTRAVENOUS PRN
OUTPATIENT
Start: 2025-01-30

## 2025-01-23 RX ORDER — SODIUM CHLORIDE 9 MG/ML
5-250 INJECTION, SOLUTION INTRAVENOUS PRN
OUTPATIENT
Start: 2025-01-30

## 2025-01-23 RX ORDER — DIPHENHYDRAMINE HYDROCHLORIDE 50 MG/ML
50 INJECTION INTRAMUSCULAR; INTRAVENOUS ONCE
OUTPATIENT
Start: 2025-01-30 | End: 2025-01-30

## 2025-01-23 RX ORDER — SODIUM CHLORIDE 9 MG/ML
INJECTION, SOLUTION INTRAVENOUS CONTINUOUS
OUTPATIENT
Start: 2025-01-30

## 2025-01-23 RX ORDER — SODIUM CHLORIDE 0.9 % (FLUSH) 0.9 %
5-40 SYRINGE (ML) INJECTION PRN
OUTPATIENT
Start: 2025-01-30

## 2025-01-23 RX ORDER — PROCHLORPERAZINE EDISYLATE 5 MG/ML
5 INJECTION INTRAMUSCULAR; INTRAVENOUS
OUTPATIENT
Start: 2025-01-30

## 2025-01-23 RX ORDER — ACETAMINOPHEN 325 MG/1
650 TABLET ORAL
OUTPATIENT
Start: 2025-01-30

## 2025-01-23 RX ORDER — DEXAMETHASONE SODIUM PHOSPHATE 10 MG/ML
10 INJECTION, SOLUTION INTRAMUSCULAR; INTRAVENOUS ONCE
OUTPATIENT
Start: 2025-01-30 | End: 2025-01-30

## 2025-01-23 RX ORDER — HEPARIN SODIUM (PORCINE) LOCK FLUSH IV SOLN 100 UNIT/ML 100 UNIT/ML
500 SOLUTION INTRAVENOUS PRN
OUTPATIENT
Start: 2025-01-30

## 2025-01-23 RX ORDER — ONDANSETRON 2 MG/ML
8 INJECTION INTRAMUSCULAR; INTRAVENOUS
OUTPATIENT
Start: 2025-01-30

## 2025-01-23 RX ORDER — DIPHENHYDRAMINE HYDROCHLORIDE 50 MG/ML
50 INJECTION INTRAMUSCULAR; INTRAVENOUS
OUTPATIENT
Start: 2025-01-30

## 2025-01-23 RX ORDER — HYDROCORTISONE SODIUM SUCCINATE 100 MG/2ML
100 INJECTION INTRAMUSCULAR; INTRAVENOUS
OUTPATIENT
Start: 2025-01-30

## 2025-01-23 RX ORDER — FAMOTIDINE 10 MG/ML
20 INJECTION, SOLUTION INTRAVENOUS
OUTPATIENT
Start: 2025-01-30

## 2025-01-23 RX ORDER — PALONOSETRON 0.05 MG/ML
0.25 INJECTION, SOLUTION INTRAVENOUS ONCE
OUTPATIENT
Start: 2025-01-30 | End: 2025-01-30

## 2025-01-23 RX ORDER — ALBUTEROL SULFATE 90 UG/1
4 INHALANT RESPIRATORY (INHALATION) PRN
OUTPATIENT
Start: 2025-01-30

## 2025-01-28 DIAGNOSIS — C54.1 MALIGNANT NEOPLASM OF ENDOMETRIUM (HCC): ICD-10-CM

## 2025-01-28 LAB
ALBUMIN SERPL-MCNC: 2.9 G/DL (ref 3.5–5)
ALBUMIN/GLOB SERPL: 0.9 (ref 1.1–2.2)
ALP SERPL-CCNC: 71 U/L (ref 45–117)
ALT SERPL-CCNC: 14 U/L (ref 12–78)
ANION GAP SERPL CALC-SCNC: 4 MMOL/L (ref 2–12)
AST SERPL-CCNC: 13 U/L (ref 15–37)
BASOPHILS # BLD: 0.02 K/UL (ref 0–0.1)
BASOPHILS NFR BLD: 0.3 % (ref 0–1)
BILIRUB SERPL-MCNC: 0.5 MG/DL (ref 0.2–1)
BUN SERPL-MCNC: 17 MG/DL (ref 6–20)
BUN/CREAT SERPL: 16 (ref 12–20)
CALCIUM SERPL-MCNC: 9.2 MG/DL (ref 8.5–10.1)
CANCER AG125 SERPL-ACNC: 12 U/ML (ref 1.5–35)
CHLORIDE SERPL-SCNC: 109 MMOL/L (ref 97–108)
CO2 SERPL-SCNC: 28 MMOL/L (ref 21–32)
CREAT SERPL-MCNC: 1.08 MG/DL (ref 0.55–1.02)
DIFFERENTIAL METHOD BLD: ABNORMAL
EOSINOPHIL # BLD: 0.08 K/UL (ref 0–0.4)
EOSINOPHIL NFR BLD: 1.3 % (ref 0–7)
ERYTHROCYTE [DISTWIDTH] IN BLOOD BY AUTOMATED COUNT: 16.2 % (ref 11.5–14.5)
GLOBULIN SER CALC-MCNC: 3.1 G/DL (ref 2–4)
GLUCOSE SERPL-MCNC: 101 MG/DL (ref 65–100)
HCT VFR BLD AUTO: 30.6 % (ref 35–47)
HGB BLD-MCNC: 9.5 G/DL (ref 11.5–16)
IMM GRANULOCYTES # BLD AUTO: 0.04 K/UL (ref 0–0.04)
IMM GRANULOCYTES NFR BLD AUTO: 0.6 % (ref 0–0.5)
LYMPHOCYTES # BLD: 0.5 K/UL (ref 0.8–3.5)
LYMPHOCYTES NFR BLD: 7.9 % (ref 12–49)
MAGNESIUM SERPL-MCNC: 1.9 MG/DL (ref 1.6–2.4)
MCH RBC QN AUTO: 30.3 PG (ref 26–34)
MCHC RBC AUTO-ENTMCNC: 31 G/DL (ref 30–36.5)
MCV RBC AUTO: 97.5 FL (ref 80–99)
MONOCYTES # BLD: 0.42 K/UL (ref 0–1)
MONOCYTES NFR BLD: 6.6 % (ref 5–13)
NEUTS SEG # BLD: 5.25 K/UL (ref 1.8–8)
NEUTS SEG NFR BLD: 83.3 % (ref 32–75)
NRBC # BLD: 0 K/UL (ref 0–0.01)
NRBC BLD-RTO: 0 PER 100 WBC
PLATELET # BLD AUTO: 230 K/UL (ref 150–400)
PMV BLD AUTO: 9.9 FL (ref 8.9–12.9)
POTASSIUM SERPL-SCNC: 4.1 MMOL/L (ref 3.5–5.1)
PROT SERPL-MCNC: 6 G/DL (ref 6.4–8.2)
RBC # BLD AUTO: 3.14 M/UL (ref 3.8–5.2)
RBC MORPH BLD: ABNORMAL
SODIUM SERPL-SCNC: 141 MMOL/L (ref 136–145)
WBC # BLD AUTO: 6.3 K/UL (ref 3.6–11)

## 2025-01-28 NOTE — PROGRESS NOTES
Virtual pre chemo for C3 Taxol, Carbo, Keytruda on 1/30/25    1. Have you been to the ER, urgent care clinic since your last visit?  Hospitalized since your last visit?  no    2. Have you seen or consulted any other health care providers outside of the [facility] since your last visit?  Include any pap smears or colon screening.   no

## 2025-01-29 ENCOUNTER — TELEMEDICINE (OUTPATIENT)
Age: 75
End: 2025-01-29
Payer: MEDICARE

## 2025-01-29 ENCOUNTER — TELEPHONE (OUTPATIENT)
Age: 75
End: 2025-01-29

## 2025-01-29 DIAGNOSIS — Z51.11 ENCOUNTER FOR ANTINEOPLASTIC CHEMOTHERAPY AND IMMUNOTHERAPY: ICD-10-CM

## 2025-01-29 DIAGNOSIS — Z51.12 ENCOUNTER FOR ANTINEOPLASTIC CHEMOTHERAPY AND IMMUNOTHERAPY: ICD-10-CM

## 2025-01-29 DIAGNOSIS — G62.0 CHEMOTHERAPY-INDUCED NEUROPATHY (HCC): ICD-10-CM

## 2025-01-29 DIAGNOSIS — C54.1 PAPILLARY SEROUS ENDOMETRIAL ADENOCARCINOMA (HCC): Primary | ICD-10-CM

## 2025-01-29 DIAGNOSIS — T45.1X5A CHEMOTHERAPY-INDUCED NEUROPATHY (HCC): ICD-10-CM

## 2025-01-29 PROCEDURE — 99215 OFFICE O/P EST HI 40 MIN: CPT | Performed by: OBSTETRICS & GYNECOLOGY

## 2025-01-29 PROCEDURE — 3017F COLORECTAL CA SCREEN DOC REV: CPT | Performed by: OBSTETRICS & GYNECOLOGY

## 2025-01-29 PROCEDURE — 1090F PRES/ABSN URINE INCON ASSESS: CPT | Performed by: OBSTETRICS & GYNECOLOGY

## 2025-01-29 PROCEDURE — 1160F RVW MEDS BY RX/DR IN RCRD: CPT | Performed by: OBSTETRICS & GYNECOLOGY

## 2025-01-29 PROCEDURE — G8399 PT W/DXA RESULTS DOCUMENT: HCPCS | Performed by: OBSTETRICS & GYNECOLOGY

## 2025-01-29 PROCEDURE — 1123F ACP DISCUSS/DSCN MKR DOCD: CPT | Performed by: OBSTETRICS & GYNECOLOGY

## 2025-01-29 PROCEDURE — 1159F MED LIST DOCD IN RCRD: CPT | Performed by: OBSTETRICS & GYNECOLOGY

## 2025-01-29 PROCEDURE — G8427 DOCREV CUR MEDS BY ELIG CLIN: HCPCS | Performed by: OBSTETRICS & GYNECOLOGY

## 2025-01-29 PROCEDURE — 1111F DSCHRG MED/CURRENT MED MERGE: CPT | Performed by: OBSTETRICS & GYNECOLOGY

## 2025-01-29 NOTE — PROGRESS NOTES
[facility] GYNECOLOGIC ONCOLOGY  [address] [place] [address], [facility]  [place] [address]  P [phone]  F [phone]    Office Note  Patient ID:  Name:  Elenita Benitez  MRN:  [id number]  :  1950/72 y.o.  Date:  2023      HISTORY OF PRESENT ILLNESS:  Ms. Elenita Benitez is a 74 y.o. female who on 2021 underwent Robotic-assisted total laparoscopic hysterectomy, Bilateral salpingo-oophorectomy, Bilateral pelvic sentinel lymph node mapping and biopsy. 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 Negative washings. Stromal cervical involvement. Negative SLNs. 2mm out of 10mm myometrial invasion. Parametrial involvement of tumor.     Completed concurrent chemo-EBRT + VBT on 2021. Initiated on adjuvant carboplatin + paclitaxel on 2021. Completed cycle 4 on 2022. CT C/A/P 2022 without evidence of disease.      Initial History:  Ms. Elenita Benitez is a 72 y.o.  postmenopausal female who presents as a new patient from Dr. Nix for high-grade endometrial cancer.    The patient reports vaginal spotting back in April, which resulted in her seeing Dr. Nix. Pelvic ultrasound on 2021 demonstrated 12.5mm stripe. Hysteroscopy/D&C on 2021 demonstrated \"high-grade endometrial carcinoma, favor serous carcinoma\". She was subsequently referred to our office for further discussion and management.     Denies pelvic or abdominal pain/bloating, change in appetite or bowel habits, nausea, vomiting, CP, SOB, hematuria, hematochezia, or urinary symptoms.       Pertinent PMH/PSH: DJD, h/o  x 2     Active, no restrictions.     Imaging Review:   PET 10/25/2024:  FINDINGS:  HEAD/NECK: 2 hypermetabolic, mildly enlarged left supraclavicular lymph nodes  are present, maximal SUV 9.8.  CHEST: No foci of abnormal hypermetabolism.  ABDOMEN/PELVIS: There is moderately severe left-sided hydronephrosis and  diminished left renal cortical

## 2025-01-30 ENCOUNTER — HOSPITAL ENCOUNTER (OUTPATIENT)
Facility: HOSPITAL | Age: 75
Setting detail: INFUSION SERIES
Discharge: HOME | End: 2025-01-30
Payer: MEDICARE

## 2025-01-30 VITALS
HEIGHT: 61 IN | WEIGHT: 173.2 LBS | TEMPERATURE: 97.8 F | OXYGEN SATURATION: 98 % | DIASTOLIC BLOOD PRESSURE: 69 MMHG | SYSTOLIC BLOOD PRESSURE: 145 MMHG | BODY MASS INDEX: 32.7 KG/M2 | HEART RATE: 63 BPM | RESPIRATION RATE: 18 BRPM

## 2025-01-30 DIAGNOSIS — C54.1 PAPILLARY SEROUS ENDOMETRIAL ADENOCARCINOMA (HCC): Primary | ICD-10-CM

## 2025-01-30 PROCEDURE — 2580000003 HC RX 258: Performed by: OBSTETRICS & GYNECOLOGY

## 2025-01-30 PROCEDURE — 96413 CHEMO IV INFUSION 1 HR: CPT

## 2025-01-30 PROCEDURE — 96375 TX/PRO/DX INJ NEW DRUG ADDON: CPT

## 2025-01-30 PROCEDURE — 96415 CHEMO IV INFUSION ADDL HR: CPT

## 2025-01-30 PROCEDURE — 96417 CHEMO IV INFUS EACH ADDL SEQ: CPT

## 2025-01-30 PROCEDURE — 6360000002 HC RX W HCPCS: Performed by: OBSTETRICS & GYNECOLOGY

## 2025-01-30 PROCEDURE — 2500000003 HC RX 250 WO HCPCS: Performed by: OBSTETRICS & GYNECOLOGY

## 2025-01-30 PROCEDURE — 96367 TX/PROPH/DG ADDL SEQ IV INF: CPT

## 2025-01-30 RX ORDER — PALONOSETRON 0.05 MG/ML
0.25 INJECTION, SOLUTION INTRAVENOUS ONCE
Status: COMPLETED | OUTPATIENT
Start: 2025-01-30 | End: 2025-01-30

## 2025-01-30 RX ORDER — SODIUM CHLORIDE 9 MG/ML
5-250 INJECTION, SOLUTION INTRAVENOUS PRN
Status: DISCONTINUED | OUTPATIENT
Start: 2025-01-30 | End: 2025-01-31 | Stop reason: HOSPADM

## 2025-01-30 RX ORDER — SODIUM CHLORIDE 0.9 % (FLUSH) 0.9 %
5-40 SYRINGE (ML) INJECTION PRN
Status: DISCONTINUED | OUTPATIENT
Start: 2025-01-30 | End: 2025-01-31 | Stop reason: HOSPADM

## 2025-01-30 RX ORDER — DIPHENHYDRAMINE HYDROCHLORIDE 50 MG/ML
50 INJECTION INTRAMUSCULAR; INTRAVENOUS ONCE
Status: COMPLETED | OUTPATIENT
Start: 2025-01-30 | End: 2025-01-30

## 2025-01-30 RX ORDER — DEXAMETHASONE SODIUM PHOSPHATE 10 MG/ML
10 INJECTION, SOLUTION INTRAMUSCULAR; INTRAVENOUS ONCE
Status: COMPLETED | OUTPATIENT
Start: 2025-01-30 | End: 2025-01-30

## 2025-01-30 RX ADMIN — PALONOSETRON HYDROCHLORIDE 0.25 MG: 0.25 INJECTION INTRAVENOUS at 08:41

## 2025-01-30 RX ADMIN — PACLITAXEL 312 MG: 6 INJECTION, SOLUTION, CONCENTRATE INTRAVENOUS at 09:36

## 2025-01-30 RX ADMIN — DEXAMETHASONE SODIUM PHOSPHATE 10 MG: 10 INJECTION INTRAMUSCULAR; INTRAVENOUS at 08:50

## 2025-01-30 RX ADMIN — FOSAPREPITANT 150 MG: 150 INJECTION, POWDER, LYOPHILIZED, FOR SOLUTION INTRAVENOUS at 09:09

## 2025-01-30 RX ADMIN — FAMOTIDINE 20 MG: 10 INJECTION INTRAVENOUS at 08:45

## 2025-01-30 RX ADMIN — CARBOPLATIN 340 MG: 10 INJECTION INTRAVENOUS at 12:38

## 2025-01-30 RX ADMIN — SODIUM CHLORIDE, PRESERVATIVE FREE 10 ML: 5 INJECTION INTRAVENOUS at 13:10

## 2025-01-30 RX ADMIN — DIPHENHYDRAMINE HYDROCHLORIDE 50 MG: 50 INJECTION INTRAMUSCULAR; INTRAVENOUS at 08:53

## 2025-01-30 RX ADMIN — SODIUM CHLORIDE 100 ML/HR: 9 INJECTION, SOLUTION INTRAVENOUS at 08:41

## 2025-01-30 NOTE — PROGRESS NOTES
[facility] Visit Note    Pt arrived to [facility] ambulatory in no acute distress at 0820 for Taxol/Carboplatin C2.  Sarah held this cycle.  Assessment unremarkable except fatigue, elevated BP, decreased appetite, numbness/tingling in hands and feet, and lymphedema in lower extremities.  R chest port accessed without issue and positive blood return noted.  Labs obtained on 1/28/25, WDL for tx today.    BP (!) 186/88   Pulse 82   Temp 97.8 °F (36.6 °C) (Temporal)   Resp 18   Ht 1.549 m (5' 0.98\")   Wt 78.6 kg (173 lb 3.2 oz)   SpO2 98%   BMI 32.74 kg/m²     Two nurses verified prior to administering:  Drug name  Drug dose  Infusion volume or drug volume when prepared in a syringe  Rate of administration  Route of administration  Expiration dates and/or times  Appearance and physical integrity of the drugs  Rate set on infusion pump, when used  Sequencing of drug administration     Medications Administered         0.9 % sodium chloride infusion Admin Date  01/30/2025 Action  New Bag Dose  100 mL/hr Rate  100 mL/hr Route  IntraVENous Documented By  Latonia Aguirre RN        CARBOplatin (PARAPLATIN) 340 mg in sodium chloride 0.9 % 100 mL chemo IVPB Admin Date  01/30/2025 Action  New Bag Dose  340 mg Rate  288 mL/hr Route  IntraVENous Documented By  Latonia Aguirre RN        dexAMETHasone (PF) (DECADRON) injection 10 mg Admin Date  01/30/2025 Action  Given Dose  10 mg Rate   Route  IntraVENous Documented By  Latonia Aguirre RN        diphenhydrAMINE (BENADRYL) injection 50 mg Admin Date  01/30/2025 Action  Given Dose  50 mg Rate   Route  IntraVENous Documented By  Latonia Aguirre RN        famotidine (PEPCID) 20 mg in sodium chloride (PF) 0.9 % 10 mL injection Admin Date  01/30/2025 Action  Given Dose  20 mg Rate   Route  IntraVENous Documented By  Latonia Aguirre RN        fosaprepitant (EMEND) 150 mg in sodium chloride 0.9 % 250 mL IVPB ([id number]) Admin

## 2025-02-04 ENCOUNTER — HOSPITAL ENCOUNTER (OUTPATIENT)
Facility: HOSPITAL | Age: 75
Discharge: HOME | End: 2025-02-07
Payer: MEDICARE

## 2025-02-04 VITALS
WEIGHT: 167.2 LBS | BODY MASS INDEX: 31.57 KG/M2 | DIASTOLIC BLOOD PRESSURE: 69 MMHG | HEART RATE: 58 BPM | TEMPERATURE: 97.9 F | HEIGHT: 61 IN | SYSTOLIC BLOOD PRESSURE: 156 MMHG

## 2025-02-04 LAB
APPEARANCE UR: CLEAR
BACTERIA URNS QL MICRO: NEGATIVE /HPF
BASOPHILS # BLD: 0.01 K/UL (ref 0–0.1)
BASOPHILS NFR BLD: 0.2 % (ref 0–1)
BILIRUB UR QL: NEGATIVE
COLOR UR: NORMAL
DIFFERENTIAL METHOD BLD: ABNORMAL
EKG ATRIAL RATE: 51 BPM
EKG DIAGNOSIS: NORMAL
EKG P AXIS: 52 DEGREES
EKG P-R INTERVAL: 128 MS
EKG Q-T INTERVAL: 480 MS
EKG QRS DURATION: 80 MS
EKG QTC CALCULATION (BAZETT): 471 MS
EKG R AXIS: -14 DEGREES
EKG T AXIS: -53 DEGREES
EKG VENTRICULAR RATE: 58 BPM
EOSINOPHIL # BLD: 0.1 K/UL (ref 0–0.4)
EOSINOPHIL NFR BLD: 2.3 % (ref 0–7)
EPITH CASTS URNS QL MICRO: NORMAL /LPF
ERYTHROCYTE [DISTWIDTH] IN BLOOD BY AUTOMATED COUNT: 15.8 % (ref 11.5–14.5)
GLUCOSE UR STRIP.AUTO-MCNC: NEGATIVE MG/DL
HCT VFR BLD AUTO: 33.8 % (ref 35–47)
HGB BLD-MCNC: 10.4 G/DL (ref 11.5–16)
HGB UR QL STRIP: NEGATIVE
HYALINE CASTS URNS QL MICRO: NORMAL /LPF (ref 0–5)
IMM GRANULOCYTES # BLD AUTO: 0.03 K/UL (ref 0–0.04)
IMM GRANULOCYTES NFR BLD AUTO: 0.7 % (ref 0–0.5)
KETONES UR QL STRIP.AUTO: NEGATIVE MG/DL
LEUKOCYTE ESTERASE UR QL STRIP.AUTO: NEGATIVE
LYMPHOCYTES # BLD: 0.25 K/UL (ref 0.8–3.5)
LYMPHOCYTES NFR BLD: 5.8 % (ref 12–49)
MCH RBC QN AUTO: 29.5 PG (ref 26–34)
MCHC RBC AUTO-ENTMCNC: 30.8 G/DL (ref 30–36.5)
MCV RBC AUTO: 96 FL (ref 80–99)
MONOCYTES # BLD: 0.03 K/UL (ref 0–1)
MONOCYTES NFR BLD: 0.7 % (ref 5–13)
NEUTS SEG # BLD: 3.88 K/UL (ref 1.8–8)
NEUTS SEG NFR BLD: 90.3 % (ref 32–75)
NITRITE UR QL STRIP.AUTO: NEGATIVE
NRBC # BLD: 0 K/UL (ref 0–0.01)
NRBC BLD-RTO: 0 PER 100 WBC
PH UR STRIP: 7 (ref 5–8)
PLATELET # BLD AUTO: 223 K/UL (ref 150–400)
PMV BLD AUTO: 10.3 FL (ref 8.9–12.9)
PROT UR STRIP-MCNC: NEGATIVE MG/DL
RBC # BLD AUTO: 3.52 M/UL (ref 3.8–5.2)
RBC #/AREA URNS HPF: NORMAL /HPF (ref 0–5)
RBC MORPH BLD: ABNORMAL
SP GR UR REFRACTOMETRY: 1.02 (ref 1–1.03)
URINE CULTURE IF INDICATED: NORMAL
UROBILINOGEN UR QL STRIP.AUTO: 0.2 EU/DL (ref 0.2–1)
WBC # BLD AUTO: 4.3 K/UL (ref 3.6–11)
WBC MORPH BLD: ABNORMAL
WBC URNS QL MICRO: NORMAL /HPF (ref 0–4)

## 2025-02-04 PROCEDURE — 81001 URINALYSIS AUTO W/SCOPE: CPT

## 2025-02-04 PROCEDURE — 85025 COMPLETE CBC W/AUTO DIFF WBC: CPT

## 2025-02-04 PROCEDURE — 36415 COLL VENOUS BLD VENIPUNCTURE: CPT

## 2025-02-04 PROCEDURE — 93010 ELECTROCARDIOGRAM REPORT: CPT | Performed by: SPECIALIST

## 2025-02-04 PROCEDURE — 93005 ELECTROCARDIOGRAM TRACING: CPT | Performed by: STUDENT IN AN ORGANIZED HEALTH CARE EDUCATION/TRAINING PROGRAM

## 2025-02-04 ASSESSMENT — PAIN SCALES - GENERAL: PAINLEVEL_OUTOF10: 6

## 2025-02-04 ASSESSMENT — PAIN DESCRIPTION - ONSET: ONSET: ON-GOING

## 2025-02-04 ASSESSMENT — PAIN DESCRIPTION - PAIN TYPE: TYPE: CHRONIC PAIN

## 2025-02-04 ASSESSMENT — PAIN DESCRIPTION - LOCATION: LOCATION: HIP

## 2025-02-04 ASSESSMENT — PAIN DESCRIPTION - ORIENTATION: ORIENTATION: RIGHT;LEFT

## 2025-02-04 ASSESSMENT — PAIN DESCRIPTION - DESCRIPTORS: DESCRIPTORS: ACHING

## 2025-02-04 ASSESSMENT — PAIN DESCRIPTION - FREQUENCY: FREQUENCY: CONTINUOUS

## 2025-02-04 ASSESSMENT — PAIN - FUNCTIONAL ASSESSMENT: PAIN_FUNCTIONAL_ASSESSMENT: PREVENTS OR INTERFERES WITH MANY ACTIVE NOT PASSIVE ACTIVITIES

## 2025-02-04 NOTE — PERIOP NOTE
[address] [address]   MAIN OR                                  [phone]    MAIN PRE OP             [phone]                                                                                AMBULATORY PRE OP          [phone]  PRE-ADMISSION TESTING    [phone]     Surgery Date:  2/18/25       Is surgery arrival time given by surgeon?   NO    If “NO”, [facility] staff will call you between 4 and 7pm the day before your surgery with your arrival time. (If your surgery is on a Monday, we will call you the Friday before.)    Call [phone] after 7pm Monday-Friday if you did not receive this call.    INSTRUCTIONS BEFORE YOUR SURGERY   When You  Arrive Arrive at [facility] Patient Access on 1st floor the day of your surgery.  Have your insurance card, photo ID,living will/advanced directive/POA (if applicable),  and any copayment (if needed)   Food   and   Drink NO solid food after midnight the night before surgery. You can drink clear liquids from midnight until ONE hour prior to your arrival at the hospital on the day of your surgery. Clear liquids include:  Water  Apple juice (no sediment)  Carbonated beverages  Black coffee(no cream/milk)  Tea(no cream/milk)  Gatorade    No alcohol (beer, wine, liquor) or marijuana (smoking) 24 hours, edibles (3 days). Stop smoking cigarettes 14 days before surgery (helps w/healing and breathing).   Medications to   TAKE   Morning of Surgery MEDICATIONS TO TAKE THE MORNING OF SURGERY WITH A SIP OF WATER: BRILINTA AND ASPIRIN    You may take these medications, IF NEEDED, the morning of surgery: ZOFRAN    Ask your surgeon/prescribing doctor for instructions on taking or stopping these medications prior to surgery: FOLLOW THE INSTRUCTIONS GIVEN TO YOU ON CONTINUING YOUR BRILINTA AND ASPIRIN   Medications to STOP  before surgery Non-Steroidal anti-inflammatory Drugs (NSAID's): for example, Diclofenac

## 2025-02-04 NOTE — PERIOP NOTE
PT HAD A MI ON 1/8/25.  WAS HOSPITALIZED AT [facility].  HAD A NEW CARDIAC STENT PLACED 1/8/25.  PT TAKES BRILINTA PO BID AS WELL AS ASPIRIN 81MG PO QD AND HAS BEEN INSTRUCTED TO CONTINUE TAKING THESE PRIOR TO SURGERY.    CARDIOLOGY NOTES/DR. VLADIMIR GAITAN NOTED IN CC.  CLEARANCE NOTED ON PAPER FROM DR. GAITAN ON PAPER CHART BEHIND ORDERS.    EKG REPEATED AT [facility] with [facility] APPT SINCE LAST EKG IN CC WAS FROM THE DAY PT WAS HAVING THE MI.

## 2025-02-05 ENCOUNTER — APPOINTMENT (OUTPATIENT)
Facility: HOSPITAL | Age: 75
End: 2025-02-05
Attending: RADIOLOGY
Payer: MEDICARE

## 2025-02-06 ENCOUNTER — TELEPHONE (OUTPATIENT)
Age: 75
End: 2025-02-06

## 2025-02-06 DIAGNOSIS — C54.1 MALIGNANT NEOPLASM OF ENDOMETRIUM (HCC): ICD-10-CM

## 2025-02-06 RX ORDER — ONDANSETRON 8 MG/1
8 TABLET, ORALLY DISINTEGRATING ORAL EVERY 8 HOURS PRN
Qty: 30 TABLET | Refills: 0 | Status: SHIPPED | OUTPATIENT
Start: 2025-02-06

## 2025-02-06 NOTE — TELEPHONE ENCOUNTER
Elenita needs a refill of her zofran sent to the [facility] off of [address]

## 2025-02-13 DIAGNOSIS — C54.1 MALIGNANT NEOPLASM OF ENDOMETRIUM (HCC): Primary | ICD-10-CM

## 2025-02-13 RX ORDER — DEXAMETHASONE SODIUM PHOSPHATE 10 MG/ML
10 INJECTION, SOLUTION INTRAMUSCULAR; INTRAVENOUS ONCE
OUTPATIENT
Start: 2025-02-20 | End: 2025-02-20

## 2025-02-13 RX ORDER — FAMOTIDINE 10 MG/ML
20 INJECTION, SOLUTION INTRAVENOUS ONCE
OUTPATIENT
Start: 2025-02-20 | End: 2025-02-20

## 2025-02-13 RX ORDER — PROCHLORPERAZINE EDISYLATE 5 MG/ML
5 INJECTION INTRAMUSCULAR; INTRAVENOUS
OUTPATIENT
Start: 2025-02-20

## 2025-02-13 RX ORDER — ACETAMINOPHEN 325 MG/1
650 TABLET ORAL
OUTPATIENT
Start: 2025-02-20

## 2025-02-13 RX ORDER — DIPHENHYDRAMINE HYDROCHLORIDE 50 MG/ML
50 INJECTION INTRAMUSCULAR; INTRAVENOUS
OUTPATIENT
Start: 2025-02-20

## 2025-02-13 RX ORDER — FAMOTIDINE 10 MG/ML
20 INJECTION, SOLUTION INTRAVENOUS
OUTPATIENT
Start: 2025-02-20

## 2025-02-13 RX ORDER — HEPARIN SODIUM (PORCINE) LOCK FLUSH IV SOLN 100 UNIT/ML 100 UNIT/ML
500 SOLUTION INTRAVENOUS PRN
OUTPATIENT
Start: 2025-02-20

## 2025-02-13 RX ORDER — SODIUM CHLORIDE 9 MG/ML
INJECTION, SOLUTION INTRAVENOUS CONTINUOUS
OUTPATIENT
Start: 2025-02-20

## 2025-02-13 RX ORDER — SODIUM CHLORIDE 9 MG/ML
5-250 INJECTION, SOLUTION INTRAVENOUS PRN
OUTPATIENT
Start: 2025-02-20

## 2025-02-13 RX ORDER — HYDROCORTISONE SODIUM SUCCINATE 100 MG/2ML
100 INJECTION INTRAMUSCULAR; INTRAVENOUS
OUTPATIENT
Start: 2025-02-20

## 2025-02-13 RX ORDER — SODIUM CHLORIDE 0.9 % (FLUSH) 0.9 %
5-40 SYRINGE (ML) INJECTION PRN
OUTPATIENT
Start: 2025-02-20

## 2025-02-13 RX ORDER — ALBUTEROL SULFATE 90 UG/1
4 INHALANT RESPIRATORY (INHALATION) PRN
OUTPATIENT
Start: 2025-02-20

## 2025-02-13 RX ORDER — MEPERIDINE HYDROCHLORIDE 50 MG/ML
12.5 INJECTION INTRAMUSCULAR; INTRAVENOUS; SUBCUTANEOUS PRN
OUTPATIENT
Start: 2025-02-20

## 2025-02-13 RX ORDER — EPINEPHRINE 1 MG/ML
0.3 INJECTION, SOLUTION, CONCENTRATE INTRAVENOUS PRN
OUTPATIENT
Start: 2025-02-20

## 2025-02-13 RX ORDER — PALONOSETRON 0.05 MG/ML
0.25 INJECTION, SOLUTION INTRAVENOUS ONCE
OUTPATIENT
Start: 2025-02-20 | End: 2025-02-20

## 2025-02-13 RX ORDER — DIPHENHYDRAMINE HYDROCHLORIDE 50 MG/ML
50 INJECTION INTRAMUSCULAR; INTRAVENOUS ONCE
OUTPATIENT
Start: 2025-02-20 | End: 2025-02-20

## 2025-02-13 RX ORDER — ONDANSETRON 2 MG/ML
8 INJECTION INTRAMUSCULAR; INTRAVENOUS
OUTPATIENT
Start: 2025-02-20

## 2025-02-17 ENCOUNTER — ANESTHESIA EVENT (OUTPATIENT)
Facility: HOSPITAL | Age: 75
End: 2025-02-17
Payer: MEDICARE

## 2025-02-17 DIAGNOSIS — C54.1 MALIGNANT NEOPLASM OF ENDOMETRIUM (HCC): ICD-10-CM

## 2025-02-18 ENCOUNTER — APPOINTMENT (OUTPATIENT)
Facility: HOSPITAL | Age: 75
End: 2025-02-18
Attending: STUDENT IN AN ORGANIZED HEALTH CARE EDUCATION/TRAINING PROGRAM
Payer: MEDICARE

## 2025-02-18 ENCOUNTER — HOSPITAL ENCOUNTER (OUTPATIENT)
Facility: HOSPITAL | Age: 75
Setting detail: OUTPATIENT SURGERY
Discharge: HOME | End: 2025-02-18
Attending: STUDENT IN AN ORGANIZED HEALTH CARE EDUCATION/TRAINING PROGRAM | Admitting: STUDENT IN AN ORGANIZED HEALTH CARE EDUCATION/TRAINING PROGRAM
Payer: MEDICARE

## 2025-02-18 ENCOUNTER — ANESTHESIA (OUTPATIENT)
Facility: HOSPITAL | Age: 75
End: 2025-02-18
Payer: MEDICARE

## 2025-02-18 VITALS
HEART RATE: 60 BPM | DIASTOLIC BLOOD PRESSURE: 81 MMHG | RESPIRATION RATE: 12 BRPM | OXYGEN SATURATION: 98 % | TEMPERATURE: 97.5 F | SYSTOLIC BLOOD PRESSURE: 159 MMHG

## 2025-02-18 LAB
ALBUMIN SERPL-MCNC: 2.9 G/DL (ref 3.5–5)
ALBUMIN/GLOB SERPL: 0.8 (ref 1.1–2.2)
ALP SERPL-CCNC: 71 U/L (ref 45–117)
ALT SERPL-CCNC: 18 U/L (ref 12–78)
ANION GAP SERPL CALC-SCNC: 9 MMOL/L (ref 2–12)
AST SERPL-CCNC: 15 U/L (ref 15–37)
BASOPHILS # BLD: 0.04 K/UL (ref 0–0.1)
BASOPHILS NFR BLD: 0.7 % (ref 0–1)
BILIRUB SERPL-MCNC: 0.4 MG/DL (ref 0.2–1)
BUN SERPL-MCNC: 16 MG/DL (ref 6–20)
BUN/CREAT SERPL: 13 (ref 12–20)
CALCIUM SERPL-MCNC: 9 MG/DL (ref 8.5–10.1)
CANCER AG125 SERPL-ACNC: 10 U/ML (ref 1.5–35)
CHLORIDE SERPL-SCNC: 103 MMOL/L (ref 97–108)
CO2 SERPL-SCNC: 28 MMOL/L (ref 21–32)
CREAT SERPL-MCNC: 1.24 MG/DL (ref 0.55–1.02)
DIFFERENTIAL METHOD BLD: ABNORMAL
EOSINOPHIL # BLD: 0.05 K/UL (ref 0–0.4)
EOSINOPHIL NFR BLD: 0.8 % (ref 0–7)
ERYTHROCYTE [DISTWIDTH] IN BLOOD BY AUTOMATED COUNT: 15.6 % (ref 11.5–14.5)
GLOBULIN SER CALC-MCNC: 3.7 G/DL (ref 2–4)
GLUCOSE SERPL-MCNC: 133 MG/DL (ref 65–100)
HCT VFR BLD AUTO: 30.6 % (ref 35–47)
HGB BLD-MCNC: 9.5 G/DL (ref 11.5–16)
IMM GRANULOCYTES # BLD AUTO: 0.11 K/UL (ref 0–0.04)
IMM GRANULOCYTES NFR BLD AUTO: 1.8 % (ref 0–0.5)
LYMPHOCYTES # BLD: 0.66 K/UL (ref 0.8–3.5)
LYMPHOCYTES NFR BLD: 10.9 % (ref 12–49)
MAGNESIUM SERPL-MCNC: 1.9 MG/DL (ref 1.6–2.4)
MCH RBC QN AUTO: 29.3 PG (ref 26–34)
MCHC RBC AUTO-ENTMCNC: 31 G/DL (ref 30–36.5)
MCV RBC AUTO: 94.4 FL (ref 80–99)
MONOCYTES # BLD: 0.46 K/UL (ref 0–1)
MONOCYTES NFR BLD: 7.5 % (ref 5–13)
NEUTS SEG # BLD: 4.78 K/UL (ref 1.8–8)
NEUTS SEG NFR BLD: 78.3 % (ref 32–75)
NRBC # BLD: 0 K/UL (ref 0–0.01)
NRBC BLD-RTO: 0 PER 100 WBC
PLATELET # BLD AUTO: 245 K/UL (ref 150–400)
PMV BLD AUTO: 10.5 FL (ref 8.9–12.9)
POTASSIUM SERPL-SCNC: 3.6 MMOL/L (ref 3.5–5.1)
PROT SERPL-MCNC: 6.6 G/DL (ref 6.4–8.2)
RBC # BLD AUTO: 3.24 M/UL (ref 3.8–5.2)
RBC MORPH BLD: ABNORMAL
RBC MORPH BLD: ABNORMAL
SODIUM SERPL-SCNC: 140 MMOL/L (ref 136–145)
WBC # BLD AUTO: 6.1 K/UL (ref 3.6–11)

## 2025-02-18 PROCEDURE — 2580000003 HC RX 258: Performed by: STUDENT IN AN ORGANIZED HEALTH CARE EDUCATION/TRAINING PROGRAM

## 2025-02-18 PROCEDURE — C1758 CATHETER, URETERAL: HCPCS | Performed by: STUDENT IN AN ORGANIZED HEALTH CARE EDUCATION/TRAINING PROGRAM

## 2025-02-18 PROCEDURE — 7100000010 HC PHASE II RECOVERY - FIRST 15 MIN: Performed by: STUDENT IN AN ORGANIZED HEALTH CARE EDUCATION/TRAINING PROGRAM

## 2025-02-18 PROCEDURE — 7100000000 HC PACU RECOVERY - FIRST 15 MIN: Performed by: STUDENT IN AN ORGANIZED HEALTH CARE EDUCATION/TRAINING PROGRAM

## 2025-02-18 PROCEDURE — 3600000013 HC SURGERY LEVEL 3 ADDTL 15MIN: Performed by: STUDENT IN AN ORGANIZED HEALTH CARE EDUCATION/TRAINING PROGRAM

## 2025-02-18 PROCEDURE — 7100000011 HC PHASE II RECOVERY - ADDTL 15 MIN: Performed by: STUDENT IN AN ORGANIZED HEALTH CARE EDUCATION/TRAINING PROGRAM

## 2025-02-18 PROCEDURE — 6360000002 HC RX W HCPCS: Performed by: STUDENT IN AN ORGANIZED HEALTH CARE EDUCATION/TRAINING PROGRAM

## 2025-02-18 PROCEDURE — 3600000003 HC SURGERY LEVEL 3 BASE: Performed by: STUDENT IN AN ORGANIZED HEALTH CARE EDUCATION/TRAINING PROGRAM

## 2025-02-18 PROCEDURE — 3700000001 HC ADD 15 MINUTES (ANESTHESIA): Performed by: STUDENT IN AN ORGANIZED HEALTH CARE EDUCATION/TRAINING PROGRAM

## 2025-02-18 PROCEDURE — 6370000000 HC RX 637 (ALT 250 FOR IP): Performed by: STUDENT IN AN ORGANIZED HEALTH CARE EDUCATION/TRAINING PROGRAM

## 2025-02-18 PROCEDURE — 2709999900 HC NON-CHARGEABLE SUPPLY: Performed by: STUDENT IN AN ORGANIZED HEALTH CARE EDUCATION/TRAINING PROGRAM

## 2025-02-18 PROCEDURE — 7100000001 HC PACU RECOVERY - ADDTL 15 MIN: Performed by: STUDENT IN AN ORGANIZED HEALTH CARE EDUCATION/TRAINING PROGRAM

## 2025-02-18 PROCEDURE — 3700000000 HC ANESTHESIA ATTENDED CARE: Performed by: STUDENT IN AN ORGANIZED HEALTH CARE EDUCATION/TRAINING PROGRAM

## 2025-02-18 PROCEDURE — 6360000004 HC RX CONTRAST MEDICATION: Performed by: STUDENT IN AN ORGANIZED HEALTH CARE EDUCATION/TRAINING PROGRAM

## 2025-02-18 PROCEDURE — C1769 GUIDE WIRE: HCPCS | Performed by: STUDENT IN AN ORGANIZED HEALTH CARE EDUCATION/TRAINING PROGRAM

## 2025-02-18 RX ORDER — SODIUM CHLORIDE 0.9 % (FLUSH) 0.9 %
5-40 SYRINGE (ML) INJECTION EVERY 12 HOURS SCHEDULED
Status: DISCONTINUED | OUTPATIENT
Start: 2025-02-18 | End: 2025-02-18 | Stop reason: HOSPADM

## 2025-02-18 RX ORDER — FENTANYL CITRATE 50 UG/ML
100 INJECTION, SOLUTION INTRAMUSCULAR; INTRAVENOUS
Status: DISCONTINUED | OUTPATIENT
Start: 2025-02-18 | End: 2025-02-18 | Stop reason: HOSPADM

## 2025-02-18 RX ORDER — HYDRALAZINE HYDROCHLORIDE 20 MG/ML
10 INJECTION INTRAMUSCULAR; INTRAVENOUS
Status: DISCONTINUED | OUTPATIENT
Start: 2025-02-18 | End: 2025-02-18 | Stop reason: HOSPADM

## 2025-02-18 RX ORDER — SODIUM CHLORIDE 0.9 % (FLUSH) 0.9 %
5-40 SYRINGE (ML) INJECTION PRN
Status: DISCONTINUED | OUTPATIENT
Start: 2025-02-18 | End: 2025-02-18 | Stop reason: HOSPADM

## 2025-02-18 RX ORDER — HYDROMORPHONE HYDROCHLORIDE 1 MG/ML
0.5 INJECTION, SOLUTION INTRAMUSCULAR; INTRAVENOUS; SUBCUTANEOUS EVERY 5 MIN PRN
Status: DISCONTINUED | OUTPATIENT
Start: 2025-02-18 | End: 2025-02-18 | Stop reason: HOSPADM

## 2025-02-18 RX ORDER — PROCHLORPERAZINE EDISYLATE 5 MG/ML
5 INJECTION INTRAMUSCULAR; INTRAVENOUS
Status: DISCONTINUED | OUTPATIENT
Start: 2025-02-18 | End: 2025-02-18 | Stop reason: HOSPADM

## 2025-02-18 RX ORDER — FENTANYL CITRATE 50 UG/ML
25 INJECTION, SOLUTION INTRAMUSCULAR; INTRAVENOUS EVERY 5 MIN PRN
Status: DISCONTINUED | OUTPATIENT
Start: 2025-02-18 | End: 2025-02-18 | Stop reason: HOSPADM

## 2025-02-18 RX ORDER — MIDAZOLAM HYDROCHLORIDE 2 MG/2ML
2 INJECTION, SOLUTION INTRAMUSCULAR; INTRAVENOUS PRN
Status: DISCONTINUED | OUTPATIENT
Start: 2025-02-18 | End: 2025-02-18 | Stop reason: HOSPADM

## 2025-02-18 RX ORDER — DEXAMETHASONE SODIUM PHOSPHATE 4 MG/ML
INJECTION, SOLUTION INTRA-ARTICULAR; INTRALESIONAL; INTRAMUSCULAR; INTRAVENOUS; SOFT TISSUE
Status: DISCONTINUED | OUTPATIENT
Start: 2025-02-18 | End: 2025-02-18 | Stop reason: SDUPTHER

## 2025-02-18 RX ORDER — LABETALOL HYDROCHLORIDE 5 MG/ML
10 INJECTION, SOLUTION INTRAVENOUS
Status: DISCONTINUED | OUTPATIENT
Start: 2025-02-18 | End: 2025-02-18 | Stop reason: HOSPADM

## 2025-02-18 RX ORDER — PHENYLEPHRINE HCL IN 0.9% NACL 0.4MG/10ML
SYRINGE (ML) INTRAVENOUS
Status: DISCONTINUED | OUTPATIENT
Start: 2025-02-18 | End: 2025-02-18 | Stop reason: SDUPTHER

## 2025-02-18 RX ORDER — LIDOCAINE HYDROCHLORIDE 10 MG/ML
1 INJECTION, SOLUTION EPIDURAL; INFILTRATION; INTRACAUDAL; PERINEURAL
Status: DISCONTINUED | OUTPATIENT
Start: 2025-02-18 | End: 2025-02-18 | Stop reason: HOSPADM

## 2025-02-18 RX ORDER — CEFAZOLIN SODIUM 1 G/3ML
INJECTION, POWDER, FOR SOLUTION INTRAMUSCULAR; INTRAVENOUS
Status: DISCONTINUED | OUTPATIENT
Start: 2025-02-18 | End: 2025-02-18 | Stop reason: SDUPTHER

## 2025-02-18 RX ORDER — IOPAMIDOL 755 MG/ML
INJECTION, SOLUTION INTRAVASCULAR PRN
Status: DISCONTINUED | OUTPATIENT
Start: 2025-02-18 | End: 2025-02-18 | Stop reason: HOSPADM

## 2025-02-18 RX ORDER — SODIUM CHLORIDE, SODIUM LACTATE, POTASSIUM CHLORIDE, CALCIUM CHLORIDE 600; 310; 30; 20 MG/100ML; MG/100ML; MG/100ML; MG/100ML
INJECTION, SOLUTION INTRAVENOUS
Status: DISCONTINUED | OUTPATIENT
Start: 2025-02-18 | End: 2025-02-18 | Stop reason: SDUPTHER

## 2025-02-18 RX ORDER — ONDANSETRON 2 MG/ML
INJECTION INTRAMUSCULAR; INTRAVENOUS
Status: DISCONTINUED | OUTPATIENT
Start: 2025-02-18 | End: 2025-02-18 | Stop reason: SDUPTHER

## 2025-02-18 RX ORDER — SODIUM CHLORIDE, SODIUM LACTATE, POTASSIUM CHLORIDE, CALCIUM CHLORIDE 600; 310; 30; 20 MG/100ML; MG/100ML; MG/100ML; MG/100ML
INJECTION, SOLUTION INTRAVENOUS CONTINUOUS
Status: DISCONTINUED | OUTPATIENT
Start: 2025-02-18 | End: 2025-02-18 | Stop reason: HOSPADM

## 2025-02-18 RX ORDER — ONDANSETRON 2 MG/ML
4 INJECTION INTRAMUSCULAR; INTRAVENOUS
Status: DISCONTINUED | OUTPATIENT
Start: 2025-02-18 | End: 2025-02-18 | Stop reason: HOSPADM

## 2025-02-18 RX ORDER — NALOXONE HYDROCHLORIDE 0.4 MG/ML
INJECTION, SOLUTION INTRAMUSCULAR; INTRAVENOUS; SUBCUTANEOUS PRN
Status: DISCONTINUED | OUTPATIENT
Start: 2025-02-18 | End: 2025-02-18 | Stop reason: HOSPADM

## 2025-02-18 RX ORDER — LIDOCAINE HYDROCHLORIDE 20 MG/ML
INJECTION, SOLUTION EPIDURAL; INFILTRATION; INTRACAUDAL; PERINEURAL
Status: DISCONTINUED | OUTPATIENT
Start: 2025-02-18 | End: 2025-02-18 | Stop reason: SDUPTHER

## 2025-02-18 RX ORDER — OXYCODONE HYDROCHLORIDE 5 MG/1
5 TABLET ORAL
Status: DISCONTINUED | OUTPATIENT
Start: 2025-02-18 | End: 2025-02-18 | Stop reason: HOSPADM

## 2025-02-18 RX ORDER — SODIUM CHLORIDE 9 MG/ML
INJECTION, SOLUTION INTRAVENOUS PRN
Status: DISCONTINUED | OUTPATIENT
Start: 2025-02-18 | End: 2025-02-18 | Stop reason: HOSPADM

## 2025-02-18 RX ORDER — TAMSULOSIN HYDROCHLORIDE 0.4 MG/1
0.4 CAPSULE ORAL DAILY
Qty: 90 CAPSULE | Refills: 1 | Status: SHIPPED | OUTPATIENT
Start: 2025-02-18

## 2025-02-18 RX ORDER — CLOPIDOGREL BISULFATE 75 MG/1
75 TABLET ORAL DAILY
COMMUNITY

## 2025-02-18 RX ORDER — ACETAMINOPHEN 500 MG
1000 TABLET ORAL ONCE
Status: COMPLETED | OUTPATIENT
Start: 2025-02-18 | End: 2025-02-18

## 2025-02-18 RX ADMIN — LIDOCAINE HYDROCHLORIDE 60 MG: 20 INJECTION, SOLUTION EPIDURAL; INFILTRATION; INTRACAUDAL; PERINEURAL at 12:56

## 2025-02-18 RX ADMIN — ONDANSETRON 4 MG: 2 INJECTION INTRAMUSCULAR; INTRAVENOUS at 13:03

## 2025-02-18 RX ADMIN — Medication 80 MCG: at 12:56

## 2025-02-18 RX ADMIN — PROPOFOL 150 MG: 10 INJECTION, EMULSION INTRAVENOUS at 12:56

## 2025-02-18 RX ADMIN — Medication 80 MCG: at 13:08

## 2025-02-18 RX ADMIN — PROPOFOL 30 MG: 10 INJECTION, EMULSION INTRAVENOUS at 13:32

## 2025-02-18 RX ADMIN — Medication 80 MCG: at 13:13

## 2025-02-18 RX ADMIN — PROPOFOL 30 MG: 10 INJECTION, EMULSION INTRAVENOUS at 13:34

## 2025-02-18 RX ADMIN — DEXAMETHASONE SODIUM PHOSPHATE 4 MG: 4 INJECTION, SOLUTION INTRAMUSCULAR; INTRAVENOUS at 13:03

## 2025-02-18 RX ADMIN — SODIUM CHLORIDE, POTASSIUM CHLORIDE, SODIUM LACTATE AND CALCIUM CHLORIDE: 600; 310; 30; 20 INJECTION, SOLUTION INTRAVENOUS at 11:25

## 2025-02-18 RX ADMIN — ACETAMINOPHEN 1000 MG: 500 TABLET ORAL at 11:12

## 2025-02-18 RX ADMIN — SODIUM CHLORIDE, POTASSIUM CHLORIDE, SODIUM LACTATE AND CALCIUM CHLORIDE: 600; 310; 30; 20 INJECTION, SOLUTION INTRAVENOUS at 12:50

## 2025-02-18 RX ADMIN — Medication 80 MCG: at 13:17

## 2025-02-18 RX ADMIN — CEFAZOLIN 2 G: 330 INJECTION, POWDER, FOR SOLUTION INTRAMUSCULAR; INTRAVENOUS at 13:04

## 2025-02-18 RX ADMIN — Medication 120 MCG: at 13:21

## 2025-02-18 ASSESSMENT — PAIN - FUNCTIONAL ASSESSMENT
PAIN_FUNCTIONAL_ASSESSMENT: NONE - DENIES PAIN
PAIN_FUNCTIONAL_ASSESSMENT: NONE - DENIES PAIN
PAIN_FUNCTIONAL_ASSESSMENT: 0-10
PAIN_FUNCTIONAL_ASSESSMENT: NONE - DENIES PAIN
PAIN_FUNCTIONAL_ASSESSMENT: NONE - DENIES PAIN

## 2025-02-18 ASSESSMENT — PAIN DESCRIPTION - DESCRIPTORS: DESCRIPTORS: ACHING

## 2025-02-18 NOTE — ANESTHESIA PRE PROCEDURE
Department of Anesthesiology  Preprocedure Note       Name:  Elenita Benitez   Age:  74 y.o.  :  1950                                          MRN:  [id number]         Date:  2025      Surgeon: Surgeon(s):  Crow García III, MD    Procedure: Procedure(s):  CYSTOSCOPY WITH LEFT STENT PLACEMENT    Medications prior to admission:   Prior to Admission medications    Medication Sig Start Date End Date Taking? Authorizing Provider   ondansetron (ZOFRAN-ODT) 8 MG TBDP disintegrating tablet Take 1 tablet by mouth every 8 hours as needed for Nausea or Vomiting Place one (1) tablet under tongue every 8 hour when needed for nausea and vomiting 25   Corina Amado PA-C   atorvastatin (LIPITOR) 80 MG tablet Take 1 tablet by mouth nightly 1/10/25   Markus Nguyễn MD   aspirin 81 MG chewable tablet Take 1 tablet by mouth daily 1/10/25   Markus Nguyễn MD   metoprolol succinate (TOPROL XL) 25 MG extended release tablet Take 1 tablet by mouth daily  Patient taking differently: Take 1 tablet by mouth Daily with lunch 1/10/25   Markus Nguyễn MD   ticagrelor (BRILINTA) 90 MG TABS tablet Take 1 tablet by mouth 2 times daily  Patient taking differently: Take 1 tablet by mouth 2 times daily 10AM AND 10PM 1/10/25   Markus Nguyễn MD   nitroGLYCERIN (NITROSTAT) 0.4 MG SL tablet Place 1 tablet under the tongue every 5 minutes as needed for Chest pain up to max of 3 total doses. If no relief after 1 dose, call 911. 1/10/25   Renata Nguyễn MD   dexAMETHasone (DECADRON) 4 MG tablet Take 2 tablets by mouth with breakfast the day before chemotherapy and repeat for 2 days after chemotherapy  Patient taking differently: Take 1 tablet by mouth Take 2 tablets by mouth with breakfast the day before chemotherapy and repeat for 2 days after chemotherapy 24   Dominic Nicole MD   lidocaine-prilocaine (EMLA) 2.5-2.5 % cream Apply small amount over port area and cover with a band-aid one (1)

## 2025-02-18 NOTE — H&P
favor serous carcinoma    HEENT      3 teeth implants    HEENT      EXTRACTION OF WISDOM TEETH X 4    HYSTERECTOMY, VAGINAL  2021    IR PORT PLACEMENT > 5 YEARS  10/14/2021    IR PORT PLACEMENT EQUAL OR GREATER THAN 5 YEARS 10/14/2021 [facility] ANGIO IR    IR PORT PLACEMENT > 5 YEARS  10/14/2021    IR PORT PLACEMENT > 5 YEARS  2024    IR PORT PLACEMENT EQUAL OR GREATER THAN 5 YEARS 2024 [facility] ANGIO IR    ORTHOPEDIC SURGERY      LEFTl hip arthroplasty    ORTHOPEDIC SURGERY      right foot, bunionectomy    ORTHOPEDIC SURGERY      RIGHT hip arthroplasty    TUBAL LIGATION      UPPER GASTROINTESTINAL ENDOSCOPY      Do no remember the date but around     UPPER GI ENDOSCOPY,BIOPSY  2016         UPPER GI ENDOSCOPY,DILATN W GUIDE  2016         US BIOPSY LYMPH NODE  2024    US LYMPH NODE BIOPSY 2024 [facility] RAD US    VASCULAR SURGERY Right     port for chemo     Family History   Problem Relation Age of Onset    Hypertension Mother     Pancreatic Cancer Mother     Alcohol Abuse Father          from staph infection from infection on his finger.  Staph went into his bloodstream and destroyed heart valves.    Stroke Sister         Due to hemachromatosis    Asthma Brother         No longer severe    Ovarian Cancer Paternal Aunt     Rheum Arthritis Paternal Aunt     Colon Cancer Maternal Grandmother     Diabetes Paternal Grandmother     Anesth Problems Neg Hx      Vitals:    25 1106   BP: (!) 158/67   Pulse: 59   Resp: 18   Temp: 98.5 °F (36.9 °C)   SpO2: 100%     Nad  Warm well perfused    A/p  75 yo with endometrial cancer   Left hydro present at least since   Here for stent placement  Discussed risks of not placing stent including renal detioration or infection risk  Did discuss w/ renal function normal could not intervene  Would likely lose function of that kidney and may pose infection risk  Other options would be attempt at stent - approx 50%fail rate need for freq changes

## 2025-02-18 NOTE — ANESTHESIA POSTPROCEDURE EVALUATION
Department of Anesthesiology  Postprocedure Note    Patient: Elenita Benitez  MRN: [id number]  YOB: 1950  Date of evaluation: 2/18/2025    Procedure Summary       Date: 02/18/25 Room / Location: [facility] MAIN OR [address] [place] [address] MAIN OR    Anesthesia Start: 1244 Anesthesia Stop: 1352    Procedure: CYSTOSCOPY WITH LEFT STENT PLACEMENT, RETROGRADE POLYGRAM (Left: Ureter) Diagnosis:       Hydronephrosis, unspecified hydronephrosis type      (Hydronephrosis, unspecified hydronephrosis type [N13.30])    Providers: Crow García III, MD Responsible Provider: Vianey Mejia MD    Anesthesia Type: general ASA Status: 4            Anesthesia Type: No value filed.    Christopher Phase I: Christopher Score: 10    Christopher Phase II: Christopher Score: 10    Anesthesia Post Evaluation    Level of consciousness: awake  Airway patency: patent  Nausea & Vomiting: no nausea  Cardiovascular status: hemodynamically stable  Respiratory status: acceptable  Hydration status: euvolemic  Comments: Pt alert, interactive, and denies any pain or nausea. Hemodynamically stable and telemetry without any ST changes. Stable for discharge from PACU.   Pain management: adequate      No notable events documented.

## 2025-02-18 NOTE — BRIEF OP NOTE
Brief Postoperative Note      Patient: Elenita Benitez  YOB: 1950  MRN: [id number]    Date of Procedure: 2/18/2025    Pre-Op Diagnosis Codes:      * Hydronephrosis, unspecified hydronephrosis type [N13.30]    Post-Op Diagnosis: Same       Procedure(s):  CYSTOSCOPY WITH LEFT STENT PLACEMENT, RETROGRADE POLYGRAM    Surgeon(s):  Crow García III, MD    Assistant:  * No surgical staff found *    Anesthesia: General    Estimated Blood Loss (mL): Minimal    Complications: None    Specimens:   * No specimens in log *    Implants:  * No implants in log *      Drains: * No LDAs found *    Findings:  Infection Present At Time Of Surgery (PATOS) (choose all levels that have infection present):  No infection present  Other Findings: left ureteral obstruction ?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

## 2025-02-19 ENCOUNTER — TELEMEDICINE (OUTPATIENT)
Age: 75
End: 2025-02-19
Payer: MEDICARE

## 2025-02-19 DIAGNOSIS — Z51.11 ENCOUNTER FOR ANTINEOPLASTIC CHEMOTHERAPY AND IMMUNOTHERAPY: ICD-10-CM

## 2025-02-19 DIAGNOSIS — Z51.12 ENCOUNTER FOR ANTINEOPLASTIC CHEMOTHERAPY AND IMMUNOTHERAPY: ICD-10-CM

## 2025-02-19 DIAGNOSIS — C54.1 PAPILLARY SEROUS ENDOMETRIAL ADENOCARCINOMA (HCC): Primary | ICD-10-CM

## 2025-02-19 DIAGNOSIS — C54.1 MALIGNANT NEOPLASM OF ENDOMETRIUM (HCC): ICD-10-CM

## 2025-02-19 PROCEDURE — 1123F ACP DISCUSS/DSCN MKR DOCD: CPT | Performed by: OBSTETRICS & GYNECOLOGY

## 2025-02-19 PROCEDURE — 1159F MED LIST DOCD IN RCRD: CPT | Performed by: OBSTETRICS & GYNECOLOGY

## 2025-02-19 PROCEDURE — 99215 OFFICE O/P EST HI 40 MIN: CPT | Performed by: OBSTETRICS & GYNECOLOGY

## 2025-02-19 PROCEDURE — 1090F PRES/ABSN URINE INCON ASSESS: CPT | Performed by: OBSTETRICS & GYNECOLOGY

## 2025-02-19 PROCEDURE — 1160F RVW MEDS BY RX/DR IN RCRD: CPT | Performed by: OBSTETRICS & GYNECOLOGY

## 2025-02-19 PROCEDURE — G8399 PT W/DXA RESULTS DOCUMENT: HCPCS | Performed by: OBSTETRICS & GYNECOLOGY

## 2025-02-19 PROCEDURE — 3017F COLORECTAL CA SCREEN DOC REV: CPT | Performed by: OBSTETRICS & GYNECOLOGY

## 2025-02-19 PROCEDURE — G8427 DOCREV CUR MEDS BY ELIG CLIN: HCPCS | Performed by: OBSTETRICS & GYNECOLOGY

## 2025-02-19 RX ORDER — ONDANSETRON 8 MG/1
8 TABLET, ORALLY DISINTEGRATING ORAL EVERY 8 HOURS PRN
Qty: 30 TABLET | Refills: 0 | Status: SHIPPED | OUTPATIENT
Start: 2025-02-19

## 2025-02-19 NOTE — TELEPHONE ENCOUNTER
Requested Prescriptions     Signed Prescriptions Disp Refills    ondansetron (ZOFRAN-ODT) 8 MG TBDP disintegrating tablet 30 tablet 0     Sig: Take 1 tablet by mouth every 8 hours as needed for Nausea or Vomiting Place one (1) tablet under tongue every 8 hour when needed for nausea and vomiting     Authorizing Provider: REJI HOPKINS     Ordering User: WOODY BAE     Rx sent to pharmacy per vo Dr. Hopkins.

## 2025-02-19 NOTE — PROGRESS NOTES
Virtual visit. Pre chemotherapy, for C4 Carbo/Taxol/Pembro on 2/20.  
  1. CT of the chest demonstrates no definite evidence of metastatic disease. No  acute abnormality identified.  2. CT of the abdomen and pelvis demonstrates no definite evidence of metastatic  disease.   The pelvis is obscured by artifact related to total hip replacements.  There is hepatic steatosis. 2 subcentimeter low densities in the liver are too  small to characterize but most likely represent tiny cysts.  There is an 8mm lymph node just to the left of the aortic bifurcation which is  within normal limits.    Pelvic ultrasound 4/20/2021:  Impression: Normal uterus with 12.5mm stripe. Normal right ovary. Non visible left ovary with otherwise normal left adnexa. No free fluid.     Pathology Review:   11/12/2024:  CYTOLOGIC INTERPRETATION:   Lymph Node, Left Supraclavicular, core biopsy with touch preparation:     Metastatic adenocarcinoma, morphologically compatible with high-grade   serous carcinoma of endometrial origin.   Core biopsy shows similar features.     7/29/2021:  * * *FINAL PATHOLOGIC DIAGNOSIS* * *   1.  Right pelvic sentinel lymph node, biopsy:        One lymph node, negative for carcinoma, levels and cytokeratin stain   examined (0/1)   2.  Left pelvic sentinel lymph node #1, biopsy:        One lymph node, negative for carcinoma, levels and cytokeratin stain   examined (0/1)   3.  Left pelvic sentinel lymph node, biopsy:        One lymph node, negative for carcinoma, levels and cytokeratin stain   examined (0/1)   4. Uterus, cervix, bilateral fallopian tubes and ovaries; simple   hysterectomy, BSO:        Endometrium: Uterine serous carcinoma, see synoptic report        Cervix: Serous carcinoma focally involves endocervical stroma,   predominantly as lymphovascular                invasion        Bilateral fallopian tubes: No pathologic diagnosis     ENDOMETRIUM    SPECIMEN       Procedure: Total hysterectomy and bilateral salpingo-oophorectomy       Specimen Integrity: Intact    TUMOR

## 2025-02-20 ENCOUNTER — HOSPITAL ENCOUNTER (OUTPATIENT)
Facility: HOSPITAL | Age: 75
Setting detail: INFUSION SERIES
Discharge: HOME | End: 2025-02-20
Payer: MEDICARE

## 2025-02-20 VITALS
TEMPERATURE: 98.3 F | WEIGHT: 165 LBS | OXYGEN SATURATION: 99 % | DIASTOLIC BLOOD PRESSURE: 71 MMHG | RESPIRATION RATE: 18 BRPM | SYSTOLIC BLOOD PRESSURE: 157 MMHG | HEART RATE: 61 BPM | HEIGHT: 61 IN | BODY MASS INDEX: 31.15 KG/M2

## 2025-02-20 DIAGNOSIS — C54.1 PAPILLARY SEROUS ENDOMETRIAL ADENOCARCINOMA (HCC): Primary | ICD-10-CM

## 2025-02-20 PROCEDURE — 96367 TX/PROPH/DG ADDL SEQ IV INF: CPT

## 2025-02-20 PROCEDURE — 96413 CHEMO IV INFUSION 1 HR: CPT

## 2025-02-20 PROCEDURE — 96415 CHEMO IV INFUSION ADDL HR: CPT

## 2025-02-20 PROCEDURE — 2500000003 HC RX 250 WO HCPCS: Performed by: OBSTETRICS & GYNECOLOGY

## 2025-02-20 PROCEDURE — 96417 CHEMO IV INFUS EACH ADDL SEQ: CPT

## 2025-02-20 PROCEDURE — 2580000003 HC RX 258: Performed by: OBSTETRICS & GYNECOLOGY

## 2025-02-20 PROCEDURE — 6360000002 HC RX W HCPCS: Performed by: OBSTETRICS & GYNECOLOGY

## 2025-02-20 PROCEDURE — 96375 TX/PRO/DX INJ NEW DRUG ADDON: CPT

## 2025-02-20 RX ORDER — DIPHENHYDRAMINE HYDROCHLORIDE 50 MG/ML
50 INJECTION INTRAMUSCULAR; INTRAVENOUS ONCE
Status: COMPLETED | OUTPATIENT
Start: 2025-02-20 | End: 2025-02-20

## 2025-02-20 RX ORDER — PALONOSETRON 0.05 MG/ML
0.25 INJECTION, SOLUTION INTRAVENOUS ONCE
Status: COMPLETED | OUTPATIENT
Start: 2025-02-20 | End: 2025-02-20

## 2025-02-20 RX ORDER — DEXAMETHASONE SODIUM PHOSPHATE 10 MG/ML
10 INJECTION, SOLUTION INTRAMUSCULAR; INTRAVENOUS ONCE
Status: COMPLETED | OUTPATIENT
Start: 2025-02-20 | End: 2025-02-20

## 2025-02-20 RX ORDER — SODIUM CHLORIDE 9 MG/ML
5-250 INJECTION, SOLUTION INTRAVENOUS PRN
Status: DISCONTINUED | OUTPATIENT
Start: 2025-02-20 | End: 2025-02-21 | Stop reason: HOSPADM

## 2025-02-20 RX ORDER — SODIUM CHLORIDE 0.9 % (FLUSH) 0.9 %
5-40 SYRINGE (ML) INJECTION PRN
Status: DISCONTINUED | OUTPATIENT
Start: 2025-02-20 | End: 2025-02-21 | Stop reason: HOSPADM

## 2025-02-20 RX ADMIN — SODIUM CHLORIDE, PRESERVATIVE FREE 10 ML: 5 INJECTION INTRAVENOUS at 09:56

## 2025-02-20 RX ADMIN — FAMOTIDINE 20 MG: 10 INJECTION INTRAVENOUS at 10:12

## 2025-02-20 RX ADMIN — DEXAMETHASONE SODIUM PHOSPHATE 10 MG: 10 INJECTION INTRAMUSCULAR; INTRAVENOUS at 10:18

## 2025-02-20 RX ADMIN — FOSAPREPITANT 150 MG: 150 INJECTION, POWDER, LYOPHILIZED, FOR SOLUTION INTRAVENOUS at 10:44

## 2025-02-20 RX ADMIN — SODIUM CHLORIDE 100 ML/HR: 9 INJECTION, SOLUTION INTRAVENOUS at 10:08

## 2025-02-20 RX ADMIN — CARBOPLATIN 310 MG: 10 INJECTION INTRAVENOUS at 14:18

## 2025-02-20 RX ADMIN — PACLITAXEL 276 MG: 6 INJECTION, SOLUTION INTRAVENOUS at 11:13

## 2025-02-20 RX ADMIN — DIPHENHYDRAMINE HYDROCHLORIDE 50 MG: 50 INJECTION, SOLUTION INTRAMUSCULAR; INTRAVENOUS at 10:23

## 2025-02-20 RX ADMIN — PALONOSETRON HYDROCHLORIDE 0.25 MG: 0.25 INJECTION INTRAVENOUS at 10:09

## 2025-02-20 ASSESSMENT — PAIN SCALES - GENERAL: PAINLEVEL_OUTOF10: 5

## 2025-02-20 ASSESSMENT — PAIN DESCRIPTION - LOCATION: LOCATION: LEG

## 2025-02-20 ASSESSMENT — PAIN DESCRIPTION - ORIENTATION: ORIENTATION: LEFT

## 2025-02-20 ASSESSMENT — PAIN DESCRIPTION - DESCRIPTORS: DESCRIPTORS: ACHING

## 2025-02-20 NOTE — PROGRESS NOTES
[facility] Visit Note:    Pt arrived to [facility] ambulatory in no acute distress at 0945 for Taxol/Carboplatin C3.  Assessment unremarkable except lymphedema in lower extremities, fatigue, and numbness/tingling in fingertips.  Pt reports some burning with urination after urinary stent placed 2/18/25. R chest port accessed without issue and positive blood return noted.  Labs obtained on 2/17/25, within parameters for treatment today.    Patient using chemotherapy mitts and socks.    BP (!) 179/82 Comment: hasn't taken BP meds yet- takes at 2pm  Pulse 59   Temp 98.3 °F (36.8 °C) (Temporal)   Resp 18   Ht 1.549 m (5' 1\")   Wt 74.8 kg (165 lb)   SpO2 99%   BMI 31.18 kg/m²     Two nurses verified prior to administering:  Drug name  Drug dose  Infusion volume or drug volume when prepared in a syringe  Rate of administration  Route of administration  Expiration dates and/or times  Appearance and physical integrity of the drugs  Rate set on infusion pump, when used  Sequencing of drug administration     Medications Administered         0.9 % sodium chloride infusion Admin Date  02/20/2025 Action  New Bag Dose  100 mL/hr Rate  100 mL/hr Route  IntraVENous Documented By  Latonia Aguirre RN        CARBOplatin (PARAPLATIN) 310 mg in sodium chloride 0.9 % 100 mL chemo IVPB Admin Date  02/20/2025 Action  New Bag Dose  310 mg Rate  282 mL/hr Route  IntraVENous Documented By  Latonia Aguirre RN        dexAMETHasone (PF) (DECADRON) injection 10 mg Admin Date  02/20/2025 Action  Given Dose  10 mg Rate   Route  IntraVENous Documented By  Latonia Aguirre RN        diphenhydrAMINE (BENADRYL) injection 50 mg Admin Date  02/20/2025 Action  Given Dose  50 mg Rate   Route  IntraVENous Documented By  Latonia Aguirre RN        famotidine (PEPCID) 20 MG/2ML 20 mg in sodium chloride (PF) 0.9 % 10 mL injection Admin Date  02/20/2025 Action  Given Dose  20 mg Rate   Route  IntraVENous

## 2025-02-20 NOTE — OP NOTE
Operative Note      Patient: Elenita Benitez  YOB: 1950  MRN: [id number]    Date of Procedure: 2/18/2025    Pre-Op Diagnosis Codes:      * Hydronephrosis, unspecified hydronephrosis type [N13.30]    Post-Op Diagnosis: Same       Procedure(s):  CYSTOSCOPY WITH LEFT STENT PLACEMENT, RETROGRADE POLYGRAM    Surgeon(s):  Crow García III, MD    Assistant:   * No surgical staff found *    Anesthesia: General    Estimated Blood Loss (mL): Minimal    Complications: None    Specimens:   * No specimens in log *    Implants:  * No implants in log *      Drains: * No LDAs found *    Findings:  Infection Present At Time Of Surgery (PATOS) (choose all levels that have infection present):  No infection present  Other Findings: Very difficult stent placement tortuous obstructed proximal ureter    Detailed Description of Procedure:   Patient taken to the operating room   Placed on the table  She received preoperative abx  She was prepped and draped in dorsal lithotomy positioning  I performed cystourethroscopy with 21 Moldovan scope  The bladder showed no masses  Her urethra was a bit snug  She had bilateral orthotopic ureteral orifices  I advanced a wire into the distal left ureter it was actually difficult to get this to advance up   I was eventually able to use a 5 Moldovan and support and slowly advance the wire through what appeared to be a tortuous proximal ureter with severe external narrowing with a severely hydronephrotic renal pelvis  The pelvis did appear to potentially be bifid  I then attempted to advance a 7 x 24 double j stent it was hard to even get this into distal ureter eventually I could get it up into the renal pelvis it was folded back into the lower pole portion of the pelvis  There did appear to be some contrast draining through but it was very slow   I then rinsed the bladder out a few times and tried to do retrograde along the stent and contrast did not appear to get through the narrowed

## 2025-02-21 DIAGNOSIS — C54.1 MALIGNANT NEOPLASM OF ENDOMETRIUM (HCC): Primary | ICD-10-CM

## 2025-02-27 RX ORDER — FAMOTIDINE 10 MG/ML
20 INJECTION, SOLUTION INTRAVENOUS ONCE
OUTPATIENT
Start: 2025-03-13 | End: 2025-03-13

## 2025-02-27 RX ORDER — DIPHENHYDRAMINE HYDROCHLORIDE 50 MG/ML
50 INJECTION INTRAMUSCULAR; INTRAVENOUS
OUTPATIENT
Start: 2025-03-13

## 2025-02-27 RX ORDER — ACETAMINOPHEN 325 MG/1
650 TABLET ORAL
OUTPATIENT
Start: 2025-03-13

## 2025-02-27 RX ORDER — SODIUM CHLORIDE 0.9 % (FLUSH) 0.9 %
5-40 SYRINGE (ML) INJECTION PRN
OUTPATIENT
Start: 2025-03-13

## 2025-02-27 RX ORDER — PROCHLORPERAZINE EDISYLATE 5 MG/ML
5 INJECTION INTRAMUSCULAR; INTRAVENOUS
OUTPATIENT
Start: 2025-03-13

## 2025-02-27 RX ORDER — FAMOTIDINE 10 MG/ML
20 INJECTION, SOLUTION INTRAVENOUS
OUTPATIENT
Start: 2025-03-13

## 2025-02-27 RX ORDER — HEPARIN SODIUM (PORCINE) LOCK FLUSH IV SOLN 100 UNIT/ML 100 UNIT/ML
500 SOLUTION INTRAVENOUS PRN
OUTPATIENT
Start: 2025-03-13

## 2025-02-27 RX ORDER — DIPHENHYDRAMINE HYDROCHLORIDE 50 MG/ML
50 INJECTION INTRAMUSCULAR; INTRAVENOUS ONCE
OUTPATIENT
Start: 2025-03-13 | End: 2025-03-13

## 2025-02-27 RX ORDER — HYDROCORTISONE SODIUM SUCCINATE 100 MG/2ML
100 INJECTION INTRAMUSCULAR; INTRAVENOUS
OUTPATIENT
Start: 2025-03-13

## 2025-02-27 RX ORDER — ALBUTEROL SULFATE 90 UG/1
4 INHALANT RESPIRATORY (INHALATION) PRN
OUTPATIENT
Start: 2025-03-13

## 2025-02-27 RX ORDER — EPINEPHRINE 1 MG/ML
0.3 INJECTION, SOLUTION, CONCENTRATE INTRAVENOUS PRN
OUTPATIENT
Start: 2025-03-13

## 2025-02-27 RX ORDER — SODIUM CHLORIDE 9 MG/ML
5-250 INJECTION, SOLUTION INTRAVENOUS PRN
OUTPATIENT
Start: 2025-03-13

## 2025-02-27 RX ORDER — MEPERIDINE HYDROCHLORIDE 50 MG/ML
12.5 INJECTION INTRAMUSCULAR; INTRAVENOUS; SUBCUTANEOUS PRN
OUTPATIENT
Start: 2025-03-13

## 2025-02-27 RX ORDER — ONDANSETRON 2 MG/ML
8 INJECTION INTRAMUSCULAR; INTRAVENOUS
OUTPATIENT
Start: 2025-03-13

## 2025-02-27 RX ORDER — SODIUM CHLORIDE 9 MG/ML
INJECTION, SOLUTION INTRAVENOUS CONTINUOUS
OUTPATIENT
Start: 2025-03-13

## 2025-02-27 RX ORDER — PALONOSETRON 0.05 MG/ML
0.25 INJECTION, SOLUTION INTRAVENOUS ONCE
OUTPATIENT
Start: 2025-03-13 | End: 2025-03-13

## 2025-02-28 ENCOUNTER — APPOINTMENT (OUTPATIENT)
Facility: HOSPITAL | Age: 75
End: 2025-02-28
Attending: RADIOLOGY
Payer: MEDICARE

## 2025-03-05 ENCOUNTER — HOSPITAL ENCOUNTER (OUTPATIENT)
Facility: HOSPITAL | Age: 75
Setting detail: RECURRING SERIES
Discharge: HOME | End: 2025-03-08
Attending: RADIOLOGY
Payer: MEDICARE

## 2025-03-05 PROCEDURE — 97535 SELF CARE MNGMENT TRAINING: CPT

## 2025-03-05 PROCEDURE — 97140 MANUAL THERAPY 1/> REGIONS: CPT

## 2025-03-05 NOTE — PROGRESS NOTES
and obtain comfortable and optimal fitting compression products to prevent reaccumulation of fluid at discharge which could impair patient's ability to perform safe mobility and dressing. In progress  5.  Patient will obtain a home vaso-pneumatic device if cleared by physician and use at least 4 days each week to prevent reaccumulation of fluid for improved tolerance of mobility and ambulation and/or decrease the feeling of limb heaviness with bathing and dressing during the restorative phase of care.     PLAN  Yes  Continue plan of care. Will need plan of care extended due plan of care expiring due to recent medical issues and inability to return to clinic for follow up until today.   Re-Cert Due: 2/18/25  []  Upgrade activities as tolerated  []  Discharge due to:  []  Other:    JENNIFER LEVINE PTA, CLT    3/5/2025      10:30 AM  Dolores Powers PT, CLT

## 2025-03-07 NOTE — THERAPY RECERTIFICATION
[phone].   Thank you for allowing us to assist in the care of your patient.     Certification Period: 3/5/2025 - 06/03/25        JENNIFER LEVINE PTA, CLT       3/5/2025       10:39 AM  Dolores Powers PT, OLIVE        ___ I have read the above report and request that my patient continue as recommended.   ___ I have read the above report and request that my patient continue therapy with the following changes/special instructions: ________________________________________________   ___ I have read the above report and request that my patient be discharged from therapy.      Physician's Signature:_________________________   DATE:_________   TIME:________                           Hong Alcazar MD     ** Signature, Date and Time must be completed for valid certification **  Please sign and fax to [phone].  Thank you

## 2025-03-10 DIAGNOSIS — C54.1 MALIGNANT NEOPLASM OF ENDOMETRIUM (HCC): ICD-10-CM

## 2025-03-10 RX ORDER — ONDANSETRON 8 MG/1
8 TABLET, ORALLY DISINTEGRATING ORAL EVERY 8 HOURS PRN
Qty: 30 TABLET | Refills: 2 | Status: SHIPPED | OUTPATIENT
Start: 2025-03-10

## 2025-03-11 ENCOUNTER — HOSPITAL ENCOUNTER (OUTPATIENT)
Facility: HOSPITAL | Age: 75
Setting detail: RECURRING SERIES
Discharge: HOME | End: 2025-03-14
Attending: RADIOLOGY
Payer: MEDICARE

## 2025-03-11 DIAGNOSIS — C54.1 MALIGNANT NEOPLASM OF ENDOMETRIUM (HCC): ICD-10-CM

## 2025-03-11 LAB
ALBUMIN SERPL-MCNC: 2.8 G/DL (ref 3.5–5)
ALBUMIN/GLOB SERPL: 1 (ref 1.1–2.2)
ALP SERPL-CCNC: 70 U/L (ref 45–117)
ALT SERPL-CCNC: 14 U/L (ref 12–78)
ANION GAP SERPL CALC-SCNC: 7 MMOL/L (ref 2–12)
AST SERPL-CCNC: 13 U/L (ref 15–37)
BASOPHILS # BLD: 0.04 K/UL (ref 0–0.1)
BASOPHILS NFR BLD: 0.6 % (ref 0–1)
BILIRUB SERPL-MCNC: 0.3 MG/DL (ref 0.2–1)
BUN SERPL-MCNC: 14 MG/DL (ref 6–20)
BUN/CREAT SERPL: 14 (ref 12–20)
CALCIUM SERPL-MCNC: 9.1 MG/DL (ref 8.5–10.1)
CANCER AG125 SERPL-ACNC: 11 U/ML (ref 1.5–35)
CHLORIDE SERPL-SCNC: 109 MMOL/L (ref 97–108)
CO2 SERPL-SCNC: 26 MMOL/L (ref 21–32)
CREAT SERPL-MCNC: 1.01 MG/DL (ref 0.55–1.02)
DIFFERENTIAL METHOD BLD: ABNORMAL
EOSINOPHIL # BLD: 0.19 K/UL (ref 0–0.4)
EOSINOPHIL NFR BLD: 3 % (ref 0–7)
ERYTHROCYTE [DISTWIDTH] IN BLOOD BY AUTOMATED COUNT: 18 % (ref 11.5–14.5)
GLOBULIN SER CALC-MCNC: 2.8 G/DL (ref 2–4)
GLUCOSE SERPL-MCNC: 114 MG/DL (ref 65–100)
HCT VFR BLD AUTO: 27.7 % (ref 35–47)
HGB BLD-MCNC: 8.4 G/DL (ref 11.5–16)
IMM GRANULOCYTES # BLD AUTO: 0.06 K/UL (ref 0–0.04)
IMM GRANULOCYTES NFR BLD AUTO: 0.9 % (ref 0–0.5)
LYMPHOCYTES # BLD: 0.76 K/UL (ref 0.8–3.5)
LYMPHOCYTES NFR BLD: 12 % (ref 12–49)
MAGNESIUM SERPL-MCNC: 1.9 MG/DL (ref 1.6–2.4)
MCH RBC QN AUTO: 30 PG (ref 26–34)
MCHC RBC AUTO-ENTMCNC: 30.3 G/DL (ref 30–36.5)
MCV RBC AUTO: 98.9 FL (ref 80–99)
MONOCYTES # BLD: 0.57 K/UL (ref 0–1)
MONOCYTES NFR BLD: 9 % (ref 5–13)
NEUTS SEG # BLD: 4.68 K/UL (ref 1.8–8)
NEUTS SEG NFR BLD: 74.5 % (ref 32–75)
NRBC # BLD: 0 K/UL (ref 0–0.01)
NRBC BLD-RTO: 0 PER 100 WBC
PLATELET # BLD AUTO: 209 K/UL (ref 150–400)
PMV BLD AUTO: 10.2 FL (ref 8.9–12.9)
POTASSIUM SERPL-SCNC: 4.1 MMOL/L (ref 3.5–5.1)
PROT SERPL-MCNC: 5.6 G/DL (ref 6.4–8.2)
RBC # BLD AUTO: 2.8 M/UL (ref 3.8–5.2)
RBC MORPH BLD: ABNORMAL
SODIUM SERPL-SCNC: 142 MMOL/L (ref 136–145)
WBC # BLD AUTO: 6.3 K/UL (ref 3.6–11)

## 2025-03-11 PROCEDURE — 97535 SELF CARE MNGMENT TRAINING: CPT

## 2025-03-11 PROCEDURE — 97140 MANUAL THERAPY 1/> REGIONS: CPT

## 2025-03-11 NOTE — PROGRESS NOTES
interventions were performed at a separate and distinct time from the therapeutic activities interventions . (see flow sheet as applicable)    Details if applicable:    Manual Lymphatic Drainage (MLD):  Area to decongest: L LE > R LE and trunk   Sequence used and effectiveness: Secondary sequence for lower extremities with trunk involvement. Cervical techniques deferred. Modified left clavicular techniques as well.  All performed in supine with pillows for positioning and comfort.   Trunk and L LE today with focus on thigh and knee.   Skin texture softens will MLD. Continued education in self MLD packet with modifications and patient encouraged to  perform at home daily as tolerated. Written hand out provided     Skin/wound care/debridement: Reviewed skin care principles:   Signs and symptoms of infection  Daily skin care using MLD techniques.  Patient with intact skin, but reports very small raised area on lower anterior right leg that she will monitor. Presents like a trapped hair follicle. Patient will monitor she reports a history of having a spider bite on the right leg in the past.          15  [id number] Self Care/Home Management (timed):  improve patient knowledge and understanding of skin care and home program  to improve patient's ability to progress to PLOF and address remaining functional goals.  (see flow sheet as applicable)    Details if applicable:      Compression garment management         Patient arrived with compression in place for bilateral lower legs. She is utilizing up to 5 hours a day. Her son assisted her with donning today. Patient will work with her thigh garment in the home setting, but has not been able to wear consistently.     Circaid Juxtafit Essential garment in conjunction with compressive liner sock on the R LE.  Circaid lower leg reduction kit on lower left leg with compressive liner sock.   Circaid upper leg reduction kit for L LE.   Able to educate patient on alternating the upper

## 2025-03-12 ENCOUNTER — TELEMEDICINE (OUTPATIENT)
Age: 75
End: 2025-03-12
Payer: MEDICARE

## 2025-03-12 DIAGNOSIS — C54.1 MALIGNANT NEOPLASM OF ENDOMETRIUM (HCC): ICD-10-CM

## 2025-03-12 DIAGNOSIS — Z51.12 ENCOUNTER FOR ANTINEOPLASTIC CHEMOTHERAPY AND IMMUNOTHERAPY: Primary | ICD-10-CM

## 2025-03-12 DIAGNOSIS — C54.1 PAPILLARY SEROUS ENDOMETRIAL ADENOCARCINOMA (HCC): ICD-10-CM

## 2025-03-12 DIAGNOSIS — Z51.11 ENCOUNTER FOR ANTINEOPLASTIC CHEMOTHERAPY AND IMMUNOTHERAPY: Primary | ICD-10-CM

## 2025-03-12 DIAGNOSIS — G89.3 CANCER RELATED PAIN: ICD-10-CM

## 2025-03-12 PROCEDURE — 99215 OFFICE O/P EST HI 40 MIN: CPT | Performed by: OBSTETRICS & GYNECOLOGY

## 2025-03-12 PROCEDURE — 1123F ACP DISCUSS/DSCN MKR DOCD: CPT | Performed by: OBSTETRICS & GYNECOLOGY

## 2025-03-12 PROCEDURE — 3017F COLORECTAL CA SCREEN DOC REV: CPT | Performed by: OBSTETRICS & GYNECOLOGY

## 2025-03-12 PROCEDURE — 1159F MED LIST DOCD IN RCRD: CPT | Performed by: OBSTETRICS & GYNECOLOGY

## 2025-03-12 PROCEDURE — 1160F RVW MEDS BY RX/DR IN RCRD: CPT | Performed by: OBSTETRICS & GYNECOLOGY

## 2025-03-12 PROCEDURE — G8399 PT W/DXA RESULTS DOCUMENT: HCPCS | Performed by: OBSTETRICS & GYNECOLOGY

## 2025-03-12 PROCEDURE — 1090F PRES/ABSN URINE INCON ASSESS: CPT | Performed by: OBSTETRICS & GYNECOLOGY

## 2025-03-12 PROCEDURE — G8427 DOCREV CUR MEDS BY ELIG CLIN: HCPCS | Performed by: OBSTETRICS & GYNECOLOGY

## 2025-03-12 NOTE — PROGRESS NOTES
Virtual Visit for pre chemo on 3/13/25    1. Have you been to the ER, urgent care clinic since your last visit?  Hospitalized since your last visit?  no    2. Have you seen or consulted any other health care providers outside of the [facility] since your last visit?  Include any pap smears or colon screening.   no    
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  Caris molecular testing pending.   Pembro held with cycle 2 at patient's request, and would like to continue to hold. MI prior to cycle 2. Will plan for scan after cycle 4.   3/12/25: Presents today for consideration of cycle 4. Significant fatigue.  More neuropathy in her legs with this last cycle, grade 2.  Will need to continue to monitor throughout her treatment.  With cycle 3 reduce paclitaxel to 155mg/m2. Still some worsening of neuropathy. Will plan to reduce to 135mg/m2.  If she continues to have worsening neuropathy, then we may need to switch to docetaxel.  Prior to cycle 2 scheduled the patient had an MI and was admitted and underwent a coronary stent.  Will follow-up with Dr. Nguyễn for clearance.  Will follow-up prechemotherapy labs.   Order placed for PET scan today.     Lymphedema:   Patient has significant lymphedema in her left leg most likely secondary to bulky retroperitoneal lymphadenopathy.  This is worsened since her recent admission with an MI.  She will continue to follow-up with lymphedema clinic.  She is using stockings.  Patient takes tramadol 50 mg every 6 hours, along with Tylenol and Advil as needed.      Hydronephrosis:  Patient with hydronephrosis on CT scan.  Stent placement on 2/18/2025.    MI:  Recent MI prior to cycle 2.  Admitted underwent coronary stent placement.  She has follow-up with Dr. Nguyễn.    On plavix. Some bleeding with urinary stent. Some small amount of blood in stools.     All questions and concerns were addressed with the patient and she is comfortable with the plan. Today's visit was 45 minutes, including review of records, examination, discussion and coordination of care.     An electronic signature was used to authenticate this note.     Dominic Nicole MD    Please note that portions of this dictation were

## 2025-03-13 ENCOUNTER — HOSPITAL ENCOUNTER (OUTPATIENT)
Facility: HOSPITAL | Age: 75
Setting detail: INFUSION SERIES
Discharge: HOME | End: 2025-03-13
Payer: MEDICARE

## 2025-03-13 VITALS
OXYGEN SATURATION: 100 % | WEIGHT: 173 LBS | RESPIRATION RATE: 18 BRPM | BODY MASS INDEX: 32.66 KG/M2 | HEART RATE: 65 BPM | DIASTOLIC BLOOD PRESSURE: 83 MMHG | HEIGHT: 61 IN | SYSTOLIC BLOOD PRESSURE: 158 MMHG | TEMPERATURE: 97.4 F

## 2025-03-13 DIAGNOSIS — C54.1 PAPILLARY SEROUS ENDOMETRIAL ADENOCARCINOMA (HCC): Primary | ICD-10-CM

## 2025-03-13 PROCEDURE — 2500000003 HC RX 250 WO HCPCS: Performed by: OBSTETRICS & GYNECOLOGY

## 2025-03-13 PROCEDURE — 96375 TX/PRO/DX INJ NEW DRUG ADDON: CPT

## 2025-03-13 PROCEDURE — 96367 TX/PROPH/DG ADDL SEQ IV INF: CPT

## 2025-03-13 PROCEDURE — 6360000002 HC RX W HCPCS: Performed by: OBSTETRICS & GYNECOLOGY

## 2025-03-13 PROCEDURE — 96417 CHEMO IV INFUS EACH ADDL SEQ: CPT

## 2025-03-13 PROCEDURE — 96413 CHEMO IV INFUSION 1 HR: CPT

## 2025-03-13 PROCEDURE — 96415 CHEMO IV INFUSION ADDL HR: CPT

## 2025-03-13 PROCEDURE — 2580000003 HC RX 258: Performed by: OBSTETRICS & GYNECOLOGY

## 2025-03-13 RX ORDER — SODIUM CHLORIDE 9 MG/ML
5-250 INJECTION, SOLUTION INTRAVENOUS PRN
Status: DISCONTINUED | OUTPATIENT
Start: 2025-03-13 | End: 2025-03-14 | Stop reason: HOSPADM

## 2025-03-13 RX ORDER — DIPHENHYDRAMINE HYDROCHLORIDE 50 MG/ML
50 INJECTION, SOLUTION INTRAMUSCULAR; INTRAVENOUS ONCE
Status: COMPLETED | OUTPATIENT
Start: 2025-03-13 | End: 2025-03-13

## 2025-03-13 RX ORDER — PALONOSETRON 0.05 MG/ML
0.25 INJECTION, SOLUTION INTRAVENOUS ONCE
Status: COMPLETED | OUTPATIENT
Start: 2025-03-13 | End: 2025-03-13

## 2025-03-13 RX ORDER — DEXAMETHASONE SODIUM PHOSPHATE 10 MG/ML
10 INJECTION, SOLUTION INTRAMUSCULAR; INTRAVENOUS ONCE
Status: COMPLETED | OUTPATIENT
Start: 2025-03-13 | End: 2025-03-13

## 2025-03-13 RX ADMIN — FAMOTIDINE 20 MG: 10 INJECTION, SOLUTION INTRAVENOUS at 08:43

## 2025-03-13 RX ADMIN — DIPHENHYDRAMINE HYDROCHLORIDE 50 MG: 50 INJECTION INTRAMUSCULAR; INTRAVENOUS at 08:44

## 2025-03-13 RX ADMIN — PALONOSETRON HYDROCHLORIDE 0.25 MG: 0.25 INJECTION INTRAVENOUS at 08:43

## 2025-03-13 RX ADMIN — CARBOPLATIN 350 MG: 10 INJECTION INTRAVENOUS at 13:20

## 2025-03-13 RX ADMIN — SODIUM CHLORIDE 25 ML/HR: 9 INJECTION, SOLUTION INTRAVENOUS at 08:37

## 2025-03-13 RX ADMIN — PACLITAXEL 276 MG: 6 INJECTION, SOLUTION INTRAVENOUS at 10:11

## 2025-03-13 RX ADMIN — FOSAPREPITANT 150 MG: 150 INJECTION, POWDER, LYOPHILIZED, FOR SOLUTION INTRAVENOUS at 09:23

## 2025-03-13 RX ADMIN — DEXAMETHASONE SODIUM PHOSPHATE 10 MG: 10 INJECTION, SOLUTION INTRAMUSCULAR; INTRAVENOUS at 08:43

## 2025-03-13 NOTE — PROGRESS NOTES
[facility]patient [facility]fusion [facility] - Chemotherapy Progress Note    Pt admit to [facility] for C4 in stable condition. Assessment completed.     R chest port accessed with positive blood return. Labs reviewed from 3/11/25.    Patient Vitals for the past 12 hrs:   Temp Pulse Resp BP SpO2   03/13/25 1408 -- 65 18 (!) 158/83 --   03/13/25 1400 -- -- -- (!) 156/102 --   03/13/25 0800 97.4 °F (36.3 °C) 62 16 (!) 159/85 100 %      Medications:  Medications Administered         0.9 % sodium chloride infusion Admin Date  03/13/2025 Action  New Bag Dose  25 mL/hr Rate  25 mL/hr Route  IntraVENous Documented By  Lianna Kapadia RN        CARBOplatin (PARAPLATIN) 350 mg in sodium chloride 0.9 % 250 mL chemo IVPB Admin Date  03/13/2025 Action  New Bag Dose  350 mg Rate  620 mL/hr Route  IntraVENous Documented By  Lianna Kapadia RN        dexAMETHasone (PF) (DECADRON) injection 10 mg Admin Date  03/13/2025 Action  Given Dose  10 mg Rate   Route  IntraVENous Documented By  Lianna Kapadia RN        diphenhydrAMINE (BENADRYL) injection 50 mg Admin Date  03/13/2025 Action  Given Dose  50 mg Rate   Route  IntraVENous Documented By  Lianna Kapadia RN        famotidine (PEPCID) 20 MG/2ML 20 mg in sodium chloride (PF) 0.9 % 10 mL injection Admin Date  03/13/2025 Action  Given Dose  20 mg Rate   Route  IntraVENous Documented By  Lianna Kapadia RN        fosaprepitant (EMEND) 150 mg in sodium chloride 0.9 % 250 mL IVPB ([id number]) Admin Date  03/13/2025 Action  New Bag Dose  150 mg Rate  750 mL/hr Route  IntraVENous Documented By  Lianna Kapadia RN        PACLitaxel (TAXOL) 276 mg in sodium chloride 0.9 % 500 mL chemo infusion Admin Date  03/13/2025 Action  New Bag Dose  276 mg Rate  198.7 mL/hr Route  IntraVENous Documented By  Lianna Kapadia RN        palonosetron (ALOXI) injection 0.25 mg Admin Date  03/13/2025 Action  Given Dose  0.25 mg Rate   Route  IntraVENous Documented By  Lianna Kapadia RN           Two nurses

## 2025-03-14 DIAGNOSIS — C54.1 MALIGNANT NEOPLASM OF ENDOMETRIUM (HCC): Primary | ICD-10-CM

## 2025-03-14 DIAGNOSIS — C54.1 PAPILLARY SEROUS ENDOMETRIAL ADENOCARCINOMA (HCC): ICD-10-CM

## 2025-03-14 NOTE — TELEPHONE ENCOUNTER
Called pt with PET Scan appt info. She said she was given an Ativan to take for her last PET and it helped. She asked if Dr. Nicole would call in another Ativan for her upcoming PET on 4/1.

## 2025-03-17 RX ORDER — HEPARIN SODIUM (PORCINE) LOCK FLUSH IV SOLN 100 UNIT/ML 100 UNIT/ML
500 SOLUTION INTRAVENOUS PRN
OUTPATIENT
Start: 2025-04-03

## 2025-03-17 RX ORDER — DIPHENHYDRAMINE HYDROCHLORIDE 50 MG/ML
50 INJECTION, SOLUTION INTRAMUSCULAR; INTRAVENOUS
OUTPATIENT
Start: 2025-04-03

## 2025-03-17 RX ORDER — SODIUM CHLORIDE 9 MG/ML
INJECTION, SOLUTION INTRAVENOUS CONTINUOUS
OUTPATIENT
Start: 2025-04-03

## 2025-03-17 RX ORDER — ONDANSETRON 2 MG/ML
8 INJECTION INTRAMUSCULAR; INTRAVENOUS
OUTPATIENT
Start: 2025-04-03

## 2025-03-17 RX ORDER — MEPERIDINE HYDROCHLORIDE 50 MG/ML
12.5 INJECTION INTRAMUSCULAR; INTRAVENOUS; SUBCUTANEOUS PRN
OUTPATIENT
Start: 2025-04-03

## 2025-03-17 RX ORDER — DIPHENHYDRAMINE HYDROCHLORIDE 50 MG/ML
50 INJECTION, SOLUTION INTRAMUSCULAR; INTRAVENOUS ONCE
OUTPATIENT
Start: 2025-04-03 | End: 2025-04-03

## 2025-03-17 RX ORDER — SODIUM CHLORIDE 0.9 % (FLUSH) 0.9 %
5-40 SYRINGE (ML) INJECTION PRN
OUTPATIENT
Start: 2025-04-03

## 2025-03-17 RX ORDER — ACETAMINOPHEN 325 MG/1
650 TABLET ORAL
OUTPATIENT
Start: 2025-04-03

## 2025-03-17 RX ORDER — EPINEPHRINE 1 MG/ML
0.3 INJECTION, SOLUTION, CONCENTRATE INTRAVENOUS PRN
OUTPATIENT
Start: 2025-04-03

## 2025-03-17 RX ORDER — ALBUTEROL SULFATE 90 UG/1
4 INHALANT RESPIRATORY (INHALATION) PRN
OUTPATIENT
Start: 2025-04-03

## 2025-03-17 RX ORDER — PROCHLORPERAZINE EDISYLATE 5 MG/ML
5 INJECTION INTRAMUSCULAR; INTRAVENOUS
OUTPATIENT
Start: 2025-04-03

## 2025-03-17 RX ORDER — HYDROCORTISONE SODIUM SUCCINATE 100 MG/2ML
100 INJECTION INTRAMUSCULAR; INTRAVENOUS
OUTPATIENT
Start: 2025-04-03

## 2025-03-17 RX ORDER — FAMOTIDINE 10 MG/ML
20 INJECTION, SOLUTION INTRAVENOUS ONCE
OUTPATIENT
Start: 2025-04-03 | End: 2025-04-03

## 2025-03-17 RX ORDER — FAMOTIDINE 10 MG/ML
20 INJECTION, SOLUTION INTRAVENOUS
OUTPATIENT
Start: 2025-04-03

## 2025-03-17 RX ORDER — PALONOSETRON 0.05 MG/ML
0.25 INJECTION, SOLUTION INTRAVENOUS ONCE
OUTPATIENT
Start: 2025-04-03 | End: 2025-04-03

## 2025-03-17 RX ORDER — SODIUM CHLORIDE 9 MG/ML
5-250 INJECTION, SOLUTION INTRAVENOUS PRN
OUTPATIENT
Start: 2025-04-03

## 2025-03-17 RX ORDER — LORAZEPAM 1 MG/1
1 TABLET ORAL ONCE
Qty: 1 TABLET | Refills: 0 | Status: SHIPPED | OUTPATIENT
Start: 2025-03-17 | End: 2025-03-17

## 2025-03-17 NOTE — TELEPHONE ENCOUNTER
Pt requesting Ativan 1 mg tablet X1 dose to take before upcoming PET scan    Requested Prescriptions     Pending Prescriptions Disp Refills    LORazepam (ATIVAN) 1 MG tablet 1 tablet 0     Sig: Take 1 tablet by mouth once for 1 dose. Take 30 to 60 minutes before scan Max Daily Amount: 1 mg

## 2025-03-20 ENCOUNTER — TELEPHONE (OUTPATIENT)
Age: 75
End: 2025-03-20

## 2025-03-20 NOTE — TELEPHONE ENCOUNTER
Pt , she states she became constipated after her last infusion, she took a colace and now she is having watery diarrhea, she states she has it in the morning at 5 am, when asked how many water stools per day she answered 3, she states she took a little liquid immodium but she doesn't want to take it, no fever or chills, but she is having nausea

## 2025-03-21 ENCOUNTER — APPOINTMENT (OUTPATIENT)
Facility: HOSPITAL | Age: 75
End: 2025-03-21
Attending: RADIOLOGY
Payer: MEDICARE

## 2025-03-24 ENCOUNTER — TELEPHONE (OUTPATIENT)
Age: 75
End: 2025-03-24

## 2025-03-27 ENCOUNTER — APPOINTMENT (OUTPATIENT)
Facility: HOSPITAL | Age: 75
End: 2025-03-27
Attending: RADIOLOGY
Payer: MEDICARE

## 2025-03-31 DIAGNOSIS — C54.1 MALIGNANT NEOPLASM OF ENDOMETRIUM (HCC): ICD-10-CM

## 2025-03-31 DIAGNOSIS — C54.1 MALIGNANT NEOPLASM OF ENDOMETRIUM (HCC): Primary | ICD-10-CM

## 2025-04-01 ENCOUNTER — HOSPITAL ENCOUNTER (OUTPATIENT)
Facility: HOSPITAL | Age: 75
Discharge: HOME | End: 2025-04-04
Attending: OBSTETRICS & GYNECOLOGY
Payer: MEDICARE

## 2025-04-01 DIAGNOSIS — Z51.12 ENCOUNTER FOR ANTINEOPLASTIC CHEMOTHERAPY AND IMMUNOTHERAPY: ICD-10-CM

## 2025-04-01 DIAGNOSIS — C54.1 MALIGNANT NEOPLASM OF ENDOMETRIUM (HCC): ICD-10-CM

## 2025-04-01 DIAGNOSIS — G89.3 CANCER RELATED PAIN: ICD-10-CM

## 2025-04-01 DIAGNOSIS — Z51.11 ENCOUNTER FOR ANTINEOPLASTIC CHEMOTHERAPY AND IMMUNOTHERAPY: ICD-10-CM

## 2025-04-01 DIAGNOSIS — C54.1 PAPILLARY SEROUS ENDOMETRIAL ADENOCARCINOMA (HCC): ICD-10-CM

## 2025-04-01 LAB
ALBUMIN SERPL-MCNC: 3.1 G/DL (ref 3.5–5)
ALBUMIN/GLOB SERPL: 1 (ref 1.1–2.2)
ALP SERPL-CCNC: 81 U/L (ref 45–117)
ALT SERPL-CCNC: 17 U/L (ref 12–78)
ANION GAP SERPL CALC-SCNC: 7 MMOL/L (ref 2–12)
AST SERPL-CCNC: 11 U/L (ref 15–37)
BASOPHILS # BLD: 0.03 K/UL (ref 0–0.1)
BASOPHILS NFR BLD: 0.6 % (ref 0–1)
BILIRUB SERPL-MCNC: 0.4 MG/DL (ref 0.2–1)
BUN SERPL-MCNC: 19 MG/DL (ref 6–20)
BUN/CREAT SERPL: 20 (ref 12–20)
CALCIUM SERPL-MCNC: 9 MG/DL (ref 8.5–10.1)
CANCER AG125 SERPL-ACNC: 11 U/ML (ref 1.5–35)
CHLORIDE SERPL-SCNC: 106 MMOL/L (ref 97–108)
CO2 SERPL-SCNC: 28 MMOL/L (ref 21–32)
CREAT SERPL-MCNC: 0.97 MG/DL (ref 0.55–1.02)
DIFFERENTIAL METHOD BLD: ABNORMAL
EOSINOPHIL # BLD: 0.08 K/UL (ref 0–0.4)
EOSINOPHIL NFR BLD: 1.7 % (ref 0–7)
ERYTHROCYTE [DISTWIDTH] IN BLOOD BY AUTOMATED COUNT: 18 % (ref 11.5–14.5)
GLOBULIN SER CALC-MCNC: 3 G/DL (ref 2–4)
GLUCOSE BLD STRIP.AUTO-MCNC: 93 MG/DL (ref 65–117)
GLUCOSE SERPL-MCNC: 100 MG/DL (ref 65–100)
HCT VFR BLD AUTO: 25.2 % (ref 35–47)
HGB BLD-MCNC: 7.8 G/DL (ref 11.5–16)
IMM GRANULOCYTES # BLD AUTO: 0.03 K/UL (ref 0–0.04)
IMM GRANULOCYTES NFR BLD AUTO: 0.6 % (ref 0–0.5)
LYMPHOCYTES # BLD: 0.63 K/UL (ref 0.8–3.5)
LYMPHOCYTES NFR BLD: 13.3 % (ref 12–49)
MAGNESIUM SERPL-MCNC: 1.6 MG/DL (ref 1.6–2.4)
MCH RBC QN AUTO: 30.5 PG (ref 26–34)
MCHC RBC AUTO-ENTMCNC: 31 G/DL (ref 30–36.5)
MCV RBC AUTO: 98.4 FL (ref 80–99)
MONOCYTES # BLD: 0.34 K/UL (ref 0–1)
MONOCYTES NFR BLD: 7.3 % (ref 5–13)
NEUTS SEG # BLD: 3.59 K/UL (ref 1.8–8)
NEUTS SEG NFR BLD: 76.5 % (ref 32–75)
NRBC # BLD: 0.02 K/UL (ref 0–0.01)
NRBC BLD-RTO: 0.4 PER 100 WBC
PLATELET # BLD AUTO: 202 K/UL (ref 150–400)
PMV BLD AUTO: 11.3 FL (ref 8.9–12.9)
POTASSIUM SERPL-SCNC: 4.2 MMOL/L (ref 3.5–5.1)
PROT SERPL-MCNC: 6.1 G/DL (ref 6.4–8.2)
RBC # BLD AUTO: 2.56 M/UL (ref 3.8–5.2)
RBC MORPH BLD: ABNORMAL
SERVICE CMNT-IMP: NORMAL
SODIUM SERPL-SCNC: 141 MMOL/L (ref 136–145)
WBC # BLD AUTO: 4.7 K/UL (ref 3.6–11)

## 2025-04-01 PROCEDURE — 78815 PET IMAGE W/CT SKULL-THIGH: CPT

## 2025-04-01 PROCEDURE — 3430000000 HC RX DIAGNOSTIC RADIOPHARMACEUTICAL: Performed by: OBSTETRICS & GYNECOLOGY

## 2025-04-01 PROCEDURE — A9609 HC RX DIAGNOSTIC RADIOPHARMACEUTICAL: HCPCS | Performed by: OBSTETRICS & GYNECOLOGY

## 2025-04-01 PROCEDURE — 82962 GLUCOSE BLOOD TEST: CPT

## 2025-04-01 RX ORDER — FLUDEOXYGLUCOSE F-18 500 MCI/ML
10 INJECTION INTRAVENOUS
Status: COMPLETED | OUTPATIENT
Start: 2025-04-01 | End: 2025-04-01

## 2025-04-01 RX ADMIN — FLUDEOXYGLUCOSE F-18 10 MILLICURIE: 500 INJECTION INTRAVENOUS at 13:30

## 2025-04-02 ENCOUNTER — HOSPITAL ENCOUNTER (OUTPATIENT)
Facility: HOSPITAL | Age: 75
Setting detail: RECURRING SERIES
Discharge: HOME | End: 2025-04-05
Attending: RADIOLOGY
Payer: MEDICARE

## 2025-04-02 ENCOUNTER — OFFICE VISIT (OUTPATIENT)
Age: 75
End: 2025-04-02
Payer: MEDICARE

## 2025-04-02 VITALS
WEIGHT: 172.2 LBS | SYSTOLIC BLOOD PRESSURE: 165 MMHG | HEIGHT: 61 IN | BODY MASS INDEX: 32.51 KG/M2 | DIASTOLIC BLOOD PRESSURE: 68 MMHG | HEART RATE: 69 BPM

## 2025-04-02 DIAGNOSIS — Z51.11 ENCOUNTER FOR ANTINEOPLASTIC CHEMOTHERAPY AND IMMUNOTHERAPY: ICD-10-CM

## 2025-04-02 DIAGNOSIS — T45.1X5A CHEMOTHERAPY-INDUCED NEUROPATHY: ICD-10-CM

## 2025-04-02 DIAGNOSIS — G62.0 CHEMOTHERAPY-INDUCED NEUROPATHY: ICD-10-CM

## 2025-04-02 DIAGNOSIS — R11.2 NAUSEA VOMITING AND DIARRHEA: ICD-10-CM

## 2025-04-02 DIAGNOSIS — R19.7 NAUSEA VOMITING AND DIARRHEA: ICD-10-CM

## 2025-04-02 DIAGNOSIS — C54.1 PAPILLARY SEROUS ENDOMETRIAL ADENOCARCINOMA (HCC): Primary | ICD-10-CM

## 2025-04-02 DIAGNOSIS — Z51.12 ENCOUNTER FOR ANTINEOPLASTIC CHEMOTHERAPY AND IMMUNOTHERAPY: ICD-10-CM

## 2025-04-02 PROCEDURE — 97140 MANUAL THERAPY 1/> REGIONS: CPT

## 2025-04-02 PROCEDURE — 99215 OFFICE O/P EST HI 40 MIN: CPT | Performed by: OBSTETRICS & GYNECOLOGY

## 2025-04-02 PROCEDURE — 3017F COLORECTAL CA SCREEN DOC REV: CPT | Performed by: OBSTETRICS & GYNECOLOGY

## 2025-04-02 PROCEDURE — 1159F MED LIST DOCD IN RCRD: CPT | Performed by: OBSTETRICS & GYNECOLOGY

## 2025-04-02 PROCEDURE — 1090F PRES/ABSN URINE INCON ASSESS: CPT | Performed by: OBSTETRICS & GYNECOLOGY

## 2025-04-02 PROCEDURE — 1160F RVW MEDS BY RX/DR IN RCRD: CPT | Performed by: OBSTETRICS & GYNECOLOGY

## 2025-04-02 PROCEDURE — G8427 DOCREV CUR MEDS BY ELIG CLIN: HCPCS | Performed by: OBSTETRICS & GYNECOLOGY

## 2025-04-02 PROCEDURE — G8399 PT W/DXA RESULTS DOCUMENT: HCPCS | Performed by: OBSTETRICS & GYNECOLOGY

## 2025-04-02 PROCEDURE — 3078F DIAST BP <80 MM HG: CPT | Performed by: OBSTETRICS & GYNECOLOGY

## 2025-04-02 PROCEDURE — 97535 SELF CARE MNGMENT TRAINING: CPT

## 2025-04-02 PROCEDURE — G8417 CALC BMI ABV UP PARAM F/U: HCPCS | Performed by: OBSTETRICS & GYNECOLOGY

## 2025-04-02 PROCEDURE — 1126F AMNT PAIN NOTED NONE PRSNT: CPT | Performed by: OBSTETRICS & GYNECOLOGY

## 2025-04-02 PROCEDURE — 3077F SYST BP >= 140 MM HG: CPT | Performed by: OBSTETRICS & GYNECOLOGY

## 2025-04-02 PROCEDURE — 1123F ACP DISCUSS/DSCN MKR DOCD: CPT | Performed by: OBSTETRICS & GYNECOLOGY

## 2025-04-02 PROCEDURE — 1036F TOBACCO NON-USER: CPT | Performed by: OBSTETRICS & GYNECOLOGY

## 2025-04-02 ASSESSMENT — PATIENT HEALTH QUESTIONNAIRE - PHQ9
2. FEELING DOWN, DEPRESSED OR HOPELESS: NOT AT ALL
SUM OF ALL RESPONSES TO PHQ QUESTIONS 1-9: 0
1. LITTLE INTEREST OR PLEASURE IN DOING THINGS: NOT AT ALL
SUM OF ALL RESPONSES TO PHQ QUESTIONS 1-9: 0

## 2025-04-02 NOTE — PROGRESS NOTES
Port a cath accessed with 3/4 inch kelsey needle, Gulshan labs drawn for genetics, tube labeled with pt name// draw date and time.  Placed on Corina MARTINEZ's desk with request to call pt to explain genetics.  Printout for next lab draw/MD virtual visit/C6 chemotherapy given to pt.  
Pre chemo for C5 Taxol, Carbo on 4/3/25, PET scan results from 4/1/25, genetics testing labs, pt reports slight rectal bleeding    1. Have you been to the ER, urgent care clinic since your last visit?  Hospitalized since your last visit?  no    2. Have you seen or consulted any other health care providers outside of the [facility] since your last visit?  Include any pap smears or colon screening.   no    
History:   Procedure Laterality Date    CARDIAC PROCEDURE N/A 2025    Left heart cath / coronary angiography performed by Markus Nguyễn MD at [facility] CARDIAC CATH LAB    CARDIAC PROCEDURE N/A 2025    Percutaneous coronary intervention performed by Markus Nguyễn MD at [facility] CARDIAC CATH LAB    CARDIAC PROCEDURE N/A 2025    Thrombectomy coronary performed by Markus Nguyễn MD at [facility] CARDIAC CATH LAB    CARDIAC PROCEDURE N/A 2025    Insert stent smith coronary performed by Markus Nguyễn MD at [facility] CARDIAC CATH LAB     SECTION      X2    CHOLECYSTECTOMY      COLONOSCOPY N/A 2022    COLONOSCOPY performed by Norberto Vigil MD at [facility] ENDOSCOPY    COLONOSCOPY N/A 2022    COLONOSCOPY performed by Norberto Vigil MD at [facility] ENDOSCOPY    COLONOSCOPY N/A 10/16/2017    COLONOSCOPY performed by Norberto Vigil MD at [facility] ENDOSCOPY    COLONOSCOPY FLX DX W/COLLJ SPEC WHEN PFRMD  01/20/2012     x 2    COLONOSCOPY,FLEX,W/CONTROL, BLEEDING  2022         COLONOSCOPY,REMV LESN,SNARE  10/16/2017         CYSTOSCOPY Left 2025    CYSTOSCOPY WITH LEFT STENT PLACEMENT, RETROGRADE POLYGRAM performed by Crow García III, MD at [facility] MAIN OR    DILATION AND CURETTAGE OF UTERUS  2021    High-grade endometrial carcinoma, favor serous carcinoma    HEENT      3 teeth implants    HEENT      EXTRACTION OF WISDOM TEETH X 4    HYSTERECTOMY, VAGINAL  2021    IR PORT PLACEMENT > 5 YEARS  10/14/2021    IR PORT PLACEMENT EQUAL OR GREATER THAN 5 YEARS 10/14/2021 [facility] RAD ANGIO IR    IR PORT PLACEMENT > 5 YEARS  10/14/2021    IR PORT PLACEMENT > 5 YEARS  2024    IR PORT PLACEMENT EQUAL OR GREATER THAN 5 YEARS 2024 [facility] RAD ANGIO IR    ORTHOPEDIC SURGERY      LEFTl hip arthroplasty    ORTHOPEDIC SURGERY      right foot, bunionectomy    ORTHOPEDIC SURGERY      RIGHT hip arthroplasty    TUBAL LIGATION      UPPER GASTROINTESTINAL ENDOSCOPY      Do no remember the date but around

## 2025-04-02 NOTE — PROGRESS NOTES
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   8-22 min = 1 unit; 23-37 min = 2 units; 38-52 min = 3 units;  53-67 min = 4 units; 68-82 min = 5 units     SUBJECTIVE    Pain Level (0-10 scale):3/10 Left hip/Flank    Any medication changes, allergies to medications, adverse drug reactions, diagnosis change, or new procedure performed?: [x] No    [] Yes (see summary sheet for update)   Medications: Verified on Patient Summary List    Subjective functional status/changes:    Patient saw Dr. Nicole today and PET scan was earlier this week. Patient report her PET scan was good with no new activity.    Patient reports that she has numbness in her feet worse R > L. Fatigue is her biggest complaint.  Episode of balance loss in clinic today with straight cane when walking out of clinic bathroom today requiring assist to correct. Patient would benefit from utilizing a walker and having improved foot wear. Patient's son drove her today and discussed with him and patient that she would benefit from utilizing her rollator instead of cane for improved safety with gait as well as utilizing tennis shoes for improved support. Today patient was wearing slip on clog style shoes that increase her risk for falls. Patient reports she utilizes slip

## 2025-04-02 NOTE — PROGRESS NOTES
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   Patient and/or caregiver will verbalize 3/3 signs and symptoms of infection without external cueing, in order to reduce the risk of infection and skin impairment and to promote optimal self management of condition.  Status at last Eval/Progress Note: Not Met/In Progress  Current Status: MET  Goal Met?  yes  2.   Patient to perform 5/5 lymphedema remedial exercises in session with modified independence utilizing HEP handout, in order to promote optimal independence with management of condition, as well as promote optimal limb volume reduction required for proper fit of donned clothing.   Status at last Eval/Progress Note: Not Met/In Progress  Current Status: Not Met/In Progress  Goal Met?  no  3.   Patient/caregiver will demonstrate independence with application of reduction kit/velcro products during session for continued volume reduction and prevention of re-accumulation of  fluid during phase 1 of complete decongestive therapy.   Status at last Eval/Progress Note: Not Met/In Progress  Current Status: Not Met/In Progress  Goal Met?  no  4.   Patient will demonstrate a loss of volume in the 250 ml in the L LE to reduce the risk of infection and progression of swelling over time for ease of performing lower body dressing and safe mobility.   Status at last Eval/Progress Note: Not Met/In Progress  Current Status: Not Met/In Progress  Goal Met?  no    Long Term Goals: To be

## 2025-04-03 ENCOUNTER — HOSPITAL ENCOUNTER (OUTPATIENT)
Facility: HOSPITAL | Age: 75
Setting detail: INFUSION SERIES
Discharge: HOME | End: 2025-04-03
Payer: MEDICARE

## 2025-04-03 VITALS
BODY MASS INDEX: 32.38 KG/M2 | RESPIRATION RATE: 16 BRPM | DIASTOLIC BLOOD PRESSURE: 76 MMHG | WEIGHT: 171.5 LBS | HEIGHT: 61 IN | TEMPERATURE: 97.2 F | HEART RATE: 67 BPM | SYSTOLIC BLOOD PRESSURE: 163 MMHG | OXYGEN SATURATION: 95 %

## 2025-04-03 DIAGNOSIS — C54.1 PAPILLARY SEROUS ENDOMETRIAL ADENOCARCINOMA (HCC): Primary | ICD-10-CM

## 2025-04-03 PROCEDURE — 6360000002 HC RX W HCPCS: Performed by: OBSTETRICS & GYNECOLOGY

## 2025-04-03 PROCEDURE — 96375 TX/PRO/DX INJ NEW DRUG ADDON: CPT

## 2025-04-03 PROCEDURE — 2580000003 HC RX 258: Performed by: OBSTETRICS & GYNECOLOGY

## 2025-04-03 PROCEDURE — 96417 CHEMO IV INFUS EACH ADDL SEQ: CPT

## 2025-04-03 PROCEDURE — 96413 CHEMO IV INFUSION 1 HR: CPT

## 2025-04-03 PROCEDURE — 96367 TX/PROPH/DG ADDL SEQ IV INF: CPT

## 2025-04-03 PROCEDURE — 96415 CHEMO IV INFUSION ADDL HR: CPT

## 2025-04-03 RX ORDER — SODIUM CHLORIDE 9 MG/ML
5-250 INJECTION, SOLUTION INTRAVENOUS PRN
Status: DISCONTINUED | OUTPATIENT
Start: 2025-04-03 | End: 2025-04-04 | Stop reason: HOSPADM

## 2025-04-03 RX ORDER — DEXAMETHASONE SODIUM PHOSPHATE 10 MG/ML
10 INJECTION, SOLUTION INTRAMUSCULAR; INTRAVENOUS ONCE
Status: COMPLETED | OUTPATIENT
Start: 2025-04-03 | End: 2025-04-03

## 2025-04-03 RX ORDER — DIPHENHYDRAMINE HYDROCHLORIDE 50 MG/ML
50 INJECTION, SOLUTION INTRAMUSCULAR; INTRAVENOUS ONCE
Status: COMPLETED | OUTPATIENT
Start: 2025-04-03 | End: 2025-04-03

## 2025-04-03 RX ORDER — PALONOSETRON 0.05 MG/ML
0.25 INJECTION, SOLUTION INTRAVENOUS ONCE
Status: COMPLETED | OUTPATIENT
Start: 2025-04-03 | End: 2025-04-03

## 2025-04-03 RX ADMIN — PALONOSETRON 0.25 MG: 0.05 INJECTION, SOLUTION INTRAVENOUS at 09:12

## 2025-04-03 RX ADMIN — PACLITAXEL 192 MG: 6 INJECTION INTRAVENOUS at 10:42

## 2025-04-03 RX ADMIN — DEXAMETHASONE SODIUM PHOSPHATE 10 MG: 10 INJECTION INTRAMUSCULAR; INTRAVENOUS at 09:12

## 2025-04-03 RX ADMIN — CARBOPLATIN 365 MG: 10 INJECTION INTRAVENOUS at 13:45

## 2025-04-03 RX ADMIN — SODIUM CHLORIDE 25 ML/HR: 9 INJECTION, SOLUTION INTRAVENOUS at 09:16

## 2025-04-03 RX ADMIN — FAMOTIDINE 20 MG: 10 INJECTION, SOLUTION INTRAVENOUS at 09:12

## 2025-04-03 RX ADMIN — SODIUM CHLORIDE 150 MG: 9 INJECTION, SOLUTION INTRAVENOUS at 10:10

## 2025-04-03 RX ADMIN — DIPHENHYDRAMINE HYDROCHLORIDE 50 MG: 50 INJECTION INTRAMUSCULAR; INTRAVENOUS at 09:12

## 2025-04-03 ASSESSMENT — PAIN DESCRIPTION - ORIENTATION: ORIENTATION: LEFT

## 2025-04-03 ASSESSMENT — PAIN SCALES - GENERAL: PAINLEVEL_OUTOF10: 3

## 2025-04-03 ASSESSMENT — PAIN DESCRIPTION - LOCATION: LOCATION: HIP

## 2025-04-03 ASSESSMENT — PAIN DESCRIPTION - DESCRIPTORS: DESCRIPTORS: ACHING

## 2025-04-03 NOTE — PROGRESS NOTES
Outpatient Infusion Center - Chemotherapy Progress Note    Pt admit to [facility] for C5D1 in stable condition. Assessment completed.     R chest port accessed with positive blood return. Labs reviewed from 3/31.    Patient Vitals for the past 12 hrs:   Temp Pulse Resp BP SpO2   04/03/25 0830 97.2 °F (36.2 °C) 66 16 (!) 151/82 95 %      Medications:  Medications Administered         0.9 % sodium chloride infusion Admin Date  04/03/2025 Action  New Bag Dose  25 mL/hr Rate  25 mL/hr Route  IntraVENous Documented By  Lianna Kapadia RN        CARBOplatin (PARAPLATIN) 365 mg in sodium chloride 0.9 % 250 mL chemo IVPB Admin Date  04/03/2025 Action  New Bag Dose  365 mg Rate  623 mL/hr Route  IntraVENous Documented By  Lianna Kapadia RN        dexAMETHasone (PF) (DECADRON) injection 10 mg Admin Date  04/03/2025 Action  Given Dose  10 mg Rate   Route  IntraVENous Documented By  Lianna Kapadia RN        diphenhydrAMINE (BENADRYL) injection 50 mg Admin Date  04/03/2025 Action  Given Dose  50 mg Rate   Route  IntraVENous Documented By  Lianna Kapadia RN        famotidine (PEPCID) 20 MG/2ML 20 mg in sodium chloride (PF) 0.9 % 10 mL injection Admin Date  04/03/2025 Action  Given Dose  20 mg Rate   Route  IntraVENous Documented By  Lianna Kapadia RN        fosaprepitant (EMEND) 150 mg in sodium chloride 0.9 % 250 mL IVPB ([id number]) Admin Date  04/03/2025 Action  New Bag Dose  150 mg Rate  750 mL/hr Route  IntraVENous Documented By  Lianna Kapadia RN        PACLitaxel (TAXOL) 192 mg in sodium chloride 0.9 % 250 mL chemo infusion Admin Date  04/03/2025 Action  New Bag Dose  192 mg Rate  102.3 mL/hr Route  IntraVENous Documented By  Lianna Kapadia RN        palonosetron (ALOXI) injection 0.25 mg Admin Date  04/03/2025 Action  Given Dose  0.25 mg Rate   Route  IntraVENous Documented By  Lianna Kapadia RN           Two nurses verified prior to administering:  Drug name  Drug dose  Infusion volume or drug

## 2025-04-07 RX ORDER — SODIUM CHLORIDE 9 MG/ML
5-250 INJECTION, SOLUTION INTRAVENOUS PRN
OUTPATIENT
Start: 2025-04-24

## 2025-04-07 RX ORDER — SODIUM CHLORIDE 9 MG/ML
INJECTION, SOLUTION INTRAVENOUS CONTINUOUS
OUTPATIENT
Start: 2025-04-24

## 2025-04-07 RX ORDER — EPINEPHRINE 1 MG/ML
0.3 INJECTION, SOLUTION, CONCENTRATE INTRAVENOUS PRN
OUTPATIENT
Start: 2025-04-24

## 2025-04-07 RX ORDER — MEPERIDINE HYDROCHLORIDE 50 MG/ML
12.5 INJECTION INTRAMUSCULAR; INTRAVENOUS; SUBCUTANEOUS PRN
OUTPATIENT
Start: 2025-04-24

## 2025-04-07 RX ORDER — HYDROCORTISONE SODIUM SUCCINATE 100 MG/2ML
100 INJECTION INTRAMUSCULAR; INTRAVENOUS
OUTPATIENT
Start: 2025-04-24

## 2025-04-07 RX ORDER — PROCHLORPERAZINE EDISYLATE 5 MG/ML
5 INJECTION INTRAMUSCULAR; INTRAVENOUS
OUTPATIENT
Start: 2025-04-24

## 2025-04-07 RX ORDER — SODIUM CHLORIDE 0.9 % (FLUSH) 0.9 %
5-40 SYRINGE (ML) INJECTION PRN
OUTPATIENT
Start: 2025-04-24

## 2025-04-07 RX ORDER — DIPHENHYDRAMINE HYDROCHLORIDE 50 MG/ML
50 INJECTION, SOLUTION INTRAMUSCULAR; INTRAVENOUS
OUTPATIENT
Start: 2025-04-24

## 2025-04-07 RX ORDER — ONDANSETRON 2 MG/ML
8 INJECTION INTRAMUSCULAR; INTRAVENOUS
OUTPATIENT
Start: 2025-04-24

## 2025-04-07 RX ORDER — ACETAMINOPHEN 325 MG/1
650 TABLET ORAL
OUTPATIENT
Start: 2025-04-24

## 2025-04-07 RX ORDER — FAMOTIDINE 10 MG/ML
20 INJECTION, SOLUTION INTRAVENOUS ONCE
OUTPATIENT
Start: 2025-04-24 | End: 2025-04-24

## 2025-04-07 RX ORDER — FAMOTIDINE 10 MG/ML
20 INJECTION, SOLUTION INTRAVENOUS
OUTPATIENT
Start: 2025-04-24

## 2025-04-07 RX ORDER — PALONOSETRON 0.05 MG/ML
0.25 INJECTION, SOLUTION INTRAVENOUS ONCE
OUTPATIENT
Start: 2025-04-24 | End: 2025-04-24

## 2025-04-07 RX ORDER — HEPARIN SODIUM (PORCINE) LOCK FLUSH IV SOLN 100 UNIT/ML 100 UNIT/ML
500 SOLUTION INTRAVENOUS PRN
OUTPATIENT
Start: 2025-04-24

## 2025-04-07 RX ORDER — ALBUTEROL SULFATE 90 UG/1
4 INHALANT RESPIRATORY (INHALATION) PRN
OUTPATIENT
Start: 2025-04-24

## 2025-04-07 RX ORDER — DIPHENHYDRAMINE HYDROCHLORIDE 50 MG/ML
50 INJECTION, SOLUTION INTRAMUSCULAR; INTRAVENOUS ONCE
OUTPATIENT
Start: 2025-04-24 | End: 2025-04-24

## 2025-04-11 ENCOUNTER — HOSPITAL ENCOUNTER (OUTPATIENT)
Facility: HOSPITAL | Age: 75
Setting detail: RECURRING SERIES
Discharge: HOME | End: 2025-04-14
Attending: RADIOLOGY
Payer: MEDICARE

## 2025-04-11 ENCOUNTER — TELEPHONE (OUTPATIENT)
Age: 75
End: 2025-04-11

## 2025-04-11 DIAGNOSIS — C54.1 MALIGNANT NEOPLASM OF ENDOMETRIUM (HCC): ICD-10-CM

## 2025-04-11 PROCEDURE — 97535 SELF CARE MNGMENT TRAINING: CPT

## 2025-04-11 PROCEDURE — 97140 MANUAL THERAPY 1/> REGIONS: CPT

## 2025-04-11 RX ORDER — ONDANSETRON 8 MG/1
8 TABLET, ORALLY DISINTEGRATING ORAL EVERY 8 HOURS PRN
Qty: 30 TABLET | Refills: 2 | Status: SHIPPED | OUTPATIENT
Start: 2025-04-11

## 2025-04-11 NOTE — TELEPHONE ENCOUNTER
I called rthe patient to advise that she should have refills. She states on her bottle it says no refills. She states she  is unsure how many times she has refilled and is unsure if they gave her 30 pills the last time. I advised I will send the refill per samir from  to her pharmacy.   Requested Prescriptions     Signed Prescriptions Disp Refills    ondansetron (ZOFRAN-ODT) 8 MG TBDP disintegrating tablet 30 tablet 2     Sig: Take 1 tablet by mouth every 8 hours as needed for Nausea or Vomiting Place one (1) tablet under tongue every 8 hour when needed for nausea and vomiting     Authorizing Provider: REJI HOPKINS     Ordering User: HENNY GARCÍA

## 2025-04-11 NOTE — PROGRESS NOTES
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   8-22 min = 1 unit; 23-37 min = 2 units; 38-52 min = 3 units;  53-67 min = 4 units; 68-82 min = 5 units     SUBJECTIVE    Pain Level (0-10 scale):0/10    Any medication changes, allergies to medications, adverse drug reactions, diagnosis change, or new procedure performed?: [x] No    [] Yes (see summary sheet for update)   Medications: Verified on Patient Summary List    Subjective functional status/changes:            Patient saw Dr. Nicole today and PET scan was earlier this week. Patient report her PET scan was good with no new activity.    Patient reports that she has numbness in her feet worse R > L. Fatigue is her biggest complaint.  Episode of balance loss in clinic today with straight cane when walking out of clinic bathroom today requiring assist to correct. Patient would benefit from utilizing a walker and having improved foot wear. Patient's son drove her today and discussed with him and patient that she would benefit from utilizing her rollator instead of cane for improved safety with gait as well as utilizing tennis shoes for improved support. Today patient was wearing slip on clog style shoes that increase her risk for falls. Patient reports she utilizes slip on  I reviewed the H&P, I examined the patient, and there are no changes in the patient's condition.

## 2025-04-15 ENCOUNTER — TRANSCRIBE ORDERS (OUTPATIENT)
Facility: HOSPITAL | Age: 75
End: 2025-04-15

## 2025-04-15 DIAGNOSIS — N13.30 HYDRONEPHROSIS, UNSPECIFIED HYDRONEPHROSIS TYPE: Primary | ICD-10-CM

## 2025-04-18 ENCOUNTER — HOSPITAL ENCOUNTER (OUTPATIENT)
Facility: HOSPITAL | Age: 75
Setting detail: RECURRING SERIES
Discharge: HOME | End: 2025-04-21
Attending: RADIOLOGY
Payer: MEDICARE

## 2025-04-18 PROCEDURE — 97140 MANUAL THERAPY 1/> REGIONS: CPT

## 2025-04-18 PROCEDURE — 97535 SELF CARE MNGMENT TRAINING: CPT

## 2025-04-18 NOTE — PROGRESS NOTES
precautions reviewed: [x] Yes  [] No    Other Objective/Functional Measures    Pain Level at end of session (0-10 scale): 2/10 B LE    Assessment   Patient continues to have swelling in bilateral legs and symptoms of numbness/neuropathy that limits her mobility. Fatigue persists as she continues with her chemotherapy treatments. Encouraged patient to continue to work on her home program as tolerated and increase time in her compression wraps as able, to best contain her swelling. Able to provide patient with non-compressive silver liner socks to utilize in the home setting when using the compression velcro wraps at night.   Patient educated in use of compression up to 23 hours a day as tolerated. Patient will benefit from a vasopneumatic device once chemotherapy treatments are completed. Patient will be measured for longer term compression garments once her volumes have been stabilized.   Patient to return next week for follow up and will reassess volumes at that time and determine frequency of follow ups.     Short term goal 1 met. Long term goal 2 met.  All others are in progress.     Patient will continue to benefit from skilled PT / OT services to address swelling, analyze and address soft tissue restrictions, analyze compression product fit and use, instruct in home lymphedema management program, and measure for compression products to address functional deficits and attain remaining goals.    Progress toward goals / Updated goals:  []  See Progress Note/Recertification    Short Term Goals: To be accomplished in 5 treatments.  Patient and/or caregiver will verbalize 3/3 signs and symptoms of infection without external cueing, in order to reduce the risk of infection and skin impairment and to promote optimal self management of condition. Goal Met 4/2/2025  2.   Patient to perform 5/5 lymphedema remedial exercises in session with modified independence utilizing HEP handout, in order to promote optimal

## 2025-04-22 ENCOUNTER — HOSPITAL ENCOUNTER (OUTPATIENT)
Facility: HOSPITAL | Age: 75
Setting detail: RECURRING SERIES
Discharge: HOME | End: 2025-04-25
Attending: RADIOLOGY
Payer: MEDICARE

## 2025-04-22 ENCOUNTER — TELEPHONE (OUTPATIENT)
Age: 75
End: 2025-04-22

## 2025-04-22 DIAGNOSIS — Z51.12 ENCOUNTER FOR ANTINEOPLASTIC CHEMOTHERAPY AND IMMUNOTHERAPY: ICD-10-CM

## 2025-04-22 DIAGNOSIS — Z51.11 ENCOUNTER FOR ANTINEOPLASTIC CHEMOTHERAPY AND IMMUNOTHERAPY: ICD-10-CM

## 2025-04-22 DIAGNOSIS — C54.1 MALIGNANT NEOPLASM OF ENDOMETRIUM (HCC): ICD-10-CM

## 2025-04-22 DIAGNOSIS — C54.1 MALIGNANT NEOPLASM OF ENDOMETRIUM (HCC): Primary | ICD-10-CM

## 2025-04-22 PROCEDURE — 97535 SELF CARE MNGMENT TRAINING: CPT

## 2025-04-22 PROCEDURE — 97140 MANUAL THERAPY 1/> REGIONS: CPT

## 2025-04-22 NOTE — PROGRESS NOTES
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   8-22 min = 1 unit; 23-37 min = 2 units; 38-52 min = 3 units;  53-67 min = 4 units; 68-82 min = 5 units     SUBJECTIVE    Pain Level (0-10 scale):4/10 Pain in L>R LE    Any medication changes, allergies to medications, adverse drug reactions, diagnosis change, or new procedure performed?: [x] No    [] Yes (see summary sheet for update)   Medications: Verified on Patient Summary List    Subjective functional status/changes:    R leg with small open area weeping.   Wore in no compression today. Legs swollen greatest proximally with tissue texture changes restricting motion.   Poor balance today with gait with straight cane and slip on clog style shoes not recommended.       OBJECTIVE   Therapeutic Procedures:  Tx Min Billable or 1:1 Min (if diff from Tx Min) Procedure, Rationale Specifics   80  63918 Manual Therapy (timed):  increase ROM, increase tissue extensibility, and decrease edema to improve patient's ability to progress to PLOF and address remaining functional goals.  The manual therapy interventions were performed at a separate and distinct time from the therapeutic activities interventions . (see flow sheet as applicable)    Details if applicable:    Manual Lymphatic

## 2025-04-22 NOTE — PROGRESS NOTES
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  {Yes/No-Ex:627705}    2.  ***  Status at last Eval/Progress Note: ***  Current Status: ***  Goal Met?  {Yes/No-Ex:246639}    3. ***  Status at last Eval/Progress Note: ***  Current Status: ***  Goal Met?  {Yes/No-Ex:638108}    4. ***  Status at last Eval/Progress Note: ***  Current Status: ***  Goal Met?  {Yes/No-Ex:629401}    Key Functional Changes/Progress: ***  Problem List: {IN MOTION PT PROBLEM LIST:95483}   Treatment Plan may include any combination of the following: {InMotion POC Tx Plan:60380:a}  Patient Goal(s) has been updated and includes: ***    Goals for this certification period include and are to be achieved in   ***  treatments:    ***    Frequency / Duration:   Patient to be seen   ***   times per week for   ***  treatments:      Assessments/Recommendations for Continuation of Care: ***    If you have any questions/comments please contact us directly at [phone].   Thank you for allowing us to assist in the care of your patient.    Certification Period: 2025 - 25      JENNIFER LEVINE PTA, CLT     2025       5:38 PM      ___ I have read the above report and request that my patient

## 2025-04-23 ENCOUNTER — TELEPHONE (OUTPATIENT)
Age: 75
End: 2025-04-23

## 2025-04-23 ENCOUNTER — TELEMEDICINE (OUTPATIENT)
Age: 75
End: 2025-04-23
Payer: MEDICARE

## 2025-04-23 DIAGNOSIS — Z51.12 ENCOUNTER FOR ANTINEOPLASTIC CHEMOTHERAPY AND IMMUNOTHERAPY: ICD-10-CM

## 2025-04-23 DIAGNOSIS — G62.0 CHEMOTHERAPY-INDUCED NEUROPATHY: ICD-10-CM

## 2025-04-23 DIAGNOSIS — C54.1 PAPILLARY SEROUS ENDOMETRIAL ADENOCARCINOMA (HCC): Primary | ICD-10-CM

## 2025-04-23 DIAGNOSIS — Z51.11 ENCOUNTER FOR ANTINEOPLASTIC CHEMOTHERAPY AND IMMUNOTHERAPY: ICD-10-CM

## 2025-04-23 DIAGNOSIS — I10 ESSENTIAL HYPERTENSION: ICD-10-CM

## 2025-04-23 DIAGNOSIS — T45.1X5A CHEMOTHERAPY-INDUCED NEUROPATHY: ICD-10-CM

## 2025-04-23 LAB
ALBUMIN SERPL-MCNC: 2.8 G/DL (ref 3.5–5)
ALBUMIN/GLOB SERPL: 0.9 (ref 1.1–2.2)
ALP SERPL-CCNC: 176 U/L (ref 45–117)
ALT SERPL-CCNC: 71 U/L (ref 12–78)
ANION GAP SERPL CALC-SCNC: 6 MMOL/L (ref 2–12)
AST SERPL-CCNC: 64 U/L (ref 15–37)
BASOPHILS # BLD: 0.02 K/UL (ref 0–0.1)
BASOPHILS NFR BLD: 0.3 % (ref 0–1)
BILIRUB SERPL-MCNC: 0.6 MG/DL (ref 0.2–1)
BUN SERPL-MCNC: 21 MG/DL (ref 6–20)
BUN/CREAT SERPL: 22 (ref 12–20)
CALCIUM SERPL-MCNC: 8.7 MG/DL (ref 8.5–10.1)
CANCER AG125 SERPL-ACNC: 11 U/ML (ref 1.5–35)
CHLORIDE SERPL-SCNC: 108 MMOL/L (ref 97–108)
CO2 SERPL-SCNC: 27 MMOL/L (ref 21–32)
CREAT SERPL-MCNC: 0.97 MG/DL (ref 0.55–1.02)
DIFFERENTIAL METHOD BLD: ABNORMAL
EOSINOPHIL # BLD: 0.04 K/UL (ref 0–0.4)
EOSINOPHIL NFR BLD: 0.7 % (ref 0–7)
ERYTHROCYTE [DISTWIDTH] IN BLOOD BY AUTOMATED COUNT: 19.8 % (ref 11.5–14.5)
GLOBULIN SER CALC-MCNC: 3 G/DL (ref 2–4)
GLUCOSE SERPL-MCNC: 109 MG/DL (ref 65–100)
HCT VFR BLD AUTO: 26.1 % (ref 35–47)
HGB BLD-MCNC: 7.9 G/DL (ref 11.5–16)
IMM GRANULOCYTES # BLD AUTO: 0.03 K/UL (ref 0–0.04)
IMM GRANULOCYTES NFR BLD AUTO: 0.5 % (ref 0–0.5)
LYMPHOCYTES # BLD: 0.49 K/UL (ref 0.8–3.5)
LYMPHOCYTES NFR BLD: 8.3 % (ref 12–49)
MAGNESIUM SERPL-MCNC: 1.7 MG/DL (ref 1.6–2.4)
MCH RBC QN AUTO: 30 PG (ref 26–34)
MCHC RBC AUTO-ENTMCNC: 30.3 G/DL (ref 30–36.5)
MCV RBC AUTO: 99.2 FL (ref 80–99)
MONOCYTES # BLD: 0.35 K/UL (ref 0–1)
MONOCYTES NFR BLD: 5.9 % (ref 5–13)
NEUTS SEG # BLD: 4.97 K/UL (ref 1.8–8)
NEUTS SEG NFR BLD: 84.3 % (ref 32–75)
NRBC # BLD: 0 K/UL (ref 0–0.01)
NRBC BLD-RTO: 0 PER 100 WBC
PLATELET # BLD AUTO: 165 K/UL (ref 150–400)
PMV BLD AUTO: 11.6 FL (ref 8.9–12.9)
POTASSIUM SERPL-SCNC: 4.1 MMOL/L (ref 3.5–5.1)
PROT SERPL-MCNC: 5.8 G/DL (ref 6.4–8.2)
RBC # BLD AUTO: 2.63 M/UL (ref 3.8–5.2)
RBC MORPH BLD: ABNORMAL
SODIUM SERPL-SCNC: 141 MMOL/L (ref 136–145)
WBC # BLD AUTO: 5.9 K/UL (ref 3.6–11)
WBC MORPH BLD: ABNORMAL

## 2025-04-23 PROCEDURE — G8399 PT W/DXA RESULTS DOCUMENT: HCPCS | Performed by: OBSTETRICS & GYNECOLOGY

## 2025-04-23 PROCEDURE — 1160F RVW MEDS BY RX/DR IN RCRD: CPT | Performed by: OBSTETRICS & GYNECOLOGY

## 2025-04-23 PROCEDURE — 3017F COLORECTAL CA SCREEN DOC REV: CPT | Performed by: OBSTETRICS & GYNECOLOGY

## 2025-04-23 PROCEDURE — 99215 OFFICE O/P EST HI 40 MIN: CPT | Performed by: OBSTETRICS & GYNECOLOGY

## 2025-04-23 PROCEDURE — 1123F ACP DISCUSS/DSCN MKR DOCD: CPT | Performed by: OBSTETRICS & GYNECOLOGY

## 2025-04-23 PROCEDURE — 1090F PRES/ABSN URINE INCON ASSESS: CPT | Performed by: OBSTETRICS & GYNECOLOGY

## 2025-04-23 PROCEDURE — G8427 DOCREV CUR MEDS BY ELIG CLIN: HCPCS | Performed by: OBSTETRICS & GYNECOLOGY

## 2025-04-23 PROCEDURE — 1159F MED LIST DOCD IN RCRD: CPT | Performed by: OBSTETRICS & GYNECOLOGY

## 2025-04-23 NOTE — PROGRESS NOTES
[facility] GYNECOLOGIC ONCOLOGY  [address] [place] [address], [address]  [place] [address]  P [phone]  F [phone]    Office Note  Patient ID:  Name:  Elenita Benitez  MRN:  [id number]  :  1950/72 y.o.  Date:  2023      HISTORY OF PRESENT ILLNESS:  Ms. Elenita Benitez is a 74 y.o. female who on 2021 underwent Robotic-assisted total laparoscopic hysterectomy, Bilateral salpingo-oophorectomy, Bilateral pelvic sentinel lymph node mapping and biopsy. 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 Negative washings. Stromal cervical involvement. Negative SLNs. 2mm out of 10mm myometrial invasion. Parametrial involvement of tumor.     Completed concurrent chemo-EBRT + VBT on 2021. Initiated on adjuvant carboplatin + paclitaxel on 2021. Completed cycle 4 on 2022. CT C/A/P 2022 without evidence of disease.      Initial History:  Ms. Elenita Benitez is a 72 y.o.  postmenopausal female who presents as a new patient from Dr. Nix for high-grade endometrial cancer.    The patient reports vaginal spotting back in April, which resulted in her seeing Dr. Nix. Pelvic ultrasound on 2021 demonstrated 12.5mm stripe. Hysteroscopy/D&C on 2021 demonstrated \"high-grade endometrial carcinoma, favor serous carcinoma\". She was subsequently referred to our office for further discussion and management.     Denies pelvic or abdominal pain/bloating, change in appetite or bowel habits, nausea, vomiting, CP, SOB, hematuria, hematochezia, or urinary symptoms.       Pertinent PMH/PSH: DJD, h/o  x 2     Active, no restrictions.     Imaging Review:   PET 2025:  FINDINGS:  HEAD/NECK:  Decreased activity in the 2 left supraclavicular nodes, max SUV 2.9, previous  9.8.  No new foci of abnormal hypermetabolism.  Cerebral evaluation is limited by normal intense activity.  CHEST: No foci of abnormal 
Virtual Visit, Pre chemo C6 Taxol, Carbo on 4/24/25    1. Have you been to the ER, urgent care clinic since your last visit?  Hospitalized since your last visit?  no    2. Have you seen or consulted any other health care providers outside of the [facility] since your last visit?  Include any pap smears or colon screening.   no    
22-Mar-2022 02:12

## 2025-04-24 ENCOUNTER — HOSPITAL ENCOUNTER (OUTPATIENT)
Facility: HOSPITAL | Age: 75
Setting detail: INFUSION SERIES
Discharge: HOME | End: 2025-04-24
Payer: MEDICARE

## 2025-04-24 VITALS
BODY MASS INDEX: 32.62 KG/M2 | SYSTOLIC BLOOD PRESSURE: 159 MMHG | HEIGHT: 61 IN | RESPIRATION RATE: 18 BRPM | DIASTOLIC BLOOD PRESSURE: 72 MMHG | TEMPERATURE: 97.8 F | OXYGEN SATURATION: 100 % | WEIGHT: 172.8 LBS | HEART RATE: 58 BPM

## 2025-04-24 DIAGNOSIS — C54.1 PAPILLARY SEROUS ENDOMETRIAL ADENOCARCINOMA (HCC): Primary | ICD-10-CM

## 2025-04-24 PROCEDURE — 96367 TX/PROPH/DG ADDL SEQ IV INF: CPT

## 2025-04-24 PROCEDURE — 96413 CHEMO IV INFUSION 1 HR: CPT

## 2025-04-24 PROCEDURE — 2500000003 HC RX 250 WO HCPCS: Performed by: PHYSICIAN ASSISTANT

## 2025-04-24 PROCEDURE — 2580000003 HC RX 258: Performed by: PHYSICIAN ASSISTANT

## 2025-04-24 PROCEDURE — 96375 TX/PRO/DX INJ NEW DRUG ADDON: CPT

## 2025-04-24 PROCEDURE — 6360000002 HC RX W HCPCS: Performed by: PHYSICIAN ASSISTANT

## 2025-04-24 PROCEDURE — 96417 CHEMO IV INFUS EACH ADDL SEQ: CPT

## 2025-04-24 PROCEDURE — 2580000003 HC RX 258: Performed by: OBSTETRICS & GYNECOLOGY

## 2025-04-24 PROCEDURE — 6360000002 HC RX W HCPCS: Performed by: OBSTETRICS & GYNECOLOGY

## 2025-04-24 RX ORDER — DEXAMETHASONE SODIUM PHOSPHATE 10 MG/ML
10 INJECTION, SOLUTION INTRAMUSCULAR; INTRAVENOUS ONCE
Status: COMPLETED | OUTPATIENT
Start: 2025-04-24 | End: 2025-04-24

## 2025-04-24 RX ORDER — DIPHENHYDRAMINE HYDROCHLORIDE 50 MG/ML
50 INJECTION, SOLUTION INTRAMUSCULAR; INTRAVENOUS ONCE
Status: COMPLETED | OUTPATIENT
Start: 2025-04-24 | End: 2025-04-24

## 2025-04-24 RX ORDER — PALONOSETRON 0.05 MG/ML
0.25 INJECTION, SOLUTION INTRAVENOUS ONCE
Status: COMPLETED | OUTPATIENT
Start: 2025-04-24 | End: 2025-04-24

## 2025-04-24 RX ORDER — SODIUM CHLORIDE 0.9 % (FLUSH) 0.9 %
5-40 SYRINGE (ML) INJECTION PRN
Status: DISCONTINUED | OUTPATIENT
Start: 2025-04-24 | End: 2025-04-25 | Stop reason: HOSPADM

## 2025-04-24 RX ORDER — SODIUM CHLORIDE 9 MG/ML
5-250 INJECTION, SOLUTION INTRAVENOUS PRN
Status: DISCONTINUED | OUTPATIENT
Start: 2025-04-24 | End: 2025-04-25 | Stop reason: HOSPADM

## 2025-04-24 RX ADMIN — CARBOPLATIN 365 MG: 10 INJECTION INTRAVENOUS at 11:50

## 2025-04-24 RX ADMIN — DOCETAXEL ANHYDROUS 115 MG: 10 INJECTION, SOLUTION INTRAVENOUS at 10:39

## 2025-04-24 RX ADMIN — FAMOTIDINE 20 MG: 10 INJECTION INTRAVENOUS at 09:30

## 2025-04-24 RX ADMIN — SODIUM CHLORIDE 25 ML/HR: 0.9 INJECTION, SOLUTION INTRAVENOUS at 09:23

## 2025-04-24 RX ADMIN — SODIUM CHLORIDE, PRESERVATIVE FREE 10 ML: 5 INJECTION INTRAVENOUS at 09:00

## 2025-04-24 RX ADMIN — DEXAMETHASONE SODIUM PHOSPHATE 10 MG: 10 INJECTION, SOLUTION INTRAMUSCULAR; INTRAVENOUS at 09:44

## 2025-04-24 RX ADMIN — DIPHENHYDRAMINE HYDROCHLORIDE 50 MG: 50 INJECTION INTRAMUSCULAR; INTRAVENOUS at 09:33

## 2025-04-24 RX ADMIN — PALONOSETRON 0.25 MG: 0.05 INJECTION, SOLUTION INTRAVENOUS at 09:28

## 2025-04-24 RX ADMIN — FOSAPREPITANT 150 MG: 150 INJECTION, POWDER, LYOPHILIZED, FOR SOLUTION INTRAVENOUS at 09:55

## 2025-04-24 ASSESSMENT — PAIN DESCRIPTION - LOCATION: LOCATION: HIP

## 2025-04-24 ASSESSMENT — PAIN DESCRIPTION - DESCRIPTORS: DESCRIPTORS: ACHING

## 2025-04-24 ASSESSMENT — PAIN SCALES - GENERAL: PAINLEVEL_OUTOF10: 2

## 2025-04-24 ASSESSMENT — PAIN DESCRIPTION - ORIENTATION: ORIENTATION: LEFT

## 2025-04-24 NOTE — PROGRESS NOTES
Name: Elenita Benitez    MRN: [id number]         : 1950    Ms. Benitez Arrived ambulatory with cane and in no distress for C6D1 of TC.  Assessment was completed, no acute issues at this time. 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  Right chest wall port accessed without difficulty, positive blood return noted. Labs reviewed from 2025 and are within treatment parameters.       Patient Vitals for the past 24 hrs:   BP Temp Temp src Pulse Resp SpO2 Height Weight   25 0903 (!) 155/68 97.8 °F (36.6 °C) Temporal 60 18 100 % 1.549 m (5' 0.98\") 78.4 kg (172 lb 12.8 oz)         Medications:  Medications Administered         0.9 % sodium chloride infusion Admin Date  2025 Action  New Bag Dose  25 mL/hr Rate  25 mL/hr Route  IntraVENous Documented By  Yadira Najera RN        CARBOplatin (PARAPLATIN) 365 mg in sodium chloride 0.9 % 250 mL chemo IVPB Admin Date  2025 Action  New Bag Dose  365 mg Rate  623 mL/hr Route  IntraVENous Documented By  Yadira Najera RN        dexAMETHasone (PF) (DECADRON) injection 10 mg Admin Date  2025 Action  Given Dose  10 mg Rate   Route  IntraVENous Documented By  Yadira Najera RN        diphenhydrAMINE (BENADRYL) injection 50 mg Admin Date  2025 Action  Given Dose  50 mg Rate   Route  IntraVENous Documented By  Yadira Najera RN        DOCEtaxel (TAXOTERE) 115 mg in sodium chloride 0.9 % 250 mL chemo IVPB Admin Date  2025 Action  New Bag Dose  115 mg Rate  286.5 mL/hr Route  IntraVENous Documented By  Yadira Najera RN        famotidine (PEPCID) 20 MG/2ML 20 mg in sodium chloride (PF) 0.9 % 10 mL injection Admin Date  2025 Action  Given Dose  20 mg Rate   Route  IntraVENous Documented By  Yadira Najera RN        fosaprepitant (EMEND) 150 mg in sodium chloride 0.9 % 250 mL IVPB ([id number]) Admin Date  2025 Action  New Bag Dose  150 mg Rate  750 mL/hr  Route  IntraVENous Documented By  Yadira Dunlap RN        palonosetron (ALOXI) injection 0.25 mg Admin Date  04/24/2025 Action  Given Dose  0.25 mg Rate   Route  IntraVENous Documented By  Yadira Dunlap RN        sodium chloride flush 0.9 % injection 5-40 mL Admin Date  04/24/2025 Action  Given Dose  10 mL Rate   Route  IntraVENous Documented By  Yadira Dunlap RN             Two nurses verified prior to administering: Drug name, drug dose, infusion volume or drug volume when prepared in a syringe, rate of administration, route of administration,  expiration dates and/or times, appearance and physical integrity of the drugs, rate set on infusion pump, when used sequencing of drug administration.       Patient Vitals for the past 24 hrs:   BP Temp Temp src Pulse Resp SpO2 Height Weight   04/24/25 1225 (!) 159/72 -- -- 58 18 -- -- --   04/24/25 0903 (!) 155/68 97.8 °F (36.6 °C) Temporal 60 18 100 % 1.549 m (5' 0.98\") 78.4 kg (172 lb 12.8 oz)        Ms. Benitze tolerated treatment well; no s/s of adverse reaction noted. Port flushed and de-accessed per protocol. [facility] with cane and in stable condition accompanied by her son.  Next appointment TBD after her scans.     YADIRA DUNLAP RN  April 24, 2025

## 2025-04-25 ENCOUNTER — CLINICAL DOCUMENTATION (OUTPATIENT)
Age: 75
End: 2025-04-25

## 2025-04-25 NOTE — PROGRESS NOTES
Spoke with patient over the phone to discuss genetic screening.      Ms. 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       Available Pathology:  Hysterectomy 2021 - FINAL PATHOLOGIC DIAGNOSIS* * *     1.  Right pelvic sentinel lymph node, biopsy:        One lymph node, negative for carcinoma, levels and cytokeratin stain   examined (0/1)     2.  Left pelvic sentinel lymph node #1, biopsy:        One lymph node, negative for carcinoma, levels and cytokeratin stain   examined (0/1)     3.  Left pelvic sentinel lymph node, biopsy:        One lymph node, negative for carcinoma, levels and cytokeratin stain   examined (0/1)     4. Uterus, cervix, bilateral fallopian tubes and ovaries; simple   hysterectomy, BSO:        Endometrium: Uterine serous carcinoma, see synoptic report        Cervix: Serous carcinoma focally involves endocervical stroma,   predominantly as lymphovascular                invasion        Bilateral fallopian tubes: No pathologic diagnosis     Treatment to Date:  2021 - robotic assisted total laparoscopic hysterectomy, BSO, SLN biopsy  Completed Chemo-EBRT and VBT on 2021  Completed Carboplatin/paclitaxel cycle 4 chemotherapy 2022  Initiated carboplatin/paclitaxel/keytruda on 2024. Completed cycle 5 4/3/2025  Initiated carboplatin/docetaxel/keytruda on 2025     Family History reviewed; pedigree as follows:  A = Alive  D =      Maternal  Grandfather - D101, No history of cancer  Grandmother - D78, colon cancer (Dx 70s)  Mother - D84, pancreatic cancer?  Aunt - D?, No history of cancer  Uncle - D?, No history of cancer     Paternal  Grandfather - D?, oral cancer? Related to chewing tobacco  Grandmother - D91, No history of cancer  Father - D61, No history of cancer  Aunt - D50s, ovarian cancer  Aunt - D80s, Breast cancer  Aunt - D?, No history of

## 2025-04-28 ENCOUNTER — TELEPHONE (OUTPATIENT)
Age: 75
End: 2025-04-28

## 2025-04-28 NOTE — TELEPHONE ENCOUNTER
Call placed to pt to f/u with taking Taxotere on 4/24/25.  Pt states she had a little more nausea with Taxotere, but neuropathy has maintained the same.

## 2025-04-30 DIAGNOSIS — R11.2 CHEMOTHERAPY INDUCED NAUSEA AND VOMITING: Primary | ICD-10-CM

## 2025-04-30 DIAGNOSIS — T45.1X5A CHEMOTHERAPY INDUCED NAUSEA AND VOMITING: Primary | ICD-10-CM

## 2025-04-30 RX ORDER — PROCHLORPERAZINE MALEATE 10 MG
10 TABLET ORAL EVERY 6 HOURS PRN
Qty: 30 TABLET | Refills: 2 | Status: SHIPPED | OUTPATIENT
Start: 2025-04-30

## 2025-04-30 NOTE — TELEPHONE ENCOUNTER
Requested Prescriptions     Signed Prescriptions Disp Refills    prochlorperazine (COMPAZINE) 10 MG tablet 30 tablet 2     Sig: Take 1 tablet by mouth every 6 hours as needed (take if needed for nausea and vomiting if nausea not relieved by Ondansetron)     Authorizing Provider: REJI HOPKINS     Ordering User: WOODY BAE     Rx sent to pharmacy per vo Dr. Hopkins for nausea and vomiting.

## 2025-05-03 ENCOUNTER — HOSPITAL ENCOUNTER (EMERGENCY)
Facility: HOSPITAL | Age: 75
Discharge: HOME | End: 2025-05-03
Attending: STUDENT IN AN ORGANIZED HEALTH CARE EDUCATION/TRAINING PROGRAM
Payer: MEDICARE

## 2025-05-03 VITALS
DIASTOLIC BLOOD PRESSURE: 88 MMHG | WEIGHT: 170 LBS | SYSTOLIC BLOOD PRESSURE: 192 MMHG | BODY MASS INDEX: 32.14 KG/M2 | TEMPERATURE: 97.9 F | HEART RATE: 55 BPM | OXYGEN SATURATION: 97 % | RESPIRATION RATE: 13 BRPM

## 2025-05-03 DIAGNOSIS — R11.2 NAUSEA VOMITING AND DIARRHEA: Primary | ICD-10-CM

## 2025-05-03 DIAGNOSIS — E87.6 HYPOKALEMIA: ICD-10-CM

## 2025-05-03 DIAGNOSIS — E83.42 HYPOMAGNESEMIA: ICD-10-CM

## 2025-05-03 DIAGNOSIS — R19.7 NAUSEA VOMITING AND DIARRHEA: Primary | ICD-10-CM

## 2025-05-03 LAB
ALBUMIN SERPL-MCNC: 2.6 G/DL (ref 3.5–5)
ALBUMIN/GLOB SERPL: 0.7 (ref 1.1–2.2)
ALP SERPL-CCNC: 138 U/L (ref 45–117)
ALT SERPL-CCNC: 27 U/L (ref 12–78)
ANION GAP SERPL CALC-SCNC: 10 MMOL/L (ref 2–12)
APPEARANCE UR: ABNORMAL
AST SERPL-CCNC: 16 U/L (ref 15–37)
BACTERIA URNS QL MICRO: ABNORMAL /HPF
BASOPHILS # BLD: 0 K/UL (ref 0–0.1)
BASOPHILS NFR BLD: 0 % (ref 0–1)
BILIRUB SERPL-MCNC: 0.8 MG/DL (ref 0.2–1)
BILIRUB UR QL CFM: NEGATIVE
BUN SERPL-MCNC: 11 MG/DL (ref 6–20)
BUN/CREAT SERPL: 14 (ref 12–20)
CALCIUM SERPL-MCNC: 8.3 MG/DL (ref 8.5–10.1)
CHLORIDE SERPL-SCNC: 105 MMOL/L (ref 97–108)
CO2 SERPL-SCNC: 23 MMOL/L (ref 21–32)
COLOR UR: ABNORMAL
CREAT SERPL-MCNC: 0.79 MG/DL (ref 0.55–1.02)
DIFFERENTIAL METHOD BLD: ABNORMAL
EOSINOPHIL # BLD: 0.01 K/UL (ref 0–0.4)
EOSINOPHIL NFR BLD: 2 % (ref 0–7)
EPITH CASTS URNS QL MICRO: ABNORMAL /LPF
ERYTHROCYTE [DISTWIDTH] IN BLOOD BY AUTOMATED COUNT: 19.1 % (ref 11.5–14.5)
GLOBULIN SER CALC-MCNC: 3.5 G/DL (ref 2–4)
GLUCOSE SERPL-MCNC: 92 MG/DL (ref 65–100)
GLUCOSE UR STRIP.AUTO-MCNC: NEGATIVE MG/DL
HCT VFR BLD AUTO: 24.2 % (ref 35–47)
HGB BLD-MCNC: 7.5 G/DL (ref 11.5–16)
HGB UR QL STRIP: ABNORMAL
IMM GRANULOCYTES # BLD AUTO: 0 K/UL (ref 0–0.04)
IMM GRANULOCYTES NFR BLD AUTO: 0 % (ref 0–0.5)
KETONES UR QL STRIP.AUTO: >80 MG/DL
LEUKOCYTE ESTERASE UR QL STRIP.AUTO: NEGATIVE
LYMPHOCYTES # BLD: 0.26 K/UL (ref 0.8–3.5)
LYMPHOCYTES NFR BLD: 43 % (ref 12–49)
MAGNESIUM SERPL-MCNC: 1.5 MG/DL (ref 1.6–2.4)
MCH RBC QN AUTO: 29.9 PG (ref 26–34)
MCHC RBC AUTO-ENTMCNC: 31 G/DL (ref 30–36.5)
MCV RBC AUTO: 96.4 FL (ref 80–99)
MONOCYTES # BLD: 0.24 K/UL (ref 0–1)
MONOCYTES NFR BLD: 40 % (ref 5–13)
NEUTS SEG # BLD: 0.09 K/UL (ref 1.8–8)
NEUTS SEG NFR BLD: 15 % (ref 32–75)
NITRITE UR QL STRIP.AUTO: NEGATIVE
NRBC # BLD: 0.03 K/UL (ref 0–0.01)
NRBC BLD-RTO: 4.8 PER 100 WBC
PH UR STRIP: 6 (ref 5–8)
PLATELET # BLD AUTO: 140 K/UL (ref 150–400)
PMV BLD AUTO: 11.8 FL (ref 8.9–12.9)
POTASSIUM SERPL-SCNC: 3 MMOL/L (ref 3.5–5.1)
PROT SERPL-MCNC: 6.1 G/DL (ref 6.4–8.2)
PROT UR STRIP-MCNC: 100 MG/DL
RBC # BLD AUTO: 2.51 M/UL (ref 3.8–5.2)
RBC #/AREA URNS HPF: ABNORMAL /HPF (ref 0–5)
RBC MORPH BLD: ABNORMAL
SODIUM SERPL-SCNC: 138 MMOL/L (ref 136–145)
SP GR UR REFRACTOMETRY: 1.02 (ref 1–1.03)
TSH SERPL DL<=0.05 MIU/L-ACNC: 2.02 UIU/ML (ref 0.36–3.74)
URINE CULTURE IF INDICATED: ABNORMAL
UROBILINOGEN UR QL STRIP.AUTO: 0.2 EU/DL (ref 0.2–1)
WBC # BLD AUTO: 0.6 K/UL (ref 3.6–11)
WBC MORPH BLD: ABNORMAL
WBC URNS QL MICRO: ABNORMAL /HPF (ref 0–4)

## 2025-05-03 PROCEDURE — 84443 ASSAY THYROID STIM HORMONE: CPT

## 2025-05-03 PROCEDURE — 99284 EMERGENCY DEPT VISIT MOD MDM: CPT

## 2025-05-03 PROCEDURE — 6370000000 HC RX 637 (ALT 250 FOR IP): Performed by: STUDENT IN AN ORGANIZED HEALTH CARE EDUCATION/TRAINING PROGRAM

## 2025-05-03 PROCEDURE — 6360000002 HC RX W HCPCS: Performed by: STUDENT IN AN ORGANIZED HEALTH CARE EDUCATION/TRAINING PROGRAM

## 2025-05-03 PROCEDURE — 85025 COMPLETE CBC W/AUTO DIFF WBC: CPT

## 2025-05-03 PROCEDURE — 96366 THER/PROPH/DIAG IV INF ADDON: CPT

## 2025-05-03 PROCEDURE — 36415 COLL VENOUS BLD VENIPUNCTURE: CPT

## 2025-05-03 PROCEDURE — 2580000003 HC RX 258: Performed by: STUDENT IN AN ORGANIZED HEALTH CARE EDUCATION/TRAINING PROGRAM

## 2025-05-03 PROCEDURE — 81001 URINALYSIS AUTO W/SCOPE: CPT

## 2025-05-03 PROCEDURE — 96365 THER/PROPH/DIAG IV INF INIT: CPT

## 2025-05-03 PROCEDURE — 96361 HYDRATE IV INFUSION ADD-ON: CPT

## 2025-05-03 PROCEDURE — 96375 TX/PRO/DX INJ NEW DRUG ADDON: CPT

## 2025-05-03 PROCEDURE — 80053 COMPREHEN METABOLIC PANEL: CPT

## 2025-05-03 PROCEDURE — 83735 ASSAY OF MAGNESIUM: CPT

## 2025-05-03 RX ORDER — POTASSIUM CHLORIDE 1500 MG/1
40 TABLET, EXTENDED RELEASE ORAL ONCE
Status: COMPLETED | OUTPATIENT
Start: 2025-05-03 | End: 2025-05-03

## 2025-05-03 RX ORDER — ONDANSETRON 2 MG/ML
4 INJECTION INTRAMUSCULAR; INTRAVENOUS ONCE
Status: COMPLETED | OUTPATIENT
Start: 2025-05-03 | End: 2025-05-03

## 2025-05-03 RX ORDER — LOPERAMIDE HYDROCHLORIDE 2 MG/1
2 CAPSULE ORAL ONCE
Status: COMPLETED | OUTPATIENT
Start: 2025-05-03 | End: 2025-05-03

## 2025-05-03 RX ORDER — MAGNESIUM SULFATE IN WATER 40 MG/ML
2000 INJECTION, SOLUTION INTRAVENOUS ONCE
Status: COMPLETED | OUTPATIENT
Start: 2025-05-03 | End: 2025-05-03

## 2025-05-03 RX ORDER — SODIUM CHLORIDE, SODIUM LACTATE, POTASSIUM CHLORIDE, AND CALCIUM CHLORIDE .6; .31; .03; .02 G/100ML; G/100ML; G/100ML; G/100ML
1000 INJECTION, SOLUTION INTRAVENOUS ONCE
Status: COMPLETED | OUTPATIENT
Start: 2025-05-03 | End: 2025-05-03

## 2025-05-03 RX ADMIN — POTASSIUM CHLORIDE 40 MEQ: 1500 TABLET, EXTENDED RELEASE ORAL at 12:33

## 2025-05-03 RX ADMIN — MAGNESIUM SULFATE HEPTAHYDRATE 2000 MG: 40 INJECTION, SOLUTION INTRAVENOUS at 12:27

## 2025-05-03 RX ADMIN — ONDANSETRON 4 MG: 2 INJECTION, SOLUTION INTRAMUSCULAR; INTRAVENOUS at 12:25

## 2025-05-03 RX ADMIN — SODIUM CHLORIDE, SODIUM LACTATE, POTASSIUM CHLORIDE, AND CALCIUM CHLORIDE 1000 ML: .6; .31; .03; .02 INJECTION, SOLUTION INTRAVENOUS at 12:32

## 2025-05-03 RX ADMIN — LOPERAMIDE HYDROCHLORIDE 2 MG: 2 CAPSULE ORAL at 14:16

## 2025-05-03 RX ADMIN — SODIUM CHLORIDE, SODIUM LACTATE, POTASSIUM CHLORIDE, AND CALCIUM CHLORIDE 1000 ML: .6; .31; .03; .02 INJECTION, SOLUTION INTRAVENOUS at 10:37

## 2025-05-03 NOTE — ED NOTES
Patient discharged from the ED by MD Butler. Diagnosis, medications, precautions and follow-ups were reviewed with the patient/family. Questions were asked and answered prior to departure. Patient departed the ED via car and was accompanied by son.

## 2025-05-03 NOTE — ED PROVIDER NOTES
mouth daily    NITROGLYCERIN (NITROSTAT) 0.4 MG SL TABLET    Place 1 tablet under the tongue every 5 minutes as needed for Chest pain up to max of 3 total doses. If no relief after 1 dose, call 911.    ONDANSETRON (ZOFRAN-ODT) 8 MG TBDP DISINTEGRATING TABLET    Take 1 tablet by mouth every 8 hours as needed for Nausea or Vomiting Place one (1) tablet under tongue every 8 hour when needed for nausea and vomiting    PROCHLORPERAZINE (COMPAZINE) 10 MG TABLET    Take 1 tablet by mouth every 6 hours as needed (take if needed for nausea and vomiting if nausea not relieved by Ondansetron)    TAMSULOSIN (FLOMAX) 0.4 MG CAPSULE    Take 1 capsule by mouth daily    TICAGRELOR (BRILINTA) 90 MG TABS TABLET    Take 1 tablet by mouth 2 times daily       SCREENINGS               No data recorded            PHYSICAL EXAM      ED Triage Vitals   Encounter Vitals Group      BP 05/03/25 0956 (!) 146/76      Systolic BP Percentile --       Diastolic BP Percentile --       Pulse 05/03/25 0956 69      Respirations 05/03/25 0956 12      Temp 05/03/25 0956 97.9 °F (36.6 °C)      Temp src --       SpO2 05/03/25 0956 97 %      Weight - Scale 05/03/25 0945 77.1 kg (170 lb)      Height --       Head Circumference --       Peak Flow --       Pain Score --       Pain Loc --       Pain Education --       Exclude from Growth Chart --         Physical Exam  Vitals and nursing note reviewed.   Constitutional:       Appearance: Normal appearance.   HENT:      Head: Normocephalic.   Eyes:      Extraocular Movements: Extraocular movements intact.      Pupils: Pupils are equal, round, and reactive to light.   Cardiovascular:      Rate and Rhythm: Normal rate.   Pulmonary:      Effort: Pulmonary effort is normal.   Abdominal:      General: Abdomen is flat.   Musculoskeletal:         General: No deformity.      Cervical back: Normal range of motion.   Skin:     General: Skin is dry.   Neurological:      General: No focal deficit present.      Mental  been going on for 2-3 days.  Currently undergoing chemotherapy for endometrial cancer, last treatment a week ago.  Notes she had some abdominal pain a few days ago but that is gone.  Denies CP, Sob, HA, Dizziness, fevers.  Has some baseline urinary urgency.      Appears well, benign abdominal exam, normal vitals.    Ddx includes NV, dehydration, madelin, chemo induced NV, diarrhea.  No signs of any infectious abdominal pathologies given abdominal exam, though CT scan considered.  Will obtain labs, start IVF resuscitation.     Amount and/or Complexity of Data Reviewed  Labs: ordered.    Risk  Prescription drug management.        ED Course as of 05/03/25 1412   Sat May 03, 2025   1411 She has been hypokalemic at 3.0 replete with p.o. potassium.  Magnesium 1.5 replete with IV magnesium sulfate.  Urinalysis not consistent UTI but notable ketones.  Patient got 2 L of crystalloids.  Tolerating p.o.  Discussed oncology follow-up. [JS]      ED Course User Index  [JS] Tyrone Butler MD       FINAL IMPRESSION     1. Nausea vomiting and diarrhea    2. Hypomagnesemia    3. Hypokalemia          DISPOSITION/PLAN   Elenita Benitez's  results have been reviewed with her.  She has been counseled regarding her diagnosis, treatment, and plan.  She verbally conveys understanding and agreement of the signs, symptoms, diagnosis, treatment and prognosis and additionally agrees to follow up as discussed.  She also agrees with the care-plan and conveys that all of her questions have been answered.  I have also provided discharge instructions for her that include: educational information regarding their diagnosis and treatment, and list of reasons why they would want to return to the ED prior to their follow-up appointment, should her condition change.     CLINICAL IMPRESSION    Discharge Note: The patient is stable for discharge home. The signs, symptoms, diagnosis, and discharge instructions have been discussed, understanding conveyed,

## 2025-05-14 ENCOUNTER — HOSPITAL ENCOUNTER (OUTPATIENT)
Facility: HOSPITAL | Age: 75
Discharge: HOME | End: 2025-05-17
Attending: STUDENT IN AN ORGANIZED HEALTH CARE EDUCATION/TRAINING PROGRAM
Payer: MEDICARE

## 2025-05-14 DIAGNOSIS — N13.30 HYDRONEPHROSIS, UNSPECIFIED HYDRONEPHROSIS TYPE: ICD-10-CM

## 2025-05-14 PROCEDURE — 78708 K FLOW/FUNCT IMAGE W/DRUG: CPT

## 2025-05-14 PROCEDURE — A9562 TC99M MERTIATIDE: HCPCS | Performed by: STUDENT IN AN ORGANIZED HEALTH CARE EDUCATION/TRAINING PROGRAM

## 2025-05-14 PROCEDURE — 6360000002 HC RX W HCPCS: Performed by: STUDENT IN AN ORGANIZED HEALTH CARE EDUCATION/TRAINING PROGRAM

## 2025-05-14 PROCEDURE — 3430000000 HC RX DIAGNOSTIC RADIOPHARMACEUTICAL: Performed by: STUDENT IN AN ORGANIZED HEALTH CARE EDUCATION/TRAINING PROGRAM

## 2025-05-14 RX ORDER — FUROSEMIDE 10 MG/ML
20 INJECTION INTRAMUSCULAR; INTRAVENOUS ONCE
Status: COMPLETED | OUTPATIENT
Start: 2025-05-14 | End: 2025-05-14

## 2025-05-14 RX ORDER — HEPARIN 100 UNIT/ML
500 SYRINGE INTRAVENOUS PRN
Status: DISCONTINUED | OUTPATIENT
Start: 2025-05-14 | End: 2025-05-18 | Stop reason: HOSPADM

## 2025-05-14 RX ADMIN — HEPARIN 500 UNITS: 100 SYRINGE at 13:20

## 2025-05-14 RX ADMIN — TECHNESCAN TC 99M MERTIATIDE 11 MILLICURIE: 1 INJECTION, POWDER, LYOPHILIZED, FOR SOLUTION INTRAVENOUS at 13:33

## 2025-05-14 RX ADMIN — FUROSEMIDE 20 MG: 10 INJECTION, SOLUTION INTRAMUSCULAR; INTRAVENOUS at 14:34

## 2025-05-15 ENCOUNTER — HOSPITAL ENCOUNTER (OUTPATIENT)
Facility: HOSPITAL | Age: 75
Discharge: HOME | End: 2025-05-18
Attending: OBSTETRICS & GYNECOLOGY
Payer: MEDICARE

## 2025-05-15 DIAGNOSIS — G62.0 CHEMOTHERAPY-INDUCED NEUROPATHY: ICD-10-CM

## 2025-05-15 DIAGNOSIS — Z51.11 ENCOUNTER FOR ANTINEOPLASTIC CHEMOTHERAPY AND IMMUNOTHERAPY: ICD-10-CM

## 2025-05-15 DIAGNOSIS — Z51.12 ENCOUNTER FOR ANTINEOPLASTIC CHEMOTHERAPY AND IMMUNOTHERAPY: ICD-10-CM

## 2025-05-15 DIAGNOSIS — T45.1X5A CHEMOTHERAPY-INDUCED NEUROPATHY: ICD-10-CM

## 2025-05-15 DIAGNOSIS — C54.1 PAPILLARY SEROUS ENDOMETRIAL ADENOCARCINOMA (HCC): ICD-10-CM

## 2025-05-15 DIAGNOSIS — I10 ESSENTIAL HYPERTENSION: ICD-10-CM

## 2025-05-15 PROCEDURE — 6360000004 HC RX CONTRAST MEDICATION: Performed by: OBSTETRICS & GYNECOLOGY

## 2025-05-15 PROCEDURE — 6360000002 HC RX W HCPCS: Performed by: RADIOLOGY

## 2025-05-15 PROCEDURE — 74177 CT ABD & PELVIS W/CONTRAST: CPT

## 2025-05-15 RX ORDER — HEPARIN 100 UNIT/ML
500 SYRINGE INTRAVENOUS PRN
Status: DISCONTINUED | OUTPATIENT
Start: 2025-05-15 | End: 2025-05-19 | Stop reason: HOSPADM

## 2025-05-15 RX ORDER — IOPAMIDOL 755 MG/ML
100 INJECTION, SOLUTION INTRAVASCULAR
Status: COMPLETED | OUTPATIENT
Start: 2025-05-15 | End: 2025-05-15

## 2025-05-15 RX ADMIN — IOPAMIDOL 100 ML: 755 INJECTION, SOLUTION INTRAVENOUS at 12:59

## 2025-05-15 RX ADMIN — Medication 500 UNITS: at 13:12

## 2025-05-20 NOTE — PROGRESS NOTES
Pending sale to [facility] GYNECOLOGIC ONCOLOGY  [address] [place] [address], [address]  [place] [address]  P [phone]  F [phone]    Office Note  Patient ID:  Name:  Elenita Benitez  MRN:  [id number]  :  1950/72 y.o.  Date:  2023      HISTORY OF PRESENT ILLNESS:  Ms. Elenita Benitez is a 74 y.o. female who on 2021 underwent Robotic-assisted total laparoscopic hysterectomy, Bilateral salpingo-oophorectomy, Bilateral pelvic sentinel lymph node mapping and biopsy. 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 Negative washings. Stromal cervical involvement. Negative SLNs. 2mm out of 10mm myometrial invasion. Parametrial involvement of tumor.     Completed concurrent chemo-EBRT + VBT on 2021. Initiated on adjuvant carboplatin + paclitaxel on 2021. Completed cycle 4 on 2022. CT C/A/P 2022 without evidence of disease.      Initial History:  Ms. Elenita Benitez is a 72 y.o.  postmenopausal female who presents as a new patient from Dr. Nix for high-grade endometrial cancer.    The patient reports vaginal spotting back in April, which resulted in her seeing Dr. Nix. Pelvic ultrasound on 2021 demonstrated 12.5mm stripe. Hysteroscopy/D&C on 2021 demonstrated \"high-grade endometrial carcinoma, favor serous carcinoma\". She was subsequently referred to our office for further discussion and management.     Denies pelvic or abdominal pain/bloating, change in appetite or bowel habits, nausea, vomiting, CP, SOB, hematuria, hematochezia, or urinary symptoms.       Pertinent PMH/PSH: DJD, h/o  x 2     Active, no restrictions.     Imaging Review:   CT C/A/P 5/15/2025:  FINDINGS:  MEDIASTINUM:No mass or lymphadenopathy.  COREY: No mass or lymphadenopathy.  THORACIC AORTA: No dissection or aneurysm.  MAIN PULMONARY ARTERY: Normal in caliber.  TRACHEA/BRONCHI: Patent.  ESOPHAGUS: No wall thickening or dilatation.  HEART: Normal in size.

## 2025-05-21 ENCOUNTER — TELEMEDICINE (OUTPATIENT)
Age: 75
End: 2025-05-21

## 2025-05-21 DIAGNOSIS — C54.1 MALIGNANT NEOPLASM OF ENDOMETRIUM (HCC): ICD-10-CM

## 2025-05-21 DIAGNOSIS — C54.1 PAPILLARY SEROUS ENDOMETRIAL ADENOCARCINOMA (HCC): Primary | ICD-10-CM

## 2025-05-21 DIAGNOSIS — G62.0 CHEMOTHERAPY-INDUCED NEUROPATHY: ICD-10-CM

## 2025-05-21 DIAGNOSIS — Z51.11 ENCOUNTER FOR ANTINEOPLASTIC CHEMOTHERAPY AND IMMUNOTHERAPY: ICD-10-CM

## 2025-05-21 DIAGNOSIS — Z51.12 ENCOUNTER FOR ANTINEOPLASTIC CHEMOTHERAPY AND IMMUNOTHERAPY: ICD-10-CM

## 2025-05-21 DIAGNOSIS — T45.1X5A CHEMOTHERAPY-INDUCED NEUROPATHY: ICD-10-CM

## 2025-05-21 RX ORDER — ONDANSETRON 8 MG/1
8 TABLET, ORALLY DISINTEGRATING ORAL EVERY 8 HOURS PRN
Qty: 30 TABLET | Refills: 2 | Status: SHIPPED | OUTPATIENT
Start: 2025-05-21

## 2025-05-21 NOTE — PROGRESS NOTES
Virtual visit to discuss Ct scan results from 5/15/25  Per Verbal order from Dr. Nicole, chauncey pt's zofran    1. Have you been to the ER, urgent care clinic since your last visit?  Hospitalized since your last visit?  no    2. Have you seen or consulted any other health care providers outside of the [facility] since your last visit?  Include any pap smears or colon screening.   no

## 2025-05-23 ENCOUNTER — HOSPITAL ENCOUNTER (OUTPATIENT)
Facility: HOSPITAL | Age: 75
Setting detail: RECURRING SERIES
Discharge: HOME | End: 2025-05-26
Attending: RADIOLOGY
Payer: MEDICARE

## 2025-05-23 PROCEDURE — 97535 SELF CARE MNGMENT TRAINING: CPT

## 2025-05-23 PROCEDURE — 97140 MANUAL THERAPY 1/> REGIONS: CPT

## 2025-05-23 NOTE — PROGRESS NOTES
PHYSICAL THERAPY/OCCUPATIONAL THERAPY - MEDICARE DAILY TREATMENT NOTE (updated 3/23)    Date: 2025        Patient Name:  Elenita Benitez :  1950         Medical   Diagnosis:  Papillary serous endometrial adenocarcinoma (HCC) [C54.1] Treatment Diagnosis:  I89.0     Lymphedema, not elsewhere classified    Referral Source:  Hong Alcazar MD Insurance:   Payor: MEDICARE / Plan: MEDICARE PART A AND B / Product Type: *No Product type* /                   Patient  verified yes     Visit #   Current  / Total 11 20   Time   In / Out 7:25 AM 8:45 AM   Total Treatment Time 80   Total Timed Codes 60   1:1 Treatment Time 60      [facility] Totals Reminder:  bill using total billable   min of TIMED therapeutic procedures and modalities.   8-22 min = 1 unit; 23-37 min = 2 units; 38-52 min = 3 units;  53-67 min = 4 units; 68-82 min = 5 units     SUBJECTIVE    Pain Level (0-10 scale): No pain number reported. Reports numbness in bilateral feet from neuropathy.    Any medication changes, allergies to medications, adverse drug reactions, diagnosis change, or new procedure performed?: [] No    [x] Yes (see summary sheet for update)   Medications: Verified on Patient Summary List  Emergency room for dehydration.    Subjective functional status/changes:    Patient reports that she will have to continue with chemotherapy, but having a short break due to her side effects.   Patient states she has been working on her home program daily, but continues to struggle with fatigue and weakness.     OBJECTIVE   Therapeutic Procedures:  Tx Min Billable or 1:1 Min (if diff from Tx Min) Procedure, Rationale Specifics   45  06402 Manual Therapy (timed):  increase ROM, increase tissue extensibility, and decrease edema to improve patient's ability to progress to PLOF and address remaining functional goals.  The manual therapy interventions were performed at a separate and distinct time from the therapeutic activities interventions .

## 2025-05-23 NOTE — PROGRESS NOTES
[facility]  a part of [address], [address]  [place] [address]  Phone: [phone]  Fax: [phone]    PHYSICAL THERAPY/OCCUPATIONAL THERAPY PROGRESS NOTE  Patient Name:  Elenita Benitez :  1950   Treatment/Medical Diagnosis: Papillary serous endometrial adenocarcinoma (HCC) [C54.1]   Referral Source:  Hong Alcazar MD     Date of Initial Visit:  2024 Attended Visits:  11 Missed Visits:  7   SUMMARY OF TREATMENT/ASSESSMENT:  Patient returns today for visit #11 from evaluation. Patient was able to have improvement in reduction in volumes from last visit. There was a noticeable reduction in weight as well as patient reports that her appetite has not been very good. She is consisently working on skin care, elevation/positioning, compression garment use and exercise as tolerated.   Patient is now taking a short break from chemotherapy due to the side effects/symptoms and she will need to restart in a few more weeks. She had hoped that she could be finished, but she will require additional chemotherapy treatments. Patient did report some relief in thigh swelling with kinesiotaping so will revisit that on next visit as time did not allow for application today.   Patient ready to move forward with her compression garment order for longer term compression products to prepare for the restorative phase of care. This order will be processed and patient is aware to bring in garments for fitting when they arrive.   Patient continues to struggle with fatigue, weakness and balance issues so encouraged pacing of activities. Patient has a good support system in place to assist her as needed.     Short term goals 1+3 met. Long term goal 2 met.  All others are in progress.     Patient will continue to benefit from skilled PT / OT services to address swelling, analyze and address soft tissue restrictions, analyze compression product fit and use, instruct in home

## 2025-05-27 ENCOUNTER — HOSPITAL ENCOUNTER (OUTPATIENT)
Facility: HOSPITAL | Age: 75
Setting detail: RECURRING SERIES
Discharge: HOME | End: 2025-05-30
Attending: RADIOLOGY
Payer: MEDICARE

## 2025-05-27 PROCEDURE — 97140 MANUAL THERAPY 1/> REGIONS: CPT

## 2025-05-27 PROCEDURE — 97535 SELF CARE MNGMENT TRAINING: CPT

## 2025-05-27 NOTE — PROGRESS NOTES
demonstrate independence with application of reduction kit/velcro products during session for continued volume reduction and prevention of re-accumulation of  fluid during phase 1 of complete decongestive therapy. Goal Met 5/23/2025  4.   Patient will demonstrate a loss of volume in the 250 ml in the L LE to reduce the risk of infection and progression of swelling over time for ease of performing lower body dressing and safe mobility. In progress     Long Term Goals: To be accomplished in 10 treatments.  1.  Patient will demonstrate an improvement in self perceived functional impairment as evidenced by an improved score on the FOTO of 76 or better. In progress  2.  Patient will verbalize a decrease of pain in the L LE  from 8/10 to 5/10 when participating in daily activities. Goal Met  4/2/2025  3.  Patient will demonstrate a loss of volume of 500 ml in the L LE to reduce the risk of infection and progression of swelling over time for ease of performing lower body dressing and safe mobility. In progress  4.   Patient will be measured for and obtain comfortable and optimal fitting compression products to prevent reaccumulation of fluid at discharge which could impair patient's ability to perform safe mobility and dressing. In progress  5.  Patient will obtain a home vaso-pneumatic device if cleared by physician and use at least 4 days each week to prevent reaccumulation of fluid for improved tolerance of mobility and ambulation and/or decrease the feeling of limb heaviness with bathing and dressing during the restorative phase of care.     PLAN  Yes  Continue plan of care.   Re-Cert Due:7/21/2025 or 10 treatments  []  Upgrade activities as tolerated  []  Discharge due to:  [x]  Other: Will fit compression garments upon delivery.     JENNIFER LEVINE, TYRONE, CLT       5/27/2025       4:42 PM

## 2025-05-30 ENCOUNTER — HOSPITAL ENCOUNTER (OUTPATIENT)
Facility: HOSPITAL | Age: 75
Setting detail: RECURRING SERIES
End: 2025-05-30
Attending: RADIOLOGY
Payer: MEDICARE

## 2025-05-30 PROCEDURE — 97110 THERAPEUTIC EXERCISES: CPT

## 2025-05-30 PROCEDURE — 97016 VASOPNEUMATIC DEVICE THERAPY: CPT

## 2025-05-30 PROCEDURE — 97140 MANUAL THERAPY 1/> REGIONS: CPT

## 2025-05-30 PROCEDURE — 97535 SELF CARE MNGMENT TRAINING: CPT

## 2025-05-30 NOTE — PROGRESS NOTES
PHYSICAL THERAPY/OCCUPATIONAL THERAPY - MEDICARE DAILY TREATMENT NOTE (updated 3/23)    Date: 2025        Patient Name:  Elenita Benitez :  1950         Medical   Diagnosis:  Papillary serous endometrial adenocarcinoma (HCC) [C54.1] Treatment Diagnosis:  I89.0     Lymphedema, not elsewhere classified    Referral Source:  Hong Alcazar MD Insurance:   Payor: MEDICARE / Plan: MEDICARE PART A AND B / Product Type: *No Product type* /                   Patient  verified yes     Visit #   Current  / Total 13 20   Time   In / Out 7:30 AM 8:50 AM   Total Treatment Time 80   Total Timed Codes 40   1:1 Treatment Time 40      [facility] Totals Reminder:  bill using total billable   min of TIMED therapeutic procedures and modalities.   8-22 min = 1 unit; 23-37 min = 2 units; 38-52 min = 3 units;  53-67 min = 4 units; 68-82 min = 5 units     SUBJECTIVE    Pain Level (0-10 scale): Patient reports discomfort with transfers/bending at the knees due to significant swelling.     Any medication changes, allergies to medications, adverse drug reactions, diagnosis change, or new procedure performed?: [x] No    [] Yes (see summary sheet for update)   Medications: Verified on Patient Summary List    Subjective functional status/changes:    Patient able to have demonstration of vasopneumatic pump today in clinic today.   Patient reports that she has not heard an update on status of her custom compression garment order with [employer]. Will reach out to vendor to find out status of new products.     OBJECTIVE  Therapeutic Procedures:  Tx Min Billable or 1:1 Min (if diff from Tx Min) Procedure, Rationale Specifics   20 20 27400 Manual Therapy (timed):  increase ROM, increase tissue extensibility, and decrease edema to improve patient's ability to progress to PLOF and address remaining functional goals.  The manual therapy interventions were performed at a separate and distinct time from the therapeutic activities    4:30 PM

## 2025-06-02 ENCOUNTER — HOSPITAL ENCOUNTER (OUTPATIENT)
Facility: HOSPITAL | Age: 75
Discharge: HOME | End: 2025-06-05
Payer: MEDICARE

## 2025-06-02 VITALS
BODY MASS INDEX: 31.15 KG/M2 | TEMPERATURE: 98 F | WEIGHT: 165 LBS | SYSTOLIC BLOOD PRESSURE: 185 MMHG | OXYGEN SATURATION: 98 % | HEIGHT: 61 IN | HEART RATE: 62 BPM | DIASTOLIC BLOOD PRESSURE: 83 MMHG

## 2025-06-02 LAB
ALBUMIN SERPL-MCNC: 2.7 G/DL (ref 3.5–5)
ALBUMIN/GLOB SERPL: 0.8 (ref 1.1–2.2)
ALP SERPL-CCNC: 1528 U/L (ref 45–117)
ALT SERPL-CCNC: 230 U/L (ref 12–78)
ANION GAP SERPL CALC-SCNC: 7 MMOL/L (ref 2–12)
APPEARANCE UR: CLEAR
AST SERPL-CCNC: 166 U/L (ref 15–37)
BACTERIA URNS QL MICRO: NEGATIVE /HPF
BASOPHILS # BLD: 0 K/UL (ref 0–0.1)
BASOPHILS NFR BLD: 0 % (ref 0–1)
BILIRUB SERPL-MCNC: 1.2 MG/DL (ref 0.2–1)
BILIRUB UR QL CFM: NEGATIVE
BUN SERPL-MCNC: 20 MG/DL (ref 6–20)
BUN/CREAT SERPL: 22 (ref 12–20)
CALCIUM SERPL-MCNC: 8.8 MG/DL (ref 8.5–10.1)
CHLORIDE SERPL-SCNC: 107 MMOL/L (ref 97–108)
CO2 SERPL-SCNC: 27 MMOL/L (ref 21–32)
COLOR UR: ABNORMAL
CREAT SERPL-MCNC: 0.92 MG/DL (ref 0.55–1.02)
DIFFERENTIAL METHOD BLD: ABNORMAL
EKG ATRIAL RATE: 55 BPM
EKG DIAGNOSIS: NORMAL
EKG P AXIS: -75 DEGREES
EKG P-R INTERVAL: 136 MS
EKG Q-T INTERVAL: 434 MS
EKG QRS DURATION: 78 MS
EKG QTC CALCULATION (BAZETT): 415 MS
EKG R AXIS: -12 DEGREES
EKG T AXIS: 192 DEGREES
EKG VENTRICULAR RATE: 55 BPM
EOSINOPHIL # BLD: 0.14 K/UL (ref 0–0.4)
EOSINOPHIL NFR BLD: 2 % (ref 0–7)
EPITH CASTS URNS QL MICRO: ABNORMAL /LPF
ERYTHROCYTE [DISTWIDTH] IN BLOOD BY AUTOMATED COUNT: 19.8 % (ref 11.5–14.5)
GLOBULIN SER CALC-MCNC: 3.2 G/DL (ref 2–4)
GLUCOSE SERPL-MCNC: 123 MG/DL (ref 65–100)
GLUCOSE UR STRIP.AUTO-MCNC: NEGATIVE MG/DL
HCT VFR BLD AUTO: 29.1 % (ref 35–47)
HGB BLD-MCNC: 8.6 G/DL (ref 11.5–16)
HGB UR QL STRIP: ABNORMAL
HYALINE CASTS URNS QL MICRO: ABNORMAL /LPF (ref 0–5)
IMM GRANULOCYTES # BLD AUTO: 0 K/UL
IMM GRANULOCYTES NFR BLD AUTO: 0 %
KETONES UR QL STRIP.AUTO: ABNORMAL MG/DL
LEUKOCYTE ESTERASE UR QL STRIP.AUTO: ABNORMAL
LYMPHOCYTES # BLD: 0.69 K/UL (ref 0.8–3.5)
LYMPHOCYTES NFR BLD: 10 % (ref 12–49)
MCH RBC QN AUTO: 31 PG (ref 26–34)
MCHC RBC AUTO-ENTMCNC: 29.6 G/DL (ref 30–36.5)
MCV RBC AUTO: 105.1 FL (ref 80–99)
MONOCYTES # BLD: 0.41 K/UL (ref 0–1)
MONOCYTES NFR BLD: 6 % (ref 5–13)
NEUTS BAND NFR BLD MANUAL: 1 % (ref 0–6)
NEUTS SEG # BLD: 5.66 K/UL (ref 1.8–8)
NEUTS SEG NFR BLD: 81 % (ref 32–75)
NITRITE UR QL STRIP.AUTO: NEGATIVE
NRBC # BLD: 0 K/UL (ref 0–0.01)
NRBC BLD-RTO: 0 PER 100 WBC
PH UR STRIP: 5.5 (ref 5–8)
PLATELET # BLD AUTO: 340 K/UL (ref 150–400)
PMV BLD AUTO: 10.2 FL (ref 8.9–12.9)
POTASSIUM SERPL-SCNC: 3.3 MMOL/L (ref 3.5–5.1)
PROT SERPL-MCNC: 5.9 G/DL (ref 6.4–8.2)
PROT UR STRIP-MCNC: 100 MG/DL
RBC # BLD AUTO: 2.77 M/UL (ref 3.8–5.2)
RBC #/AREA URNS HPF: ABNORMAL /HPF (ref 0–5)
RBC MORPH BLD: ABNORMAL
RBC MORPH BLD: ABNORMAL
SODIUM SERPL-SCNC: 141 MMOL/L (ref 136–145)
SP GR UR REFRACTOMETRY: 1.03 (ref 1–1.03)
URINE CULTURE IF INDICATED: ABNORMAL
UROBILINOGEN UR QL STRIP.AUTO: 1 EU/DL (ref 0.2–1)
WBC # BLD AUTO: 6.9 K/UL (ref 3.6–11)
WBC URNS QL MICRO: ABNORMAL /HPF (ref 0–4)

## 2025-06-02 PROCEDURE — 81001 URINALYSIS AUTO W/SCOPE: CPT

## 2025-06-02 PROCEDURE — 80053 COMPREHEN METABOLIC PANEL: CPT

## 2025-06-02 PROCEDURE — 85025 COMPLETE CBC W/AUTO DIFF WBC: CPT

## 2025-06-02 PROCEDURE — 93005 ELECTROCARDIOGRAM TRACING: CPT | Performed by: STUDENT IN AN ORGANIZED HEALTH CARE EDUCATION/TRAINING PROGRAM

## 2025-06-02 ASSESSMENT — PAIN DESCRIPTION - LOCATION: LOCATION: ABDOMEN

## 2025-06-02 ASSESSMENT — PAIN DESCRIPTION - DESCRIPTORS: DESCRIPTORS: SQUEEZING

## 2025-06-02 ASSESSMENT — PAIN SCALES - GENERAL: PAINLEVEL_OUTOF10: 3

## 2025-06-02 ASSESSMENT — PAIN DESCRIPTION - ORIENTATION: ORIENTATION: UPPER

## 2025-06-03 ENCOUNTER — TELEPHONE (OUTPATIENT)
Age: 75
End: 2025-06-03

## 2025-06-03 NOTE — PERIOP NOTE
Jenny Moreno, No consulted on EKG to see if EKG is ok for surgery.     All abnormal labs resulted to PCP via EPIC. Type and Screen orders DOS.     EKG RECEIVED BACK FROM KIMMIE. PER KIMMIE, EKG OK FOR SURGERY.       NURSE CALLED AND REPORTED THE FOLLOWING ABNORMAL LABS TO PAULINA, AT [facility]:    HGB: 8.6L  HCT: 29.1L  ALT: 230H  AST: 166H  ALK PHOS: 1528 HIGH   BILI: 1.2H     PAULINA STATES THAT SHE WILL SEND ABNORMAL LAB TO DR. MCKEON NURSE. AL LABS AND EKG FAXED TO [facility] FOR SURGERY.     NURSE RECEIVED A CALL BACK FROM ELYSE AT DR. MCKEON OFFICE, ELYSE STATES THAT DR. MCKEON HAS REVIEW THE LABS, NO NEW ORDERS GIVEN AT THIS TIME.   
PAT: 6-2 @ 3;00   
Preoperative instructions reviewed with patient.  Patient given SSI infection FAQS sheet.  Given two bottles of skin prep chlorhexidine soap; given written and verbal instructions on use. Patient was given the opportunity to ask questions on the information provided.    PT WAS VERY WEAK AND FATIGUED IN PAT AND REPORTED THAT SHE HAS BEEN WEAK SINCE HER LAST CHEMO TREATMENT IN [facility] BUT TODAY THE FATIGUE WAS WORSE.  SHE ALSO STATED THAT SHE PASSED LARGE BLOOD CLOTS FROM HER RECTUM LAST WEEK, IT STOPPED AND THEN HAPPENED AGAIN TODAY.  B/P IN /83, PT DENIES DIZZINESS.  CLIVE ALICIA NP ASSESSED PT.  I ALSO CALLED SURGEON'S OFFICE  AND S/W NURSE VIOLA AND INFORMED HER OF ABOVE.  SHE SAID SHE WOULD PASS INFO ON TO DR. MCKEON.  
surgical wound.   Quit smoking to help wound healing.   Manage blood sugar levels if you have diabetes. Poorly managed blood sugar levels slow down wound healing.      How We Keep You Safe During Surgery And Work To Prevent Surgical Site Infections  1. Caregivers will check your ID bracelet multiple times.  2. All caregivers will clean their hands before touching you.  3. Caregivers will verify the procedure and surgical site multiple times with you. Do not airam your surgical site unless instructed to by your surgeon.  4. Your surgical site will be cleansed, and you may receive an antibiotic before surgery

## 2025-06-03 NOTE — TELEPHONE ENCOUNTER
Pt called and left me a voicemail, she states she went to [facility] yesterday for her stent exchange and she was telling them she has had some rectal blood clots, they told her to call our office and let you know.

## 2025-06-08 ENCOUNTER — APPOINTMENT (OUTPATIENT)
Facility: HOSPITAL | Age: 75
DRG: 392 | End: 2025-06-08
Payer: MEDICARE

## 2025-06-08 ENCOUNTER — HOSPITAL ENCOUNTER (INPATIENT)
Facility: HOSPITAL | Age: 75
LOS: 4 days | Discharge: HOME HEALTH | DRG: 392 | End: 2025-06-12
Attending: EMERGENCY MEDICINE | Admitting: STUDENT IN AN ORGANIZED HEALTH CARE EDUCATION/TRAINING PROGRAM
Payer: MEDICARE

## 2025-06-08 DIAGNOSIS — E87.6 HYPOKALEMIA: ICD-10-CM

## 2025-06-08 DIAGNOSIS — R10.9 ACUTE LEFT FLANK PAIN: ICD-10-CM

## 2025-06-08 DIAGNOSIS — K52.9 COLITIS: Primary | ICD-10-CM

## 2025-06-08 PROBLEM — D84.9 IMMUNOCOMPROMISED STATE: Status: ACTIVE | Noted: 2025-06-08

## 2025-06-08 PROBLEM — K76.9 ACUTE DISORDER OF LIVER: Status: ACTIVE | Noted: 2025-06-08

## 2025-06-08 PROBLEM — C55 MALIGNANT NEOPLASM OF UTERUS (HCC): Status: ACTIVE | Noted: 2021-09-09

## 2025-06-08 PROBLEM — A09 INFECTIOUS GASTROENTERITIS AND COLITIS: Status: ACTIVE | Noted: 2025-06-08

## 2025-06-08 LAB
ALBUMIN SERPL-MCNC: 2.5 G/DL (ref 3.5–5)
ALBUMIN/GLOB SERPL: 0.6 (ref 1.1–2.2)
ALP SERPL-CCNC: 1236 U/L (ref 45–117)
ALT SERPL-CCNC: 103 U/L (ref 12–78)
ANION GAP SERPL CALC-SCNC: 9 MMOL/L (ref 2–12)
APAP SERPL-MCNC: <2 UG/ML (ref 10–30)
AST SERPL-CCNC: 57 U/L (ref 15–37)
BASOPHILS # BLD: 0.02 K/UL (ref 0–0.1)
BASOPHILS NFR BLD: 0.2 % (ref 0–1)
BILIRUB SERPL-MCNC: 1.2 MG/DL (ref 0.2–1)
BUN SERPL-MCNC: 12 MG/DL (ref 6–20)
BUN/CREAT SERPL: 14 (ref 12–20)
CALCIUM SERPL-MCNC: 9.1 MG/DL (ref 8.5–10.1)
CHLORIDE SERPL-SCNC: 103 MMOL/L (ref 97–108)
CO2 SERPL-SCNC: 25 MMOL/L (ref 21–32)
CREAT SERPL-MCNC: 0.85 MG/DL (ref 0.55–1.02)
CRP SERPL-MCNC: 10.2 MG/DL (ref 0–0.3)
DIFFERENTIAL METHOD BLD: ABNORMAL
EOSINOPHIL # BLD: 0.02 K/UL (ref 0–0.4)
EOSINOPHIL NFR BLD: 0.2 % (ref 0–7)
ERYTHROCYTE [DISTWIDTH] IN BLOOD BY AUTOMATED COUNT: 17.9 % (ref 11.5–14.5)
ERYTHROCYTE [SEDIMENTATION RATE] IN BLOOD: 85 MM/HR (ref 0–30)
GLOBULIN SER CALC-MCNC: 4.2 G/DL (ref 2–4)
GLUCOSE SERPL-MCNC: 138 MG/DL (ref 65–100)
HCT VFR BLD AUTO: 31.9 % (ref 35–47)
HGB BLD-MCNC: 9.7 G/DL (ref 11.5–16)
IMM GRANULOCYTES # BLD AUTO: 0.07 K/UL (ref 0–0.04)
IMM GRANULOCYTES NFR BLD AUTO: 0.6 % (ref 0–0.5)
INR PPP: 1.1 (ref 0.9–1.1)
LACTATE BLD-SCNC: 1.83 MMOL/L (ref 0.4–2)
LDH SERPL L TO P-CCNC: 206 U/L (ref 81–246)
LIPASE SERPL-CCNC: 21 U/L (ref 13–75)
LYMPHOCYTES # BLD: 0.66 K/UL (ref 0.8–3.5)
LYMPHOCYTES NFR BLD: 5.3 % (ref 12–49)
MAGNESIUM SERPL-MCNC: 2.1 MG/DL (ref 1.6–2.4)
MCH RBC QN AUTO: 31.1 PG (ref 26–34)
MCHC RBC AUTO-ENTMCNC: 30.4 G/DL (ref 30–36.5)
MCV RBC AUTO: 102.2 FL (ref 80–99)
MONOCYTES # BLD: 0.78 K/UL (ref 0–1)
MONOCYTES NFR BLD: 6.3 % (ref 5–13)
NEUTS SEG # BLD: 10.85 K/UL (ref 1.8–8)
NEUTS SEG NFR BLD: 87.4 % (ref 32–75)
NRBC # BLD: 0 K/UL (ref 0–0.01)
NRBC BLD-RTO: 0 PER 100 WBC
PHOSPHATE SERPL-MCNC: 3.2 MG/DL (ref 2.6–4.7)
PLATELET # BLD AUTO: 376 K/UL (ref 150–400)
PMV BLD AUTO: 9.6 FL (ref 8.9–12.9)
POTASSIUM SERPL-SCNC: 2.6 MMOL/L (ref 3.5–5.1)
PROT SERPL-MCNC: 6.7 G/DL (ref 6.4–8.2)
PROTHROMBIN TIME: 11.6 SEC (ref 9.2–11.2)
RBC # BLD AUTO: 3.12 M/UL (ref 3.8–5.2)
RBC MORPH BLD: ABNORMAL
RBC MORPH BLD: ABNORMAL
SALICYLATES SERPL-MCNC: <1.7 MG/DL (ref 2.8–20)
SODIUM SERPL-SCNC: 137 MMOL/L (ref 136–145)
WBC # BLD AUTO: 12.4 K/UL (ref 3.6–11)

## 2025-06-08 PROCEDURE — 80053 COMPREHEN METABOLIC PANEL: CPT

## 2025-06-08 PROCEDURE — 84100 ASSAY OF PHOSPHORUS: CPT

## 2025-06-08 PROCEDURE — 2500000003 HC RX 250 WO HCPCS: Performed by: STUDENT IN AN ORGANIZED HEALTH CARE EDUCATION/TRAINING PROGRAM

## 2025-06-08 PROCEDURE — 96361 HYDRATE IV INFUSION ADD-ON: CPT

## 2025-06-08 PROCEDURE — 85025 COMPLETE CBC W/AUTO DIFF WBC: CPT

## 2025-06-08 PROCEDURE — 99285 EMERGENCY DEPT VISIT HI MDM: CPT

## 2025-06-08 PROCEDURE — 76705 ECHO EXAM OF ABDOMEN: CPT

## 2025-06-08 PROCEDURE — 2580000003 HC RX 258: Performed by: EMERGENCY MEDICINE

## 2025-06-08 PROCEDURE — 85652 RBC SED RATE AUTOMATED: CPT

## 2025-06-08 PROCEDURE — 6360000002 HC RX W HCPCS: Performed by: STUDENT IN AN ORGANIZED HEALTH CARE EDUCATION/TRAINING PROGRAM

## 2025-06-08 PROCEDURE — 6360000004 HC RX CONTRAST MEDICATION: Performed by: EMERGENCY MEDICINE

## 2025-06-08 PROCEDURE — 2580000003 HC RX 258: Performed by: STUDENT IN AN ORGANIZED HEALTH CARE EDUCATION/TRAINING PROGRAM

## 2025-06-08 PROCEDURE — 80143 DRUG ASSAY ACETAMINOPHEN: CPT

## 2025-06-08 PROCEDURE — 36415 COLL VENOUS BLD VENIPUNCTURE: CPT

## 2025-06-08 PROCEDURE — 86140 C-REACTIVE PROTEIN: CPT

## 2025-06-08 PROCEDURE — 80074 ACUTE HEPATITIS PANEL: CPT

## 2025-06-08 PROCEDURE — 6360000002 HC RX W HCPCS: Performed by: EMERGENCY MEDICINE

## 2025-06-08 PROCEDURE — 83605 ASSAY OF LACTIC ACID: CPT

## 2025-06-08 PROCEDURE — 80179 DRUG ASSAY SALICYLATE: CPT

## 2025-06-08 PROCEDURE — 96365 THER/PROPH/DIAG IV INF INIT: CPT

## 2025-06-08 PROCEDURE — 83615 LACTATE (LD) (LDH) ENZYME: CPT

## 2025-06-08 PROCEDURE — 83735 ASSAY OF MAGNESIUM: CPT

## 2025-06-08 PROCEDURE — 96368 THER/DIAG CONCURRENT INF: CPT

## 2025-06-08 PROCEDURE — 74177 CT ABD & PELVIS W/CONTRAST: CPT

## 2025-06-08 PROCEDURE — 6370000000 HC RX 637 (ALT 250 FOR IP): Performed by: STUDENT IN AN ORGANIZED HEALTH CARE EDUCATION/TRAINING PROGRAM

## 2025-06-08 PROCEDURE — 6370000000 HC RX 637 (ALT 250 FOR IP): Performed by: EMERGENCY MEDICINE

## 2025-06-08 PROCEDURE — 83690 ASSAY OF LIPASE: CPT

## 2025-06-08 PROCEDURE — 85610 PROTHROMBIN TIME: CPT

## 2025-06-08 PROCEDURE — 1100000000 HC RM PRIVATE

## 2025-06-08 PROCEDURE — 96375 TX/PRO/DX INJ NEW DRUG ADDON: CPT

## 2025-06-08 PROCEDURE — 2500000003 HC RX 250 WO HCPCS: Performed by: EMERGENCY MEDICINE

## 2025-06-08 RX ORDER — CLOPIDOGREL BISULFATE 75 MG/1
75 TABLET ORAL DAILY
Status: DISCONTINUED | OUTPATIENT
Start: 2025-06-08 | End: 2025-06-10

## 2025-06-08 RX ORDER — POTASSIUM CHLORIDE 750 MG/1
40 TABLET, EXTENDED RELEASE ORAL ONCE
Status: DISCONTINUED | OUTPATIENT
Start: 2025-06-08 | End: 2025-06-12 | Stop reason: HOSPADM

## 2025-06-08 RX ORDER — METRONIDAZOLE 500 MG/100ML
500 INJECTION, SOLUTION INTRAVENOUS EVERY 8 HOURS
Status: DISCONTINUED | OUTPATIENT
Start: 2025-06-08 | End: 2025-06-12 | Stop reason: HOSPADM

## 2025-06-08 RX ORDER — ASPIRIN 81 MG/1
81 TABLET, CHEWABLE ORAL DAILY
Status: DISCONTINUED | OUTPATIENT
Start: 2025-06-08 | End: 2025-06-12 | Stop reason: HOSPADM

## 2025-06-08 RX ORDER — POTASSIUM CHLORIDE 1500 MG/1
40 TABLET, EXTENDED RELEASE ORAL PRN
Status: DISCONTINUED | OUTPATIENT
Start: 2025-06-08 | End: 2025-06-12 | Stop reason: HOSPADM

## 2025-06-08 RX ORDER — POLYETHYLENE GLYCOL 3350 17 G/17G
17 POWDER, FOR SOLUTION ORAL DAILY PRN
Status: DISCONTINUED | OUTPATIENT
Start: 2025-06-08 | End: 2025-06-12 | Stop reason: HOSPADM

## 2025-06-08 RX ORDER — ONDANSETRON 2 MG/ML
4 INJECTION INTRAMUSCULAR; INTRAVENOUS EVERY 6 HOURS PRN
Status: DISCONTINUED | OUTPATIENT
Start: 2025-06-08 | End: 2025-06-12 | Stop reason: HOSPADM

## 2025-06-08 RX ORDER — KETOROLAC TROMETHAMINE 30 MG/ML
15 INJECTION, SOLUTION INTRAMUSCULAR; INTRAVENOUS EVERY 6 HOURS
Status: COMPLETED | OUTPATIENT
Start: 2025-06-08 | End: 2025-06-09

## 2025-06-08 RX ORDER — ACETAMINOPHEN 325 MG/1
650 TABLET ORAL EVERY 4 HOURS PRN
Status: DISCONTINUED | OUTPATIENT
Start: 2025-06-08 | End: 2025-06-08 | Stop reason: SDUPTHER

## 2025-06-08 RX ORDER — POTASSIUM CHLORIDE 7.45 MG/ML
10 INJECTION INTRAVENOUS
Status: DISPENSED | OUTPATIENT
Start: 2025-06-08 | End: 2025-06-08

## 2025-06-08 RX ORDER — HYDROMORPHONE HYDROCHLORIDE 2 MG/1
4 TABLET ORAL EVERY 4 HOURS PRN
Refills: 0 | Status: DISCONTINUED | OUTPATIENT
Start: 2025-06-08 | End: 2025-06-12 | Stop reason: HOSPADM

## 2025-06-08 RX ORDER — HYDROMORPHONE HYDROCHLORIDE 1 MG/ML
1 INJECTION, SOLUTION INTRAMUSCULAR; INTRAVENOUS; SUBCUTANEOUS
Status: ACTIVE | OUTPATIENT
Start: 2025-06-08 | End: 2025-06-09

## 2025-06-08 RX ORDER — ACETAMINOPHEN 650 MG/1
650 SUPPOSITORY RECTAL EVERY 6 HOURS PRN
Status: DISCONTINUED | OUTPATIENT
Start: 2025-06-08 | End: 2025-06-12 | Stop reason: HOSPADM

## 2025-06-08 RX ORDER — ATORVASTATIN CALCIUM 20 MG/1
20 TABLET, FILM COATED ORAL NIGHTLY
Status: DISCONTINUED | OUTPATIENT
Start: 2025-06-08 | End: 2025-06-12 | Stop reason: HOSPADM

## 2025-06-08 RX ORDER — ONDANSETRON 4 MG/1
4 TABLET, ORALLY DISINTEGRATING ORAL ONCE
Status: DISCONTINUED | OUTPATIENT
Start: 2025-06-08 | End: 2025-06-10

## 2025-06-08 RX ORDER — SODIUM CHLORIDE 9 MG/ML
INJECTION, SOLUTION INTRAVENOUS PRN
Status: DISCONTINUED | OUTPATIENT
Start: 2025-06-08 | End: 2025-06-12 | Stop reason: HOSPADM

## 2025-06-08 RX ORDER — ONDANSETRON 2 MG/ML
4 INJECTION INTRAMUSCULAR; INTRAVENOUS ONCE
Status: COMPLETED | OUTPATIENT
Start: 2025-06-08 | End: 2025-06-08

## 2025-06-08 RX ORDER — MAGNESIUM SULFATE IN WATER 40 MG/ML
2000 INJECTION, SOLUTION INTRAVENOUS PRN
Status: DISCONTINUED | OUTPATIENT
Start: 2025-06-08 | End: 2025-06-12 | Stop reason: HOSPADM

## 2025-06-08 RX ORDER — 0.9 % SODIUM CHLORIDE 0.9 %
1000 INTRAVENOUS SOLUTION INTRAVENOUS ONCE
Status: COMPLETED | OUTPATIENT
Start: 2025-06-08 | End: 2025-06-08

## 2025-06-08 RX ORDER — SODIUM CHLORIDE 0.9 % (FLUSH) 0.9 %
5-40 SYRINGE (ML) INJECTION EVERY 12 HOURS SCHEDULED
Status: DISCONTINUED | OUTPATIENT
Start: 2025-06-08 | End: 2025-06-12 | Stop reason: HOSPADM

## 2025-06-08 RX ORDER — MORPHINE SULFATE 4 MG/ML
4 INJECTION, SOLUTION INTRAMUSCULAR; INTRAVENOUS
Refills: 0 | Status: COMPLETED | OUTPATIENT
Start: 2025-06-08 | End: 2025-06-08

## 2025-06-08 RX ORDER — ONDANSETRON 2 MG/ML
4 INJECTION INTRAMUSCULAR; INTRAVENOUS EVERY 6 HOURS PRN
Status: DISCONTINUED | OUTPATIENT
Start: 2025-06-08 | End: 2025-06-08

## 2025-06-08 RX ORDER — POTASSIUM CHLORIDE 7.45 MG/ML
10 INJECTION INTRAVENOUS PRN
Status: DISCONTINUED | OUTPATIENT
Start: 2025-06-08 | End: 2025-06-12 | Stop reason: HOSPADM

## 2025-06-08 RX ORDER — ACETAMINOPHEN 325 MG/1
650 TABLET ORAL EVERY 6 HOURS PRN
Status: DISCONTINUED | OUTPATIENT
Start: 2025-06-08 | End: 2025-06-12 | Stop reason: HOSPADM

## 2025-06-08 RX ORDER — ENOXAPARIN SODIUM 100 MG/ML
40 INJECTION SUBCUTANEOUS DAILY
Status: DISCONTINUED | OUTPATIENT
Start: 2025-06-08 | End: 2025-06-12 | Stop reason: HOSPADM

## 2025-06-08 RX ORDER — ONDANSETRON 4 MG/1
4 TABLET, ORALLY DISINTEGRATING ORAL EVERY 8 HOURS PRN
Status: DISCONTINUED | OUTPATIENT
Start: 2025-06-08 | End: 2025-06-12 | Stop reason: HOSPADM

## 2025-06-08 RX ORDER — HYDROMORPHONE HYDROCHLORIDE 2 MG/1
2 TABLET ORAL EVERY 4 HOURS PRN
Refills: 0 | Status: DISCONTINUED | OUTPATIENT
Start: 2025-06-08 | End: 2025-06-12 | Stop reason: HOSPADM

## 2025-06-08 RX ORDER — PROCHLORPERAZINE EDISYLATE 5 MG/ML
5 INJECTION INTRAMUSCULAR; INTRAVENOUS EVERY 6 HOURS PRN
Status: DISCONTINUED | OUTPATIENT
Start: 2025-06-08 | End: 2025-06-12 | Stop reason: HOSPADM

## 2025-06-08 RX ORDER — SODIUM CHLORIDE, SODIUM LACTATE, POTASSIUM CHLORIDE, CALCIUM CHLORIDE 600; 310; 30; 20 MG/100ML; MG/100ML; MG/100ML; MG/100ML
INJECTION, SOLUTION INTRAVENOUS CONTINUOUS
Status: ACTIVE | OUTPATIENT
Start: 2025-06-08 | End: 2025-06-09

## 2025-06-08 RX ORDER — METRONIDAZOLE 500 MG/100ML
500 INJECTION, SOLUTION INTRAVENOUS
Status: COMPLETED | OUTPATIENT
Start: 2025-06-08 | End: 2025-06-08

## 2025-06-08 RX ORDER — IOPAMIDOL 755 MG/ML
100 INJECTION, SOLUTION INTRAVASCULAR
Status: COMPLETED | OUTPATIENT
Start: 2025-06-08 | End: 2025-06-08

## 2025-06-08 RX ORDER — MAGNESIUM SULFATE 1 G/100ML
1000 INJECTION INTRAVENOUS
Status: COMPLETED | OUTPATIENT
Start: 2025-06-08 | End: 2025-06-08

## 2025-06-08 RX ORDER — KETOROLAC TROMETHAMINE 30 MG/ML
15 INJECTION, SOLUTION INTRAMUSCULAR; INTRAVENOUS ONCE
Status: COMPLETED | OUTPATIENT
Start: 2025-06-08 | End: 2025-06-08

## 2025-06-08 RX ORDER — METOPROLOL SUCCINATE 25 MG/1
25 TABLET, EXTENDED RELEASE ORAL DAILY
Status: DISCONTINUED | OUTPATIENT
Start: 2025-06-08 | End: 2025-06-12 | Stop reason: HOSPADM

## 2025-06-08 RX ADMIN — MAGNESIUM SULFATE HEPTAHYDRATE 1000 MG: 1 INJECTION, SOLUTION INTRAVENOUS at 11:59

## 2025-06-08 RX ADMIN — SODIUM CHLORIDE, PRESERVATIVE FREE 10 ML: 5 INJECTION INTRAVENOUS at 21:00

## 2025-06-08 RX ADMIN — FAMOTIDINE 20 MG: 10 INJECTION, SOLUTION INTRAVENOUS at 17:24

## 2025-06-08 RX ADMIN — KETOROLAC TROMETHAMINE 15 MG: 30 INJECTION, SOLUTION INTRAMUSCULAR at 10:00

## 2025-06-08 RX ADMIN — POTASSIUM CHLORIDE 10 MEQ: 7.46 INJECTION, SOLUTION INTRAVENOUS at 12:01

## 2025-06-08 RX ADMIN — METRONIDAZOLE 500 MG: 500 INJECTION, SOLUTION INTRAVENOUS at 13:20

## 2025-06-08 RX ADMIN — ATORVASTATIN CALCIUM 20 MG: 20 TABLET, FILM COATED ORAL at 22:03

## 2025-06-08 RX ADMIN — KETOROLAC TROMETHAMINE 15 MG: 30 INJECTION, SOLUTION INTRAMUSCULAR at 22:03

## 2025-06-08 RX ADMIN — IOPAMIDOL 100 ML: 755 INJECTION, SOLUTION INTRAVENOUS at 11:24

## 2025-06-08 RX ADMIN — SODIUM CHLORIDE 1000 ML: 0.9 INJECTION, SOLUTION INTRAVENOUS at 10:01

## 2025-06-08 RX ADMIN — MORPHINE SULFATE 4 MG: 4 INJECTION, SOLUTION INTRAMUSCULAR; INTRAVENOUS at 10:00

## 2025-06-08 RX ADMIN — ONDANSETRON 4 MG: 2 INJECTION, SOLUTION INTRAMUSCULAR; INTRAVENOUS at 10:01

## 2025-06-08 RX ADMIN — ENOXAPARIN SODIUM 40 MG: 100 INJECTION SUBCUTANEOUS at 15:08

## 2025-06-08 RX ADMIN — POTASSIUM CHLORIDE 10 MEQ: 7.46 INJECTION, SOLUTION INTRAVENOUS at 13:19

## 2025-06-08 RX ADMIN — WATER 2000 MG: 1 INJECTION INTRAMUSCULAR; INTRAVENOUS; SUBCUTANEOUS at 13:14

## 2025-06-08 RX ADMIN — KETOROLAC TROMETHAMINE 15 MG: 30 INJECTION, SOLUTION INTRAMUSCULAR at 15:08

## 2025-06-08 RX ADMIN — METRONIDAZOLE 500 MG: 500 INJECTION, SOLUTION INTRAVENOUS at 22:23

## 2025-06-08 RX ADMIN — SODIUM CHLORIDE, SODIUM LACTATE, POTASSIUM CHLORIDE, AND CALCIUM CHLORIDE: .6; .31; .03; .02 INJECTION, SOLUTION INTRAVENOUS at 21:10

## 2025-06-08 RX ADMIN — POTASSIUM CHLORIDE 10 MEQ: 7.46 INJECTION, SOLUTION INTRAVENOUS at 14:27

## 2025-06-08 ASSESSMENT — PAIN DESCRIPTION - ORIENTATION
ORIENTATION: LEFT

## 2025-06-08 ASSESSMENT — PAIN DESCRIPTION - LOCATION
LOCATION: ABDOMEN

## 2025-06-08 ASSESSMENT — PAIN DESCRIPTION - DESCRIPTORS: DESCRIPTORS: ACHING

## 2025-06-08 ASSESSMENT — PAIN SCALES - GENERAL
PAINLEVEL_OUTOF10: 5
PAINLEVEL_OUTOF10: 8
PAINLEVEL_OUTOF10: 8

## 2025-06-08 NOTE — ED PROVIDER NOTES
[facility]   EMERGENCY DEPARTMENT ENCOUNTER       Pt Name: Elenita Benitez  MRN: [id number]  Birthdate 1950  Date of evaluation: 6/8/2025  Provider: Kolby Fleming MD   PCP: Artis Bangura MD  Note Started: 10:15 AM 6/8/25     CHIEF COMPLAINT       Chief Complaint   Patient presents with    Abdominal Pain     Patient with LLQ abdominal pain that started yesterday.  Patient also with nausea, vomiting.          HISTORY OF PRESENT ILLNESS: 1 or more elements      History From: Patient, History limited by: No limitations     Elenita Benitez is a 74 y.o. female with history of endometrial cancer currently on respite from chemotherapy followed by Dr. Nicole at [facility] who presents with chief complaint of nausea, vomiting, diarrhea, left-sided abdominal pain and left flank pain.  She has history of hydronephrosis on the left side status post left ureteral stent followed by [facility].  States that she has had increased pain over the past 2 days with nausea, vomiting, diarrhea.  Denies fevers.  Endorses poor p.o. intake.     Nursing Notes were all reviewed and agreed with or any disagreements were addressed in the HPI.     REVIEW OF SYSTEMS        Positives and Pertinent negatives as per HPI.    PAST HISTORY     Past Medical History:  Past Medical History:   Diagnosis Date    Arthritis     OSTEO HIP ANNE.    Atrophic vaginitis 01/27/2009    Cataract 11/2022    Beginning stage    GERD (gastroesophageal reflux disease)     Hyperlipidemia     Hypertension     Kidney damage     left    Kidney stones     Lichen planus     oral    Lymphedema     TO BOTH LOWER EXTREMITIES    Macrocytic anemia     Osteoarthritis of hip 01/04/2013    Postmenopausal bleeding 01/27/2009    Entered By: Rehana Delgado MDSigned By: Rehana Delgado MDStop Reason: Resolved Description: POSTMENOPAUSAL BLEEDING code: 627.1      PUD (peptic ulcer disease)     Uterine cancer (HCC) 7/5/2021       Past Surgical History:  Past  (TORADOL) injection 15 mg (15 mg IntraVENous Given 6/9/25 1001)   famotidine (PEPCID) 20 MG/2ML 20 mg in sodium chloride (PF) 0.9 % 10 mL injection (20 mg IntraVENous Given 6/8/25 1724)   loperamide (IMODIUM) capsule 2 mg (2 mg Oral Given 6/9/25 0103)       Medical Decision Making  Amount and/or Complexity of Data Reviewed  Labs: ordered. Decision-making details documented in ED Course.  Radiology: ordered. Decision-making details documented in ED Course.  Discussion of management or test interpretation with external provider(s): Dr. Gomez, hospitalist    Risk  OTC drugs.  Prescription drug management.  Drug therapy requiring intensive monitoring for toxicity.  Decision regarding hospitalization.    Critical Care  Total time providing critical care: 45 minutes      74-year-old female with history of endometrial cancer currently on respite from chemotherapy presenting to the emergency department with nausea, vomiting, diarrhea, left-sided abdominal pain, left flank pain over the past 2 days.  She has had poor p.o. intake during this time without fevers.  She is afebrile and vital signs are stable.  She appears chronically ill and on exam she does have diffuse TTP throughout the left hemiabdomen and left flank with guarding but no rebound.  She is worried about a left ureteral stent which was placed 3 months ago by [facility] for lymphadenopathy causing extrinsic obstruction of the left ureter.  Differential diagnosis includes pyelonephritis, stone, ureteritis, recurrent hydronephrosis, obstruction, progression of cancer, dehydration, electrolyte disarray, colitis, diverticulitis, perforated viscus.  Will evaluate CBC, CMP, lipase, urinalysis, CT abdomen pelvis, and treat symptomatically with IV fluid bolus, IV Zofran, IV ketorolac, IV morphine, and reassess.    Workup demonstrating significant hypokalemia at 2.6 likely due to poor p.o. intake and GI volume losses.  CT demonstrating normal appearing left

## 2025-06-08 NOTE — H&P
Hospitalist Admission Note     Demographics    Patient Name  Elenita Benitez   Date of Birth 1950   Medical Record Number  [id number]    Age  74 y.o.   PCP Artis Bangura MD   Admit date:  6/8/2025  8:57 AM   Date of Service 6/8/25   Room Number  1123/01    @ [facility]     Admission Diagnosis:  Infectious gastroenteritis and colitis      Active Problem List:  Active Hospital Problems    Diagnosis Date Noted    Infectious gastroenteritis and colitis 06/08/2025     Priority: High    Immunocompromised state 06/08/2025     Priority: High    Acute hypokalemia 12/24/2024     Priority: High    Malignant neoplasm of uterus (HCC) 09/09/2021     Priority: High     2021      Acute disorder of liver 06/08/2025     Priority: Medium        Integrated HPI // Assessment & Plan:   ED Consult Reason for Admission:   1. Colitis    2. Hypokalemia    3. Acute left flank pain      ED Report // Course --> \"74 y.o. yo female with cancer currently undergoing chemotherapy here with nausea, vomiting, diarrhea, left-sided abdominal pain, found to be hypokalemic 2.6 and CT with left-sided colitis. Replacing K, treated with ceftriaxone and Flagyl.\"     In my discussion with patient: She confirmed much of the history that I had reviewed between ED documentation/handoff and from prior notes at OSH.  Relatively acute onset of pain, nausea, vomiting, diarrhea.  Pain was predominantly left-sided.  Has a history of reflux, but no reported history of diverticulitis.  No known sick contacts, lives at home and is ambulatory/independent with minimal assist.  Follows with Dr. Nicole for gyn/onc at [facility], currently in a chemo respite but only a short break was planned for at this time. Health and otherwise been stable, no other recent illness or infection, no other significant change to health or medications that she reports.  Had been unable to keep down p.o., but is beginning to feel improvement since arrival to  Days    Comprehensive Metabolic Panel     Frequency: Daily     Number of Occurrences: 3 Days    Hepatitis Panel, Acute     Frequency: 1 Time     Number of Occurrences: 1 Occurrences    Lactate Dehydrogenase     Frequency: 1 Time     Number of Occurrences: 1 Occurrences    Magnesium     Frequency: Daily     Number of Occurrences: 2 Days    Magnesium     Frequency: 1 Time     Number of Occurrences: 1 Occurrences    Phosphorus     Frequency: Daily     Number of Occurrences: 2 Days    Procalcitonin     Frequency: 1 Time     Number of Occurrences: 1 Occurrences    Protime-INR     Frequency: 1 Time     Number of Occurrences: 1 Occurrences    Salicylate     Frequency: 1 Time     Number of Occurrences: 1 Occurrences    Sedimentation Rate     Frequency: 1 Time     Number of Occurrences: 1 Occurrences    Urinalysis with Reflex to Culture     Frequency: 1 Time     Number of Occurrences: 1 Occurrences    Vitamin B12 & Folate     Frequency: Tomorrow AM     Number of Occurrences: 1 Occurrences            Certification: I am admitting Elenita Benitez 74 y.o. female with a principle diagnosis of Infectious gastroenteritis and colitis. This patient also suffers from other comorbidities listed above. I have a high level of concern for the active problems (see above) leading to life threatening complications or multi system organ failure if left untreated. Patient admitted under Inpatient status.   MDM:I have independently interviewed and examined the patient. I have reviewed the pertinent notes (including outside documentation,) laboratory tests, vitals, EKG (if applicable,) radiographic data, and all relevant data in order to formulate the plan of care (within the constraints of this admission process. these and other pertinent data were taken into consideration when the treatment plan was developed and customized to this patient's unique overall circumstances and needsI have reviewed at least 3 distinct labs ordered by myself or

## 2025-06-08 NOTE — PROGRESS NOTES
ANTIMICROBIAL STEWARDSHIP    Indication:    IAI                                                                         Labs:  Recent Labs     Units 25  0947   CREATININE MG/DL 0.85   BUN MG/DL 12   WBC K/uL 12.4*     Temp (24hrs), Av.2 °F (36.8 °C), Min:98.2 °F (36.8 °C), Max:98.2 °F (36.8 °C)      Body mass index is 31.18 kg/m².:        Plan:  Adjusted Ceftriaxone to 2gm IV x1 for BMI >/= 30 and indication per dosing protocol.    Thank you,  OFELIA LAWRENCE, [facility]

## 2025-06-08 NOTE — PLAN OF CARE
Problem: Pain  Goal: Verbalizes/displays adequate comfort level or baseline comfort level  Flowsheets (Taken 6/8/2025 7347)  Verbalizes/displays adequate comfort level or baseline comfort level:   Encourage patient to monitor pain and request assistance   Administer analgesics based on type and severity of pain and evaluate response   Consider cultural and social influences on pain and pain management   Assess pain using appropriate pain scale   Implement non-pharmacological measures as appropriate and evaluate response

## 2025-06-09 LAB
-: NORMAL
ALBUMIN SERPL-MCNC: 2 G/DL (ref 3.5–5)
ALBUMIN/GLOB SERPL: 0.6 (ref 1.1–2.2)
ALP SERPL-CCNC: 950 U/L (ref 45–117)
ALT SERPL-CCNC: 69 U/L (ref 12–78)
ANION GAP SERPL CALC-SCNC: 4 MMOL/L (ref 2–12)
APPEARANCE UR: CLEAR
AST SERPL-CCNC: 39 U/L (ref 15–37)
BACTERIA URNS QL MICRO: NEGATIVE /HPF
BASOPHILS # BLD: 0.03 K/UL (ref 0–0.1)
BASOPHILS NFR BLD: 0.3 % (ref 0–1)
BILIRUB SERPL-MCNC: 0.6 MG/DL (ref 0.2–1)
BILIRUB UR QL: NEGATIVE
BUN SERPL-MCNC: 12 MG/DL (ref 6–20)
BUN/CREAT SERPL: 18 (ref 12–20)
C COLI+JEJUNI TUF STL QL NAA+PROBE: NEGATIVE
C DIFF GDH STL QL: NEGATIVE
C DIFF TOX A+B STL QL IA: NEGATIVE
C DIFF TOXIN INTERPRETATION: NORMAL
CALCIUM SERPL-MCNC: 8.2 MG/DL (ref 8.5–10.1)
CHLORIDE SERPL-SCNC: 106 MMOL/L (ref 97–108)
CO2 SERPL-SCNC: 28 MMOL/L (ref 21–32)
COLOR UR: ABNORMAL
CREAT SERPL-MCNC: 0.67 MG/DL (ref 0.55–1.02)
DIFFERENTIAL METHOD BLD: ABNORMAL
EC STX1+STX2 GENES STL QL NAA+PROBE: NEGATIVE
EOSINOPHIL # BLD: 0.13 K/UL (ref 0–0.4)
EOSINOPHIL NFR BLD: 1.4 % (ref 0–7)
EPITH CASTS URNS QL MICRO: ABNORMAL /LPF
ERYTHROCYTE [DISTWIDTH] IN BLOOD BY AUTOMATED COUNT: 18 % (ref 11.5–14.5)
ETEC ELTA+ESTB GENES STL QL NAA+PROBE: NEGATIVE
FOLATE SERPL-MCNC: 16.4 NG/ML (ref 5–21)
GLOBULIN SER CALC-MCNC: 3.1 G/DL (ref 2–4)
GLUCOSE SERPL-MCNC: 102 MG/DL (ref 65–100)
GLUCOSE UR STRIP.AUTO-MCNC: NEGATIVE MG/DL
HAV IGM SER QL: NONREACTIVE
HBV CORE IGM SER QL: NONREACTIVE
HBV SURFACE AG SER QL: <0.1 INDEX
HBV SURFACE AG SER QL: NEGATIVE
HCT VFR BLD AUTO: 24.5 % (ref 35–47)
HCV AB SER IA-ACNC: 0.08 INDEX
HCV AB SERPL QL IA: NONREACTIVE
HGB BLD-MCNC: 7.2 G/DL (ref 11.5–16)
HGB UR QL STRIP: NEGATIVE
HYALINE CASTS URNS QL MICRO: ABNORMAL /LPF (ref 0–2)
IMM GRANULOCYTES # BLD AUTO: 0.05 K/UL (ref 0–0.04)
IMM GRANULOCYTES NFR BLD AUTO: 0.5 % (ref 0–0.5)
KETONES UR QL STRIP.AUTO: ABNORMAL MG/DL
LEUKOCYTE ESTERASE UR QL STRIP.AUTO: ABNORMAL
LYMPHOCYTES # BLD: 0.52 K/UL (ref 0.8–3.5)
LYMPHOCYTES NFR BLD: 5.7 % (ref 12–49)
MAGNESIUM SERPL-MCNC: 1.9 MG/DL (ref 1.6–2.4)
MCH RBC QN AUTO: 30.6 PG (ref 26–34)
MCHC RBC AUTO-ENTMCNC: 29.4 G/DL (ref 30–36.5)
MCV RBC AUTO: 104.3 FL (ref 80–99)
MONOCYTES # BLD: 0.87 K/UL (ref 0–1)
MONOCYTES NFR BLD: 9.5 % (ref 5–13)
NEUTS SEG # BLD: 7.57 K/UL (ref 1.8–8)
NEUTS SEG NFR BLD: 82.6 % (ref 32–75)
NITRITE UR QL STRIP.AUTO: NEGATIVE
NRBC # BLD: 0 K/UL (ref 0–0.01)
NRBC BLD-RTO: 0 PER 100 WBC
P SHIGELLOIDES DNA STL QL NAA+PROBE: NEGATIVE
PH UR STRIP: 6 (ref 5–8)
PHOSPHATE SERPL-MCNC: 2.3 MG/DL (ref 2.6–4.7)
PLATELET # BLD AUTO: 258 K/UL (ref 150–400)
PMV BLD AUTO: 9.8 FL (ref 8.9–12.9)
POTASSIUM SERPL-SCNC: 2.7 MMOL/L (ref 3.5–5.1)
PROT SERPL-MCNC: 5.1 G/DL (ref 6.4–8.2)
PROT UR STRIP-MCNC: 30 MG/DL
RBC # BLD AUTO: 2.35 M/UL (ref 3.8–5.2)
RBC #/AREA URNS HPF: ABNORMAL /HPF (ref 0–5)
SALMONELLA SP SPAO STL QL NAA+PROBE: NEGATIVE
SHIGELLA SP+EIEC IPAH STL QL NAA+PROBE: NEGATIVE
SODIUM SERPL-SCNC: 138 MMOL/L (ref 136–145)
SP GR UR REFRACTOMETRY: 1.02 (ref 1–1.03)
URINE CULTURE IF INDICATED: ABNORMAL
UROBILINOGEN UR QL STRIP.AUTO: 1 EU/DL (ref 0.2–1)
V CHOL+PARA+VUL DNA STL QL NAA+NON-PROBE: NEGATIVE
VIT B12 SERPL-MCNC: 1042 PG/ML (ref 193–986)
WBC # BLD AUTO: 9.2 K/UL (ref 3.6–11)
WBC URNS QL MICRO: ABNORMAL /HPF (ref 0–4)
Y ENTEROCOL DNA STL QL NAA+NON-PROBE: NEGATIVE

## 2025-06-09 PROCEDURE — 87506 IADNA-DNA/RNA PROBE TQ 6-11: CPT

## 2025-06-09 PROCEDURE — 87324 CLOSTRIDIUM AG IA: CPT

## 2025-06-09 PROCEDURE — 81001 URINALYSIS AUTO W/SCOPE: CPT

## 2025-06-09 PROCEDURE — 97116 GAIT TRAINING THERAPY: CPT

## 2025-06-09 PROCEDURE — 82746 ASSAY OF FOLIC ACID SERUM: CPT

## 2025-06-09 PROCEDURE — 97161 PT EVAL LOW COMPLEX 20 MIN: CPT

## 2025-06-09 PROCEDURE — 82607 VITAMIN B-12: CPT

## 2025-06-09 PROCEDURE — 2580000003 HC RX 258: Performed by: STUDENT IN AN ORGANIZED HEALTH CARE EDUCATION/TRAINING PROGRAM

## 2025-06-09 PROCEDURE — 87449 NOS EACH ORGANISM AG IA: CPT

## 2025-06-09 PROCEDURE — 83735 ASSAY OF MAGNESIUM: CPT

## 2025-06-09 PROCEDURE — 6370000000 HC RX 637 (ALT 250 FOR IP): Performed by: STUDENT IN AN ORGANIZED HEALTH CARE EDUCATION/TRAINING PROGRAM

## 2025-06-09 PROCEDURE — 6360000002 HC RX W HCPCS: Performed by: STUDENT IN AN ORGANIZED HEALTH CARE EDUCATION/TRAINING PROGRAM

## 2025-06-09 PROCEDURE — 36415 COLL VENOUS BLD VENIPUNCTURE: CPT

## 2025-06-09 PROCEDURE — 1100000000 HC RM PRIVATE

## 2025-06-09 PROCEDURE — 6370000000 HC RX 637 (ALT 250 FOR IP): Performed by: PHYSICIAN ASSISTANT

## 2025-06-09 PROCEDURE — 85025 COMPLETE CBC W/AUTO DIFF WBC: CPT

## 2025-06-09 PROCEDURE — 97110 THERAPEUTIC EXERCISES: CPT

## 2025-06-09 PROCEDURE — 80053 COMPREHEN METABOLIC PANEL: CPT

## 2025-06-09 PROCEDURE — 2500000003 HC RX 250 WO HCPCS: Performed by: STUDENT IN AN ORGANIZED HEALTH CARE EDUCATION/TRAINING PROGRAM

## 2025-06-09 PROCEDURE — 84100 ASSAY OF PHOSPHORUS: CPT

## 2025-06-09 RX ORDER — POTASSIUM CHLORIDE 1.5 G/1.58G
40 POWDER, FOR SOLUTION ORAL 2 TIMES DAILY
Status: DISPENSED | OUTPATIENT
Start: 2025-06-09 | End: 2025-06-11

## 2025-06-09 RX ORDER — POTASSIUM CHLORIDE 1500 MG/1
40 TABLET, EXTENDED RELEASE ORAL 2 TIMES DAILY
Status: DISCONTINUED | OUTPATIENT
Start: 2025-06-09 | End: 2025-06-09 | Stop reason: SDUPTHER

## 2025-06-09 RX ORDER — LOPERAMIDE HYDROCHLORIDE 2 MG/1
2 CAPSULE ORAL ONCE
Status: COMPLETED | OUTPATIENT
Start: 2025-06-09 | End: 2025-06-09

## 2025-06-09 RX ORDER — MAGNESIUM SULFATE 1 G/100ML
1000 INJECTION INTRAVENOUS ONCE
Status: COMPLETED | OUTPATIENT
Start: 2025-06-09 | End: 2025-06-09

## 2025-06-09 RX ORDER — POTASSIUM CHLORIDE 7.45 MG/ML
10 INJECTION INTRAVENOUS
Status: DISPENSED | OUTPATIENT
Start: 2025-06-09 | End: 2025-06-09

## 2025-06-09 RX ADMIN — POTASSIUM CHLORIDE 10 MEQ: 7.46 INJECTION, SOLUTION INTRAVENOUS at 09:58

## 2025-06-09 RX ADMIN — POTASSIUM CHLORIDE 10 MEQ: 7.46 INJECTION, SOLUTION INTRAVENOUS at 11:00

## 2025-06-09 RX ADMIN — MAGNESIUM SULFATE HEPTAHYDRATE 1000 MG: 1 INJECTION, SOLUTION INTRAVENOUS at 13:40

## 2025-06-09 RX ADMIN — SODIUM CHLORIDE, SODIUM LACTATE, POTASSIUM CHLORIDE, AND CALCIUM CHLORIDE: .6; .31; .03; .02 INJECTION, SOLUTION INTRAVENOUS at 09:54

## 2025-06-09 RX ADMIN — PANTOPRAZOLE SODIUM 40 MG: 40 INJECTION, POWDER, FOR SOLUTION INTRAVENOUS at 09:59

## 2025-06-09 RX ADMIN — CLOPIDOGREL BISULFATE 75 MG: 75 TABLET, FILM COATED ORAL at 10:00

## 2025-06-09 RX ADMIN — METRONIDAZOLE 500 MG: 500 INJECTION, SOLUTION INTRAVENOUS at 15:29

## 2025-06-09 RX ADMIN — POTASSIUM CHLORIDE 10 MEQ: 7.46 INJECTION, SOLUTION INTRAVENOUS at 12:17

## 2025-06-09 RX ADMIN — ATORVASTATIN CALCIUM 20 MG: 20 TABLET, FILM COATED ORAL at 20:52

## 2025-06-09 RX ADMIN — METRONIDAZOLE 500 MG: 500 INJECTION, SOLUTION INTRAVENOUS at 23:37

## 2025-06-09 RX ADMIN — ONDANSETRON 4 MG: 2 INJECTION, SOLUTION INTRAMUSCULAR; INTRAVENOUS at 20:54

## 2025-06-09 RX ADMIN — LOPERAMIDE HYDROCHLORIDE 2 MG: 2 CAPSULE ORAL at 01:03

## 2025-06-09 RX ADMIN — ONDANSETRON 4 MG: 2 INJECTION, SOLUTION INTRAMUSCULAR; INTRAVENOUS at 13:28

## 2025-06-09 RX ADMIN — POTASSIUM CHLORIDE 40 MEQ: 1.5 FOR SOLUTION ORAL at 20:52

## 2025-06-09 RX ADMIN — METOPROLOL SUCCINATE 25 MG: 25 TABLET, EXTENDED RELEASE ORAL at 10:09

## 2025-06-09 RX ADMIN — ONDANSETRON 4 MG: 2 INJECTION, SOLUTION INTRAMUSCULAR; INTRAVENOUS at 01:03

## 2025-06-09 RX ADMIN — SODIUM CHLORIDE, PRESERVATIVE FREE 10 ML: 5 INJECTION INTRAVENOUS at 10:08

## 2025-06-09 RX ADMIN — POTASSIUM CHLORIDE 40 MEQ: 1500 TABLET, EXTENDED RELEASE ORAL at 10:01

## 2025-06-09 RX ADMIN — ENOXAPARIN SODIUM 40 MG: 100 INJECTION SUBCUTANEOUS at 10:00

## 2025-06-09 RX ADMIN — METRONIDAZOLE 500 MG: 500 INJECTION, SOLUTION INTRAVENOUS at 06:22

## 2025-06-09 RX ADMIN — WATER 2000 MG: 1 INJECTION INTRAMUSCULAR; INTRAVENOUS; SUBCUTANEOUS at 13:32

## 2025-06-09 RX ADMIN — KETOROLAC TROMETHAMINE 15 MG: 30 INJECTION, SOLUTION INTRAMUSCULAR at 03:54

## 2025-06-09 RX ADMIN — KETOROLAC TROMETHAMINE 15 MG: 30 INJECTION, SOLUTION INTRAMUSCULAR at 10:01

## 2025-06-09 ASSESSMENT — PAIN DESCRIPTION - LOCATION: LOCATION: HIP

## 2025-06-09 ASSESSMENT — PAIN DESCRIPTION - DESCRIPTORS: DESCRIPTORS: SHARP

## 2025-06-09 ASSESSMENT — PAIN - FUNCTIONAL ASSESSMENT: PAIN_FUNCTIONAL_ASSESSMENT: ACTIVITIES ARE NOT PREVENTED

## 2025-06-09 ASSESSMENT — PAIN SCALES - GENERAL
PAINLEVEL_OUTOF10: 0

## 2025-06-09 ASSESSMENT — PAIN DESCRIPTION - ORIENTATION: ORIENTATION: LEFT

## 2025-06-09 NOTE — PROGRESS NOTES
End of Shift Note    Bedside shift change report given to MACI Wagner (oncoming nurse) by Isela Araya RN (offgoing nurse).  Report included the following information SBAR and MAR    Shift worked:  3229-2129     Shift summary and any significant changes:     Pt had many trips to the bathroom and reports diarrhea. Some bright blood in stools and reports hx of hemorrhoids. One dose of antidiarrheal, and slept well after. Woke up complaining of gas.      Concerns for physician to address:  -     Zone phone for oncoming shift:   -       Activity:   To bathroom        Cardiac:   Cardiac Monitoring: No           Access:  Current line(s): PIV     Genitourinary:   Urinary Status: Voiding    Respiratory:   O2 Device: None (Room air)  Chronic home O2 use?: NO  Incentive spirometer at bedside: NO    GI:     Current diet:  ADULT DIET; Clear Liquid  Passing flatus: YES    Pain Management:   Patient states pain is manageable on current regimen: YES    Skin:  Fritz Scale Score: 19  Interventions: Wound Offloading (Prevention Methods): Bed, pressure reduction mattress, Turning, Pillows    Patient Safety:  Fall Risk: Nursing Judgement-Fall Risk High(Add Comments): Yes  Fall Risk Interventions  Nursing Judgement-Fall Risk High(Add Comments): Yes  Toilet Every 2 Hours-In Advance of Need: Yes  Hourly Visual Checks: In bed  Fall Visual Posted: Armband, Fall sign posted, Socks  Room Door Open: Yes  Alarm On: Bed  Patient Moved Closer to Nursing Station: No    Active Consults:   IP CONSULT TO PHARMACY  IP CONSULT TO SOCIAL WORK  IP CONSULT TO CASE MANAGEMENT    Length of Stay:  Expected LOS: 3  Actual LOS: 1    Isela Araya RN

## 2025-06-09 NOTE — PROGRESS NOTES
Occupational Therapy   Chart reviewed; cleared for tx which patient declines; she is up to chair mod I with safety concerns while trying to manage IV and RW; recommend to call RN staff to remove IV when frequently toileting for safest bathroom transfers with RW; has been up ad jeovanny in room, dressed herself including B LE lymphedema garments. Declines OT as \"I am at my normal level, the pain is gone.\" Has signed waiver to decline chair alarm. Encouraged patient to request therapy again if she feels a decline/has been hospitalized longer than expected. Will complete order at patient request. Nancy Hirsch OTR/L

## 2025-06-09 NOTE — PROGRESS NOTES
PHYSICAL THERAPY EVALUATION/DISCHARGE    Patient: Elenita Benitez (74 y.o. female)  Date: 6/9/2025  Primary Diagnosis: Hypokalemia [E87.6]  Colitis [K52.9]  Acute left flank pain [R10.9]  Infectious gastroenteritis and colitis [A09]       Precautions: Restrictions/Precautions  Restrictions/Precautions:  (falls, lymphedema, cancer with chemo)            ASSESSMENT AND RECOMMENDATIONS:    Pt tolerated PT services well and presents at or near baseline status. Pt received in bedside chair and amenable to therapy services. Pt confirms that she has been up walking in the room with RW. Per observed IV lines, pt education recommending staff presence when ambulating in the room per inability to safely ambulate with both hands on dme AND simultaneously pushing IV line. Pt acknowledged understanding, but admits \"I simply do it slowly little by little\".    Pt is independent and ambulatory at baseline and resides in a 1SH with level entry. On this date, most tasks performed with Claudette/distant supervision including multiple functional sit <-> stand transfers, room ambulation. Good static/dynamic standing balance and activity tolerance seen during extended  bathroom for ADLs.    Pt was assisted with return to bedside chair and positioned for comfort. Pt education provided re pending dc from [facility] PT Team services with recommended dc to home with HHPT vs OP PT. Pt amenable to the same and resting comfortably with no further needs when therapist departed.    Accordingly, will dc PT services and airam \"complete\".    Functional Outcome Measure:  The patient scored 23/24 on the [facility] outcome measure which is indicative of low fall risk.      Patient is cleared for discharge from PT standpoint:  YES [x]     NO []      PLAN :  Recommendation for discharge: (in order for the patient to meet his/her long term goals):  HHPT vs OP PT    Other factors to consider for discharge: lives alone and medical status, pain.    IF patient

## 2025-06-09 NOTE — PROGRESS NOTES
End of Shift Note    Bedside shift change report given to *** (oncoming nurse) by ESTHER PATINO RN (offgoing nurse).  Report included the following information SBAR and MAR    Shift worked:  7am - 7pm     Shift summary and any significant changes:     Medication given per Mar and education complete. K+ 2.7, provider made aware and K+ x 3 runs given per order. Pt also is having blood clots from stool, Pt states \"I think they are hemorrhoids bleeding from my treatment. Provider Ej made aware and no orders received. Encouraged. Safety and caring rounds complete.      Concerns for physician to address:       Zone phone for oncoming shift:          Activity:     Number times ambulated in hallways past shift: 0  Number of times OOB to chair past shift: 1    Cardiac:   Cardiac Monitoring: No           Access:  Current line(s): port accessed    Genitourinary:   Urinary Status: Voiding    Respiratory:   O2 Device: None (Room air)  Chronic home O2 use?: NO  Incentive spirometer at bedside: NO    GI:     Current diet:  ADULT DIET; Regular; Low Fiber; Lactose-Controlled, No Beef, Double Protein Portions  Passing flatus: YES    Pain Management:   Patient states pain is manageable on current regimen: YES    Skin:  Fritz Scale Score: 19  Interventions: Wound Offloading (Prevention Methods): Bed, pressure reduction mattress, Turning, Pillows    Patient Safety:  Fall Risk: Nursing Judgement-Fall Risk High(Add Comments): Yes  Fall Risk Interventions  Nursing Judgement-Fall Risk High(Add Comments): Yes  Toilet Every 2 Hours-In Advance of Need: Yes  Hourly Visual Checks: In bed  Fall Visual Posted: Armband, Fall sign posted, Socks  Room Door Open: Yes  Alarm On: Bed  Patient Moved Closer to Nursing Station: No    Active Consults:   IP CONSULT TO PHARMACY  IP CONSULT TO SOCIAL WORK  IP CONSULT TO CASE MANAGEMENT    Length of Stay:  Expected LOS: 3  Actual LOS: 1    ESTHER PATINO, RN

## 2025-06-10 LAB
ALBUMIN SERPL-MCNC: 1.9 G/DL (ref 3.5–5)
ALBUMIN/GLOB SERPL: 0.7 (ref 1.1–2.2)
ALP SERPL-CCNC: 815 U/L (ref 45–117)
ALT SERPL-CCNC: 57 U/L (ref 12–78)
ANION GAP SERPL CALC-SCNC: 3 MMOL/L (ref 2–12)
AST SERPL-CCNC: 37 U/L (ref 15–37)
BASOPHILS # BLD: 0.03 K/UL (ref 0–0.1)
BASOPHILS NFR BLD: 0.5 % (ref 0–1)
BILIRUB SERPL-MCNC: 0.6 MG/DL (ref 0.2–1)
BUN SERPL-MCNC: 10 MG/DL (ref 6–20)
BUN/CREAT SERPL: 15 (ref 12–20)
CALCIUM SERPL-MCNC: 8 MG/DL (ref 8.5–10.1)
CHLORIDE SERPL-SCNC: 109 MMOL/L (ref 97–108)
CO2 SERPL-SCNC: 26 MMOL/L (ref 21–32)
CREAT SERPL-MCNC: 0.65 MG/DL (ref 0.55–1.02)
DIFFERENTIAL METHOD BLD: ABNORMAL
EOSINOPHIL # BLD: 0.23 K/UL (ref 0–0.4)
EOSINOPHIL NFR BLD: 4.1 % (ref 0–7)
ERYTHROCYTE [DISTWIDTH] IN BLOOD BY AUTOMATED COUNT: 17.6 % (ref 11.5–14.5)
GLOBULIN SER CALC-MCNC: 2.8 G/DL (ref 2–4)
GLUCOSE SERPL-MCNC: 90 MG/DL (ref 65–100)
HCT VFR BLD AUTO: 23 % (ref 35–47)
HCT VFR BLD AUTO: 28.3 % (ref 35–47)
HGB BLD-MCNC: 6.8 G/DL (ref 11.5–16)
HGB BLD-MCNC: 8.8 G/DL (ref 11.5–16)
HISTORY CHECK: NORMAL
IMM GRANULOCYTES # BLD AUTO: 0.03 K/UL (ref 0–0.04)
IMM GRANULOCYTES NFR BLD AUTO: 0.5 % (ref 0–0.5)
LYMPHOCYTES # BLD: 0.47 K/UL (ref 0.8–3.5)
LYMPHOCYTES NFR BLD: 8.3 % (ref 12–49)
MAGNESIUM SERPL-MCNC: 2 MG/DL (ref 1.6–2.4)
MCH RBC QN AUTO: 31.1 PG (ref 26–34)
MCHC RBC AUTO-ENTMCNC: 29.6 G/DL (ref 30–36.5)
MCV RBC AUTO: 105 FL (ref 80–99)
MONOCYTES # BLD: 0.51 K/UL (ref 0–1)
MONOCYTES NFR BLD: 9 % (ref 5–13)
NEUTS SEG # BLD: 4.43 K/UL (ref 1.8–8)
NEUTS SEG NFR BLD: 77.6 % (ref 32–75)
NRBC # BLD: 0 K/UL (ref 0–0.01)
NRBC BLD-RTO: 0 PER 100 WBC
PHOSPHATE SERPL-MCNC: 2.1 MG/DL (ref 2.6–4.7)
PLATELET # BLD AUTO: 235 K/UL (ref 150–400)
PMV BLD AUTO: 10.1 FL (ref 8.9–12.9)
POTASSIUM SERPL-SCNC: 3.1 MMOL/L (ref 3.5–5.1)
PROCALCITONIN SERPL-MCNC: 0.68 NG/ML
PROT SERPL-MCNC: 4.7 G/DL (ref 6.4–8.2)
RBC # BLD AUTO: 2.19 M/UL (ref 3.8–5.2)
RBC MORPH BLD: ABNORMAL
RBC MORPH BLD: ABNORMAL
SODIUM SERPL-SCNC: 138 MMOL/L (ref 136–145)
WBC # BLD AUTO: 5.7 K/UL (ref 3.6–11)

## 2025-06-10 PROCEDURE — 6370000000 HC RX 637 (ALT 250 FOR IP): Performed by: INTERNAL MEDICINE

## 2025-06-10 PROCEDURE — 85014 HEMATOCRIT: CPT

## 2025-06-10 PROCEDURE — 83735 ASSAY OF MAGNESIUM: CPT

## 2025-06-10 PROCEDURE — 2500000003 HC RX 250 WO HCPCS: Performed by: STUDENT IN AN ORGANIZED HEALTH CARE EDUCATION/TRAINING PROGRAM

## 2025-06-10 PROCEDURE — 6370000000 HC RX 637 (ALT 250 FOR IP): Performed by: STUDENT IN AN ORGANIZED HEALTH CARE EDUCATION/TRAINING PROGRAM

## 2025-06-10 PROCEDURE — 85025 COMPLETE CBC W/AUTO DIFF WBC: CPT

## 2025-06-10 PROCEDURE — 86850 RBC ANTIBODY SCREEN: CPT

## 2025-06-10 PROCEDURE — 84145 PROCALCITONIN (PCT): CPT

## 2025-06-10 PROCEDURE — 36430 TRANSFUSION BLD/BLD COMPNT: CPT

## 2025-06-10 PROCEDURE — 84100 ASSAY OF PHOSPHORUS: CPT

## 2025-06-10 PROCEDURE — 1100000000 HC RM PRIVATE

## 2025-06-10 PROCEDURE — P9016 RBC LEUKOCYTES REDUCED: HCPCS

## 2025-06-10 PROCEDURE — 80053 COMPREHEN METABOLIC PANEL: CPT

## 2025-06-10 PROCEDURE — 86923 COMPATIBILITY TEST ELECTRIC: CPT

## 2025-06-10 PROCEDURE — 86901 BLOOD TYPING SEROLOGIC RH(D): CPT

## 2025-06-10 PROCEDURE — 6360000002 HC RX W HCPCS: Performed by: STUDENT IN AN ORGANIZED HEALTH CARE EDUCATION/TRAINING PROGRAM

## 2025-06-10 PROCEDURE — 85018 HEMOGLOBIN: CPT

## 2025-06-10 PROCEDURE — 94760 N-INVAS EAR/PLS OXIMETRY 1: CPT

## 2025-06-10 PROCEDURE — 6370000000 HC RX 637 (ALT 250 FOR IP): Performed by: PHYSICIAN ASSISTANT

## 2025-06-10 PROCEDURE — 86900 BLOOD TYPING SEROLOGIC ABO: CPT

## 2025-06-10 PROCEDURE — 36415 COLL VENOUS BLD VENIPUNCTURE: CPT

## 2025-06-10 PROCEDURE — 30233N1 TRANSFUSION OF NONAUTOLOGOUS RED BLOOD CELLS INTO PERIPHERAL VEIN, PERCUTANEOUS APPROACH: ICD-10-PCS | Performed by: STUDENT IN AN ORGANIZED HEALTH CARE EDUCATION/TRAINING PROGRAM

## 2025-06-10 RX ORDER — SODIUM CHLORIDE 9 MG/ML
INJECTION, SOLUTION INTRAVENOUS PRN
Status: DISCONTINUED | OUTPATIENT
Start: 2025-06-10 | End: 2025-06-12 | Stop reason: HOSPADM

## 2025-06-10 RX ORDER — POTASSIUM CHLORIDE 29.8 MG/ML
20 INJECTION INTRAVENOUS PRN
Status: DISCONTINUED | OUTPATIENT
Start: 2025-06-10 | End: 2025-06-10

## 2025-06-10 RX ADMIN — POTASSIUM CHLORIDE 10 MEQ: 7.46 INJECTION, SOLUTION INTRAVENOUS at 10:02

## 2025-06-10 RX ADMIN — SODIUM CHLORIDE, PRESERVATIVE FREE 10 ML: 5 INJECTION INTRAVENOUS at 20:51

## 2025-06-10 RX ADMIN — METOPROLOL SUCCINATE 25 MG: 25 TABLET, EXTENDED RELEASE ORAL at 09:37

## 2025-06-10 RX ADMIN — PANTOPRAZOLE SODIUM 40 MG: 40 INJECTION, POWDER, FOR SOLUTION INTRAVENOUS at 09:37

## 2025-06-10 RX ADMIN — HYDROMORPHONE HYDROCHLORIDE 2 MG: 2 TABLET ORAL at 01:44

## 2025-06-10 RX ADMIN — METRONIDAZOLE 500 MG: 500 INJECTION, SOLUTION INTRAVENOUS at 13:32

## 2025-06-10 RX ADMIN — ACETAMINOPHEN 650 MG: 325 TABLET ORAL at 14:50

## 2025-06-10 RX ADMIN — METRONIDAZOLE 500 MG: 500 INJECTION, SOLUTION INTRAVENOUS at 09:58

## 2025-06-10 RX ADMIN — ASPIRIN 81 MG: 81 TABLET, CHEWABLE ORAL at 14:49

## 2025-06-10 RX ADMIN — HYDROMORPHONE HYDROCHLORIDE 2 MG: 2 TABLET ORAL at 20:46

## 2025-06-10 RX ADMIN — METRONIDAZOLE 500 MG: 500 INJECTION, SOLUTION INTRAVENOUS at 20:48

## 2025-06-10 RX ADMIN — SODIUM CHLORIDE, PRESERVATIVE FREE 10 ML: 5 INJECTION INTRAVENOUS at 10:02

## 2025-06-10 RX ADMIN — WATER 2000 MG: 1 INJECTION INTRAMUSCULAR; INTRAVENOUS; SUBCUTANEOUS at 13:31

## 2025-06-10 RX ADMIN — ATORVASTATIN CALCIUM 20 MG: 20 TABLET, FILM COATED ORAL at 20:46

## 2025-06-10 ASSESSMENT — PAIN SCALES - GENERAL
PAINLEVEL_OUTOF10: 4
PAINLEVEL_OUTOF10: 0
PAINLEVEL_OUTOF10: 0

## 2025-06-10 ASSESSMENT — PAIN DESCRIPTION - PAIN TYPE: TYPE: ACUTE PAIN

## 2025-06-10 ASSESSMENT — PAIN DESCRIPTION - FREQUENCY: FREQUENCY: INTERMITTENT

## 2025-06-10 ASSESSMENT — PAIN DESCRIPTION - LOCATION
LOCATION: ABDOMEN
LOCATION: ABDOMEN

## 2025-06-10 ASSESSMENT — PAIN DESCRIPTION - DESCRIPTORS
DESCRIPTORS: ACHING
DESCRIPTORS: OTHER (COMMENT)

## 2025-06-10 ASSESSMENT — PAIN DESCRIPTION - ONSET: ONSET: GRADUAL

## 2025-06-10 ASSESSMENT — PAIN DESCRIPTION - ORIENTATION
ORIENTATION: LEFT
ORIENTATION: LEFT

## 2025-06-10 ASSESSMENT — PAIN - FUNCTIONAL ASSESSMENT: PAIN_FUNCTIONAL_ASSESSMENT: ACTIVITIES ARE NOT PREVENTED

## 2025-06-10 NOTE — PROGRESS NOTES
Signed         End of Shift Note     Bedside shift change report given to Du LUX (oncoming nurse) by  MACI Sears (offgoing nurse).  Report included the following information SBAR, Kardex, and MAR     Shift worked:  1660-6380         Shift summary and any significant changes:     1500 Asprin was given per Dr Pagan orders.    1530 1 unit of blood transfusion was started patient tolerated well vitals are stable.     Pt Aox4.       Concerns for physician to address:  none

## 2025-06-10 NOTE — CONSULTS
GI CONSULTATION NOTE  Irma Wilkerson PA-C  [phone] NP in-hospital cell phone M-F until 4:30  After 5pm or on weekends, please call  for physician on call    NAME: Elenita Benitez   :  1950   MRN:  [id number]   Attending: Dr. Pagan  Date/Time:  6/10/2025 2:55 PM  Assessment:   Colitis   Hematochezia  Acute on chronic anemia   Nausea and vomiting   Loose stools   Abdominal pain   CAD s/p recent STEMI  w/ PCI of RCA on Plavix (last dose  AM)    Hgb 9.7 POA > 6.8   Baseline Hgb 7-9 recently     BUN/Cr normal   WC 12.4 POA > 5.7   , other liver enzymes normal   Lipase wnl   C. Diff negative   Enteric panel negative   CT a/p with IV contrast   1. Mild left-sided colitis. Moderate fecal retention in the large bowel.  2. Stable left ureteral stent and minimal prominence of the collecting system.  3. Progressive mild to moderate biliary ductal dilatation of uncertain clinical  significance.  4. Stable left retroperitoneal mass.  5. Mild gastritis. Refer to above findings for complete details.  RUQ US   1. Surgically absent gallbladder with bile ducts within normal  postcholecystectomy limits.   2. Unremarkable sonographic appearance of the liver.    Last CLN    Plan:   Imaging and symptoms suggestive of possible infectious vs. Ischemic colitis  Agree with antibiotics   Recommend symptomatic treatment for now - PRN antiemetics and analgesia   Appreciate plavix hold   Plan for colonoscopy after Plavix hold given significant anemia  Goal Hgb > 7 transfuse as needed to maintain   GI will follow     Plan discussed with Dr. Vigil   Subjective:     HISTORY OF PRESENT ILLNESS:     Elenita Benitez is an 74 y.o.  female who we are asked to see for complaint of colitis. Pt developed severe L sided abdominal pain, N/V, liquid stool on Saturday as well as passing moderate amount of blood per rectum. Normal BM are constipation. Hx of GI bleeding with radiation  Collection Time: 06/10/25  5:58 AM   Result Value Ref Range    Magnesium 2.0 1.6 - 2.4 mg/dL   Procalcitonin    Collection Time: 06/10/25  5:58 AM   Result Value Ref Range    Procalcitonin 0.68 ng/mL   PREPARE RBC (CROSSMATCH), 1 Units    Collection Time: 06/10/25  7:30 AM   Result Value Ref Range    History Check Historical check performed    TYPE AND SCREEN    Collection Time: 06/10/25 10:23 AM   Result Value Ref Range    Crossmatch expiration date 06/13/2025,2359     ABO/Rh A POSITIVE     Antibody Screen NEG     Unit Number [id number]     Product Code Blood Bank RC LR     Unit Divison 00     Dispense Status Blood Bank ALLOCATED     Crossmatch Result Compatible        IMAGING RESULTS:  I have personally reviewed the imaging reports      Total time spent with patient: 30 minutes ________________________________________________________________________  Care Plan discussed with:  Patient Y   Family     RN               Consultant:       ________________________________________________________________________    ___________________________________________________  Consulting Provider: Irma Wilkerson PA-C      6/10/2025  2:55 PM

## 2025-06-10 NOTE — PROGRESS NOTES
sounds  Neurologic:  Alert and oriented X 3, normal speech,   Psych:   Not anxious nor agitated  Skin:  No rashes.  No jaundice    Reviewed most current lab test results and cultures  YES  Reviewed most current radiology test results   YES  Review and summation of old records today    NO  Reviewed patient's current orders and MAR    YES  PMH/SH reviewed - no change compared to H&P    Procedures: see electronic medical records for all procedures/Xrays and details which were not copied into this note but were reviewed prior to creation of Plan.      LABS:  I reviewed today's most current labs and imaging studies.  Pertinent labs include:  Recent Labs     06/08/25  0947 06/09/25  0600   WBC 12.4* 9.2   HGB 9.7* 7.2*   HCT 31.9* 24.5*    258     Recent Labs     06/08/25  0947 06/08/25  1422 06/09/25  0600     --  138   K 2.6*  --  2.7*     --  106   CO2 25  --  28   GLUCOSE 138*  --  102*   BUN 12  --  12   CREATININE 0.85  --  0.67   CALCIUM 9.1  --  8.2*   MG  --  2.1 1.9   PHOS 3.2  --  2.3*   BILITOT 1.2*  --  0.6   AST 57*  --  39*   *  --  69   INR  --  1.1  --        Signed: Araseli Pagan MD

## 2025-06-10 NOTE — PROGRESS NOTES
Hospitalist Progress Note    NAME:   Elenita Benitez   : 1950   MRN: [id number]     Date/Time: 6/10/2025 5:48 PM  Patient PCP: Artis Bangura MD    Estimated discharge date: ??  Barriers: Resolved rectal bleeding, GI recs-plavix washout?      Assessment / Plan:    Colitis, unspecified  Hypokalemia  Chronic constipation  R/o infectious, working dx hemorrhoidal bleeding, radiation proctitis? Vs ischemic colitis?  Cdiff negative    Continue abx  Hold plavix, continue ASA for stent placed per cardiology  GI consulted, expertise appreciated  Enteric add on never done. Re-ordered  Titrate diet as tolerated  Add oncology consult if needed/recommended  Replenish electrolytes prn  IV PPI  Cardiology consulted for continuity and recommendations regarding holding aspirin/Plavix- desire ASA to continue for now  Transfuse prn Hb <7  Trend daily labs    Acute liver injury  Liver US okay. Surgically absent gallbladder  Trend CMP  Follow up GI recs    Anemia of chronic disease  Endometrial Ca  Presence of urethral sent  Follow-up with Dr. Nicole outpatient.  Transfuse hemoglobin less than 7  Patient rescheduled stent replacement prior to chemo    HTN  CAD  Continue home metoprolol  Hold home ASA/plavix    Medical Decision Making:    [x] High (any 2)     A. Problems (any 1)  [x] Acute/Chronic Illness/injury posing threat to life or bodily function:  Colitis   [] Severe exacerbation of chronic illness:    ---------------------------------------------------------------------  B. Risk of Treatment (any 1)   [x] Drugs/treatments that require intensive monitoring for toxicity include:    [] IV ABX requiring serial renal monitoring for nephrotoxicity:     [] IV Narcotic analgesia for adverse drug reaction  [] Aggressive IV diuresis requiring serial monitoring for renal impairment and electrolyte derangements  [x] Critical electrolyte abnormalities requiring IV replacement and close serial monitoring  [] Insulin -  patient on 6/10/2025, as outlined below:  PHYSICAL EXAM:  General: Alert, cooperative  EENT:  EOMI. Anicteric sclerae.  Resp:  CTA bilaterally, no wheezing or rales.  No accessory muscle use  CV:  Regular  rhythm,  No edema  GI:  Soft, Non distended, Non tender.  +Bowel sounds  Neurologic:  Alert and oriented X 3, normal speech,   Psych:   Not anxious nor agitated  Skin:  No rashes.  No jaundice    Reviewed most current lab test results and cultures  YES  Reviewed most current radiology test results   YES  Review and summation of old records today    NO  Reviewed patient's current orders and MAR    YES  PMH/SH reviewed - no change compared to H&P    Procedures: see electronic medical records for all procedures/Xrays and details which were not copied into this note but were reviewed prior to creation of Plan.      LABS:  I reviewed today's most current labs and imaging studies.  Pertinent labs include:  Recent Labs     06/08/25  0947 06/09/25  0600 06/10/25  0558   WBC 12.4* 9.2 5.7   HGB 9.7* 7.2* 6.8*   HCT 31.9* 24.5* 23.0*    258 235     Recent Labs     06/08/25  0947 06/08/25  1422 06/09/25  0600 06/10/25  0558     --  138 138   K 2.6*  --  2.7* 3.1*     --  106 109*   CO2 25  --  28 26   GLUCOSE 138*  --  102* 90   BUN 12  --  12 10   CREATININE 0.85  --  0.67 0.65   CALCIUM 9.1  --  8.2* 8.0*   MG  --  2.1 1.9 2.0   PHOS 3.2  --  2.3* 2.1*   BILITOT 1.2*  --  0.6 0.6   AST 57*  --  39* 37   *  --  69 57   INR  --  1.1  --   --        Signed: Araseli Pagan MD

## 2025-06-10 NOTE — PLAN OF CARE
Problem: Pain  Goal: Verbalizes/displays adequate comfort level or baseline comfort level  6/10/2025 1230 by Renu Reyna RN  Flowsheets (Taken 6/9/2025 0845 by Kristy Pryor RN)  Verbalizes/displays adequate comfort level or baseline comfort level: Encourage patient to monitor pain and request assistance  6/10/2025 0645 by Leticia Copeland RN  Outcome: Progressing     Problem: Discharge Planning  Goal: Discharge to home or other facility with appropriate resources  6/10/2025 1230 by Renu Reyna RN  Flowsheets (Taken 6/10/2025 1230)  Discharge to home or other facility with appropriate resources: Identify barriers to discharge with patient and caregiver  6/10/2025 0645 by Leticia Copeland RN  Outcome: Progressing     Problem: Safety - Adult  Goal: Free from fall injury  6/10/2025 0645 by Leticia Copeland RN  Outcome: Progressing

## 2025-06-10 NOTE — CARE COORDINATION
Care Management Initial Assessment       RUR: 23%  Readmission? No  1st IM letter given? Yes - 6/8/25  1st  letter given: No      CM introduce self, explain role and confirmed demographics with pt.    Pt stated to CM that her son lives with her in an one story home with no steps to enter.    Pt has a hx of home health but was unable to recall the name of the agency.    No hx of inpatient rehab or SNF.    Pt uses the "[facility] on [address].    At the time of d/c pt's family will transport.    CM will follow and assist with d/c planning.       06/10/25 1620   Service Assessment   Patient Orientation Alert and Oriented   Cognition Alert   History Provided By Patient   Primary Caregiver Self   Support Systems Children   Patient's Healthcare Decision Maker is: Named in Scanned ACP Document   PCP Verified by CM Yes   Last Visit to PCP Within last 3 months   Prior Functional Level Independent in ADLs/IADLs   Current Functional Level Independent in ADLs/IADLs   Can patient return to prior living arrangement Yes   Family able to assist with home care needs: Yes   Would you like for me to discuss the discharge plan with any other family members/significant others, and if so, who? Yes  (Pt's sons)   Financial Resources Medicare;Other (Comment)  ([facility])   Community Resources None   Social/Functional History   Lives With Son   Type of Home House   Home Equipment Walker - Rolling;Grab bars;Cane   Active  No   Patient's  Info Pt's sons/friend   Discharge Planning   Type of Residence House   Current Services Prior To Admission None   Patient expects to be discharged to: House     Advance Care Planning     General Advance Care Planning (ACP) Conversation    Date of Conversation: 6/10/2025  Conducted with: Patient with Decision Making Capacity  Other persons present: None    Healthcare Decision Maker:   Primary Decision Maker: Caesar Benitez - [phone]    Primary Decision Maker: Oneida Benitez -  [phone]       Content/Action Overview:  Has ACP document(s) on file - reflects the patient's care preferences  Reviewed DNR/DNI and patient elects Full Code (Attempt Resuscitation)        Length of Voluntary ACP Conversation in minutes:  <16 minutes (Non-Billable)    Danni Lujan    [phone]

## 2025-06-10 NOTE — CONSENT
Informed Consent for Blood Component Transfusion Note    I have discussed with the patient the rationale for blood component transfusion; its benefits in treating or preventing fatigue, organ damage, or death; and its risk which includes mild transfusion reactions, rare risk of blood borne infection, or more serious but rare reactions. I have discussed the alternatives to transfusion, including the risk and consequences of not receiving transfusion. The patient had an opportunity to ask questions and had agreed to proceed with transfusion of blood components.    Electronically signed by Araseli Pagan MD on 6/10/25 at 7:21 AM EDT

## 2025-06-10 NOTE — CONSULTS
follows:    Total of 12 systems reviewed as follows:       POSITIVE= Bold text  Negative = normal text  General:  fever, chills, sweats, generalized weakness, weight loss/gain,      loss of appetite   Eyes:    blurred vision, eye pain, loss of vision, double vision  ENT:    rhinorrhea, pharyngitis   Respiratory:   cough, sputum production, SOB, MENDEZ, wheezing, pleuritic pain   Cardiology:   chest pain, palpitations, orthopnea, PND, edema, syncope   Gastrointestinal:  abdominal pain , N/V, diarrhea, dysphagia, constipation, bleeding   Genitourinary:  frequency, urgency, dysuria, hematuria, incontinence   Muskuloskeletal :  arthralgia, myalgia, back pain  Hematology:  easy bruising, nose or gum bleeding, lymphadenopathy   Dermatological: rash, ulceration, pruritis, color change / jaundice  Endocrine:   hot flashes or polydipsia   Neurological:  headache, dizziness, confusion, focal weakness, paresthesia,     Speech difficulties, memory loss, gait difficulty  Psychological: Feelings of anxiety, depression, agitation    Objective:      Physical Exam:    Last 24hrs VS reviewed since prior progress note. Most recent are:    /75   Pulse 65   Temp 98.4 °F (36.9 °C)   Resp 18   Ht 1.549 m (5' 1\")   Wt 72.2 kg (159 lb 2.8 oz)   SpO2 97%   BMI 30.08 kg/m²     Intake/Output Summary (Last 24 hours) at 6/10/2025 1059  Last data filed at 6/9/2025 1848  Gross per 24 hour   Intake 200 ml   Output --   Net 200 ml        General Appearance: Well developed, well nourished, alert & oriented x 3,    no acute distress.  Ears/Nose/Mouth/Throat: Pupils equal and round, Hearing grossly normal.  Neck: Supple.  JVP within normal limits. Carotids good upstrokes, with no bruit.  Chest: Lungs clear to auscultation bilaterally.  Cardiovascular: Regular rate and rhythm, S1S2 normal, no murmur, rubs, gallops.R upper chest portacath  Abdomen: Soft, non-tender, bowel sounds are active. No organomegaly.  Extremities: No edema bilaterally.  Albumin/Globulin Ratio 0.7 (L) 1.1 - 2.2     Phosphorus    Collection Time: 06/10/25  5:58 AM   Result Value Ref Range    Phosphorus 2.1 (L) 2.6 - 4.7 MG/DL   Magnesium    Collection Time: 06/10/25  5:58 AM   Result Value Ref Range    Magnesium 2.0 1.6 - 2.4 mg/dL   Procalcitonin    Collection Time: 06/10/25  5:58 AM   Result Value Ref Range    Procalcitonin 0.68 ng/mL

## 2025-06-11 LAB
ABO + RH BLD: NORMAL
ALBUMIN SERPL-MCNC: 1.9 G/DL (ref 3.5–5)
ALBUMIN/GLOB SERPL: 0.7 (ref 1.1–2.2)
ALP SERPL-CCNC: 827 U/L (ref 45–117)
ALT SERPL-CCNC: 56 U/L (ref 12–78)
ANION GAP SERPL CALC-SCNC: 5 MMOL/L (ref 2–12)
AST SERPL-CCNC: 51 U/L (ref 15–37)
BASOPHILS # BLD: 0.03 K/UL (ref 0–0.1)
BASOPHILS NFR BLD: 0.5 % (ref 0–1)
BILIRUB SERPL-MCNC: 0.9 MG/DL (ref 0.2–1)
BLD PROD TYP BPU: NORMAL
BLOOD BANK BLOOD PRODUCT EXPIRATION DATE: NORMAL
BLOOD BANK DISPENSE STATUS: NORMAL
BLOOD BANK ISBT PRODUCT BLOOD TYPE: 6200
BLOOD BANK PRODUCT CODE: NORMAL
BLOOD BANK UNIT TYPE AND RH: NORMAL
BLOOD GROUP ANTIBODIES SERPL: NORMAL
BPU ID: NORMAL
BUN SERPL-MCNC: 11 MG/DL (ref 6–20)
BUN/CREAT SERPL: 16 (ref 12–20)
CALCIUM SERPL-MCNC: 7.8 MG/DL (ref 8.5–10.1)
CHLORIDE SERPL-SCNC: 110 MMOL/L (ref 97–108)
CO2 SERPL-SCNC: 25 MMOL/L (ref 21–32)
CREAT SERPL-MCNC: 0.68 MG/DL (ref 0.55–1.02)
CROSSMATCH RESULT: NORMAL
DIFFERENTIAL METHOD BLD: ABNORMAL
EOSINOPHIL # BLD: 0.28 K/UL (ref 0–0.4)
EOSINOPHIL NFR BLD: 4.9 % (ref 0–7)
ERYTHROCYTE [DISTWIDTH] IN BLOOD BY AUTOMATED COUNT: 19.4 % (ref 11.5–14.5)
GLOBULIN SER CALC-MCNC: 2.9 G/DL (ref 2–4)
GLUCOSE SERPL-MCNC: 89 MG/DL (ref 65–100)
HCT VFR BLD AUTO: 29.2 % (ref 35–47)
HGB BLD-MCNC: 8.8 G/DL (ref 11.5–16)
IMM GRANULOCYTES # BLD AUTO: 0.06 K/UL (ref 0–0.04)
IMM GRANULOCYTES NFR BLD AUTO: 1.1 % (ref 0–0.5)
LYMPHOCYTES # BLD: 0.39 K/UL (ref 0.8–3.5)
LYMPHOCYTES NFR BLD: 6.9 % (ref 12–49)
MCH RBC QN AUTO: 30.6 PG (ref 26–34)
MCHC RBC AUTO-ENTMCNC: 30.1 G/DL (ref 30–36.5)
MCV RBC AUTO: 101.4 FL (ref 80–99)
MONOCYTES # BLD: 0.53 K/UL (ref 0–1)
MONOCYTES NFR BLD: 9.4 % (ref 5–13)
NEUTS SEG # BLD: 4.37 K/UL (ref 1.8–8)
NEUTS SEG NFR BLD: 77.2 % (ref 32–75)
NRBC # BLD: 0 K/UL (ref 0–0.01)
NRBC BLD-RTO: 0 PER 100 WBC
PLATELET # BLD AUTO: 245 K/UL (ref 150–400)
PMV BLD AUTO: 10.2 FL (ref 8.9–12.9)
POTASSIUM SERPL-SCNC: 3.2 MMOL/L (ref 3.5–5.1)
PROT SERPL-MCNC: 4.8 G/DL (ref 6.4–8.2)
RBC # BLD AUTO: 2.88 M/UL (ref 3.8–5.2)
SODIUM SERPL-SCNC: 140 MMOL/L (ref 136–145)
SPECIMEN EXP DATE BLD: NORMAL
UNIT DIVISION: 0
UNIT ISSUE DATE/TIME: NORMAL
WBC # BLD AUTO: 5.7 K/UL (ref 3.6–11)

## 2025-06-11 PROCEDURE — 6370000000 HC RX 637 (ALT 250 FOR IP): Performed by: STUDENT IN AN ORGANIZED HEALTH CARE EDUCATION/TRAINING PROGRAM

## 2025-06-11 PROCEDURE — 2500000003 HC RX 250 WO HCPCS: Performed by: STUDENT IN AN ORGANIZED HEALTH CARE EDUCATION/TRAINING PROGRAM

## 2025-06-11 PROCEDURE — 6360000002 HC RX W HCPCS: Performed by: STUDENT IN AN ORGANIZED HEALTH CARE EDUCATION/TRAINING PROGRAM

## 2025-06-11 PROCEDURE — 6370000000 HC RX 637 (ALT 250 FOR IP)

## 2025-06-11 PROCEDURE — 1100000000 HC RM PRIVATE

## 2025-06-11 PROCEDURE — 6370000000 HC RX 637 (ALT 250 FOR IP): Performed by: PHYSICIAN ASSISTANT

## 2025-06-11 PROCEDURE — 36415 COLL VENOUS BLD VENIPUNCTURE: CPT

## 2025-06-11 PROCEDURE — 85025 COMPLETE CBC W/AUTO DIFF WBC: CPT

## 2025-06-11 PROCEDURE — 80053 COMPREHEN METABOLIC PANEL: CPT

## 2025-06-11 PROCEDURE — 6370000000 HC RX 637 (ALT 250 FOR IP): Performed by: INTERNAL MEDICINE

## 2025-06-11 PROCEDURE — 94760 N-INVAS EAR/PLS OXIMETRY 1: CPT

## 2025-06-11 RX ORDER — POTASSIUM CHLORIDE 1.5 G/1.58G
40 POWDER, FOR SOLUTION ORAL 2 TIMES DAILY
Status: DISCONTINUED | OUTPATIENT
Start: 2025-06-11 | End: 2025-06-11

## 2025-06-11 RX ORDER — POLYETHYLENE GLYCOL 3350 17 G/17G
17 POWDER, FOR SOLUTION ORAL 4 TIMES DAILY
Status: DISCONTINUED | OUTPATIENT
Start: 2025-06-11 | End: 2025-06-12 | Stop reason: HOSPADM

## 2025-06-11 RX ADMIN — METRONIDAZOLE 500 MG: 500 INJECTION, SOLUTION INTRAVENOUS at 05:27

## 2025-06-11 RX ADMIN — POLYETHYLENE GLYCOL 3350 17 G: 17 POWDER, FOR SOLUTION ORAL at 22:23

## 2025-06-11 RX ADMIN — HYDROMORPHONE HYDROCHLORIDE 2 MG: 2 TABLET ORAL at 18:09

## 2025-06-11 RX ADMIN — METRONIDAZOLE 500 MG: 500 INJECTION, SOLUTION INTRAVENOUS at 22:22

## 2025-06-11 RX ADMIN — HYDROMORPHONE HYDROCHLORIDE 2 MG: 2 TABLET ORAL at 18:26

## 2025-06-11 RX ADMIN — PROCHLORPERAZINE EDISYLATE 5 MG: 5 INJECTION INTRAMUSCULAR; INTRAVENOUS at 00:56

## 2025-06-11 RX ADMIN — WATER 2000 MG: 1 INJECTION INTRAMUSCULAR; INTRAVENOUS; SUBCUTANEOUS at 12:34

## 2025-06-11 RX ADMIN — METRONIDAZOLE 500 MG: 500 INJECTION, SOLUTION INTRAVENOUS at 12:37

## 2025-06-11 RX ADMIN — SODIUM CHLORIDE, PRESERVATIVE FREE 10 ML: 5 INJECTION INTRAVENOUS at 09:08

## 2025-06-11 RX ADMIN — POTASSIUM CHLORIDE 10 MEQ: 7.46 INJECTION, SOLUTION INTRAVENOUS at 08:54

## 2025-06-11 RX ADMIN — SODIUM CHLORIDE, PRESERVATIVE FREE 10 ML: 5 INJECTION INTRAVENOUS at 22:24

## 2025-06-11 RX ADMIN — ASPIRIN 81 MG: 81 TABLET, CHEWABLE ORAL at 09:06

## 2025-06-11 RX ADMIN — ATORVASTATIN CALCIUM 20 MG: 20 TABLET, FILM COATED ORAL at 22:23

## 2025-06-11 RX ADMIN — POLYETHYLENE GLYCOL 3350 17 G: 17 POWDER, FOR SOLUTION ORAL at 16:35

## 2025-06-11 RX ADMIN — PANTOPRAZOLE SODIUM 40 MG: 40 INJECTION, POWDER, FOR SOLUTION INTRAVENOUS at 09:06

## 2025-06-11 RX ADMIN — METOPROLOL SUCCINATE 25 MG: 25 TABLET, EXTENDED RELEASE ORAL at 09:06

## 2025-06-11 RX ADMIN — ONDANSETRON 4 MG: 2 INJECTION, SOLUTION INTRAMUSCULAR; INTRAVENOUS at 18:09

## 2025-06-11 ASSESSMENT — PAIN SCALES - GENERAL
PAINLEVEL_OUTOF10: 3
PAINLEVEL_OUTOF10: 0

## 2025-06-11 ASSESSMENT — PAIN DESCRIPTION - ONSET: ONSET: GRADUAL

## 2025-06-11 ASSESSMENT — PAIN DESCRIPTION - LOCATION: LOCATION: ABDOMEN

## 2025-06-11 ASSESSMENT — PAIN DESCRIPTION - DESCRIPTORS: DESCRIPTORS: ACHING

## 2025-06-11 ASSESSMENT — PAIN DESCRIPTION - ORIENTATION: ORIENTATION: LEFT

## 2025-06-11 ASSESSMENT — PAIN DESCRIPTION - PAIN TYPE: TYPE: ACUTE PAIN

## 2025-06-11 ASSESSMENT — PAIN DESCRIPTION - FREQUENCY: FREQUENCY: INTERMITTENT

## 2025-06-11 NOTE — CARE COORDINATION
Transition of Care Plan:    RUR: 23%  Prior Level of Functioning: Independent  Disposition: Home with son   ENMA: 6/12/25  If SNF or IPR: Date FOC offered:   Date FOC received:   Accepting facility:   Date authorization started with reference number:   Date authorization received and expires:   Follow up appointments: PCP   DME needed: No DME needed   Transportation at discharge: Pt's son will transport  IM/IMM Medicare/ letter given: 2nd IMM letter   Is patient a [place] and connected with [facility]? No   If yes, was [place] transfer form completed and [facility] notified? No  Caregiver Contact: pt's sons  Discharge Caregiver contacted prior to discharge? Pt will be contacted   Care Conference needed? No  Barriers to discharge: Medical clearance       CM reviewed pt's chart and pt is not medically stable for d/c due to GI and K+.    CM will continue to follow and assist with d/c planning.    Danni Lujan    [id number]

## 2025-06-11 NOTE — PROGRESS NOTES
Hospitalist Progress Note    NAME:   Elenita Benitez   : 1950   MRN: [id number]     Date/Time: 2025 7:16 PM  Patient PCP: Artis Bangura MD    Estimated discharge date: ??  Barriers: Resolved rectal bleeding, GI recs-plavix washout then scope Monday if there is OR time?    Assessment / Plan:    Colitis, unspecified  Hypokalemia  Chronic constipation  R/o infectious, working dx hemorrhoidal bleeding, radiation proctitis? Vs ischemic colitis? Vs endometrial ca recurrence?  Cdiff negative    Continue abx  Hold plavix, continue ASA for stent placed per cardiology  GI consulted, expertise appreciated-awaiting plavix washout  Enteric add on never done. Re-ordered  Titrate diet as tolerated  Add oncology consult if needed/recommended  Replenish electrolytes prn  IV PPI  Cardiology consulted for continuity and recommendations regarding holding aspirin/Plavix- desire ASA to continue for now  Transfuse prn Hb <7  Trend daily labs    Acute liver injury  Liver US okay. Surgically absent gallbladder  Trend CMP  Follow up GI recs    Anemia of chronic disease  Endometrial Ca  Presence of urethral sent  Follow-up with Dr. Nicole outpatient.  Transfuse hemoglobin less than 7  Patient rescheduled stent replacement prior to chemo    HTN  CAD  Continue home metoprolol  Hold home ASA/plavix    Medical Decision Making:    [x] High (any 2)     A. Problems (any 1)  [x] Acute/Chronic Illness/injury posing threat to life or bodily function:  Colitis, Bright red blood per rectum  [] Severe exacerbation of chronic illness:    ---------------------------------------------------------------------  B. Risk of Treatment (any 1)   [x] Drugs/treatments that require intensive monitoring for toxicity include:    [] IV ABX requiring serial renal monitoring for nephrotoxicity:     [] IV Narcotic analgesia for adverse drug reaction  [] Aggressive IV diuresis requiring serial monitoring for renal impairment and electrolyte  this patient on 6/11/2025, as outlined below:  PHYSICAL EXAM:  General: Alert, cooperative  EENT:  EOMI. Anicteric sclerae.  Resp:  CTA bilaterally, no wheezing or rales.  No accessory muscle use  CV:  Regular  rhythm,  No edema  GI:  Soft, Non distended, Non tender.  +Bowel sounds  Neurologic:  Alert and oriented X 3, normal speech,   Psych:   Not anxious nor agitated  Skin:  No rashes.  No jaundice    Reviewed most current lab test results and cultures  YES  Reviewed most current radiology test results   YES  Review and summation of old records today    NO  Reviewed patient's current orders and MAR    YES  PMH/SH reviewed - no change compared to H&P    Procedures: see electronic medical records for all procedures/Xrays and details which were not copied into this note but were reviewed prior to creation of Plan.      LABS:  I reviewed today's most current labs and imaging studies.  Pertinent labs include:  Recent Labs     06/09/25  0600 06/10/25  0558 06/10/25  2233 06/11/25  0530   WBC 9.2 5.7  --  5.7   HGB 7.2* 6.8* 8.8* 8.8*   HCT 24.5* 23.0* 28.3* 29.2*    235  --  245     Recent Labs     06/09/25  0600 06/10/25  0558 06/11/25  0530    138 140   K 2.7* 3.1* 3.2*    109* 110*   CO2 28 26 25   GLUCOSE 102* 90 89   BUN 12 10 11   CREATININE 0.67 0.65 0.68   CALCIUM 8.2* 8.0* 7.8*   MG 1.9 2.0  --    PHOS 2.3* 2.1*  --    BILITOT 0.6 0.6 0.9   AST 39* 37 51*   ALT 69 57 56       Signed: Araseli Pagan MD

## 2025-06-11 NOTE — PROGRESS NOTES
Pt refuses potassium PO, pt educated on use and purpose, pt verbalizes understanding, still refuses.

## 2025-06-11 NOTE — PROGRESS NOTES
End of Shift Note    Bedside shift change report given to melo (oncoming nurse) by Leticia Copeland RN (offgoing nurse).  Report included the following information SBAR, Kardex, and MAR    Shift worked:  [id number]     Shift summary and any significant changes:     Pt Aox4, no BM, no blood noted.  Pt status post blood transfusion, tolerated well.       Concerns for physician to address:  none              Activity:  Level of Assistance: Independent  Number times ambulated in hallways past shift: 0  Number of times OOB to chair past shift: 0    Cardiac:   Cardiac Monitoring: No           Access:  Current line(s): [employer]    Genitourinary:   Urinary Status: Voiding    Respiratory:   O2 Device: None (Room air)  Chronic home O2 use?: NO  Incentive spirometer at bedside: NO    GI:  Last BM (including prior to admit): 06/09/25  Current diet:  ADULT DIET; Regular; Low Fiber; Lactose-Controlled, No Beef, Double Protein Portions  DIET ONE TIME MESSAGE;  Passing flatus: YES    Pain Management:   Patient states pain is manageable on current regimen: YES    Skin:  Fritz Scale Score: 19  Interventions: Wound Offloading (Prevention Methods): Bed, pressure reduction mattress, Pillows, Repositioning    Patient Safety:  Fall Risk: Nursing Judgement-Fall Risk High(Add Comments): Yes  Fall Risk Interventions  Nursing Judgement-Fall Risk High(Add Comments): Yes  Toilet Every 2 Hours-In Advance of Need: Yes  Hourly Visual Checks: In bed  Fall Visual Posted: Armband, Fall sign posted, Socks  Room Door Open: Deferred to promote rest  Alarm On:  (signed refusal form)  Patient Moved Closer to Nursing Station: No    Active Consults:   IP CONSULT TO PHARMACY  IP CONSULT TO SOCIAL WORK  IP CONSULT TO CASE MANAGEMENT  IP CONSULT TO GI  IP CONSULT TO CARDIOLOGY    Length of Stay:  Expected LOS: 4  Actual LOS: 3    Leticia Copeland, RN

## 2025-06-11 NOTE — PLAN OF CARE
Problem: Pain  Goal: Verbalizes/displays adequate comfort level or baseline comfort level  6/11/2025 1021 by Renu Reyna RN  Flowsheets (Taken 6/9/2025 0845 by Kristy Pryor, RN)  Verbalizes/displays adequate comfort level or baseline comfort level: Encourage patient to monitor pain and request assistance  6/11/2025 0357 by Leticia Copeland RN  Outcome: Progressing     Problem: Discharge Planning  Goal: Discharge to home or other facility with appropriate resources  6/11/2025 0357 by Leticia Copeland RN  Outcome: Progressing     Problem: Safety - Adult  Goal: Free from fall injury  6/11/2025 1021 by Renu Reyna RN  Flowsheets (Taken 6/9/2025 0800 by Kristy Pryor, RN)  Free From Fall Injury: Instruct family/caregiver on patient safety  6/11/2025 0357 by Leticia Copeland, RN  Outcome: Progressing

## 2025-06-11 NOTE — PROGRESS NOTES
Progress Note      6/11/2025 9:57 AM  NAME: Elenita Benitez   MRN:  [id number]   Admit Diagnosis: Hypokalemia [E87.6]  Colitis [K52.9]  Acute left flank pain [R10.9]  Infectious gastroenteritis and colitis [A09]         Primary Cardiologist:  Physician Requesting consult:      Assessment:       Anemia, lower GI bleed  Hypokalemia  Endometrial carcinoma  CAD s/p recent STEMI 1/25 w/ PCI of RCA; EF normal  HTN             Recommendations:     Continue aspirin, if absolutely needed then okay to hold    Will discontinue Plavix due to GI bleed, she has completed dual antiplatelet therapy for 6 months we have discussed about risk and benefit     Continue beta-blocker and statin    Monitor H&H, transfusion as needed, GI following          [x]        High complexity decision making was performed    Subjective:     HPI:     No chest pain.  Overall not feeling well due to chemo.  ROS: No CP, SOB, Abd pain, nausea, vomiting, syncope, palpitations, new focal neurological symptoms     Objective:      Physical Exam:    Last 24hrs VS reviewed since prior progress note. Most recent are:    BP (!) 155/75   Pulse 57   Temp 97.5 °F (36.4 °C) (Oral)   Resp 18   Ht 1.549 m (5' 1\")   Wt 72.2 kg (159 lb 2.8 oz)   SpO2 98%   BMI 30.08 kg/m²     Intake/Output Summary (Last 24 hours) at 6/11/2025 0957  Last data filed at 6/10/2025 1857  Gross per 24 hour   Intake 403 ml   Output --   Net 403 ml         General: Alert and oriented x3, no acute distress   Neck: Supple   Respiratory: No respiratory distress, clear lung sound   Cardiovascular: Regular rate rhythm, S1S2, no murmur   Abdomen: soft, non tender, non distended   Neuro: moves all extremities, oriented x3   Skin: warm and dry   Extremity: +  edema, warm to touch      Data Review    Telemetry: normal sinus rhythm          Lab Data Personally Reviewed:    Recent Labs     06/10/25  0558 06/10/25  2233 06/11/25  0530   WBC 5.7  --  5.7   HGB 6.8* 8.8* 8.8*   HCT 23.0*  acetaminophen (TYLENOL) suppository 650 mg  650 mg Rectal Q6H PRN    [Held by provider] enoxaparin (LOVENOX) injection 40 mg  40 mg SubCUTAneous Daily    cefTRIAXone (ROCEPHIN) 2,000 mg in sterile water 20 mL IV syringe  2,000 mg IntraVENous Q24H    metroNIDAZOLE (FLAGYL) 500 mg in 0.9% NaCl 100 mL IVPB premix  500 mg IntraVENous Q8H    pantoprazole (PROTONIX) 40 mg in sodium chloride (PF) 0.9 % 10 mL injection  40 mg IntraVENous Daily    aspirin chewable tablet 81 mg  81 mg Oral Daily    atorvastatin (LIPITOR) tablet 20 mg  20 mg Oral Nightly    metoprolol succinate (TOPROL XL) extended release tablet 25 mg  25 mg Oral Daily              Markus Nguyễn MD

## 2025-06-11 NOTE — PROGRESS NOTES
Spiritual Health History and Assessment/Progress Note  [facility]    Initial Encounter,  , Life Adjustments, Adjustment to illness,      Name: Elenita Benitez MRN: [id number]    Age: 74 y.o.     Sex: female   Language: English   Judaism: Jainism   Infectious gastroenteritis and colitis     Date: 6/11/2025            Total Time Calculated: 25 min              Spiritual Assessment began in [facility]ULT[facility]-[employer] TELE[facility]        Referral/Consult From: Rounding   Encounter Overview/Reason: Initial Encounter  Service Provided For: Patient and family together    Annamaria, Belief, Meaning:   Patient is connected with a annamaria tradition or spiritual practice and has beliefs or practices that help with coping during difficult times  Family/Friends have beliefs or practices that help with coping during difficult times      Importance and Influence:  Patient has spiritual/personal beliefs that influence decisions regarding their health  Family/Friends have spiritual/personal beliefs that influence decisions regarding the patient's health    Community:  Patient feels well-supported. Support system includes: Children and Extended family  Family/Friends feel well-supported. Support system includes: Extended family    Assessment and Plan of Care:     Patient Interventions include: Facilitated expression of thoughts and feelings, Explored spiritual coping/struggle/distress, Affirmed coping skills/support systems, and Facilitated life review and/ or legacy  Family/Friends Interventions include: Affirmed coping skills/support systems    Patient Plan of Care: Spiritual Care available upon further referral  Family/Friends Plan of Care: Spiritual Care available upon further referral    Electronically signed by SEAN Chapman on 6/11/2025 at 11:19 AM

## 2025-06-11 NOTE — PROGRESS NOTES
GI PROGRESS NOTE  Irma Wilkerson PA-C  [phone] NP in-hospital cell phone M-F until 4:30  After 5pm or on weekends, please call  for physician on call    NAME:Elenita Benitez :1950 MRN:[id number]   ATTG: MD Danial   PCP: Artis Bangura MD  Date/Time:  2025 2:36 PM     Primary GI: Dr. Vigil   Reason for following: colitis  Assessment:   Colitis   Hematochezia  Acute on chronic anemia   Nausea and vomiting   Loose stools   Abdominal pain   CAD s/p recent STEMI  w/ PCI of RCA on Plavix (last dose  AM)     Hgb 9.7 POA > 6.8 > 8.8 s/p 1 unit PRBCs  Baseline Hgb 7-9 recently   .4  BUN/Cr normal   WBC 12.4 POA > 5.7   , AST 51  other liver enzymes normal   Lipase wnl   C. Diff negative   Enteric panel negative   Hepatitis panel negative   CT a/p with IV contrast   1. Mild left-sided colitis. Moderate fecal retention in the large bowel.  2. Stable left ureteral stent and minimal prominence of the collecting system.  3. Progressive mild to moderate biliary ductal dilatation of uncertain clinical  significance.  4. Stable left retroperitoneal mass.  5. Mild gastritis. Refer to above findings for complete details.  RUQ US   1. Surgically absent gallbladder with bile ducts within normal  postcholecystectomy limits.   2. Unremarkable sonographic appearance of the liver.     Last CLN    Plan:   Imaging and symptoms suggestive of possible infectious vs. Ischemic colitis  Agree with antibiotics   Recommend symptomatic treatment for now - PRN antiemetics and analgesia   Appreciate plavix hold   Plan for colonoscopy after Plavix hold given significant anemia - with last plavix dose , CLN would be next week   Due to concerns for completing bowel prep given N/V, will start miralax QID now. Plan for:   Start Dulcolax 3 days prior to procedure   CLD 2 days prior to procedure   Can do bowel prep w/ 70% sorbitol starting at 5pm the evening before procedure - 60cc  every hour x 6 hours  Goal Hgb > 7 transfuse as needed to maintain   GI will follow   Plan discussed with Dr. Vigil   GI Attg Addendum- pt seen and d/w JUAN Bee.  Abnl CT with left side colitis and discomfort with acute on chronic anemia in setting of endometrial CA recurrence.  Evaluation mandates colonoscopy with lower-volume prep given N/V and timing delayed given need for Plavix washout (last dose 6/9).  D/w .  Norberto Vigil MD    Subjective:   Pt feels more so constipated now, but still passing blood with BM. Had BM this morning with blood - seems to be lessening.     Complaint Y/N Description   Abdominal Pain Y    Hematemesis N    Hematochezia Y    Melena N    Constipation Y    Diarrhea     Dyspepsia     Dysphagia     Jaundiced     Nausea/vomiting       Review of Systems: see HPI   Objective:   VITALS:   Last 24hrs VS reviewed since prior progress note. Most recent are:  Vitals:    06/11/25 0845   BP: (!) 155/75   Pulse: 57   Resp: 18   Temp: 97.5 °F (36.4 °C)   SpO2: 98%       Intake/Output Summary (Last 24 hours) at 6/11/2025 1436  Last data filed at 6/11/2025 1113  Gross per 24 hour   Intake 743 ml   Output --   Net 743 ml     PHYSICAL EXAM:  General: WD, WN. Alert, cooperative, no acute distress    HEENT: NC, Atraumatic. Anicteric sclerae.  Lungs:  Normal respiratory effort   Abdomen: Soft, Non distended, Non tender.  +Bowel sounds, no HSM  Extremities: BLE edema   Neurologic:  Alert and oriented X 3.  No acute neurological distress   Psych:   Good insight. Not anxious nor agitated.    Lab and Radiology Data Reviewed: (see below)    Medications Reviewed: (see below)  PMH/SH reviewed - no change compared to H&P  ________________________________________________________________________  Total time spent with patient: 15 minutes ________________________________________________________________________  Care Plan discussed with:  Patient Y   Family     RN               Consultant:  JANN Jordan Dr.

## 2025-06-12 VITALS
OXYGEN SATURATION: 97 % | HEART RATE: 60 BPM | BODY MASS INDEX: 30.05 KG/M2 | DIASTOLIC BLOOD PRESSURE: 85 MMHG | RESPIRATION RATE: 18 BRPM | SYSTOLIC BLOOD PRESSURE: 142 MMHG | WEIGHT: 159.17 LBS | HEIGHT: 61 IN | TEMPERATURE: 97.9 F

## 2025-06-12 LAB
ANION GAP SERPL CALC-SCNC: 5 MMOL/L (ref 2–12)
BASOPHILS # BLD: 0.03 K/UL (ref 0–0.1)
BASOPHILS NFR BLD: 0.6 % (ref 0–1)
BUN SERPL-MCNC: 9 MG/DL (ref 6–20)
BUN/CREAT SERPL: 13 (ref 12–20)
CALCIUM SERPL-MCNC: 7.9 MG/DL (ref 8.5–10.1)
CHLORIDE SERPL-SCNC: 110 MMOL/L (ref 97–108)
CO2 SERPL-SCNC: 25 MMOL/L (ref 21–32)
CREAT SERPL-MCNC: 0.67 MG/DL (ref 0.55–1.02)
DIFFERENTIAL METHOD BLD: ABNORMAL
EOSINOPHIL # BLD: 0.25 K/UL (ref 0–0.4)
EOSINOPHIL NFR BLD: 5.2 % (ref 0–7)
ERYTHROCYTE [DISTWIDTH] IN BLOOD BY AUTOMATED COUNT: 19 % (ref 11.5–14.5)
GLUCOSE SERPL-MCNC: 97 MG/DL (ref 65–100)
HCT VFR BLD AUTO: 30.2 % (ref 35–47)
HGB BLD-MCNC: 9.3 G/DL (ref 11.5–16)
IMM GRANULOCYTES # BLD AUTO: 0.06 K/UL (ref 0–0.04)
IMM GRANULOCYTES NFR BLD AUTO: 1.2 % (ref 0–0.5)
LYMPHOCYTES # BLD: 0.49 K/UL (ref 0.8–3.5)
LYMPHOCYTES NFR BLD: 10.1 % (ref 12–49)
MAGNESIUM SERPL-MCNC: 1.8 MG/DL (ref 1.6–2.4)
MCH RBC QN AUTO: 30.6 PG (ref 26–34)
MCHC RBC AUTO-ENTMCNC: 30.8 G/DL (ref 30–36.5)
MCV RBC AUTO: 99.3 FL (ref 80–99)
MONOCYTES # BLD: 0.57 K/UL (ref 0–1)
MONOCYTES NFR BLD: 11.8 % (ref 5–13)
NEUTS SEG # BLD: 3.44 K/UL (ref 1.8–8)
NEUTS SEG NFR BLD: 71.1 % (ref 32–75)
NRBC # BLD: 0 K/UL (ref 0–0.01)
NRBC BLD-RTO: 0 PER 100 WBC
PLATELET # BLD AUTO: 268 K/UL (ref 150–400)
PMV BLD AUTO: 9.8 FL (ref 8.9–12.9)
POTASSIUM SERPL-SCNC: 3.1 MMOL/L (ref 3.5–5.1)
RBC # BLD AUTO: 3.04 M/UL (ref 3.8–5.2)
SODIUM SERPL-SCNC: 140 MMOL/L (ref 136–145)
WBC # BLD AUTO: 4.8 K/UL (ref 3.6–11)

## 2025-06-12 PROCEDURE — 6360000002 HC RX W HCPCS: Performed by: NURSE PRACTITIONER

## 2025-06-12 PROCEDURE — 80048 BASIC METABOLIC PNL TOTAL CA: CPT

## 2025-06-12 PROCEDURE — 94760 N-INVAS EAR/PLS OXIMETRY 1: CPT

## 2025-06-12 PROCEDURE — 36415 COLL VENOUS BLD VENIPUNCTURE: CPT

## 2025-06-12 PROCEDURE — 2500000003 HC RX 250 WO HCPCS: Performed by: STUDENT IN AN ORGANIZED HEALTH CARE EDUCATION/TRAINING PROGRAM

## 2025-06-12 PROCEDURE — 85025 COMPLETE CBC W/AUTO DIFF WBC: CPT

## 2025-06-12 PROCEDURE — 6360000002 HC RX W HCPCS: Performed by: STUDENT IN AN ORGANIZED HEALTH CARE EDUCATION/TRAINING PROGRAM

## 2025-06-12 PROCEDURE — 6370000000 HC RX 637 (ALT 250 FOR IP): Performed by: INTERNAL MEDICINE

## 2025-06-12 PROCEDURE — 6370000000 HC RX 637 (ALT 250 FOR IP): Performed by: PHYSICIAN ASSISTANT

## 2025-06-12 PROCEDURE — 83735 ASSAY OF MAGNESIUM: CPT

## 2025-06-12 PROCEDURE — 6370000000 HC RX 637 (ALT 250 FOR IP): Performed by: NURSE PRACTITIONER

## 2025-06-12 RX ORDER — POTASSIUM CHLORIDE 1500 MG/1
20 TABLET, EXTENDED RELEASE ORAL DAILY
Qty: 30 TABLET | Refills: 0 | Status: SHIPPED | OUTPATIENT
Start: 2025-06-12

## 2025-06-12 RX ORDER — HEPARIN 100 UNIT/ML
300 SYRINGE INTRAVENOUS PRN
Status: DISCONTINUED | OUTPATIENT
Start: 2025-06-12 | End: 2025-06-12 | Stop reason: HOSPADM

## 2025-06-12 RX ORDER — ATORVASTATIN CALCIUM 20 MG/1
20 TABLET, FILM COATED ORAL NIGHTLY
Qty: 30 TABLET | Refills: 3 | Status: SHIPPED | OUTPATIENT
Start: 2025-06-12

## 2025-06-12 RX ORDER — POTASSIUM CHLORIDE 750 MG/1
40 TABLET, EXTENDED RELEASE ORAL 2 TIMES DAILY
Status: DISCONTINUED | OUTPATIENT
Start: 2025-06-12 | End: 2025-06-12 | Stop reason: HOSPADM

## 2025-06-12 RX ADMIN — WATER 2000 MG: 1 INJECTION INTRAMUSCULAR; INTRAVENOUS; SUBCUTANEOUS at 12:57

## 2025-06-12 RX ADMIN — PANTOPRAZOLE SODIUM 40 MG: 40 INJECTION, POWDER, FOR SOLUTION INTRAVENOUS at 08:25

## 2025-06-12 RX ADMIN — METRONIDAZOLE 500 MG: 500 INJECTION, SOLUTION INTRAVENOUS at 13:02

## 2025-06-12 RX ADMIN — SODIUM CHLORIDE, PRESERVATIVE FREE 10 ML: 5 INJECTION INTRAVENOUS at 08:25

## 2025-06-12 RX ADMIN — METOPROLOL SUCCINATE 25 MG: 25 TABLET, EXTENDED RELEASE ORAL at 08:25

## 2025-06-12 RX ADMIN — PROCHLORPERAZINE EDISYLATE 5 MG: 5 INJECTION INTRAMUSCULAR; INTRAVENOUS at 05:18

## 2025-06-12 RX ADMIN — METRONIDAZOLE 500 MG: 500 INJECTION, SOLUTION INTRAVENOUS at 05:15

## 2025-06-12 RX ADMIN — Medication 300 UNITS: at 15:50

## 2025-06-12 RX ADMIN — POTASSIUM CHLORIDE 40 MEQ: 750 TABLET, FILM COATED, EXTENDED RELEASE ORAL at 08:37

## 2025-06-12 RX ADMIN — ASPIRIN 81 MG: 81 TABLET, CHEWABLE ORAL at 08:25

## 2025-06-12 NOTE — PROGRESS NOTES
I reviewed the patient's medical history, the findings on physical examination, the patient's diagnoses, and treatment plan as documented in the note.  I concur with the treatment plan as documented.      GEN Chao D.O.  [facility]      GEN Chao D.O.  [facility]        GI PROGRESS NOTE  Irma Wilkerson PA-C  [phone] NP in-hospital cell phone M-F until 4:30  After 5pm or on weekends, please call  for physician on call    NAME:Elenita Benitez :1950 MRN:[id number]   ATTG: MD Danial   PCP: Artis Bangura MD  Date/Time:  2025 2:12 PM     Primary GI: Dr. Vigil  Reason for following: colitis  Assessment:   Colitis   Hematochezia  Acute on chronic anemia   Nausea and vomiting   Loose stools   Abdominal pain   CAD s/p recent STEMI  w/ PCI of RCA on Plavix (last dose  AM)     Hgb 9.3    s/p 1 unit PRBCs (6/10)  Baseline Hgb 7-9 recently   .4  BUN/Cr normal   WBC 12.4 POA > 4.8  , AST 51  other liver enzymes normal   Lipase wnl   C. Diff negative   Enteric panel negative   Hepatitis panel negative   CT a/p with IV contrast   1. Mild left-sided colitis. Moderate fecal retention in the large bowel.  2. Stable left ureteral stent and minimal prominence of the collecting system.  3. Progressive mild to moderate biliary ductal dilatation of uncertain clinical  significance.  4. Stable left retroperitoneal mass.  5. Mild gastritis. Refer to above findings for complete details.  RUQ US   1. Surgically absent gallbladder with bile ducts within normal  postcholecystectomy limits.   2. Unremarkable sonographic appearance of the liver.     Last CLN    Plan:   Imaging and symptoms suggestive of possible infectious vs. Ischemic colitis, agree with antibiotics. Pt now having much less blood in stool, Hgb stable and increasing since admission and s/p transfusion.   Note cardiology plan to continue aspirin,

## 2025-06-12 NOTE — PROGRESS NOTES
Attempted to schedule hospital follow up PCP appointment. Office  will contact the patient with appointment information per office protocol. S cardio office will call the patient. Cardio hospital follow up must be scheduled by the MD, PA, NP's RN with the patient per office protocol. Pending patient discharge.

## 2025-06-12 NOTE — PROGRESS NOTES
Patient determined to be stable for discharge by attending provider. I have reviewed the discharge instructions and follow-up appointments with the patient . They verbalized understanding and all questions were answered to their satisfaction. No complaints or further questions were expressed.       New medications: Appropriate educational materials and medication side effect teaching were provided.       PIV were removed prior to discharge.     All personal items collected during admission were returned to the patient prior to discharge.    JULIA HUNT RN     Patient requests all Lab and Radiology Results on their Discharge Instructions

## 2025-06-12 NOTE — PROGRESS NOTES
End of Shift Note    Bedside shift change report given to Ana Luisa (oncoming nurse) by Leticia Copeland RN (offgoing nurse).  Report included the following information SBAR, Kardex, and MAR    Shift worked:  [id number]       Shift summary and any significant changes:     Pt Aox4, BM with small clot overnight.  Voiding with no difficulty.  Pain medication given per order, see MAR     Concerns for physician to address:  none     Zone phone for oncoming shift:          Activity:  Level of Assistance: Independent  Number times ambulated in hallways past shift: 0  Number of times OOB to chair past shift: 0    Cardiac:   Cardiac Monitoring: No           Access:  Current line(s): port accessed    Genitourinary:   Urinary Status: Voiding    Respiratory:   O2 Device: None (Room air)  Chronic home O2 use?: NO  Incentive spirometer at bedside: NO    GI:  Last BM (including prior to admit): 06/11/25  Current diet:  ADULT DIET; Regular; Low Fiber; Lactose-Controlled, No Beef, Double Protein Portions  DIET ONE TIME MESSAGE;  Passing flatus: YES    Pain Management:   Patient states pain is manageable on current regimen: YES    Skin:  Fritz Scale Score: 19  Interventions: Wound Offloading (Prevention Methods): Pillows, Repositioning    Patient Safety:  Fall Risk: Nursing Judgement-Fall Risk High(Add Comments): Yes  Fall Risk Interventions  Nursing Judgement-Fall Risk High(Add Comments): Yes  Toilet Every 2 Hours-In Advance of Need: Yes  Hourly Visual Checks: In bed  Fall Visual Posted: Armband, Fall sign posted, Socks  Room Door Open: Deferred to promote rest  Alarm On: Other (Comment) (signed refusal)  Patient Moved Closer to Nursing Station: No    Active Consults:   IP CONSULT TO PHARMACY  IP CONSULT TO SOCIAL WORK  IP CONSULT TO CASE MANAGEMENT  IP CONSULT TO GI  IP CONSULT TO CARDIOLOGY    Length of Stay:  Expected LOS: 4  Actual LOS: 4    Leticia Copeland RN

## 2025-06-12 NOTE — CARE COORDINATION
Pt is clear from CM standpoint for d/c.    Pt's son will transport.      Transition of Care Plan:     RUR: 22%  Prior Level of Functioning: Independent  Disposition: Home with son and home health-[facility]  ENMA: 6/12/25  If SNF or IPR: Date FOC offered:   Date FOC received:   Accepting facility:   Date authorization started with reference number:   Date authorization received and expires:   Follow up appointments: PCP   DME needed: No DME needed   Transportation at discharge: Pt's son will transport  IM/IMM Medicare/ letter given: 6/12/25  Is patient a  and connected with VA? No              If yes, was  transfer form completed and VA notified? No  Caregiver Contact: pt's sons  Discharge Caregiver contacted prior to discharge? Pt was contacted   Care Conference needed? No  Barriers to discharge: None      06/12/25 1436   Services At/After Discharge   Transition of Care Consult (CM Consult) Home Health   Services At/After Discharge Home Health   [place] Resource Information Provided? No   Mode of Transport at Discharge Self   Confirm Follow Up Transport Self   Condition of Participation: Discharge Planning   The Plan for Transition of Care is related to the following treatment goals: Goal is to return home with son, follow up appointments and home health   The Patient and/or Patient Representative was provided with a Choice of Provider? Patient   The Patient and/Or Patient Representative agree with the Discharge Plan? Yes   Freedom of Choice list was provided with basic dialogue that supports the patient's individualized plan of care/goals, treatment preferences, and shares the quality data associated with the providers?  Yes     Danni Lujan

## 2025-06-12 NOTE — DISCHARGE SUMMARY
[address] - P [phone] - F [phone]  [address], [facility] [address]      Phone: [phone]   atorvastatin 20 MG tablet  potassium chloride 20 MEQ extended release tablet             DISPOSITION:    Home with Family:       Home with HH/PT/OT/RN: x   SNF/LTC:    ALFONSO:    OTHER:            Code status: full code  Recommended diet: cardiac diet  Recommended activity: activity as tolerated  Wound care: None      Follow up with:   PCP : Artis Bangura MD Houghton, Micah T, MD  [address] [address]  [phone]    Schedule an appointment as soon as possible for a visit in 2 day(s)  Dr. Bangura's office will call you to schedule your PCP hospital follow up. Please call their office to schedule your appt if you do not hear from them by 6/13/25.    Markus Nguyễn MD  [address] [place]  [place] [address]  [phone]    Schedule an appointment as soon as possible for a visit in 1 week(s)  To schedule your CARDIOLOGY hospital follow up.    Norberto Vigil MD  [address] [facility]  [address]  [place] [address]  [phone]    Follow up  colonoscopy scheduled for 6/19/2025          Total time in minutes spent coordinating this discharge (includes going over instructions, follow-up, prescriptions, and preparing report for sign off to her PCP) :  35 minutes

## 2025-06-13 ENCOUNTER — APPOINTMENT (OUTPATIENT)
Facility: HOSPITAL | Age: 75
End: 2025-06-13
Attending: RADIOLOGY
Payer: MEDICARE

## 2025-06-16 ENCOUNTER — HOSPITAL ENCOUNTER (OUTPATIENT)
Facility: HOSPITAL | Age: 75
Setting detail: RECURRING SERIES
End: 2025-06-16
Attending: RADIOLOGY
Payer: MEDICARE

## 2025-06-18 ENCOUNTER — APPOINTMENT (OUTPATIENT)
Facility: HOSPITAL | Age: 75
End: 2025-06-18
Attending: RADIOLOGY
Payer: MEDICARE

## 2025-06-19 ENCOUNTER — HOSPITAL ENCOUNTER (OUTPATIENT)
Facility: HOSPITAL | Age: 75
Setting detail: OUTPATIENT SURGERY
Discharge: HOME | End: 2025-06-19
Attending: STUDENT IN AN ORGANIZED HEALTH CARE EDUCATION/TRAINING PROGRAM | Admitting: STUDENT IN AN ORGANIZED HEALTH CARE EDUCATION/TRAINING PROGRAM
Payer: MEDICARE

## 2025-06-19 ENCOUNTER — ANESTHESIA (OUTPATIENT)
Facility: HOSPITAL | Age: 75
End: 2025-06-19
Payer: MEDICARE

## 2025-06-19 ENCOUNTER — ANESTHESIA EVENT (OUTPATIENT)
Facility: HOSPITAL | Age: 75
End: 2025-06-19
Payer: MEDICARE

## 2025-06-19 VITALS
TEMPERATURE: 98.2 F | DIASTOLIC BLOOD PRESSURE: 85 MMHG | HEIGHT: 61 IN | RESPIRATION RATE: 15 BRPM | SYSTOLIC BLOOD PRESSURE: 197 MMHG | HEART RATE: 65 BPM | BODY MASS INDEX: 30.58 KG/M2 | OXYGEN SATURATION: 98 % | WEIGHT: 162 LBS

## 2025-06-19 PROCEDURE — 7100000010 HC PHASE II RECOVERY - FIRST 15 MIN: Performed by: STUDENT IN AN ORGANIZED HEALTH CARE EDUCATION/TRAINING PROGRAM

## 2025-06-19 PROCEDURE — 7100000011 HC PHASE II RECOVERY - ADDTL 15 MIN: Performed by: STUDENT IN AN ORGANIZED HEALTH CARE EDUCATION/TRAINING PROGRAM

## 2025-06-19 PROCEDURE — 3600007502: Performed by: STUDENT IN AN ORGANIZED HEALTH CARE EDUCATION/TRAINING PROGRAM

## 2025-06-19 PROCEDURE — 2709999900 HC NON-CHARGEABLE SUPPLY: Performed by: STUDENT IN AN ORGANIZED HEALTH CARE EDUCATION/TRAINING PROGRAM

## 2025-06-19 PROCEDURE — 3700000000 HC ANESTHESIA ATTENDED CARE: Performed by: STUDENT IN AN ORGANIZED HEALTH CARE EDUCATION/TRAINING PROGRAM

## 2025-06-19 PROCEDURE — 6360000002 HC RX W HCPCS

## 2025-06-19 PROCEDURE — 3600007512: Performed by: STUDENT IN AN ORGANIZED HEALTH CARE EDUCATION/TRAINING PROGRAM

## 2025-06-19 PROCEDURE — 6360000002 HC RX W HCPCS: Performed by: STUDENT IN AN ORGANIZED HEALTH CARE EDUCATION/TRAINING PROGRAM

## 2025-06-19 PROCEDURE — 88305 TISSUE EXAM BY PATHOLOGIST: CPT

## 2025-06-19 PROCEDURE — 2580000003 HC RX 258

## 2025-06-19 PROCEDURE — 3700000001 HC ADD 15 MINUTES (ANESTHESIA): Performed by: STUDENT IN AN ORGANIZED HEALTH CARE EDUCATION/TRAINING PROGRAM

## 2025-06-19 PROCEDURE — 2720000010 HC SURG SUPPLY STERILE: Performed by: STUDENT IN AN ORGANIZED HEALTH CARE EDUCATION/TRAINING PROGRAM

## 2025-06-19 RX ORDER — LIDOCAINE HYDROCHLORIDE 20 MG/ML
INJECTION, SOLUTION EPIDURAL; INFILTRATION; INTRACAUDAL; PERINEURAL
Status: DISCONTINUED | OUTPATIENT
Start: 2025-06-19 | End: 2025-06-19 | Stop reason: SDUPTHER

## 2025-06-19 RX ORDER — SODIUM CHLORIDE 9 MG/ML
INJECTION, SOLUTION INTRAVENOUS CONTINUOUS
Status: DISCONTINUED | OUTPATIENT
Start: 2025-06-19 | End: 2025-06-19 | Stop reason: HOSPADM

## 2025-06-19 RX ORDER — SODIUM CHLORIDE 0.9 % (FLUSH) 0.9 %
5-40 SYRINGE (ML) INJECTION PRN
Status: DISCONTINUED | OUTPATIENT
Start: 2025-06-19 | End: 2025-06-19 | Stop reason: HOSPADM

## 2025-06-19 RX ORDER — SODIUM CHLORIDE 0.9 % (FLUSH) 0.9 %
5-40 SYRINGE (ML) INJECTION EVERY 12 HOURS SCHEDULED
Status: DISCONTINUED | OUTPATIENT
Start: 2025-06-19 | End: 2025-06-19 | Stop reason: HOSPADM

## 2025-06-19 RX ORDER — HEPARIN SODIUM (PORCINE) LOCK FLUSH IV SOLN 100 UNIT/ML 100 UNIT/ML
300 SOLUTION INTRAVENOUS PRN
Status: DISCONTINUED | OUTPATIENT
Start: 2025-06-19 | End: 2025-06-19 | Stop reason: HOSPADM

## 2025-06-19 RX ORDER — SODIUM CHLORIDE 9 MG/ML
INJECTION, SOLUTION INTRAVENOUS PRN
Status: DISCONTINUED | OUTPATIENT
Start: 2025-06-19 | End: 2025-06-19 | Stop reason: HOSPADM

## 2025-06-19 RX ORDER — SODIUM CHLORIDE 9 MG/ML
INJECTION, SOLUTION INTRAVENOUS
Status: DISCONTINUED | OUTPATIENT
Start: 2025-06-19 | End: 2025-06-19 | Stop reason: SDUPTHER

## 2025-06-19 RX ADMIN — PROPOFOL 50 MG: 10 INJECTION, EMULSION INTRAVENOUS at 14:29

## 2025-06-19 RX ADMIN — LIDOCAINE HYDROCHLORIDE 50 MG: 20 INJECTION, SOLUTION EPIDURAL; INFILTRATION; INTRACAUDAL; PERINEURAL at 14:15

## 2025-06-19 RX ADMIN — HEPARIN 300 UNITS: 100 SYRINGE at 14:59

## 2025-06-19 RX ADMIN — PROPOFOL 50 MG: 10 INJECTION, EMULSION INTRAVENOUS at 14:15

## 2025-06-19 RX ADMIN — PROPOFOL 30 MG: 10 INJECTION, EMULSION INTRAVENOUS at 14:36

## 2025-06-19 RX ADMIN — PROPOFOL 50 MG: 10 INJECTION, EMULSION INTRAVENOUS at 14:22

## 2025-06-19 RX ADMIN — SODIUM CHLORIDE: 900 INJECTION, SOLUTION INTRAVENOUS at 14:03

## 2025-06-19 ASSESSMENT — PAIN - FUNCTIONAL ASSESSMENT: PAIN_FUNCTIONAL_ASSESSMENT: NONE - DENIES PAIN

## 2025-06-19 NOTE — ANESTHESIA POSTPROCEDURE EVALUATION
Department of Anesthesiology  Postprocedure Note    Patient: Elenita Benitez  MRN: [id number]  YOB: 1950  Date of evaluation: 6/19/2025    Procedure Summary       Date: 06/19/25 Room / Location: hospitals ENDO 03 / [facility] ENDOSCOPY    Anesthesia Start: 1403 Anesthesia Stop: 1442    Procedure: COLONOSCOPY DIAGNOSTIC (Lower GI Region) Diagnosis:       Hematochezia      (Hematochezia [K92.1])    Surgeons: Philip Chao DO Responsible Provider: Edgardo Mortensen MD    Anesthesia Type: MAC ASA Status: 3            Anesthesia Type: MAC    Christopher Phase I: Christopher Score: 10    Christopher Phase II: Christopher Score: 10    Anesthesia Post Evaluation    Patient location during evaluation: PACU  Patient participation: complete - patient participated  Level of consciousness: awake and alert  Airway patency: patent  Nausea & Vomiting: no nausea  Cardiovascular status: hemodynamically stable  Respiratory status: acceptable  Hydration status: euvolemic    No notable events documented.

## 2025-06-19 NOTE — OP NOTE
NAME:  Elenita Benitez   :   1950   MRN:   [id number]     Date/Time:  2025 2:40 PM    Colonoscopy Operative Report    Procedure Type:   Colonoscopy --diagnostic     Indications:   rectal bleeding  Pre-operative Diagnosis: see indication above  Post-operative Diagnosis:  See findings below  :  GEN Chao D.O.  Referring Provider: -Artis Bangura MD    Exam:  Airway: clear, no airway problems anticipated  Heart: RRR, without gallops or rubs  Lungs: clear bilaterally without wheezes, crackles, or rhonchi  Abdomen: soft, nontender, nondistended, bowel sounds present  Mental Status: awake, alert and oriented to person, place and time    Sedation:  MAC anesthesia Propofol  Procedure Details:  After informed consent was obtained with all risks and benefits of procedure explained and preoperative exam completed, the patient was taken to the endoscopy suite and placed in the left lateral decubitus position.  Upon sequential sedation as per above, a digital rectal exam was performed which was normal.  The Olympus videocolonoscope  was inserted in the rectum and carefully advanced to the terminal ileum w/ identification of the ileocecal valve and appendiceal orifice.   The quality of preparation was good.  The colonoscope was slowly withdrawn with careful evaluation between folds. Retroflexion in the rectum was completed demonstrating internal hemorrhoids.     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 This was friable and spontaneously oozing. This was treated with APC (1L/min, 20 W, pulse) with resolution of bleeding.     Sigmoid:   Mild diverticulosis  Tattoo seen in sigmoid colon  Descending Colon:   Mild diverticulosis  3 mm polyp removed, complete resection and retrieval en bloc via cold snare polypectomy.     Transverse Colon:   Normal  Ascending Colon:   Normal  Cecum:   4 mm polyp removed, complete resection

## 2025-06-19 NOTE — DISCHARGE SUMMARY
[facility]  GEN Chao D.O.  [phone]            COLONOSCOPY DISCHARGE INSTRUCTIONS    Elenita Benitez  [id number]  1950    DISCOMFORT:  Redness at IV site- apply warm compress to area; if redness or soreness persist- contact your physician  There may be a slight amount of blood passed from the rectum  Gaseous discomfort- walking, belching will help relieve any discomfort  You may not operate a vehicle for 12 hours  You may not engage in an occupation involving machinery or appliances for the  rest of today  You may not drink alcoholic beverages for at least 12 hours  Avoid making any critical decisions for at least 24 hours    DIET:   You may resume your normal diet, but some patients find that heavy or large meals may lead to indigestion or vomiting. I suggest a light meal as first food intake.    ACTIVITY:  You may resume your normal daily activities.  It is recommended that you spend the remainder of the day resting - avoid any strenuous activity.    CALL M.DSam IF ANY SIGN OF:   Increasing pain, nausea, vomiting  Abdominal distension (swelling)  Significant bleeding (oral or rectal)  Fever   Pain in chest area  Shortness of breath    Additional Instructions:   Call Dr. Chao if any questions or problems at [phone]   You should receive the biopsy results by phone or mail within 3 weeks, if not, call  my office for the results      Colonoscopy showed radiation proctitis and two benign polyps which were removed.    Should have a repeat colonoscopy in 7 years or sooner if needed to treat the radiation proctitis.    Resume regular medications. Resume plavix in 48 hours.    Follow-up at your convenience.      GEN Chao D.O.  [facility]

## 2025-06-19 NOTE — H&P
[facility]  GEN Douglas Zhanna D.CHRISTIAN  [phone]          Pre-procedure History and Physical       NAME:  Elenita Benitez   :   1950   MRN:   [id number]     CHIEF COMPLAINT/HPI:     74 y.o. female here for colonoscopy for rectal bleeding    PMH:  Past Medical History:   Diagnosis Date    Arthritis     OSTEO HIP ANNE.    Atrophic vaginitis 2009    Cataract 2022    Beginning stage    GERD (gastroesophageal reflux disease)     Hyperlipidemia     Hypertension     Kidney damage     left    Kidney stones     Lichen planus     oral    Lymphedema     TO BOTH LOWER EXTREMITIES    Macrocytic anemia     Osteoarthritis of hip 2013    Postmenopausal bleeding 2009    Entered By: Rehana Delgado MDSigned By: Rehana Delgado MDStop Reason: Resolved Description: POSTMENOPAUSAL BLEEDING code: 627.1      PUD (peptic ulcer disease)     Uterine cancer (HCC) 2021       PSH:  Past Surgical History:   Procedure Laterality Date    CARDIAC PROCEDURE N/A 2025    Left heart cath / coronary angiography performed by Markus Nguyễn MD at [facility] CARDIAC CATH LAB    CARDIAC PROCEDURE N/A 2025    Percutaneous coronary intervention performed by Markus Nguyễn MD at [facility] CARDIAC CATH LAB    CARDIAC PROCEDURE N/A 2025    Thrombectomy coronary performed by Markus Nguyễn MD at [facility] CARDIAC CATH LAB    CARDIAC PROCEDURE N/A 2025    Insert stent smith coronary performed by Markus Nguyễn MD at [facility] CARDIAC CATH LAB     SECTION      X2    CHOLECYSTECTOMY      COLONOSCOPY N/A 2022    COLONOSCOPY performed by Norberto Vigil MD at [facility] ENDOSCOPY    COLONOSCOPY N/A 2022    COLONOSCOPY performed by Norberto Vigil MD at [facility] ENDOSCOPY    COLONOSCOPY N/A 10/16/2017    COLONOSCOPY performed by Norberto Vigil MD at [facility] ENDOSCOPY    COLONOSCOPY FLX DX W/COLLJ SPEC WHEN PFRMD  01/20/2012     x 2    COLONOSCOPY,FLEX,W/CONTROL, BLEEDING  2022

## 2025-06-19 NOTE — ANESTHESIA PRE PROCEDURE
Department of Anesthesiology  Preprocedure Note       Name:  Elenita Benitez   Age:  74 y.o.  :  1950                                          MRN:  [id number]         Date:  2025      Surgeon: Surgeon(s):  Philip Chao DO    Procedure: Procedure(s):  COLONOSCOPY DIAGNOSTIC    Medications prior to admission:   Prior to Admission medications    Medication Sig Start Date End Date Taking? Authorizing Provider   atorvastatin (LIPITOR) 20 MG tablet Take 1 tablet by mouth nightly 25   Rhonda Avendaño ACNP   potassium chloride (KLOR-CON M) 20 MEQ extended release tablet Take 1 tablet by mouth daily 25   Rhonda Avendaño ACNP   NONFORMULARY Vitamin D, B12,  Patient not taking: Reported on 2025    Provider, MD Rose   ondansetron (ZOFRAN-ODT) 8 MG TBDP disintegrating tablet Take 1 tablet by mouth every 8 hours as needed for Nausea or Vomiting Place one (1) tablet under tongue every 8 hour when needed for nausea and vomiting 25   Dominic Nicole MD   aspirin 81 MG chewable tablet Take 1 tablet by mouth daily 1/10/25   Markus Nguyễn MD   metoprolol succinate (TOPROL XL) 25 MG extended release tablet Take 1 tablet by mouth daily 1/10/25   Markus Nguyễn MD   dexAMETHasone (DECADRON) 4 MG tablet Take 2 tablets by mouth with breakfast the day before chemotherapy and repeat for 2 days after chemotherapy 24   Dominic Nicole MD   lidocaine-prilocaine (EMLA) 2.5-2.5 % cream Apply small amount over port area and cover with a band-aid one (1) hour before chemotherapy treatment 24   Dominic Nicole MD       Current medications:    No current facility-administered medications for this encounter.       Allergies:    Allergies   Allergen Reactions    Codeine Nausea And Vomiting    Penicillins Rash     Rash & dizzy    Patient screened for any delayed non-IgE-mediated reaction to PCN.        Patient notes the following:    No delayed

## 2025-06-19 NOTE — PROGRESS NOTES
Endoscopy recovery  Patient returned to baseline, vital signs stable (see vital sign flowsheet). Patient back to baseline BP pre-op. Has not taken BP med today. Updated Dr. Mortensen. Patient will take right when she gets home and recheck BP at home. She will contact her PCP if it remains elevated. Asymptomatic at this time.  Patient offered liquids and tolerated well. Respiratory status within defined limits. Abdomen soft not tender. Skin with in defined limits. Responsible party driving patient home was given the opportunity to ask questions. Patient discharged with documented belongings.

## 2025-06-19 NOTE — PROGRESS NOTES
ARRIVAL INFORMATION:  Verified patient name and date of birth, scheduled procedure, and informed consent.     : elmo Mohamud, contact number: [phone]  Physician and staff can share information with the .     Receive texts: YES    Belongings with patient include:  Clothing,Glasses, Cane    GI FOCUSED ASSESSMENT:  Neuro: Awake, alert, oriented x4  Respiratory: even and unlabored   GI: soft and non-distended  EKG Rhythm: normal sinus rhythm    Education:Reviewed general discharge instructions and  information.

## 2025-06-19 NOTE — PROGRESS NOTES
Endoscopy Case End Note:    1439:  Procedure scope was pre-cleaned, per protocol, at bedside by Celestino Hutchinson.      1441:  Report received from anesthesia - MORALES Martin.  See anesthesia flowsheet for intra-procedure vital signs and events.    1441:  Glasses returned to patient.

## 2025-06-19 NOTE — DISCHARGE INSTRUCTIONS
[facility]  GEN Chao D.O.  [phone]            COLONOSCOPY DISCHARGE INSTRUCTIONS    Elenita Benitez  [id number]  1950    DISCOMFORT:  Redness at IV site- apply warm compress to area; if redness or soreness persist- contact your physician  There may be a slight amount of blood passed from the rectum  Gaseous discomfort- walking, belching will help relieve any discomfort  You may not operate a vehicle for 12 hours  You may not engage in an occupation involving machinery or appliances for the  rest of today  You may not drink alcoholic beverages for at least 12 hours  Avoid making any critical decisions for at least 24 hours    DIET:   You may resume your normal diet, but some patients find that heavy or large meals may lead to indigestion or vomiting. I suggest a light meal as first food intake.    ACTIVITY:  You may resume your normal daily activities.  It is recommended that you spend the remainder of the day resting - avoid any strenuous activity.    CALL M.D. IF ANY SIGN OF:   Increasing pain, nausea, vomiting  Abdominal distension (swelling)  Significant bleeding (oral or rectal)  Fever   Pain in chest area  Shortness of breath    Additional Instructions:   Call Dr. 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    Should have a repeat colonoscopy in 7 years or sooner if needed to treat the radiation proctitis.    Resume regular medications. Resume plavix in 48 hours.    Follow-up at your convenience.      GEN Chao D.O.  [facility]    Patient Education on Sedation / Analgesia Administered for Procedure      For 24 hours after general anesthesia or intravenous analgesia / sedation:  Have someone responsible help you with  your care  Limit your activities  Do not drive and operate hazardous machinery  Do not make important personal, legal or business decisions  Do not drink alcoholic beverages  If you have not urinated within 8 hours after discharge, please contact your physician  Resume your medications unless otherwise instructed    For 24 hours after general anesthesia or intravenous analgesia / sedation  you may experience:  Drowsiness, dizziness, sleepiness, or confusion  Difficulty remembering or delayed reaction times  Difficulty with your balance, especially while walking, move slowly and carefully, do not make sudden position changes  Difficulty focusing or blurred vision    You may not be aware of slight changes in your behavior and/or your reaction time because of the medication used during and after your procedure.    Report the following to your physician:  Excessive pain, swelling, redness or odor of or around the surgical area  Temperature over 100.5  Nausea and vomiting lasting longer than 4 hours or if unable to take medications  Any signs of decreased circulation or nerve impairment to extremity: change in color, persistent numbness, tingling, coldness or increase pain  Any questions or concerns    IF YOU REPORT TO AN EMERGENCY ROOM, DOCTOR'S OFFICE OR HOSPITAL WITHIN 24 HOURS AFTER YOUR PROCEDURE, BRING THIS SHEET AND YOUR AFTER VISIT SUMMARY WITH YOU AND GIVE IT TO THE PHYSICIAN OR NURSE ATTENDING YOU.

## 2025-06-20 ENCOUNTER — HOSPITAL ENCOUNTER (OUTPATIENT)
Facility: HOSPITAL | Age: 75
Setting detail: RECURRING SERIES
Discharge: HOME | End: 2025-06-23
Attending: RADIOLOGY
Payer: MEDICARE

## 2025-06-20 PROCEDURE — 97140 MANUAL THERAPY 1/> REGIONS: CPT

## 2025-06-20 PROCEDURE — 97535 SELF CARE MNGMENT TRAINING: CPT

## 2025-06-20 NOTE — PROGRESS NOTES
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   8-22 min = 1 unit; 23-37 min = 2 units; 38-52 min = 3 units;  53-67 min = 4 units; 68-82 min = 5 units     SUBJECTIVE    Pain Level (0-10 scale): Patient reports soreness in her toe from recent ingrown toe nail. No other pain mentioned.    Any medication changes, allergies to medications, adverse drug reactions, diagnosis change, or new procedure performed?: [] No    [x] Yes (see summary sheet for update)   Medications: Verified on Patient Summary List  Primary therapist assessed patient due to medical changes from last visit.  Patient had procedure scheduled for 6/10/2025 for exchange of her ureteral stent. This was postponed for next week. Patient was in the hospital for 5 days with rectal bleeding and low hemoglobin since last visit in clinic. Patient reports that Plavix was the cause so she has been taken off of Plavix.  Colonoscopy yesterday that showed radiation proctitis, mild diverticulosis and a polyp.   Patient has upcoming rescheduled procedure for exchange of ureteral stent.   See EPIC notes for further details.  Patient also had ingrown toe nail addressed by podiatry since last visit (left toe)     Subjective functional status/changes:    Patient reports that she

## 2025-06-23 ENCOUNTER — ANESTHESIA (OUTPATIENT)
Facility: HOSPITAL | Age: 75
End: 2025-06-23
Payer: MEDICARE

## 2025-06-23 ENCOUNTER — HOSPITAL ENCOUNTER (OUTPATIENT)
Facility: HOSPITAL | Age: 75
Setting detail: OUTPATIENT SURGERY
Discharge: HOME | End: 2025-06-23
Attending: STUDENT IN AN ORGANIZED HEALTH CARE EDUCATION/TRAINING PROGRAM | Admitting: STUDENT IN AN ORGANIZED HEALTH CARE EDUCATION/TRAINING PROGRAM
Payer: MEDICARE

## 2025-06-23 ENCOUNTER — APPOINTMENT (OUTPATIENT)
Facility: HOSPITAL | Age: 75
End: 2025-06-23
Attending: STUDENT IN AN ORGANIZED HEALTH CARE EDUCATION/TRAINING PROGRAM
Payer: MEDICARE

## 2025-06-23 ENCOUNTER — ANESTHESIA EVENT (OUTPATIENT)
Facility: HOSPITAL | Age: 75
End: 2025-06-23
Payer: MEDICARE

## 2025-06-23 VITALS
SYSTOLIC BLOOD PRESSURE: 162 MMHG | WEIGHT: 162 LBS | HEART RATE: 71 BPM | DIASTOLIC BLOOD PRESSURE: 79 MMHG | HEIGHT: 61 IN | TEMPERATURE: 96.8 F | OXYGEN SATURATION: 100 % | RESPIRATION RATE: 16 BRPM | BODY MASS INDEX: 30.58 KG/M2

## 2025-06-23 PROCEDURE — 3700000001 HC ADD 15 MINUTES (ANESTHESIA): Performed by: STUDENT IN AN ORGANIZED HEALTH CARE EDUCATION/TRAINING PROGRAM

## 2025-06-23 PROCEDURE — 2580000003 HC RX 258: Performed by: ANESTHESIOLOGY

## 2025-06-23 PROCEDURE — 3600000013 HC SURGERY LEVEL 3 ADDTL 15MIN: Performed by: STUDENT IN AN ORGANIZED HEALTH CARE EDUCATION/TRAINING PROGRAM

## 2025-06-23 PROCEDURE — 6360000004 HC RX CONTRAST MEDICATION: Performed by: STUDENT IN AN ORGANIZED HEALTH CARE EDUCATION/TRAINING PROGRAM

## 2025-06-23 PROCEDURE — 3700000000 HC ANESTHESIA ATTENDED CARE: Performed by: STUDENT IN AN ORGANIZED HEALTH CARE EDUCATION/TRAINING PROGRAM

## 2025-06-23 PROCEDURE — 7100000001 HC PACU RECOVERY - ADDTL 15 MIN: Performed by: STUDENT IN AN ORGANIZED HEALTH CARE EDUCATION/TRAINING PROGRAM

## 2025-06-23 PROCEDURE — 2709999900 HC NON-CHARGEABLE SUPPLY: Performed by: STUDENT IN AN ORGANIZED HEALTH CARE EDUCATION/TRAINING PROGRAM

## 2025-06-23 PROCEDURE — 7100000010 HC PHASE II RECOVERY - FIRST 15 MIN: Performed by: STUDENT IN AN ORGANIZED HEALTH CARE EDUCATION/TRAINING PROGRAM

## 2025-06-23 PROCEDURE — 6360000002 HC RX W HCPCS: Performed by: NURSE ANESTHETIST, CERTIFIED REGISTERED

## 2025-06-23 PROCEDURE — C2617 STENT, NON-COR, TEM W/O DEL: HCPCS | Performed by: STUDENT IN AN ORGANIZED HEALTH CARE EDUCATION/TRAINING PROGRAM

## 2025-06-23 PROCEDURE — 2500000003 HC RX 250 WO HCPCS: Performed by: NURSE ANESTHETIST, CERTIFIED REGISTERED

## 2025-06-23 PROCEDURE — 7100000011 HC PHASE II RECOVERY - ADDTL 15 MIN: Performed by: STUDENT IN AN ORGANIZED HEALTH CARE EDUCATION/TRAINING PROGRAM

## 2025-06-23 PROCEDURE — 7100000000 HC PACU RECOVERY - FIRST 15 MIN: Performed by: STUDENT IN AN ORGANIZED HEALTH CARE EDUCATION/TRAINING PROGRAM

## 2025-06-23 PROCEDURE — C1769 GUIDE WIRE: HCPCS | Performed by: STUDENT IN AN ORGANIZED HEALTH CARE EDUCATION/TRAINING PROGRAM

## 2025-06-23 PROCEDURE — 3600000003 HC SURGERY LEVEL 3 BASE: Performed by: STUDENT IN AN ORGANIZED HEALTH CARE EDUCATION/TRAINING PROGRAM

## 2025-06-23 DEVICE — URETERAL STENT
Type: IMPLANTABLE DEVICE | Site: URETER | Status: FUNCTIONAL
Brand: CONTOUR™

## 2025-06-23 RX ORDER — SODIUM CHLORIDE 9 MG/ML
INJECTION, SOLUTION INTRAVENOUS CONTINUOUS
Status: DISCONTINUED | OUTPATIENT
Start: 2025-06-23 | End: 2025-06-23 | Stop reason: HOSPADM

## 2025-06-23 RX ORDER — PROPOFOL 10 MG/ML
INJECTION, EMULSION INTRAVENOUS
Status: DISCONTINUED | OUTPATIENT
Start: 2025-06-23 | End: 2025-06-23 | Stop reason: SDUPTHER

## 2025-06-23 RX ORDER — DIPHENHYDRAMINE HYDROCHLORIDE 50 MG/ML
12.5 INJECTION, SOLUTION INTRAMUSCULAR; INTRAVENOUS
Status: DISCONTINUED | OUTPATIENT
Start: 2025-06-23 | End: 2025-06-23 | Stop reason: HOSPADM

## 2025-06-23 RX ORDER — ONDANSETRON 2 MG/ML
INJECTION INTRAMUSCULAR; INTRAVENOUS
Status: DISCONTINUED | OUTPATIENT
Start: 2025-06-23 | End: 2025-06-23 | Stop reason: SDUPTHER

## 2025-06-23 RX ORDER — SODIUM CHLORIDE, SODIUM LACTATE, POTASSIUM CHLORIDE, CALCIUM CHLORIDE 600; 310; 30; 20 MG/100ML; MG/100ML; MG/100ML; MG/100ML
INJECTION, SOLUTION INTRAVENOUS CONTINUOUS
Status: DISCONTINUED | OUTPATIENT
Start: 2025-06-23 | End: 2025-06-23 | Stop reason: HOSPADM

## 2025-06-23 RX ORDER — MIDAZOLAM HYDROCHLORIDE 5 MG/5ML
5 INJECTION, SOLUTION INTRAMUSCULAR; INTRAVENOUS
Status: DISCONTINUED | OUTPATIENT
Start: 2025-06-23 | End: 2025-06-23 | Stop reason: HOSPADM

## 2025-06-23 RX ORDER — HYDRALAZINE HYDROCHLORIDE 20 MG/ML
10 INJECTION INTRAMUSCULAR; INTRAVENOUS
Status: DISCONTINUED | OUTPATIENT
Start: 2025-06-23 | End: 2025-06-23 | Stop reason: HOSPADM

## 2025-06-23 RX ORDER — SODIUM CHLORIDE 0.9 % (FLUSH) 0.9 %
5-40 SYRINGE (ML) INJECTION PRN
Status: DISCONTINUED | OUTPATIENT
Start: 2025-06-23 | End: 2025-06-23 | Stop reason: HOSPADM

## 2025-06-23 RX ORDER — ONDANSETRON 2 MG/ML
4 INJECTION INTRAMUSCULAR; INTRAVENOUS ONCE
Status: DISCONTINUED | OUTPATIENT
Start: 2025-06-23 | End: 2025-06-23 | Stop reason: HOSPADM

## 2025-06-23 RX ORDER — SODIUM CHLORIDE 9 MG/ML
INJECTION, SOLUTION INTRAVENOUS PRN
Status: DISCONTINUED | OUTPATIENT
Start: 2025-06-23 | End: 2025-06-23 | Stop reason: HOSPADM

## 2025-06-23 RX ORDER — FENTANYL CITRATE 50 UG/ML
100 INJECTION, SOLUTION INTRAMUSCULAR; INTRAVENOUS
Status: DISCONTINUED | OUTPATIENT
Start: 2025-06-23 | End: 2025-06-23 | Stop reason: HOSPADM

## 2025-06-23 RX ORDER — PROCHLORPERAZINE EDISYLATE 5 MG/ML
5 INJECTION INTRAMUSCULAR; INTRAVENOUS
Status: DISCONTINUED | OUTPATIENT
Start: 2025-06-23 | End: 2025-06-23 | Stop reason: HOSPADM

## 2025-06-23 RX ORDER — NALOXONE HYDROCHLORIDE 0.4 MG/ML
INJECTION, SOLUTION INTRAMUSCULAR; INTRAVENOUS; SUBCUTANEOUS PRN
Status: DISCONTINUED | OUTPATIENT
Start: 2025-06-23 | End: 2025-06-23 | Stop reason: HOSPADM

## 2025-06-23 RX ORDER — SODIUM CHLORIDE 0.9 % (FLUSH) 0.9 %
5-40 SYRINGE (ML) INJECTION EVERY 12 HOURS SCHEDULED
Status: DISCONTINUED | OUTPATIENT
Start: 2025-06-23 | End: 2025-06-23 | Stop reason: HOSPADM

## 2025-06-23 RX ORDER — EPHEDRINE SULFATE 50 MG/ML
INJECTION INTRAVENOUS
Status: DISCONTINUED | OUTPATIENT
Start: 2025-06-23 | End: 2025-06-23 | Stop reason: SDUPTHER

## 2025-06-23 RX ORDER — FENTANYL CITRATE 50 UG/ML
50 INJECTION, SOLUTION INTRAMUSCULAR; INTRAVENOUS EVERY 5 MIN PRN
Status: DISCONTINUED | OUTPATIENT
Start: 2025-06-23 | End: 2025-06-23 | Stop reason: HOSPADM

## 2025-06-23 RX ORDER — HYDROMORPHONE HYDROCHLORIDE 1 MG/ML
0.5 INJECTION, SOLUTION INTRAMUSCULAR; INTRAVENOUS; SUBCUTANEOUS EVERY 5 MIN PRN
Status: DISCONTINUED | OUTPATIENT
Start: 2025-06-23 | End: 2025-06-23 | Stop reason: HOSPADM

## 2025-06-23 RX ORDER — LIDOCAINE HYDROCHLORIDE 20 MG/ML
INJECTION, SOLUTION EPIDURAL; INFILTRATION; INTRACAUDAL; PERINEURAL
Status: DISCONTINUED | OUTPATIENT
Start: 2025-06-23 | End: 2025-06-23 | Stop reason: SDUPTHER

## 2025-06-23 RX ORDER — IOPAMIDOL 755 MG/ML
INJECTION, SOLUTION INTRAVASCULAR PRN
Status: DISCONTINUED | OUTPATIENT
Start: 2025-06-23 | End: 2025-06-23 | Stop reason: HOSPADM

## 2025-06-23 RX ORDER — ONDANSETRON 2 MG/ML
4 INJECTION INTRAMUSCULAR; INTRAVENOUS
Status: DISCONTINUED | OUTPATIENT
Start: 2025-06-23 | End: 2025-06-23 | Stop reason: HOSPADM

## 2025-06-23 RX ORDER — MIDAZOLAM HYDROCHLORIDE 2 MG/2ML
2 INJECTION, SOLUTION INTRAMUSCULAR; INTRAVENOUS
Status: DISCONTINUED | OUTPATIENT
Start: 2025-06-23 | End: 2025-06-23 | Stop reason: HOSPADM

## 2025-06-23 RX ORDER — MEPERIDINE HYDROCHLORIDE 25 MG/ML
12.5 INJECTION INTRAMUSCULAR; INTRAVENOUS; SUBCUTANEOUS EVERY 5 MIN PRN
Status: DISCONTINUED | OUTPATIENT
Start: 2025-06-23 | End: 2025-06-23 | Stop reason: HOSPADM

## 2025-06-23 RX ORDER — DEXAMETHASONE SODIUM PHOSPHATE 4 MG/ML
INJECTION, SOLUTION INTRA-ARTICULAR; INTRALESIONAL; INTRAMUSCULAR; INTRAVENOUS; SOFT TISSUE
Status: DISCONTINUED | OUTPATIENT
Start: 2025-06-23 | End: 2025-06-23 | Stop reason: SDUPTHER

## 2025-06-23 RX ORDER — CEFAZOLIN SODIUM 1 G/3ML
INJECTION, POWDER, FOR SOLUTION INTRAMUSCULAR; INTRAVENOUS
Status: DISCONTINUED | OUTPATIENT
Start: 2025-06-23 | End: 2025-06-23 | Stop reason: SDUPTHER

## 2025-06-23 RX ADMIN — EPHEDRINE SULFATE 10 MG: 50 INJECTION INTRAVENOUS at 10:41

## 2025-06-23 RX ADMIN — CEFAZOLIN 2 G: 330 INJECTION, POWDER, FOR SOLUTION INTRAMUSCULAR; INTRAVENOUS at 10:36

## 2025-06-23 RX ADMIN — DEXAMETHASONE SODIUM PHOSPHATE 4 MG: 4 INJECTION, SOLUTION INTRAMUSCULAR; INTRAVENOUS at 10:36

## 2025-06-23 RX ADMIN — ONDANSETRON 4 MG: 2 INJECTION INTRAMUSCULAR; INTRAVENOUS at 10:36

## 2025-06-23 RX ADMIN — SODIUM CHLORIDE, POTASSIUM CHLORIDE, SODIUM LACTATE AND CALCIUM CHLORIDE: 600; 310; 30; 20 INJECTION, SOLUTION INTRAVENOUS at 10:10

## 2025-06-23 RX ADMIN — PROPOFOL 150 MG: 10 INJECTION, EMULSION INTRAVENOUS at 10:27

## 2025-06-23 RX ADMIN — LIDOCAINE HYDROCHLORIDE 60 MG: 20 INJECTION, SOLUTION EPIDURAL; INFILTRATION; INTRACAUDAL; PERINEURAL at 10:27

## 2025-06-23 RX ADMIN — PROPOFOL 50 MG: 10 INJECTION, EMULSION INTRAVENOUS at 10:28

## 2025-06-23 ASSESSMENT — PAIN DESCRIPTION - DESCRIPTORS: DESCRIPTORS: SORE

## 2025-06-23 ASSESSMENT — PAIN SCALES - GENERAL: PAINLEVEL_OUTOF10: 2

## 2025-06-23 ASSESSMENT — PAIN DESCRIPTION - PAIN TYPE: TYPE: SURGICAL PAIN

## 2025-06-23 ASSESSMENT — PAIN - FUNCTIONAL ASSESSMENT: PAIN_FUNCTIONAL_ASSESSMENT: NONE - DENIES PAIN

## 2025-06-23 ASSESSMENT — PAIN DESCRIPTION - LOCATION: LOCATION: VAGINA

## 2025-06-23 NOTE — H&P
MD Yaritza at [facility] ENDOSCOPY    COLONOSCOPY N/A 2025    COLONOSCOPY DIAGNOSTIC performed by Philip Chao DO at [facility] ENDOSCOPY    COLONOSCOPY FLX DX W/COLLJ SPEC WHEN PFRMD  01/20/2012     x 2    COLONOSCOPY,FLEX,W/CONTROL, BLEEDING  2022         COLONOSCOPY,REMV LESN,SNARE  10/16/2017         CYSTOSCOPY Left 2025    CYSTOSCOPY WITH LEFT STENT PLACEMENT, RETROGRADE POLYGRAM performed by Crow García III, MD at [facility] MAIN OR    DILATION AND CURETTAGE OF UTERUS  2021    High-grade endometrial carcinoma, favor serous carcinoma    HEENT      3 teeth implants    HEENT      EXTRACTION OF WISDOM TEETH X 4    HYSTERECTOMY, VAGINAL  2021    IR PORT PLACEMENT > 5 YEARS  10/14/2021    IR PORT PLACEMENT EQUAL OR GREATER THAN 5 YEARS 10/14/2021 [facility] RAD ANGIO IR    IR PORT PLACEMENT > 5 YEARS  10/14/2021    IR PORT PLACEMENT > 5 YEARS  2024    IR PORT PLACEMENT EQUAL OR GREATER THAN 5 YEARS 2024 [facility] RAD ANGIO IR    ORTHOPEDIC SURGERY      LEFTl hip arthroplasty    ORTHOPEDIC SURGERY      right foot, bunionectomy    ORTHOPEDIC SURGERY      RIGHT hip arthroplasty    TUBAL LIGATION      UPPER GASTROINTESTINAL ENDOSCOPY      Do no remember the date but around     UPPER GI ENDOSCOPY,BIOPSY  2016         UPPER GI ENDOSCOPY,DILATN W GUIDE  2016         US BIOPSY LYMPH NODE  2024    US LYMPH NODE BIOPSY 2024 [facility] RAD US    VASCULAR SURGERY Right     port for chemo     Family History   Problem Relation Age of Onset    Hypertension Mother     Pancreatic Cancer Mother     Alcohol Abuse Father          from staph infection from infection on his finger.  Staph went into his bloodstream and destroyed heart valves.    Stroke Sister         Due to hemachromatosis    No Known Problems Sister     Asthma Brother         No longer severe    Heart Disease Brother     Hypertension Brother     Ovarian Cancer Paternal Aunt     Rheum Arthritis Paternal Aunt     Colon Cancer

## 2025-06-23 NOTE — PROGRESS NOTES
Physician Progress Note      PATIENT:               KELI CAN  CSN #:                  [id number]  :                       1950  ADMIT DATE:       2025 8:57 AM  DISCH DATE:        2025 4:10 PM  RESPONDING  PROVIDER #:        Araseli Pagan MD          QUERY TEXT:    Colitis, unspecified is documented in the DCS. \"Mild left sided colitis\" is   documented by GI and \"infectious gastroenteritis and colitis\" is documented by   Cards. Please specify the type of colitis such as:    The clinical indicators include:  GI CN 6/10: \"Mild left-sided colitis. Moderate fecal retention in the large   bowel\"  Cards PN : \"Infectious gastroenteritis and colitis\"  GI PN : \"Imaging and symptoms suggestive of possible infectious vs.   Ischemic colitis. Agree with antibiotics\"  DCS: \"Colitis, unspecified\"  Rocephin, Flagyl, IVFs (MAR)  Options provided:  -- Bacterial Colitis  -- Colitis with prophylactic abx use  -- Other - I will add my own diagnosis  -- Disagree - Not applicable / Not valid  -- Disagree - Clinically unable to determine / Unknown  -- Refer to Clinical Documentation Reviewer    PROVIDER RESPONSE TEXT:    This patient has bacterial colitis.    Query created by: Luisa Garcia on 2025 8:58 AM      Electronically signed by:  Araseli Pagan MD 2025 7:38 AM

## 2025-06-23 NOTE — DISCHARGE INSTRUCTIONS
ANESTHESIA DISCHARGE INSTRUCTIONS    All Clothing/Valuables Returned To Patient Before D/C Home    PATIENT INSTRUCTIONS:    The first meal should be something that is light; not greasy or spicy.  If this is tolerated, then advance to regular diet as tolerated.      After general anesthesia or intravenous sedation, for 24 hours or while taking prescription Narcotics:  Limit your activities  Do not drive and operate hazardous machinery  Do not make important personal or business decisions  Do  not drink alcoholic beverages  If you have not urinated within 8 hours after discharge, please contact your surgeon on call.    Pain  Take pain medication as directed by your doctor   Call your doctor if pain is NOT relieved by medication  DO NOT take aspirin or blood thinners until directed by your doctor    Report the following to your surgeon:  Excessive pain, swelling, redness or odor of or around the surgical area  Temperature over 100.5  Nausea and vomiting lasting longer than 4 hours or if unable to take medications  Any signs of decreased circulation or nerve impairment to extremity: change in color, persistent  numbness, tingling, coldness or increase pain  Any questions    ALSO:  FOLLOW ANY INSTRUCTIONS SPECIFIED BY YOUR SURGEON          Ureteral Stent Placement: What to Expect at Home  Your Recovery     A ureteral (say \"you-REE-ter-ul\") stent is a thin, hollow tube that is placed in the ureter to help urine pass from the kidney into the bladder. Ureters are the tubes that connect the kidneys to the bladder.  You may have a small amount of blood in your urine for 1 to 3 days after the procedure.  While the stent is in place, you may have to urinate more often, feel a sudden need to urinate, or feel like you can't completely empty your bladder. You may feel some pain when you urinate or do strenuous activity. You also may notice a small amount of blood in your urine after strenuous activities. These side effects usually

## 2025-06-23 NOTE — ANESTHESIA PRE PROCEDURE
Department of Anesthesiology  Preprocedure Note       Name:  Elenita Benitez   Age:  74 y.o.  :  1950                                          MRN:  [id number]         Date:  2025      Surgeon: Surgeon(s):  Crow García III, MD    Procedure: Procedure(s):  CYSTOSCOPY LEFT STENT EXCHANGE    Medications prior to admission:   Prior to Admission medications    Medication Sig Start Date End Date Taking? Authorizing Provider   atorvastatin (LIPITOR) 20 MG tablet Take 1 tablet by mouth nightly 25  Yes Rhonda Avendaño ACNP   NONFORMULARY Vitamin D, B12,   Yes Provider, MD Rose   ondansetron (ZOFRAN-ODT) 8 MG TBDP disintegrating tablet Take 1 tablet by mouth every 8 hours as needed for Nausea or Vomiting Place one (1) tablet under tongue every 8 hour when needed for nausea and vomiting 25  Yes Dominic Nicole MD   aspirin 81 MG chewable tablet Take 1 tablet by mouth daily 1/10/25  Yes Markus Nguyễn MD   metoprolol succinate (TOPROL XL) 25 MG extended release tablet Take 1 tablet by mouth daily 1/10/25  Yes Markus Nguyễn MD   dexAMETHasone (DECADRON) 4 MG tablet Take 2 tablets by mouth with breakfast the day before chemotherapy and repeat for 2 days after chemotherapy 24  Yes Dominic Nicole MD   lidocaine-prilocaine (EMLA) 2.5-2.5 % cream Apply small amount over port area and cover with a band-aid one (1) hour before chemotherapy treatment 24  Yes Dominic Nicole MD   potassium chloride (KLOR-CON M) 20 MEQ extended release tablet Take 1 tablet by mouth daily 25   Rhonda Avendaño ACNP       Current medications:    Current Facility-Administered Medications   Medication Dose Route Frequency Provider Last Rate Last Admin   • fentaNYL (SUBLIMAZE) injection 100 mcg  100 mcg IntraVENous Once PRN Leobardo Madden DO       • ondansetron (ZOFRAN) injection 4 mg  4 mg IntraVENous Once Leobardo Madden, DO       • 0.9 % sodium chloride infusion

## 2025-06-23 NOTE — ANESTHESIA POSTPROCEDURE EVALUATION
Department of Anesthesiology  Postprocedure Note    Patient: Elenita Benitez  MRN: [id number]  YOB: 1950  Date of evaluation: 6/23/2025    Procedure Summary       Date: 06/23/25 Room / Location: [facility] MAIN OR [address] [place] [address] MAIN OR    Anesthesia Start: 1022 Anesthesia Stop: 1101    Procedure: CYSTOSCOPY LEFT STENT EXCHANGE (Left: Ureter) Diagnosis:       Hydronephrosis, unspecified hydronephrosis type      (Hydronephrosis, unspecified hydronephrosis type [N13.30])    Providers: Crow García III, MD Responsible Provider: Leobardo Madden DO    Anesthesia Type: General ASA Status: 3            Anesthesia Type: General    Christopher Phase I: Christopher Score: 9    Christopher Phase II:      Anesthesia Post Evaluation    Patient location during evaluation: PACU  Patient participation: complete - patient participated  Level of consciousness: awake  Pain score: 0  Airway patency: patent  Nausea & Vomiting: no nausea  Cardiovascular status: hemodynamically stable  Respiratory status: acceptable  Hydration status: euvolemic  Comments: Seen, no complaints   Pain management: adequate    No notable events documented.

## 2025-06-23 NOTE — OP NOTE
Operative Note      Patient: Elenita Benitez  YOB: 1950  MRN: [id number]    Date of Procedure: 6/23/2025    Pre-Op Diagnosis Codes:      * Hydronephrosis, unspecified hydronephrosis type [N13.30]    Post-Op Diagnosis: Same       Procedure(s):  CYSTOSCOPY LEFT STENT EXCHANGE    Surgeon(s):  Crow García III, MD    Assistant:   * No surgical staff found *    Anesthesia: General    Estimated Blood Loss (mL): Minimal    Complications: None    Specimens:   * No specimens in log *    Implants:  Implant Name Type Inv. Item Serial No.  Lot No. LRB No. Used Action   STENT URET 7FR L24CM PERCFLX HYDR+ SFT PGTL TAPR TIP W/O - SN/A  STENT URET 7FR L24CM PERCFLX HYDR+ SFT PGTL TAPR TIP W/O N/A "[employer]" UROLOGY- [id number] Left 1 Implanted         Drains: * No LDAs found *    Findings:  Infection Present At Time Of Surgery (PATOS) (choose all levels that have infection present):  No infection present  Other Findings: erythema in bladder appeared to have hydronephrosis with fixed segment of narrowed proximal ureter tortuousity above     Detailed Description of Procedure:   Patient received ancef  Was prepped and draped in dorsal lithotomy  A preprocedure timeout was performed  I used 21 Polish rigid cystoscope with normal female urethra  No lesions noted in bladder had some erythema there  I then grasped the stent and pulled distal curl out meatus  And clipped this off  I slid benston up   And removed stent  I then advanced a 5 Polish to perfrom retrograde which appeared to still demonstrate obstruction with hydro  Narrowed portion of proximal ureter with hydronephrosis and upstream tortuosity  I then replaced the wire and advanced a 7 x 24 double j with no string over the wire and removed the wire confirming good proximal curl w x ray and visually for the distal curl I then drained bladder     Electronically signed by Crow García III, MD on 6/23/2025 at 10:50 AM

## 2025-06-24 ENCOUNTER — TELEPHONE (OUTPATIENT)
Age: 75
End: 2025-06-24

## 2025-06-24 ENCOUNTER — TELEMEDICINE (OUTPATIENT)
Age: 75
End: 2025-06-24
Payer: MEDICARE

## 2025-06-24 DIAGNOSIS — Z51.12 ENCOUNTER FOR ANTINEOPLASTIC CHEMOTHERAPY AND IMMUNOTHERAPY: ICD-10-CM

## 2025-06-24 DIAGNOSIS — C54.1 PAPILLARY SEROUS ENDOMETRIAL ADENOCARCINOMA (HCC): Primary | ICD-10-CM

## 2025-06-24 DIAGNOSIS — Z51.11 ENCOUNTER FOR ANTINEOPLASTIC CHEMOTHERAPY AND IMMUNOTHERAPY: ICD-10-CM

## 2025-06-24 PROCEDURE — 1123F ACP DISCUSS/DSCN MKR DOCD: CPT | Performed by: OBSTETRICS & GYNECOLOGY

## 2025-06-24 PROCEDURE — 99215 OFFICE O/P EST HI 40 MIN: CPT | Performed by: OBSTETRICS & GYNECOLOGY

## 2025-06-24 PROCEDURE — G8399 PT W/DXA RESULTS DOCUMENT: HCPCS | Performed by: OBSTETRICS & GYNECOLOGY

## 2025-06-24 PROCEDURE — 1159F MED LIST DOCD IN RCRD: CPT | Performed by: OBSTETRICS & GYNECOLOGY

## 2025-06-24 PROCEDURE — 3017F COLORECTAL CA SCREEN DOC REV: CPT | Performed by: OBSTETRICS & GYNECOLOGY

## 2025-06-24 PROCEDURE — 1111F DSCHRG MED/CURRENT MED MERGE: CPT | Performed by: OBSTETRICS & GYNECOLOGY

## 2025-06-24 PROCEDURE — G8427 DOCREV CUR MEDS BY ELIG CLIN: HCPCS | Performed by: OBSTETRICS & GYNECOLOGY

## 2025-06-24 PROCEDURE — 1090F PRES/ABSN URINE INCON ASSESS: CPT | Performed by: OBSTETRICS & GYNECOLOGY

## 2025-06-24 PROCEDURE — 1160F RVW MEDS BY RX/DR IN RCRD: CPT | Performed by: OBSTETRICS & GYNECOLOGY

## 2025-06-24 RX ORDER — EPINEPHRINE 1 MG/ML
0.3 INJECTION, SOLUTION, CONCENTRATE INTRAVENOUS PRN
OUTPATIENT
Start: 2025-07-15

## 2025-06-24 RX ORDER — SODIUM CHLORIDE 9 MG/ML
25 INJECTION, SOLUTION INTRAVENOUS PRN
OUTPATIENT
Start: 2025-07-15

## 2025-06-24 RX ORDER — HEPARIN SODIUM (PORCINE) LOCK FLUSH IV SOLN 100 UNIT/ML 100 UNIT/ML
500 SOLUTION INTRAVENOUS PRN
OUTPATIENT
Start: 2025-07-15

## 2025-06-24 RX ORDER — ACETAMINOPHEN 325 MG/1
650 TABLET ORAL
OUTPATIENT
Start: 2025-07-15

## 2025-06-24 RX ORDER — SODIUM CHLORIDE 9 MG/ML
INJECTION, SOLUTION INTRAVENOUS PRN
OUTPATIENT
Start: 2025-07-15

## 2025-06-24 RX ORDER — SODIUM CHLORIDE 0.9 % (FLUSH) 0.9 %
5-40 SYRINGE (ML) INJECTION PRN
OUTPATIENT
Start: 2025-07-15

## 2025-06-24 RX ORDER — HYDROCORTISONE SODIUM SUCCINATE 100 MG/2ML
100 INJECTION INTRAMUSCULAR; INTRAVENOUS
OUTPATIENT
Start: 2025-07-15

## 2025-06-24 RX ORDER — ALBUTEROL SULFATE 90 UG/1
4 INHALANT RESPIRATORY (INHALATION) PRN
OUTPATIENT
Start: 2025-07-15

## 2025-06-24 RX ORDER — DIPHENHYDRAMINE HYDROCHLORIDE 50 MG/ML
50 INJECTION, SOLUTION INTRAMUSCULAR; INTRAVENOUS
OUTPATIENT
Start: 2025-07-15

## 2025-06-24 RX ORDER — FAMOTIDINE 10 MG/ML
20 INJECTION, SOLUTION INTRAVENOUS
OUTPATIENT
Start: 2025-07-15

## 2025-06-24 RX ORDER — ONDANSETRON 2 MG/ML
8 INJECTION INTRAMUSCULAR; INTRAVENOUS
OUTPATIENT
Start: 2025-07-15

## 2025-06-24 NOTE — THERAPY RECERTIFICATION
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 No recent falls.    Self Care: Independent    Comorbidities:  See evaluation. Since evaluation STEMI with stents as well as ureteral stent.   Medications:  Verified on Patient Summary List   Visits from SOC:  14 Missed Visits:  7       Progress toward Goals:  Short Term Goals: To be accomplished in 5 treatments.  Patient and/or caregiver will verbalize 3/3 signs and symptoms of infection without external cueing, in order to reduce the risk of infection and skin impairment and to promote optimal self management of condition.  Status at last Eval/Progress Note: MET  Current Status: MET  Goal Met?  yes  2.   Patient to perform 5/5 lymphedema remedial exercises in session with modified independence utilizing HEP handout, in order to promote optimal independence with management of condition, as well as promote optimal limb volume reduction required for proper fit of donned clothing.  Status at last Eval/Progress Note: Not Met/In Progress  Current Status:  Not Met/In Progress  Goal Met?  no  3.   Patient/caregiver will demonstrate independence with application of reduction kit/velcro products during session for continued volume reduction and prevention of re-accumulation of  fluid during phase 1 of complete decongestive therapy.  Status at last

## 2025-06-24 NOTE — PROGRESS NOTES
Virtual Visit, 4 week follow up ,  pt reports no abnormal spotting or bleeding, pt states no questions or concerns for today's visit    1. Have you been to the ER, urgent care clinic since your last visit?  Hospitalized since your last visit?  no    2. Have you seen or consulted any other health care providers outside of the [facility] since your last visit?  Include any pap smears or colon screening.   no          
hypermetabolism.  ABDOMEN/PELVIS:  Decreased activity in the retrocrural, bulky retroperitoneal, and bilateral  common iliac nodes, max SUV 3.8, previous 13.5.  No new foci of abnormal hypermetabolism.  SKELETON: No foci of abnormal hypermetabolism in the axial and visualized  appendicular skeleton.  IMPRESSION:  1. Decreased left supraclavicular sharon metastases.  2. Decreased retrocrural, retroperitoneal and bilateral common iliac sharon  metastases.  3. No new finding.    PET 10/25/2024:  FINDINGS:  HEAD/NECK: 2 hypermetabolic, mildly enlarged left supraclavicular lymph nodes  are present, maximal SUV 9.8.  CHEST: No foci of abnormal hypermetabolism.  ABDOMEN/PELVIS: There is moderately severe left-sided hydronephrosis and  diminished left renal cortical activity. There is confluent left-sided  retroperitoneal lymphadenopathy at the level of the lower pole of the left  kidney, maximal SUV 13.5. Multiple smaller hypermetabolic retroperitoneal,  retrocrural and bilateral common iliac lymph nodes are seen.  SKELETON: No foci of abnormal hypermetabolism in the axial and visualized  appendicular skeleton.  IMPRESSION:  Bulky retroperitoneal lymphadenopathy as well as hypermetabolic left  supraclavicular and bilateral iliac lymph nodes are compatible with malignancy.  Moderately severe left-sided hydronephrosis; no hypermetabolic left renal mass  is visualized.  The larger supraclavicular lymph node would likely be the easiest target for  ultrasound-guided core needle biopsy, if clinically indicated; alternatively,  retroperitoneal lymphadenopathy could be targeted with CT guidance.      CT C/A/P 2/3/2022:  FINDINGS:  Port-A-Cath terminates at the cavoatrial junction.  CHEST WALL: No mass or axillary lymphadenopathy.  THYROID: No nodule.  MEDIASTINUM: No mass or lymphadenopathy.  COREY: No mass or lymphadenopathy.  THORACIC AORTA: No dissection or aneurysm.  MAIN PULMONARY ARTERY: Normal in caliber.  TRACHEA/BRONCHI:

## 2025-06-26 ENCOUNTER — HOSPITAL ENCOUNTER (OUTPATIENT)
Facility: HOSPITAL | Age: 75
Setting detail: RECURRING SERIES
Discharge: HOME | End: 2025-06-29
Attending: RADIOLOGY
Payer: MEDICARE

## 2025-06-26 PROCEDURE — 97140 MANUAL THERAPY 1/> REGIONS: CPT

## 2025-06-26 PROCEDURE — 97535 SELF CARE MNGMENT TRAINING: CPT

## 2025-06-26 NOTE — PROGRESS NOTES
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   8-22 min = 1 unit; 23-37 min = 2 units; 38-52 min = 3 units;  53-67 min = 4 units; 68-82 min = 5 units     SUBJECTIVE    Pain Level (0-10 scale): No pain number given on arrival.     Any medication changes, allergies to medications, adverse drug reactions, diagnosis change, or new procedure performed?: [] No    [x] Yes (see summary sheet for update)   Medications: Verified on Patient Summary List  Outpatient procedure for ureteral stent exchange as mentioned in last note 2025.     Subjective functional status/changes:   Patient has heard from vendor and her compression garments should arrive on 2025.  Patient did self purchase a second compression jerald Biaflect in 2XL. Patient reports that she can manage the jerald easier with donning and doffing.     OBJECTIVE  Therapeutic Procedures:  Tx Min Billable or 1:1 Min (if diff from Tx Min) Procedure, Rationale Specifics   [phone] Manual Therapy (timed):  increase ROM, increase tissue extensibility, and decrease edema to improve patient's ability to progress to PLOF and address remaining functional goals.  The manual therapy interventions were performed at a separate and distinct time from the therapeutic activities

## 2025-07-03 ENCOUNTER — PATIENT MESSAGE (OUTPATIENT)
Age: 75
End: 2025-07-03

## 2025-07-03 DIAGNOSIS — C54.1 ENDOMETRIAL CANCER (HCC): Primary | ICD-10-CM

## 2025-07-03 DIAGNOSIS — C54.1 MALIGNANT NEOPLASM OF ENDOMETRIUM (HCC): ICD-10-CM

## 2025-07-03 RX ORDER — ONDANSETRON 8 MG/1
8 TABLET, ORALLY DISINTEGRATING ORAL EVERY 8 HOURS PRN
Qty: 30 TABLET | Refills: 2 | Status: SHIPPED | OUTPATIENT
Start: 2025-07-03

## 2025-07-07 ENCOUNTER — HOSPITAL ENCOUNTER (OUTPATIENT)
Facility: HOSPITAL | Age: 75
Setting detail: RECURRING SERIES
Discharge: HOME | End: 2025-07-10
Attending: RADIOLOGY
Payer: MEDICARE

## 2025-07-07 PROCEDURE — 97140 MANUAL THERAPY 1/> REGIONS: CPT

## 2025-07-07 PROCEDURE — 97535 SELF CARE MNGMENT TRAINING: CPT

## 2025-07-07 NOTE — PROGRESS NOTES
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   8-22 min = 1 unit; 23-37 min = 2 units; 38-52 min = 3 units;  53-67 min = 4 units; 68-82 min = 5 units     SUBJECTIVE    Pain Level (0-10 scale): No pain number given on arrival.     Any medication changes, allergies to medications, adverse drug reactions, diagnosis change, or new procedure performed?: [x] No    [] Yes (see summary sheet for update)   Medications: Verified on Patient Summary List    Subjective functional status/changes:   Patient returns today to have her new custom knee highs assessed. She reports that she has been wearing her night garments, but has some difficulty with removing them initially. Patient did not bring the garments in today for assessment, but will bring in next visit. Patient alternate leg with use as recommended. Patient reports that she has not heard from [facility] on status of her vasopneumatic pump order to date.     OBJECTIVE  Therapeutic Procedures:  Tx Min Billable or 1:1 Min (if diff from Tx Min) Procedure, Rationale Specifics   30  65877 Manual Therapy (timed):  increase ROM, increase tissue extensibility, and decrease edema to improve patient's ability to progress to PLOF and address remaining functional goals.  The manual

## 2025-07-08 RX ORDER — LORAZEPAM 1 MG/1
TABLET ORAL
Qty: 1 TABLET | Refills: 0 | Status: SHIPPED | OUTPATIENT
Start: 2025-07-08 | End: 2025-07-24

## 2025-07-15 ENCOUNTER — HOSPITAL ENCOUNTER (OUTPATIENT)
Facility: HOSPITAL | Age: 75
Setting detail: INFUSION SERIES
Discharge: HOME | End: 2025-07-15
Payer: MEDICARE

## 2025-07-15 VITALS
DIASTOLIC BLOOD PRESSURE: 66 MMHG | SYSTOLIC BLOOD PRESSURE: 156 MMHG | TEMPERATURE: 97.8 F | HEART RATE: 64 BPM | OXYGEN SATURATION: 98 % | RESPIRATION RATE: 18 BRPM

## 2025-07-15 DIAGNOSIS — C54.1 PAPILLARY SEROUS ENDOMETRIAL ADENOCARCINOMA (HCC): Primary | ICD-10-CM

## 2025-07-15 DIAGNOSIS — C54.1 MALIGNANT NEOPLASM OF ENDOMETRIUM (HCC): ICD-10-CM

## 2025-07-15 DIAGNOSIS — Z51.12 ENCOUNTER FOR ANTINEOPLASTIC CHEMOTHERAPY AND IMMUNOTHERAPY: ICD-10-CM

## 2025-07-15 DIAGNOSIS — Z51.11 ENCOUNTER FOR ANTINEOPLASTIC CHEMOTHERAPY AND IMMUNOTHERAPY: ICD-10-CM

## 2025-07-15 LAB
ALBUMIN SERPL-MCNC: 2.6 G/DL (ref 3.5–5)
ALBUMIN/GLOB SERPL: 0.7 (ref 1.1–2.2)
ALP SERPL-CCNC: 1346 U/L (ref 45–117)
ALT SERPL-CCNC: 294 U/L (ref 12–78)
ANION GAP SERPL CALC-SCNC: 7 MMOL/L (ref 2–12)
AST SERPL-CCNC: 290 U/L (ref 15–37)
BASOPHILS # BLD: 0.04 K/UL (ref 0–0.1)
BASOPHILS NFR BLD: 0.6 % (ref 0–1)
BILIRUB SERPL-MCNC: 0.8 MG/DL (ref 0.2–1)
BUN SERPL-MCNC: 21 MG/DL (ref 6–20)
BUN/CREAT SERPL: 24 (ref 12–20)
CALCIUM SERPL-MCNC: 9 MG/DL (ref 8.5–10.1)
CANCER AG125 SERPL-ACNC: 10 U/ML (ref 1.5–35)
CHLORIDE SERPL-SCNC: 105 MMOL/L (ref 97–108)
CO2 SERPL-SCNC: 25 MMOL/L (ref 21–32)
CREAT SERPL-MCNC: 0.89 MG/DL (ref 0.55–1.02)
DIFFERENTIAL METHOD BLD: ABNORMAL
EOSINOPHIL # BLD: 0.14 K/UL (ref 0–0.4)
EOSINOPHIL NFR BLD: 2 % (ref 0–7)
ERYTHROCYTE [DISTWIDTH] IN BLOOD BY AUTOMATED COUNT: 16 % (ref 11.5–14.5)
GLOBULIN SER CALC-MCNC: 3.7 G/DL (ref 2–4)
GLUCOSE SERPL-MCNC: 106 MG/DL (ref 65–100)
HCT VFR BLD AUTO: 34.6 % (ref 35–47)
HGB BLD-MCNC: 10.6 G/DL (ref 11.5–16)
IMM GRANULOCYTES # BLD AUTO: 0.03 K/UL (ref 0–0.04)
IMM GRANULOCYTES NFR BLD AUTO: 0.4 % (ref 0–0.5)
LYMPHOCYTES # BLD: 0.62 K/UL (ref 0.8–3.5)
LYMPHOCYTES NFR BLD: 8.8 % (ref 12–49)
MAGNESIUM SERPL-MCNC: 1.9 MG/DL (ref 1.6–2.4)
MCH RBC QN AUTO: 30 PG (ref 26–34)
MCHC RBC AUTO-ENTMCNC: 30.6 G/DL (ref 30–36.5)
MCV RBC AUTO: 98 FL (ref 80–99)
MONOCYTES # BLD: 0.53 K/UL (ref 0–1)
MONOCYTES NFR BLD: 7.5 % (ref 5–13)
NEUTS SEG # BLD: 5.64 K/UL (ref 1.8–8)
NEUTS SEG NFR BLD: 80.7 % (ref 32–75)
NRBC # BLD: 0 K/UL (ref 0–0.01)
NRBC BLD-RTO: 0 PER 100 WBC
PLATELET # BLD AUTO: 283 K/UL (ref 150–400)
PMV BLD AUTO: 9.8 FL (ref 8.9–12.9)
POTASSIUM SERPL-SCNC: 3.7 MMOL/L (ref 3.5–5.1)
PROT SERPL-MCNC: 6.3 G/DL (ref 6.4–8.2)
RBC # BLD AUTO: 3.53 M/UL (ref 3.8–5.2)
RBC MORPH BLD: ABNORMAL
SODIUM SERPL-SCNC: 137 MMOL/L (ref 136–145)
WBC # BLD AUTO: 7 K/UL (ref 3.6–11)

## 2025-07-15 PROCEDURE — 83735 ASSAY OF MAGNESIUM: CPT

## 2025-07-15 PROCEDURE — 80053 COMPREHEN METABOLIC PANEL: CPT

## 2025-07-15 PROCEDURE — 36591 DRAW BLOOD OFF VENOUS DEVICE: CPT

## 2025-07-15 PROCEDURE — 36415 COLL VENOUS BLD VENIPUNCTURE: CPT

## 2025-07-15 PROCEDURE — 86304 IMMUNOASSAY TUMOR CA 125: CPT

## 2025-07-15 PROCEDURE — 85025 COMPLETE CBC W/AUTO DIFF WBC: CPT

## 2025-07-15 PROCEDURE — 2500000003 HC RX 250 WO HCPCS: Performed by: OBSTETRICS & GYNECOLOGY

## 2025-07-15 RX ORDER — ONDANSETRON 2 MG/ML
8 INJECTION INTRAMUSCULAR; INTRAVENOUS
Status: DISCONTINUED | OUTPATIENT
Start: 2025-07-15 | End: 2025-07-16 | Stop reason: HOSPADM

## 2025-07-15 RX ORDER — ACETAMINOPHEN 325 MG/1
650 TABLET ORAL
Status: DISCONTINUED | OUTPATIENT
Start: 2025-07-15 | End: 2025-07-16 | Stop reason: HOSPADM

## 2025-07-15 RX ORDER — ACETAMINOPHEN 325 MG/1
650 TABLET ORAL
OUTPATIENT
Start: 2025-08-19

## 2025-07-15 RX ORDER — EPINEPHRINE 1 MG/ML
0.3 INJECTION, SOLUTION INTRAMUSCULAR; SUBCUTANEOUS PRN
Status: DISCONTINUED | OUTPATIENT
Start: 2025-07-15 | End: 2025-07-16 | Stop reason: HOSPADM

## 2025-07-15 RX ORDER — HYDROCORTISONE SODIUM SUCCINATE 100 MG/2ML
100 INJECTION INTRAMUSCULAR; INTRAVENOUS
Status: DISCONTINUED | OUTPATIENT
Start: 2025-07-15 | End: 2025-07-16 | Stop reason: HOSPADM

## 2025-07-15 RX ORDER — EPINEPHRINE 1 MG/ML
0.3 INJECTION, SOLUTION INTRAMUSCULAR; SUBCUTANEOUS PRN
OUTPATIENT
Start: 2025-08-19

## 2025-07-15 RX ORDER — DIPHENHYDRAMINE HYDROCHLORIDE 50 MG/ML
50 INJECTION, SOLUTION INTRAMUSCULAR; INTRAVENOUS
Status: DISCONTINUED | OUTPATIENT
Start: 2025-07-15 | End: 2025-07-16 | Stop reason: HOSPADM

## 2025-07-15 RX ORDER — SODIUM CHLORIDE 0.9 % (FLUSH) 0.9 %
5-40 SYRINGE (ML) INJECTION PRN
OUTPATIENT
Start: 2025-08-19

## 2025-07-15 RX ORDER — ONDANSETRON 2 MG/ML
8 INJECTION INTRAMUSCULAR; INTRAVENOUS
OUTPATIENT
Start: 2025-08-19

## 2025-07-15 RX ORDER — HEPARIN 100 UNIT/ML
500 SYRINGE INTRAVENOUS PRN
OUTPATIENT
Start: 2025-08-19

## 2025-07-15 RX ORDER — ALBUTEROL SULFATE 90 UG/1
4 INHALANT RESPIRATORY (INHALATION) PRN
OUTPATIENT
Start: 2025-08-19

## 2025-07-15 RX ORDER — SODIUM CHLORIDE 9 MG/ML
25 INJECTION, SOLUTION INTRAVENOUS PRN
OUTPATIENT
Start: 2025-08-19

## 2025-07-15 RX ORDER — DIPHENHYDRAMINE HYDROCHLORIDE 50 MG/ML
50 INJECTION, SOLUTION INTRAMUSCULAR; INTRAVENOUS
OUTPATIENT
Start: 2025-08-19

## 2025-07-15 RX ORDER — SODIUM CHLORIDE 9 MG/ML
INJECTION, SOLUTION INTRAVENOUS PRN
Status: DISCONTINUED | OUTPATIENT
Start: 2025-07-15 | End: 2025-07-16 | Stop reason: HOSPADM

## 2025-07-15 RX ORDER — ALBUTEROL SULFATE 90 UG/1
4 INHALANT RESPIRATORY (INHALATION) PRN
Status: DISCONTINUED | OUTPATIENT
Start: 2025-07-15 | End: 2025-07-16 | Stop reason: HOSPADM

## 2025-07-15 RX ORDER — SODIUM CHLORIDE 9 MG/ML
INJECTION, SOLUTION INTRAVENOUS PRN
OUTPATIENT
Start: 2025-08-19

## 2025-07-15 RX ORDER — SODIUM CHLORIDE 0.9 % (FLUSH) 0.9 %
5-40 SYRINGE (ML) INJECTION PRN
Status: DISCONTINUED | OUTPATIENT
Start: 2025-07-15 | End: 2025-07-16 | Stop reason: HOSPADM

## 2025-07-15 RX ORDER — HYDROCORTISONE SODIUM SUCCINATE 100 MG/2ML
100 INJECTION INTRAMUSCULAR; INTRAVENOUS
OUTPATIENT
Start: 2025-08-19

## 2025-07-15 RX ADMIN — SODIUM CHLORIDE, PRESERVATIVE FREE 10 ML: 5 INJECTION INTRAVENOUS at 14:21

## 2025-07-15 NOTE — PROGRESS NOTES
[facility] Note:  Arrived - 1415   Labs obtained    BP (!) 156/66   Pulse 64   Temp 97.8 °F (36.6 °C)   Resp 18   SpO2 98%     Assessment - unchanged.     Port accessed & flushed per protocol w/o difficulty. Virk needle removed.   1425 - Tolerated well. Pt denies any acute problems/changes. Discharged from [facility] ambulatory. No distress. Next appt: TBD by Drs office

## 2025-07-16 ENCOUNTER — HOSPITAL ENCOUNTER (OUTPATIENT)
Facility: HOSPITAL | Age: 75
Setting detail: RECURRING SERIES
End: 2025-07-16
Attending: RADIOLOGY
Payer: MEDICARE

## 2025-07-20 ENCOUNTER — APPOINTMENT (OUTPATIENT)
Facility: HOSPITAL | Age: 75
End: 2025-07-20
Payer: MEDICARE

## 2025-07-20 ENCOUNTER — HOSPITAL ENCOUNTER (EMERGENCY)
Facility: HOSPITAL | Age: 75
Discharge: HOME | End: 2025-07-20
Attending: EMERGENCY MEDICINE
Payer: MEDICARE

## 2025-07-20 VITALS
HEART RATE: 52 BPM | TEMPERATURE: 97.8 F | RESPIRATION RATE: 18 BRPM | OXYGEN SATURATION: 93 % | SYSTOLIC BLOOD PRESSURE: 179 MMHG | DIASTOLIC BLOOD PRESSURE: 84 MMHG

## 2025-07-20 DIAGNOSIS — K52.9 COLITIS: ICD-10-CM

## 2025-07-20 DIAGNOSIS — R31.0 GROSS HEMATURIA: ICD-10-CM

## 2025-07-20 DIAGNOSIS — R11.2 NAUSEA AND VOMITING, UNSPECIFIED VOMITING TYPE: ICD-10-CM

## 2025-07-20 DIAGNOSIS — N13.30 BILATERAL HYDRONEPHROSIS: Primary | ICD-10-CM

## 2025-07-20 LAB
ALBUMIN SERPL-MCNC: 2.8 G/DL (ref 3.5–5)
ALBUMIN/GLOB SERPL: 0.7 (ref 1.1–2.2)
ALP SERPL-CCNC: 1484 U/L (ref 45–117)
ALT SERPL-CCNC: 253 U/L (ref 12–78)
ANION GAP SERPL CALC-SCNC: 7 MMOL/L (ref 2–12)
APPEARANCE UR: ABNORMAL
AST SERPL-CCNC: 187 U/L (ref 15–37)
BACTERIA URNS QL MICRO: NEGATIVE /HPF
BASOPHILS # BLD: 0.02 K/UL (ref 0–0.1)
BASOPHILS NFR BLD: 0.3 % (ref 0–1)
BILIRUB SERPL-MCNC: 0.8 MG/DL (ref 0.2–1)
BILIRUB UR QL: NEGATIVE
BUN SERPL-MCNC: 27 MG/DL (ref 6–20)
BUN/CREAT SERPL: 27 (ref 12–20)
CALCIUM SERPL-MCNC: 9.2 MG/DL (ref 8.5–10.1)
CHLORIDE SERPL-SCNC: 108 MMOL/L (ref 97–108)
CO2 SERPL-SCNC: 23 MMOL/L (ref 21–32)
COLOR UR: ABNORMAL
CREAT SERPL-MCNC: 0.99 MG/DL (ref 0.55–1.02)
DIFFERENTIAL METHOD BLD: ABNORMAL
EKG ATRIAL RATE: 77 BPM
EKG DIAGNOSIS: NORMAL
EKG P-R INTERVAL: 136 MS
EKG Q-T INTERVAL: 392 MS
EKG QRS DURATION: 62 MS
EKG QTC CALCULATION (BAZETT): 443 MS
EKG R AXIS: 40 DEGREES
EKG T AXIS: 6 DEGREES
EKG VENTRICULAR RATE: 77 BPM
EOSINOPHIL # BLD: 0.02 K/UL (ref 0–0.4)
EOSINOPHIL NFR BLD: 0.3 % (ref 0–7)
EPITH CASTS URNS QL MICRO: ABNORMAL /LPF
ERYTHROCYTE [DISTWIDTH] IN BLOOD BY AUTOMATED COUNT: 16.1 % (ref 11.5–14.5)
FLUAV RNA SPEC QL NAA+PROBE: NOT DETECTED
FLUBV RNA SPEC QL NAA+PROBE: NOT DETECTED
GLOBULIN SER CALC-MCNC: 3.8 G/DL (ref 2–4)
GLUCOSE SERPL-MCNC: 128 MG/DL (ref 65–100)
GLUCOSE UR STRIP.AUTO-MCNC: NEGATIVE MG/DL
HCT VFR BLD AUTO: 35.2 % (ref 35–47)
HGB BLD-MCNC: 11.2 G/DL (ref 11.5–16)
HGB UR QL STRIP: ABNORMAL
IMM GRANULOCYTES # BLD AUTO: 0.03 K/UL (ref 0–0.04)
IMM GRANULOCYTES NFR BLD AUTO: 0.4 % (ref 0–0.5)
INR PPP: 1 (ref 0.9–1.1)
KETONES UR QL STRIP.AUTO: ABNORMAL MG/DL
LEUKOCYTE ESTERASE UR QL STRIP.AUTO: ABNORMAL
LIPASE SERPL-CCNC: 35 U/L (ref 13–75)
LYMPHOCYTES # BLD: 0.9 K/UL (ref 0.8–3.5)
LYMPHOCYTES NFR BLD: 12.5 % (ref 12–49)
MCH RBC QN AUTO: 30.5 PG (ref 26–34)
MCHC RBC AUTO-ENTMCNC: 31.8 G/DL (ref 30–36.5)
MCV RBC AUTO: 95.9 FL (ref 80–99)
MONOCYTES # BLD: 0.41 K/UL (ref 0–1)
MONOCYTES NFR BLD: 5.7 % (ref 5–13)
NEUTS SEG # BLD: 5.82 K/UL (ref 1.8–8)
NEUTS SEG NFR BLD: 80.8 % (ref 32–75)
NITRITE UR QL STRIP.AUTO: NEGATIVE
NRBC # BLD: 0 K/UL (ref 0–0.01)
NRBC BLD-RTO: 0 PER 100 WBC
PH UR STRIP: 7 (ref 5–8)
PLATELET # BLD AUTO: 335 K/UL (ref 150–400)
PMV BLD AUTO: 9.7 FL (ref 8.9–12.9)
POTASSIUM SERPL-SCNC: 5 MMOL/L (ref 3.5–5.1)
PROT SERPL-MCNC: 6.6 G/DL (ref 6.4–8.2)
PROT UR STRIP-MCNC: >300 MG/DL
PROTHROMBIN TIME: 10.8 SEC (ref 9.2–11.2)
RBC # BLD AUTO: 3.67 M/UL (ref 3.8–5.2)
RBC #/AREA URNS HPF: >100 /HPF (ref 0–5)
SARS-COV-2 RNA RESP QL NAA+PROBE: NOT DETECTED
SODIUM SERPL-SCNC: 138 MMOL/L (ref 136–145)
SOURCE: NORMAL
SP GR UR REFRACTOMETRY: 1.01 (ref 1–1.03)
URINE CULTURE IF INDICATED: ABNORMAL
UROBILINOGEN UR QL STRIP.AUTO: 0.2 EU/DL (ref 0.2–1)
WBC # BLD AUTO: 7.2 K/UL (ref 3.6–11)
WBC URNS QL MICRO: ABNORMAL /HPF (ref 0–4)

## 2025-07-20 PROCEDURE — 94761 N-INVAS EAR/PLS OXIMETRY MLT: CPT

## 2025-07-20 PROCEDURE — 86870 RBC ANTIBODY IDENTIFICATION: CPT

## 2025-07-20 PROCEDURE — 86901 BLOOD TYPING SEROLOGIC RH(D): CPT

## 2025-07-20 PROCEDURE — 96376 TX/PRO/DX INJ SAME DRUG ADON: CPT

## 2025-07-20 PROCEDURE — 83690 ASSAY OF LIPASE: CPT

## 2025-07-20 PROCEDURE — 85025 COMPLETE CBC W/AUTO DIFF WBC: CPT

## 2025-07-20 PROCEDURE — 85610 PROTHROMBIN TIME: CPT

## 2025-07-20 PROCEDURE — 6370000000 HC RX 637 (ALT 250 FOR IP): Performed by: EMERGENCY MEDICINE

## 2025-07-20 PROCEDURE — 6360000004 HC RX CONTRAST MEDICATION: Performed by: EMERGENCY MEDICINE

## 2025-07-20 PROCEDURE — 6360000002 HC RX W HCPCS: Performed by: EMERGENCY MEDICINE

## 2025-07-20 PROCEDURE — 51702 INSERT TEMP BLADDER CATH: CPT

## 2025-07-20 PROCEDURE — 80053 COMPREHEN METABOLIC PANEL: CPT

## 2025-07-20 PROCEDURE — 86850 RBC ANTIBODY SCREEN: CPT

## 2025-07-20 PROCEDURE — 36415 COLL VENOUS BLD VENIPUNCTURE: CPT

## 2025-07-20 PROCEDURE — 87636 SARSCOV2 & INF A&B AMP PRB: CPT

## 2025-07-20 PROCEDURE — 96374 THER/PROPH/DIAG INJ IV PUSH: CPT

## 2025-07-20 PROCEDURE — 71275 CT ANGIOGRAPHY CHEST: CPT

## 2025-07-20 PROCEDURE — 81001 URINALYSIS AUTO W/SCOPE: CPT

## 2025-07-20 PROCEDURE — 74177 CT ABD & PELVIS W/CONTRAST: CPT

## 2025-07-20 PROCEDURE — 96375 TX/PRO/DX INJ NEW DRUG ADDON: CPT

## 2025-07-20 PROCEDURE — 99285 EMERGENCY DEPT VISIT HI MDM: CPT

## 2025-07-20 PROCEDURE — 93005 ELECTROCARDIOGRAM TRACING: CPT | Performed by: EMERGENCY MEDICINE

## 2025-07-20 PROCEDURE — 86900 BLOOD TYPING SEROLOGIC ABO: CPT

## 2025-07-20 RX ORDER — HYDROMORPHONE HYDROCHLORIDE 1 MG/ML
0.25 INJECTION, SOLUTION INTRAMUSCULAR; INTRAVENOUS; SUBCUTANEOUS
Status: COMPLETED | OUTPATIENT
Start: 2025-07-20 | End: 2025-07-20

## 2025-07-20 RX ORDER — METRONIDAZOLE 500 MG/1
500 TABLET ORAL 3 TIMES DAILY
Qty: 30 TABLET | Refills: 0 | Status: SHIPPED | OUTPATIENT
Start: 2025-07-20 | End: 2025-07-30

## 2025-07-20 RX ORDER — IOPAMIDOL 755 MG/ML
100 INJECTION, SOLUTION INTRAVASCULAR
Status: COMPLETED | OUTPATIENT
Start: 2025-07-20 | End: 2025-07-20

## 2025-07-20 RX ORDER — FENTANYL CITRATE 50 UG/ML
50 INJECTION, SOLUTION INTRAMUSCULAR; INTRAVENOUS
Refills: 0 | Status: COMPLETED | OUTPATIENT
Start: 2025-07-20 | End: 2025-07-20

## 2025-07-20 RX ORDER — DIPHENHYDRAMINE HYDROCHLORIDE 50 MG/ML
25 INJECTION, SOLUTION INTRAMUSCULAR; INTRAVENOUS
Status: DISCONTINUED | OUTPATIENT
Start: 2025-07-20 | End: 2025-07-20

## 2025-07-20 RX ORDER — HYDROMORPHONE HYDROCHLORIDE 2 MG/1
1 TABLET ORAL EVERY 4 HOURS PRN
Qty: 5 TABLET | Refills: 0 | Status: SHIPPED | OUTPATIENT
Start: 2025-07-20 | End: 2025-07-23

## 2025-07-20 RX ORDER — ONDANSETRON 2 MG/ML
4 INJECTION INTRAMUSCULAR; INTRAVENOUS ONCE
Status: COMPLETED | OUTPATIENT
Start: 2025-07-20 | End: 2025-07-20

## 2025-07-20 RX ORDER — METRONIDAZOLE 250 MG/1
500 TABLET ORAL
Status: COMPLETED | OUTPATIENT
Start: 2025-07-20 | End: 2025-07-20

## 2025-07-20 RX ORDER — LIDOCAINE HYDROCHLORIDE 20 MG/ML
JELLY TOPICAL
Status: COMPLETED | OUTPATIENT
Start: 2025-07-20 | End: 2025-07-20

## 2025-07-20 RX ORDER — METOCLOPRAMIDE HYDROCHLORIDE 5 MG/ML
10 INJECTION INTRAMUSCULAR; INTRAVENOUS ONCE
Status: DISCONTINUED | OUTPATIENT
Start: 2025-07-20 | End: 2025-07-20

## 2025-07-20 RX ADMIN — IOPAMIDOL 100 ML: 755 INJECTION, SOLUTION INTRAVENOUS at 18:42

## 2025-07-20 RX ADMIN — ONDANSETRON 4 MG: 2 INJECTION, SOLUTION INTRAMUSCULAR; INTRAVENOUS at 20:30

## 2025-07-20 RX ADMIN — METRONIDAZOLE 500 MG: 250 TABLET ORAL at 23:29

## 2025-07-20 RX ADMIN — LIDOCAINE HYDROCHLORIDE: 20 JELLY TOPICAL at 20:34

## 2025-07-20 RX ADMIN — ONDANSETRON 4 MG: 2 INJECTION, SOLUTION INTRAMUSCULAR; INTRAVENOUS at 18:11

## 2025-07-20 RX ADMIN — FENTANYL CITRATE 50 MCG: 50 INJECTION INTRAMUSCULAR; INTRAVENOUS at 18:11

## 2025-07-20 RX ADMIN — HYDROMORPHONE HYDROCHLORIDE 0.25 MG: 1 INJECTION, SOLUTION INTRAMUSCULAR; INTRAVENOUS; SUBCUTANEOUS at 20:32

## 2025-07-20 RX ADMIN — HYDROMORPHONE HYDROCHLORIDE 0.25 MG: 1 INJECTION, SOLUTION INTRAMUSCULAR; INTRAVENOUS; SUBCUTANEOUS at 23:12

## 2025-07-20 ASSESSMENT — PAIN DESCRIPTION - ORIENTATION: ORIENTATION: MID

## 2025-07-20 ASSESSMENT — PAIN SCALES - GENERAL
PAINLEVEL_OUTOF10: 10
PAINLEVEL_OUTOF10: 6

## 2025-07-20 ASSESSMENT — PAIN - FUNCTIONAL ASSESSMENT: PAIN_FUNCTIONAL_ASSESSMENT: ACTIVITIES ARE NOT PREVENTED

## 2025-07-20 ASSESSMENT — PAIN DESCRIPTION - DESCRIPTORS: DESCRIPTORS: ACHING

## 2025-07-20 ASSESSMENT — PAIN DESCRIPTION - LOCATION: LOCATION: CHEST

## 2025-07-20 NOTE — ED NOTES
Assumed care of patient at this time, patient found to be resting in bed, patient found to be alert and oriented Xs 4, patient reports pain level of a 10/10, allowed time for questions, discussed current plan of care, bed in lowest position, call bell in place, requesting new undergarments.

## 2025-07-20 NOTE — ED PROVIDER NOTES
[facility] EMERGENCY DEPARTMENT  EMERGENCY DEPARTMENT ENCOUNTER       Pt Name: Elenita Benitez  MRN: [id number]  Birthdate 1950  Date of evaluation: 7/20/2025  Provider: Lilian Gomez MD   PCP: Artis Bangura MD  Note Started: 5:40 PM EDT 7/20/25     CHIEF COMPLAINT       Chief Complaint   Patient presents with    Shortness of Breath     Patient wheeled into triage c/o hematuria, pt states \"something is wrong with my uretal stent.\" Hx cancer. Pt reports bleeding started 0800 and now has large clots, feeling faint.         HISTORY OF PRESENT ILLNESS: 1 or more elements      History From: Patient and medical record, History limited by: none     Elenita Benitez is a 74 y.o. female patient with history of hypertension, endometrial adenocarcinoma, last chemo in April, coronary artery disease, colitis, here for shortness of breath, gross hematuria and abdominal pain..  She has a ureteral stent, states that at 8 AM, she developed severe lower abdominal pain and flank pain.  Her only blood thinner is baby aspirin.  She also feels short of breath due to the severity of the pain and has nausea and vomiting.  She denies chest pain or fever.  She denies headache       Please See MDM for Additional Details of the HPI/PMH  Nursing Notes were all reviewed and agreed with or any disagreements were addressed in the HPI.     REVIEW OF SYSTEMS        Positives and Pertinent negatives as per HPI.    PAST HISTORY     Past Medical History:  Past Medical History:   Diagnosis Date    Arthritis     OSTEO HIP ANNE.    Atrophic vaginitis 01/27/2009    Cataract 11/2022    Beginning stage    GERD (gastroesophageal reflux disease)     Hyperlipidemia     Hypertension     Kidney damage     left    Kidney stones     Lichen planus     oral    Lymphedema     TO BOTH LOWER EXTREMITIES    Macrocytic anemia     Osteoarthritis of hip 01/04/2013    Postmenopausal bleeding 01/27/2009    Entered By: Rehana Delgado MDSigned By:

## 2025-07-20 NOTE — ED NOTES
Bedside report given to Shanon RN by MACI Ayon. Nurse was informed of reason for arrival, vitals, labs, medications, orders, procedures, results, cardiac rhythm, any outstanding and pending orders and plan of care. Opportunity for questions were provided for receiving RN at this time.

## 2025-07-21 ENCOUNTER — HOSPITAL ENCOUNTER (OUTPATIENT)
Facility: HOSPITAL | Age: 75
Setting detail: RECURRING SERIES
End: 2025-07-21
Attending: RADIOLOGY
Payer: MEDICARE

## 2025-07-21 ENCOUNTER — TELEPHONE (OUTPATIENT)
Age: 75
End: 2025-07-21

## 2025-07-21 LAB
ABO + RH BLD: NORMAL
BLOOD GROUP ANTIBODIES SERPL: NORMAL
BLOOD GROUP ANTIBODIES SERPL: NORMAL
SPECIMEN EXP DATE BLD: NORMAL

## 2025-07-21 NOTE — ED NOTES
Explained discharge paperwork to pt and answered all questions. Assisted patient to car in wheel chair for family member to take her home.

## 2025-07-21 NOTE — TELEPHONE ENCOUNTER
Pt's son , Oneida Benitez, left message on my voicemail, pt was seen in ER last evening and sent home with a linda cath, will have follow up with urologist, wanted to know if she can still have her PET scan on Thursday, per JUAN Dsouza no contraindications for having a linda cath and having a pet scan, pt has follow up scheduled on 8/8/25 to go over pet scan results

## 2025-07-24 ENCOUNTER — HOSPITAL ENCOUNTER (OUTPATIENT)
Facility: HOSPITAL | Age: 75
Discharge: HOME | End: 2025-07-27
Attending: OBSTETRICS & GYNECOLOGY
Payer: MEDICARE

## 2025-07-24 ENCOUNTER — APPOINTMENT (OUTPATIENT)
Facility: HOSPITAL | Age: 75
End: 2025-07-24
Attending: RADIOLOGY
Payer: MEDICARE

## 2025-07-24 VITALS — BODY MASS INDEX: 30.23 KG/M2 | WEIGHT: 160 LBS

## 2025-07-24 DIAGNOSIS — Z51.11 ENCOUNTER FOR ANTINEOPLASTIC CHEMOTHERAPY AND IMMUNOTHERAPY: ICD-10-CM

## 2025-07-24 DIAGNOSIS — Z51.12 ENCOUNTER FOR ANTINEOPLASTIC CHEMOTHERAPY AND IMMUNOTHERAPY: ICD-10-CM

## 2025-07-24 DIAGNOSIS — C54.1 PAPILLARY SEROUS ENDOMETRIAL ADENOCARCINOMA (HCC): ICD-10-CM

## 2025-07-24 LAB
GLUCOSE BLD STRIP.AUTO-MCNC: 103 MG/DL (ref 65–117)
SERVICE CMNT-IMP: NORMAL

## 2025-07-24 PROCEDURE — A9609 HC RX DIAGNOSTIC RADIOPHARMACEUTICAL: HCPCS | Performed by: OBSTETRICS & GYNECOLOGY

## 2025-07-24 PROCEDURE — 82962 GLUCOSE BLOOD TEST: CPT

## 2025-07-24 PROCEDURE — 3430000000 HC RX DIAGNOSTIC RADIOPHARMACEUTICAL: Performed by: OBSTETRICS & GYNECOLOGY

## 2025-07-24 PROCEDURE — 78815 PET IMAGE W/CT SKULL-THIGH: CPT

## 2025-07-24 RX ORDER — FLUDEOXYGLUCOSE F-18 500 MCI/ML
10 INJECTION INTRAVENOUS
Status: COMPLETED | OUTPATIENT
Start: 2025-07-24 | End: 2025-07-24

## 2025-07-24 RX ADMIN — FLUDEOXYGLUCOSE F-18 10 MILLICURIE: 500 INJECTION INTRAVENOUS at 12:45

## 2025-07-31 ENCOUNTER — HOSPITAL ENCOUNTER (OUTPATIENT)
Facility: HOSPITAL | Age: 75
Setting detail: RECURRING SERIES
End: 2025-07-31
Attending: RADIOLOGY
Payer: MEDICARE

## 2025-07-31 PROCEDURE — 97535 SELF CARE MNGMENT TRAINING: CPT

## 2025-07-31 PROCEDURE — 97140 MANUAL THERAPY 1/> REGIONS: CPT

## 2025-07-31 NOTE — PROGRESS NOTES
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 Patient had urology follow up and is now reporting no issues with bleeding or clotting. (See EPIC notes).  Patient has been compliant with conservative treatments and daily use of her compression products (legs/trunk) and her home program and continues with significant lymphedema in B LE/abdomen/trunk. Patient's volumes increased today from last visit and continue to be greater than initial evaluation volumes.   Patient has not made significant improvement in progress towards goals due ongoing medical issues that have occurred over the course of her episode of care.   She would benefit from the addition of advanced vasopneumatic device for home use to best address her symptoms and address her truncal/B LE lymphedema to improve  her functional mobility and ADLs in the home setting. FOTO scores continue to be low as patient has limitations with mobility and gait due to severity of her swelling. Patient to continue to focus on her home program for skin care, exercise, positioning/elevation/activity pacing, Self MLD and compression garment use.   Patient will benefit from continued interventions and will transition to the restorative phase of care once all compression products are in place.     Short term goals 1+3  met. Long term goal 2+4 met.

## 2025-07-31 NOTE — PROGRESS NOTES
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   8-22 min = 1 unit; 23-37 min = 2 units; 38-52 min = 3 units;  53-67 min = 4 units; 68-82 min = 5 units     SUBJECTIVE    Pain Level (0-10 scale): No pain number given. Patient reports heaviness in B LE.    Any medication changes, allergies to medications, adverse drug reactions, diagnosis change, or new procedure performed?: [] No    [x] Yes (see summary sheet for update) New ureteral stent was placed 2025.  Patient had pain and noted blood in urine with clotting so had emergency room visit 2025.  Required linda catheter placement. Patient had follow up with urology requiring second placement of linda catheter. No longer with catheter placement and no bleeding or clots. See EPIC notes for additional details.     Medications: Verified on Patient Summary List    Subjective functional status/changes:   Patient returns for follow up today after having to cancel due to having issues with pain and bleeding due to blood pooling in bladder. Now resolved and patient feeling better followed by urology. Patient wore in her custom knee highs and Biaflect compression jerald today. She is continuing to report fatigue, B LE heaviness, continued swelling, continued tightness from  hysterectomy, Bilateral salpingo-oophorectomy, Bilateral pelvic sentinel lymph node mapping and biopsy. 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    PET/CT on 10/25/24 revealed malignancy to the supraclavicular and B iliac lymph nodes.(Dr. Nicole)   Bulky retroperitoneal lymphadenopathy, hypermetabolic left supraclavicular and bilateral iliac lymph nodes compatible with malignancy, moderately severe left-sided hydronephrosis; no hypermetabolic left renal mass  Patient with estelle-cath placement 12/4/2024 and began chemotherapy.   Passed out at home 1/8/2025 from a heart attack. STEMI followed by cardiac cath and stents.   Ureteral stent placed secondary to chronic left hydronephrosis and will have exchange of this stent was done 6/23/2025.  Patient also with previous history of 2 C-sections,left hip arthroplasty, right hip arthoplasty and right foot bunionectomy.     Due to her complex history it is recommended to have a vasopneumatic device that has customizable options for settings and pressures that will be able to be adjusted to patients needs . The device garments are able to be adjusted to address her limb shape with increased girths proximally. The advanced vasopneumatic device is able to clear proximally and then distally, which is recommended to prevent exacerbation of swelling in upper thighs, abdomen and trunk due to her extensive medical history. The Flexitouch advanced device is designed to mimic MLD techniques that patient responds well to in clinic with clearance of trunk then working proximally to distally on B LE.   The Flexitouch is able to be set up for patient to have multiple options for treatments that will meet her needs for her to utilize independently in the home setting to prepare for the restorative phase of care.     Patient's B LE and truncal lymphedema persists despite conservative treatments with increasing swelling over time, heaviness in B LE, ill

## 2025-08-05 ENCOUNTER — TELEPHONE (OUTPATIENT)
Age: 75
End: 2025-08-05

## 2025-08-05 RX ORDER — ONDANSETRON 2 MG/ML
8 INJECTION INTRAMUSCULAR; INTRAVENOUS
OUTPATIENT
Start: 2025-08-13

## 2025-08-05 RX ORDER — SODIUM CHLORIDE 9 MG/ML
25 INJECTION, SOLUTION INTRAVENOUS PRN
OUTPATIENT
Start: 2025-08-13

## 2025-08-05 RX ORDER — SODIUM CHLORIDE 9 MG/ML
INJECTION, SOLUTION INTRAVENOUS PRN
OUTPATIENT
Start: 2025-08-13

## 2025-08-05 RX ORDER — ALBUTEROL SULFATE 90 UG/1
4 INHALANT RESPIRATORY (INHALATION) PRN
OUTPATIENT
Start: 2025-08-13

## 2025-08-05 RX ORDER — FAMOTIDINE 10 MG/ML
20 INJECTION, SOLUTION INTRAVENOUS
OUTPATIENT
Start: 2025-08-13

## 2025-08-05 RX ORDER — ACETAMINOPHEN 325 MG/1
650 TABLET ORAL
OUTPATIENT
Start: 2025-08-13

## 2025-08-05 RX ORDER — HEPARIN SODIUM (PORCINE) LOCK FLUSH IV SOLN 100 UNIT/ML 100 UNIT/ML
500 SOLUTION INTRAVENOUS PRN
OUTPATIENT
Start: 2025-08-13

## 2025-08-05 RX ORDER — HYDROCORTISONE SODIUM SUCCINATE 100 MG/2ML
100 INJECTION INTRAMUSCULAR; INTRAVENOUS
OUTPATIENT
Start: 2025-08-13

## 2025-08-05 RX ORDER — DIPHENHYDRAMINE HYDROCHLORIDE 50 MG/ML
50 INJECTION, SOLUTION INTRAMUSCULAR; INTRAVENOUS
OUTPATIENT
Start: 2025-08-13

## 2025-08-05 RX ORDER — EPINEPHRINE 1 MG/ML
0.3 INJECTION, SOLUTION, CONCENTRATE INTRAVENOUS PRN
OUTPATIENT
Start: 2025-08-13

## 2025-08-05 RX ORDER — SODIUM CHLORIDE 0.9 % (FLUSH) 0.9 %
5-40 SYRINGE (ML) INJECTION PRN
OUTPATIENT
Start: 2025-08-13

## 2025-08-06 NOTE — PROGRESS NOTES
goal 2+4 met.  All others are in progress.      Patient will continue to benefit from skilled PT / OT services to address swelling, analyze and address soft tissue restrictions, analyze compression product fit and use, instruct in home lymphedema management program, and measure for compression products to address functional deficits and attain remaining goals.     CURRENT STATUS/GOALS  Short Term Goals: To be accomplished in 5 treatments.  1.Patient and/or caregiver will verbalize 3/3 signs and symptoms of infection without external cueing, in order to reduce the risk of infection and skin impairment and to promote optimal self management of condition.  Status at last Eval/Progress Note: MET  Current Status: MET  Goal Met?  yes  3.   Patient/caregiver will demonstrate independence with application of reduction kit/velcro products during session for continued volume reduction and prevention of re-accumulation of  fluid during phase 1 of complete decongestive therapy.   Status at last Eval/Progress Note: MET  Current Status: MET  Goal Met?  yes     Short Term Goals: To be accomplished in 15 treatments.  2.   Patient to perform 5/5 lymphedema remedial exercises in session with modified independence utilizing HEP handout, in order to promote optimal independence with management of condition, as well as promote optimal limb volume reduction required for proper fit of donned clothing.  Status at last Eval/Progress Note: Not Met/In Progress  Current Status: Not Met/In Progress  Goal Met?  no  4.   Patient will demonstrate a loss of volume in the 250 ml in the L LE to reduce the risk of infection and progression of swelling over time for ease of performing lower body dressing and safe mobility.  Status at last Eval/Progress Note: Not Met/In Progress  Current Status: Not Met/In Progress  Goal Met?  no     Long Term Goals: To be accomplished in 20 treatments.  1.  Patient will demonstrate an improvement in self perceived

## 2025-08-08 ENCOUNTER — OFFICE VISIT (OUTPATIENT)
Age: 75
End: 2025-08-08
Payer: MEDICARE

## 2025-08-08 VITALS
HEIGHT: 61 IN | HEART RATE: 63 BPM | DIASTOLIC BLOOD PRESSURE: 71 MMHG | WEIGHT: 167.6 LBS | SYSTOLIC BLOOD PRESSURE: 169 MMHG | BODY MASS INDEX: 31.64 KG/M2

## 2025-08-08 DIAGNOSIS — C54.1 PAPILLARY SEROUS ENDOMETRIAL ADENOCARCINOMA (HCC): ICD-10-CM

## 2025-08-08 DIAGNOSIS — Z51.12 ENCOUNTER FOR ANTINEOPLASTIC CHEMOTHERAPY AND IMMUNOTHERAPY: ICD-10-CM

## 2025-08-08 DIAGNOSIS — Z51.11 ENCOUNTER FOR ANTINEOPLASTIC CHEMOTHERAPY AND IMMUNOTHERAPY: ICD-10-CM

## 2025-08-08 DIAGNOSIS — C54.1 ENDOMETRIAL CANCER (HCC): Primary | ICD-10-CM

## 2025-08-08 PROCEDURE — 99215 OFFICE O/P EST HI 40 MIN: CPT | Performed by: OBSTETRICS & GYNECOLOGY

## 2025-08-08 PROCEDURE — 1036F TOBACCO NON-USER: CPT | Performed by: OBSTETRICS & GYNECOLOGY

## 2025-08-08 PROCEDURE — 1126F AMNT PAIN NOTED NONE PRSNT: CPT | Performed by: OBSTETRICS & GYNECOLOGY

## 2025-08-08 PROCEDURE — 3077F SYST BP >= 140 MM HG: CPT | Performed by: OBSTETRICS & GYNECOLOGY

## 2025-08-08 PROCEDURE — 3078F DIAST BP <80 MM HG: CPT | Performed by: OBSTETRICS & GYNECOLOGY

## 2025-08-08 PROCEDURE — G8427 DOCREV CUR MEDS BY ELIG CLIN: HCPCS | Performed by: OBSTETRICS & GYNECOLOGY

## 2025-08-08 PROCEDURE — 3017F COLORECTAL CA SCREEN DOC REV: CPT | Performed by: OBSTETRICS & GYNECOLOGY

## 2025-08-08 PROCEDURE — 1123F ACP DISCUSS/DSCN MKR DOCD: CPT | Performed by: OBSTETRICS & GYNECOLOGY

## 2025-08-08 PROCEDURE — G8417 CALC BMI ABV UP PARAM F/U: HCPCS | Performed by: OBSTETRICS & GYNECOLOGY

## 2025-08-08 PROCEDURE — G8399 PT W/DXA RESULTS DOCUMENT: HCPCS | Performed by: OBSTETRICS & GYNECOLOGY

## 2025-08-08 PROCEDURE — 1090F PRES/ABSN URINE INCON ASSESS: CPT | Performed by: OBSTETRICS & GYNECOLOGY

## 2025-08-08 ASSESSMENT — PATIENT HEALTH QUESTIONNAIRE - PHQ9
1. LITTLE INTEREST OR PLEASURE IN DOING THINGS: NOT AT ALL
SUM OF ALL RESPONSES TO PHQ QUESTIONS 1-9: 0
SUM OF ALL RESPONSES TO PHQ QUESTIONS 1-9: 0
2. FEELING DOWN, DEPRESSED OR HOPELESS: NOT AT ALL
SUM OF ALL RESPONSES TO PHQ QUESTIONS 1-9: 0
SUM OF ALL RESPONSES TO PHQ QUESTIONS 1-9: 0

## 2025-08-13 ENCOUNTER — HOSPITAL ENCOUNTER (OUTPATIENT)
Facility: HOSPITAL | Age: 75
Setting detail: INFUSION SERIES
Discharge: HOME | End: 2025-08-13
Payer: MEDICARE

## 2025-08-13 VITALS
BODY MASS INDEX: 32.3 KG/M2 | TEMPERATURE: 98.1 F | WEIGHT: 171.1 LBS | HEIGHT: 61 IN | RESPIRATION RATE: 16 BRPM | OXYGEN SATURATION: 99 % | SYSTOLIC BLOOD PRESSURE: 178 MMHG | HEART RATE: 64 BPM | DIASTOLIC BLOOD PRESSURE: 80 MMHG

## 2025-08-13 DIAGNOSIS — C54.1 MALIGNANT NEOPLASM OF ENDOMETRIUM (HCC): ICD-10-CM

## 2025-08-13 DIAGNOSIS — Z51.12 ENCOUNTER FOR ANTINEOPLASTIC CHEMOTHERAPY AND IMMUNOTHERAPY: ICD-10-CM

## 2025-08-13 DIAGNOSIS — Z51.11 ENCOUNTER FOR ANTINEOPLASTIC CHEMOTHERAPY AND IMMUNOTHERAPY: ICD-10-CM

## 2025-08-13 DIAGNOSIS — C54.1 PAPILLARY SEROUS ENDOMETRIAL ADENOCARCINOMA (HCC): Primary | ICD-10-CM

## 2025-08-13 LAB
ALBUMIN SERPL-MCNC: 2.5 G/DL (ref 3.5–5.2)
ALBUMIN/GLOB SERPL: 0.8 (ref 1.1–2.2)
ALP SERPL-CCNC: 494 U/L (ref 35–104)
ALT SERPL-CCNC: 83 U/L (ref 10–35)
ANION GAP SERPL CALC-SCNC: 11 MMOL/L (ref 2–14)
AST SERPL-CCNC: 74 U/L (ref 10–35)
BASOPHILS # BLD: 0.03 K/UL (ref 0–0.1)
BASOPHILS NFR BLD: 0.7 % (ref 0–1)
BILIRUB SERPL-MCNC: 0.4 MG/DL (ref 0–1.2)
BUN SERPL-MCNC: 24 MG/DL (ref 8–23)
BUN/CREAT SERPL: 30 (ref 12–20)
CALCIUM SERPL-MCNC: 8.8 MG/DL (ref 8.8–10.2)
CANCER AG125 SERPL-ACNC: 13 U/ML (ref 0–38)
CHLORIDE SERPL-SCNC: 107 MMOL/L (ref 98–107)
CO2 SERPL-SCNC: 22 MMOL/L (ref 20–29)
CREAT SERPL-MCNC: 0.8 MG/DL (ref 0.6–1)
DIFFERENTIAL METHOD BLD: ABNORMAL
EOSINOPHIL # BLD: 0.2 K/UL (ref 0–0.4)
EOSINOPHIL NFR BLD: 4.5 % (ref 0–7)
ERYTHROCYTE [DISTWIDTH] IN BLOOD BY AUTOMATED COUNT: 15.2 % (ref 11.5–14.5)
GLOBULIN SER CALC-MCNC: 3.1 G/DL (ref 2–4)
GLUCOSE SERPL-MCNC: 99 MG/DL (ref 65–100)
HCT VFR BLD AUTO: 29.8 % (ref 35–47)
HGB BLD-MCNC: 8.8 G/DL (ref 11.5–16)
IMM GRANULOCYTES # BLD AUTO: 0.01 K/UL (ref 0–0.04)
IMM GRANULOCYTES NFR BLD AUTO: 0.2 % (ref 0–0.5)
LYMPHOCYTES # BLD: 0.59 K/UL (ref 0.8–3.5)
LYMPHOCYTES NFR BLD: 13.2 % (ref 12–49)
MAGNESIUM SERPL-MCNC: 1.9 MG/DL (ref 1.6–2.4)
MCH RBC QN AUTO: 28.9 PG (ref 26–34)
MCHC RBC AUTO-ENTMCNC: 29.5 G/DL (ref 30–36.5)
MCV RBC AUTO: 97.7 FL (ref 80–99)
MONOCYTES # BLD: 0.45 K/UL (ref 0–1)
MONOCYTES NFR BLD: 10 % (ref 5–13)
NEUTS SEG # BLD: 3.21 K/UL (ref 1.8–8)
NEUTS SEG NFR BLD: 71.4 % (ref 32–75)
NRBC # BLD: 0 K/UL (ref 0–0.01)
NRBC BLD-RTO: 0 PER 100 WBC
PLATELET # BLD AUTO: 234 K/UL (ref 150–400)
PMV BLD AUTO: 9.4 FL (ref 8.9–12.9)
POTASSIUM SERPL-SCNC: 3.8 MMOL/L (ref 3.5–5.1)
PROT SERPL-MCNC: 5.6 G/DL (ref 6.4–8.3)
RBC # BLD AUTO: 3.05 M/UL (ref 3.8–5.2)
RBC MORPH BLD: ABNORMAL
SODIUM SERPL-SCNC: 140 MMOL/L (ref 136–145)
WBC # BLD AUTO: 4.5 K/UL (ref 3.6–11)

## 2025-08-13 PROCEDURE — 80053 COMPREHEN METABOLIC PANEL: CPT

## 2025-08-13 PROCEDURE — 83735 ASSAY OF MAGNESIUM: CPT

## 2025-08-13 PROCEDURE — 2500000003 HC RX 250 WO HCPCS: Performed by: OBSTETRICS & GYNECOLOGY

## 2025-08-13 PROCEDURE — 36591 DRAW BLOOD OFF VENOUS DEVICE: CPT

## 2025-08-13 PROCEDURE — 86304 IMMUNOASSAY TUMOR CA 125: CPT

## 2025-08-13 PROCEDURE — 36415 COLL VENOUS BLD VENIPUNCTURE: CPT

## 2025-08-13 PROCEDURE — 85025 COMPLETE CBC W/AUTO DIFF WBC: CPT

## 2025-08-13 RX ORDER — ACETAMINOPHEN 325 MG/1
650 TABLET ORAL
OUTPATIENT
Start: 2025-08-19

## 2025-08-13 RX ORDER — SODIUM CHLORIDE 0.9 % (FLUSH) 0.9 %
5-40 SYRINGE (ML) INJECTION PRN
Status: DISCONTINUED | OUTPATIENT
Start: 2025-08-13 | End: 2025-08-14 | Stop reason: HOSPADM

## 2025-08-13 RX ORDER — SODIUM CHLORIDE 9 MG/ML
INJECTION, SOLUTION INTRAVENOUS PRN
Status: DISCONTINUED | OUTPATIENT
Start: 2025-08-13 | End: 2025-08-14 | Stop reason: HOSPADM

## 2025-08-13 RX ORDER — SODIUM CHLORIDE 9 MG/ML
25 INJECTION, SOLUTION INTRAVENOUS PRN
OUTPATIENT
Start: 2025-08-19

## 2025-08-13 RX ORDER — ACETAMINOPHEN 325 MG/1
650 TABLET ORAL
Status: DISCONTINUED | OUTPATIENT
Start: 2025-08-13 | End: 2025-08-14 | Stop reason: HOSPADM

## 2025-08-13 RX ORDER — HEPARIN 100 UNIT/ML
500 SYRINGE INTRAVENOUS PRN
OUTPATIENT
Start: 2025-08-19

## 2025-08-13 RX ORDER — ALBUTEROL SULFATE 90 UG/1
4 INHALANT RESPIRATORY (INHALATION) PRN
Status: DISCONTINUED | OUTPATIENT
Start: 2025-08-13 | End: 2025-08-14 | Stop reason: HOSPADM

## 2025-08-13 RX ORDER — SODIUM CHLORIDE 9 MG/ML
INJECTION, SOLUTION INTRAVENOUS PRN
OUTPATIENT
Start: 2025-08-19

## 2025-08-13 RX ORDER — SODIUM CHLORIDE 0.9 % (FLUSH) 0.9 %
5-40 SYRINGE (ML) INJECTION PRN
OUTPATIENT
Start: 2025-08-19

## 2025-08-13 RX ORDER — ONDANSETRON 2 MG/ML
8 INJECTION INTRAMUSCULAR; INTRAVENOUS
Status: DISCONTINUED | OUTPATIENT
Start: 2025-08-13 | End: 2025-08-14 | Stop reason: HOSPADM

## 2025-08-13 RX ORDER — DIPHENHYDRAMINE HYDROCHLORIDE 50 MG/ML
50 INJECTION, SOLUTION INTRAMUSCULAR; INTRAVENOUS
OUTPATIENT
Start: 2025-08-19

## 2025-08-13 RX ORDER — DIPHENHYDRAMINE HYDROCHLORIDE 50 MG/ML
50 INJECTION, SOLUTION INTRAMUSCULAR; INTRAVENOUS
Status: DISCONTINUED | OUTPATIENT
Start: 2025-08-13 | End: 2025-08-14 | Stop reason: HOSPADM

## 2025-08-13 RX ORDER — ONDANSETRON 2 MG/ML
8 INJECTION INTRAMUSCULAR; INTRAVENOUS
OUTPATIENT
Start: 2025-08-19

## 2025-08-13 RX ORDER — HYDROCORTISONE SODIUM SUCCINATE 100 MG/2ML
100 INJECTION INTRAMUSCULAR; INTRAVENOUS
Status: DISCONTINUED | OUTPATIENT
Start: 2025-08-13 | End: 2025-08-14 | Stop reason: HOSPADM

## 2025-08-13 RX ORDER — EPINEPHRINE 1 MG/ML
0.3 INJECTION, SOLUTION INTRAMUSCULAR; SUBCUTANEOUS PRN
OUTPATIENT
Start: 2025-08-19

## 2025-08-13 RX ORDER — ALBUTEROL SULFATE 90 UG/1
4 INHALANT RESPIRATORY (INHALATION) PRN
OUTPATIENT
Start: 2025-08-19

## 2025-08-13 RX ORDER — EPINEPHRINE 1 MG/ML
0.3 INJECTION, SOLUTION INTRAMUSCULAR; SUBCUTANEOUS PRN
Status: DISCONTINUED | OUTPATIENT
Start: 2025-08-13 | End: 2025-08-14 | Stop reason: HOSPADM

## 2025-08-13 RX ORDER — HYDROCORTISONE SODIUM SUCCINATE 100 MG/2ML
100 INJECTION INTRAMUSCULAR; INTRAVENOUS
OUTPATIENT
Start: 2025-08-19

## 2025-08-13 RX ADMIN — SODIUM CHLORIDE, PRESERVATIVE FREE 20 ML: 5 INJECTION INTRAVENOUS at 08:15

## 2025-08-15 ENCOUNTER — HOSPITAL ENCOUNTER (OUTPATIENT)
Facility: HOSPITAL | Age: 75
Setting detail: RECURRING SERIES
Discharge: HOME | End: 2025-08-18
Attending: RADIOLOGY
Payer: MEDICARE

## 2025-08-15 DIAGNOSIS — C54.1 MALIGNANT NEOPLASM OF ENDOMETRIUM (HCC): ICD-10-CM

## 2025-08-15 DIAGNOSIS — C54.1 ENDOMETRIAL CANCER (HCC): Primary | ICD-10-CM

## 2025-08-15 PROCEDURE — 97140 MANUAL THERAPY 1/> REGIONS: CPT

## 2025-08-15 PROCEDURE — 97110 THERAPEUTIC EXERCISES: CPT

## 2025-08-15 PROCEDURE — 97535 SELF CARE MNGMENT TRAINING: CPT

## 2025-08-15 RX ORDER — ONDANSETRON 8 MG/1
8 TABLET, ORALLY DISINTEGRATING ORAL EVERY 8 HOURS PRN
Qty: 30 TABLET | Refills: 2 | Status: SHIPPED | OUTPATIENT
Start: 2025-08-15

## 2025-08-19 RX ORDER — MEPERIDINE HYDROCHLORIDE 50 MG/ML
12.5 INJECTION INTRAMUSCULAR; INTRAVENOUS; SUBCUTANEOUS PRN
OUTPATIENT
Start: 2025-09-12

## 2025-08-19 RX ORDER — ACETAMINOPHEN 325 MG/1
650 TABLET ORAL
OUTPATIENT
Start: 2025-09-12

## 2025-08-19 RX ORDER — ALBUTEROL SULFATE 90 UG/1
4 INHALANT RESPIRATORY (INHALATION) PRN
OUTPATIENT
Start: 2025-09-12

## 2025-08-19 RX ORDER — PROCHLORPERAZINE EDISYLATE 5 MG/ML
5 INJECTION INTRAMUSCULAR; INTRAVENOUS
OUTPATIENT
Start: 2025-09-12

## 2025-08-19 RX ORDER — HEPARIN SODIUM (PORCINE) LOCK FLUSH IV SOLN 100 UNIT/ML 100 UNIT/ML
500 SOLUTION INTRAVENOUS PRN
OUTPATIENT
Start: 2025-09-12

## 2025-08-19 RX ORDER — SODIUM CHLORIDE 9 MG/ML
5-250 INJECTION, SOLUTION INTRAVENOUS PRN
OUTPATIENT
Start: 2025-09-12

## 2025-08-19 RX ORDER — PALONOSETRON 0.05 MG/ML
0.25 INJECTION, SOLUTION INTRAVENOUS ONCE
OUTPATIENT
Start: 2025-09-12 | End: 2025-09-12

## 2025-08-19 RX ORDER — EPINEPHRINE 1 MG/ML
0.3 INJECTION, SOLUTION, CONCENTRATE INTRAVENOUS PRN
OUTPATIENT
Start: 2025-09-12

## 2025-08-19 RX ORDER — ONDANSETRON 2 MG/ML
8 INJECTION INTRAMUSCULAR; INTRAVENOUS
OUTPATIENT
Start: 2025-09-12

## 2025-08-19 RX ORDER — FAMOTIDINE 10 MG/ML
20 INJECTION, SOLUTION INTRAVENOUS
OUTPATIENT
Start: 2025-09-12

## 2025-08-19 RX ORDER — SODIUM CHLORIDE 9 MG/ML
INJECTION, SOLUTION INTRAVENOUS PRN
OUTPATIENT
Start: 2025-09-12

## 2025-08-19 RX ORDER — DIPHENHYDRAMINE HYDROCHLORIDE 50 MG/ML
50 INJECTION, SOLUTION INTRAMUSCULAR; INTRAVENOUS
OUTPATIENT
Start: 2025-09-12

## 2025-08-19 RX ORDER — SODIUM CHLORIDE 0.9 % (FLUSH) 0.9 %
5-40 SYRINGE (ML) INJECTION PRN
OUTPATIENT
Start: 2025-09-12

## 2025-08-19 RX ORDER — HYDROCORTISONE SODIUM SUCCINATE 100 MG/2ML
100 INJECTION INTRAMUSCULAR; INTRAVENOUS
OUTPATIENT
Start: 2025-09-12

## (undated) DEVICE — 1200 GUARD II KIT W/5MM TUBE W/O VAC TUBE: Brand: GUARDIAN

## (undated) DEVICE — IV START KIT: Brand: MEDLINE

## (undated) DEVICE — GYN LAPAROSCOPY - SMH: Brand: MEDLINE INDUSTRIES, INC.

## (undated) DEVICE — Device: Brand: MEDEX

## (undated) DEVICE — SOLUTION IRRIG 3000ML 0.9% SOD CHL USP UROMATIC PLAS CONT

## (undated) DEVICE — TUBE ST FLD AQUILEX OUTFLO --

## (undated) DEVICE — NON-REM POLYHESIVE PATIENT RETURN ELECTRODE: Brand: VALLEYLAB

## (undated) DEVICE — ELECTRODE,RADIOTRANSLUCENT,FOAM,5PK: Brand: MEDLINE

## (undated) DEVICE — (D)SYR 10ML 1/5ML GRAD NSAF -- PKGING CHANGE USE ITEM 338027

## (undated) DEVICE — DEVICE TISS REMOVAL -- ORDER AS BX     APO CODE = DRP

## (undated) DEVICE — ENDOSCOPY PUMP TUBING/ CAP SET: Brand: ERBE

## (undated) DEVICE — SET INFUS 20GA L0.75IN 300PSI 5ML/SEC W/O Y INJ SITE ULT LO

## (undated) DEVICE — SYR 10ML LUER LOK 1/5ML GRAD --

## (undated) DEVICE — Device

## (undated) DEVICE — AGENT HEMSTAT W4XL4IN OXIDIZED REGENERATED CELOS ABSRB SFT

## (undated) DEVICE — NEEDLE HYPO 22GA L1.5IN BLK S STL HUB POLYPR SHLD REG BVL

## (undated) DEVICE — INTENDED FOR TISSUE SEPARATION, AND OTHER PROCEDURES THAT REQUIRE A SHARP SURGICAL BLADE TO PUNCTURE OR CUT.: Brand: BARD-PARKER ® CARBON RIB-BACK BLADES

## (undated) DEVICE — NEONATAL-ADULT SPO2 SENSOR: Brand: NELLCOR

## (undated) DEVICE — GLOVE SURG SZ 75 L12IN FNGR THK79MIL GRN LTX FREE

## (undated) DEVICE — CUFF ADULT 1 PC 1 VINYL DISP --

## (undated) DEVICE — BAG SPEC BIOHZRD 10 X 10 IN --

## (undated) DEVICE — SOLUTION IRRIG 1000ML 0.9% SOD CHL USP POUR PLAS BTL

## (undated) DEVICE — CYSTO/BLADDER IRRIGATION SET, REGULATING CLAMP

## (undated) DEVICE — BLADELESS OBTURATOR: Brand: WECK VISTA

## (undated) DEVICE — SYRINGE 50ML E/T

## (undated) DEVICE — CYSTO-SMH: Brand: MEDLINE INDUSTRIES, INC.

## (undated) DEVICE — STERILE POLYISOPRENE POWDER-FREE SURGICAL GLOVES: Brand: PROTEXIS

## (undated) DEVICE — BASIN EMSIS 16OZ GRAPHITE PLAS KID SHP MOLD GRAD FOR ORAL

## (undated) DEVICE — SOLIDIFIER FLD 2OZ 1500CC N DISINF IN BTL DISP SAFESORB

## (undated) DEVICE — CONTAINER SPEC 20 ML LID NEUT BUFF FORMALIN 10 % POLYPR STS

## (undated) DEVICE — NEEDLE HYPO 18GA L1.5IN PNK S STL HUB POLYPR SHLD REG BVL

## (undated) DEVICE — GOWN,SIRUS,FABRNF,XL,20/CS: Brand: MEDLINE

## (undated) DEVICE — NEEDLE CNTRST INJ 0.51X4 MM THER INTERJECT

## (undated) DEVICE — HYPODERMIC SAFETY NEEDLE: Brand: MAGELLAN

## (undated) DEVICE — SOLIDIFIER MEDC 1200ML -- CONVERT TO 356117

## (undated) DEVICE — SYR 3ML LL TIP 1/10ML GRAD --

## (undated) DEVICE — SYRINGE MED 10ML LUERLOCK TIP W/O SFTY DISP

## (undated) DEVICE — ARM DRAPE

## (undated) DEVICE — SET ADMIN 16ML TBNG L100IN 2 Y INJ SITE IV PIGGY BK DISP

## (undated) DEVICE — OPEN-END URETERAL CATHETER: Brand: COOK

## (undated) DEVICE — COVER MPLR TIP CRV SCIS ACC DA VINCI

## (undated) DEVICE — SOLUTION IRRIGATION STRL H2O 1000 ML UROMATIC CONTAINER

## (undated) DEVICE — TUBING, SUCTION, 1/4" X 12', STRAIGHT: Brand: MEDLINE

## (undated) DEVICE — INSUFFLATION NEEDLE: Brand: SURGINEEDLE

## (undated) DEVICE — D&C MRMC: Brand: MEDLINE INDUSTRIES, INC.

## (undated) DEVICE — STAIN INDIA INK IN NACL 10ML --

## (undated) DEVICE — NEEDLE SCLERO 25GA L240CM OD0.51MM ID0.24MM EXTN L4MM SHTH

## (undated) DEVICE — CUFF BLD PRSS AD CLTH SGL TB W/ BAYNT CONN ROUNDED CORNER

## (undated) DEVICE — FORCEPS BX L240CM JAW DIA2.4MM ORNG L CAP W/ NDL DISP RAD

## (undated) DEVICE — SUTURE MCRYL SZ 4-0 L27IN ABSRB UD L19MM PS-2 1/2 CIR PRIM Y426H

## (undated) DEVICE — (D)AGENT INJECTN ELEVIEW 10ML -- DISC BY MFG

## (undated) DEVICE — Z DISCONTINUED PER MEDLINE LINE GAS SAMPLING O2/CO2 LNG AD 13 FT NSL W/ TBNG FILTERLINE

## (undated) DEVICE — RETRIEVER ENDOSCP L230CM DIA2.5MM NET W3XL5.5CM MIN WRK CHN

## (undated) DEVICE — GUIDEWIRE ENDOSCP L150CM DIA0.035IN TIP L15CM BENT PTFE

## (undated) DEVICE — GLOVE SURG 7.5 11.7IN BEAD CUF LIGHT BRN SENSICARE LTX FREE

## (undated) DEVICE — SUTURE V-LOC 180 SZ 0 L9IN ABSRB GRN GS-21 L37MM 1/2 CIR VLOCL0346

## (undated) DEVICE — CONTAINER,SPECIMEN,4OZ,OR STRL: Brand: MEDLINE

## (undated) DEVICE — FIAPC® PROBE W/ FILTER 2200 A OD 2.3MM/6.9FR; L 2.2M/7.2FT: Brand: ERBE

## (undated) DEVICE — STRAINER URIN CALC RNL MSH -- CONVERT TO ITEM 357634

## (undated) DEVICE — FABRIC REINFORCED, SURGICAL GOWN, LG: Brand: CONVERTORS

## (undated) DEVICE — COVER LT HNDL PLAS RIG 1 PER PK

## (undated) DEVICE — AIRSEAL 8 MM ACCESS PORT AND LOW PROFILE OBTURATOR WITH BLADELESS OPTICAL TIP, 120 MM LENGTH: Brand: AIRSEAL

## (undated) DEVICE — CONTINU-FLO SOLUTION SET, 2 INJECTION SITES, MALE LUER LOCK ADAPTER WITH RETRACTABLE COLLAR, LARGE BORE STOPCOCK WITH ROTATING MALE LUER LOCK EXTENSION SET, 2 INJECTION SITES, MALE LUER LOCK ADAPTER WITH RETRACTABLE COLLAR: Brand: INTERLINK/CONTINU-FLO

## (undated) DEVICE — NEEDLE SPNL 22GA L3.5IN BLK HUB S STL REG WALL FIT STYL W/

## (undated) DEVICE — TUBE ST FLD CTRL AQUILEX INFLO --

## (undated) DEVICE — ELECTRODE PT RET AD L9FT HI MOIST COND ADH HYDRGEL CORDED

## (undated) DEVICE — TOTAL TRAY, DB, 100% SILI FOLEY, 16FR 10: Brand: MEDLINE

## (undated) DEVICE — KENDALL DL ECG CABLE AND LEAD WIRE SYSTEM, 3-LEAD, SINGLE PATIENT USE: Brand: KENDALL

## (undated) DEVICE — TOWEL 4 PLY TISS 19X30 SUE WHT

## (undated) DEVICE — SET SEALS HYSTEROSCOPE DISP -- MYOSURE  EA=10

## (undated) DEVICE — CATH IV AUTOGRD BC PNK 20GA 25 -- INSYTE

## (undated) DEVICE — SEAL UNIV 5-8MM DISP BX/10 -- DA VINCI XI - SNGL USE

## (undated) DEVICE — TRAP,MUCUS SPECIMEN, 80CC: Brand: MEDLINE

## (undated) DEVICE — BLADE ASSEMB CLP HAIR FINE --

## (undated) DEVICE — CATH IV AUTOGRD BC BLU 22GA 25 -- INSYTE

## (undated) DEVICE — PACK,BASIC,SIRUS,V: Brand: MEDLINE

## (undated) DEVICE — TAPE,CLOTH/SILK,CURAD,3"X10YD,LF,40/CS: Brand: CURAD

## (undated) DEVICE — ENDOSCOPIC KIT COMPLIANCE ENDOKIT

## (undated) DEVICE — REM POLYHESIVE ADULT PATIENT RETURN ELECTRODE: Brand: VALLEYLAB

## (undated) DEVICE — FORCEPS BX L240CM JAW DIA2.8MM L CAP W/ NDL MIC MESH TOOTH

## (undated) DEVICE — ELECTRO LUBE IS A SINGLE PATIENT USE DEVICE THAT IS INTENDED TO BE USED ON ELECTROSURGICAL ELECTRODES TO REDUCE STICKING.: Brand: KEY SURGICAL ELECTRO LUBE

## (undated) DEVICE — SNARE ENDOSCP M L240CM W27MM SHTH DIA2.4MM CHN 2.8MM OVL

## (undated) DEVICE — KIT,1200CC CANISTER,3/16"X6' TUBING: Brand: MEDLINE INDUSTRIES, INC.

## (undated) DEVICE — BASIN EMESIS 500CC ROSE 250/CS 60/PLT: Brand: MEDEGEN MEDICAL PRODUCTS, LLC

## (undated) DEVICE — GLOVE ORANGE PI 7   MSG9070

## (undated) DEVICE — 3M™ TEGADERM™ TRANSPARENT FILM DRESSING FRAME STYLE, 1624W, 2-3/8 IN X 2-3/4 IN (6 CM X 7 CM), 100/CT 4CT/CASE: Brand: 3M™ TEGADERM™

## (undated) DEVICE — CENTRAL LINE DRESSING CHANGE KIT: Brand: MEDLINE

## (undated) DEVICE — SET GRAV CK VLV NEEDLESS ST 3 GANGED 4WAY STPCOCK HI FLO 10

## (undated) DEVICE — TRI-LUMEN FILTERED TUBE SET WITH ACTIVATED CHARCOAL FILTER: Brand: AIRSEAL

## (undated) DEVICE — TRAP ENDOSCP POLYP 2 CHMBR DRAWER TYP

## (undated) DEVICE — TRAP SUC MUCOUS 70ML -- MEDICHOICE MEDLINE

## (undated) DEVICE — KENDALL RADIOLUCENT FOAM MONITORING ELECTRODE RECTANGULAR SHAPE: Brand: KENDALL

## (undated) DEVICE — FIAPC® PROBE W/ FILTER 2200 C OD 2.3MM/6.9FR; L 2.2M/7.2FT: Brand: ERBE

## (undated) DEVICE — TIP SUCT TRNSPAR RIB SURF STD BLB RIG NVENT W/ 5IN1 CONN DYND50138] MEDLINE INDUSTRIES INC]

## (undated) DEVICE — SOCK SPEC L9IN WHT UNIV W/ STD PRT FOR FLD MGMT

## (undated) DEVICE — VCARE SMALL, UTERINE MANIPULATOR, VAGINAL-CERVICAL-AHLUWALIA'S-RETRACTOR-ELEVATOR: Brand: VCARE

## (undated) DEVICE — SINGLE-USE POLYPECTOMY SNARE: Brand: CAPTIVATOR